# Patient Record
Sex: FEMALE | Race: WHITE | NOT HISPANIC OR LATINO | Employment: OTHER | URBAN - METROPOLITAN AREA
[De-identification: names, ages, dates, MRNs, and addresses within clinical notes are randomized per-mention and may not be internally consistent; named-entity substitution may affect disease eponyms.]

---

## 2020-12-11 ENCOUNTER — APPOINTMENT (OUTPATIENT)
Dept: CT IMAGING | Facility: HOSPITAL | Age: 75
End: 2020-12-11

## 2020-12-11 ENCOUNTER — HOSPITAL ENCOUNTER (EMERGENCY)
Facility: HOSPITAL | Age: 75
Discharge: HOME | End: 2020-12-11
Attending: EMERGENCY MEDICINE | Admitting: EMERGENCY MEDICINE

## 2020-12-11 VITALS
TEMPERATURE: 98.1 F | DIASTOLIC BLOOD PRESSURE: 96 MMHG | SYSTOLIC BLOOD PRESSURE: 138 MMHG | RESPIRATION RATE: 15 BRPM | BODY MASS INDEX: 29.88 KG/M2 | HEART RATE: 105 BPM | WEIGHT: 175 LBS | OXYGEN SATURATION: 95 % | HEIGHT: 64 IN

## 2020-12-11 DIAGNOSIS — S20.212A CHEST WALL CONTUSION, LEFT, INITIAL ENCOUNTER: ICD-10-CM

## 2020-12-11 DIAGNOSIS — S00.83XA FOREHEAD CONTUSION, INITIAL ENCOUNTER: Primary | ICD-10-CM

## 2020-12-11 PROCEDURE — 71250 CT THORAX DX C-: CPT

## 2020-12-11 PROCEDURE — 25010000002 ONDANSETRON PER 1 MG: Performed by: EMERGENCY MEDICINE

## 2020-12-11 PROCEDURE — 25010000002 MORPHINE PER 10 MG: Performed by: EMERGENCY MEDICINE

## 2020-12-11 PROCEDURE — 99283 EMERGENCY DEPT VISIT LOW MDM: CPT

## 2020-12-11 PROCEDURE — 96372 THER/PROPH/DIAG INJ SC/IM: CPT

## 2020-12-11 PROCEDURE — 70450 CT HEAD/BRAIN W/O DYE: CPT

## 2020-12-11 RX ORDER — HYDROCODONE BITARTRATE AND ACETAMINOPHEN 5; 325 MG/1; MG/1
1 TABLET ORAL EVERY 6 HOURS PRN
Qty: 12 TABLET | Refills: 0 | Status: SHIPPED | OUTPATIENT
Start: 2020-12-11 | End: 2022-07-12

## 2020-12-11 RX ORDER — MORPHINE SULFATE 4 MG/ML
4 INJECTION, SOLUTION INTRAMUSCULAR; INTRAVENOUS ONCE
Status: COMPLETED | OUTPATIENT
Start: 2020-12-11 | End: 2020-12-11

## 2020-12-11 RX ORDER — ONDANSETRON 2 MG/ML
4 INJECTION INTRAMUSCULAR; INTRAVENOUS ONCE
Status: COMPLETED | OUTPATIENT
Start: 2020-12-11 | End: 2020-12-11

## 2020-12-11 RX ADMIN — MORPHINE SULFATE 4 MG: 4 INJECTION INTRAVENOUS at 17:47

## 2020-12-11 RX ADMIN — ONDANSETRON 4 MG: 2 INJECTION, SOLUTION INTRAMUSCULAR; INTRAVENOUS at 17:47

## 2020-12-11 NOTE — ED NOTES
Pt reports she tripped over an uneven step on the walkway and fell. Pt reports pain 6/10.        Jyothi Henriquez, RN  12/11/20 2055

## 2020-12-12 NOTE — ED PROVIDER NOTES
Subjective   75-year-old female states she tripped and hit her forehead and left ribs.  She states it hurts to take a deep breath.  She reports no fever or chills.  She denies cough or hemoptysis.  She reports no abdominal pain nausea vomiting.  She reports no pain in the left upper quadrant.  He denies neck or back discomfort.  She denies paresthesias.  She reports no known novel coronavirus exposure          Review of Systems   Constitutional: Positive for fatigue. Negative for chills and fever.   HENT: Negative for sore throat and trouble swallowing.    Cardiovascular: Positive for chest pain. Negative for palpitations and leg swelling.   Gastrointestinal: Negative for abdominal pain and nausea.   Musculoskeletal: Negative for back pain and neck stiffness.   Hematological: Does not bruise/bleed easily.       No past medical history on file.  Patient has history of hypertension and has had previous episodes of pneumonia.  She states her tetanus status is up to date  No Known Allergies    No past surgical history on file.    No family history on file.    Social History     Socioeconomic History   • Marital status:      Spouse name: Not on file   • Number of children: Not on file   • Years of education: Not on file   • Highest education level: Not on file       No reported safety issues    Objective   Physical Exam  Alert Sarika Coma Scale 15   HEENT: Pupils equal and reactive to light. Conjunctivae are not injected. normal tympanic membranes. Oropharynx and nares are normal.  Fusion is noted to the left aspect of the forehead with a superficial abrasion there is no palpable fracture or step-off   Neck: Supple. Midline trachea. No JVD. No goiter.   Chest: Clear and equal breath sounds bilaterally regular rate and rhythm without murmur or rub.  There is diffuse discomfort mostly in the anterior axillary line in the lower third of the left ribs.  There is no subcutaneous air crepitance   Abdomen: Positive  bowel sounds nontender nondistended. No rebound or peritoneal signs. No CVA tenderness.   Extremities no clubbing cyanosis or edema motor sensory exam is normal the full range of motion is intact   skin: Warm and dry, no rashes or petechia.   Lymphatic: No regional lymphadenopathy. No calf pain, swelling or Corrine's sign    Procedures           ED Course                                   Labs Reviewed - No data to display  Medications   Morphine sulfate (PF) injection 4 mg (4 mg Intramuscular Given 12/11/20 1747)   ondansetron (ZOFRAN) injection 4 mg (4 mg Intramuscular Given 12/11/20 1747)     Ct Head Without Contrast    Result Date: 12/11/2020  1. Negative for evidence of intracranial injury. 2. Small left frontal scalp hematoma. 3. There is a 9 mm calcified structure in the medial left frontal area which may be intraparenchymal or extra-axial. It is considered incidental to the patient's history of trauma. It could be a meningioma but is not confirmed as such. Recommend consideration of nonemergent MR for further evaluation. 4. Minor white matter abnormality, most likely due to chronic small vessel ischemia.  Electronically Signed By-Kianna Bryant MD On:12/11/2020 6:10 PM This report was finalized on [id number] by  Kianna Bryant MD.    Ct Chest Without Contrast    Result Date: 12/11/2020  Impression: 1. Negative for evidence of active disease or injury to the chest. No rib fracture is identified. 2. There are a few small lung nodules. Based on Fleischner criteria, recommend consideration of CT follow-up in one year. 3. There is moderate chronic compression of T11. 4. There is mild cardiomegaly. There is coronary artery and aortic calcification.  Electronically Signed By-Kianna Bryant MD On:12/11/2020 6:20 PM This report was finalized on [id number] by  Kianna Bryant MD.            MDM  Number of Diagnoses or Management Options     Amount and/or Complexity of Data Reviewed  Tests in the radiology section of  CPT®: reviewed  Discuss the patient with other providers: yes  Independent visualization of images, tracings, or specimens: yes    Risk of Complications, Morbidity, and/or Mortality  Presenting problems: high  Diagnostic procedures: high  Management options: high  General comments: Stable ER course.  The patient is started on hydrocodone for pain.  She is encouraged to follow-up with her primary care provider.  The patient was stable at discharge and vocalized understanding of discharge instructions and warnings    Patient Progress  Patient progress: improved      Final diagnoses:   Forehead contusion, initial encounter   Chest wall contusion, left, initial encounter            Eagle Leroy MD  12/11/20 2030

## 2020-12-12 NOTE — DISCHARGE INSTRUCTIONS
No heavy lifting or pulling for the next 10 days  Rest elevate head next 24 hours  Cough and take a deep breath hourly while awake.  You can also use Tylenol or ibuprofen for discomfort

## 2020-12-31 ENCOUNTER — APPOINTMENT (OUTPATIENT)
Dept: GENERAL RADIOLOGY | Facility: HOSPITAL | Age: 75
End: 2020-12-31

## 2020-12-31 ENCOUNTER — APPOINTMENT (OUTPATIENT)
Dept: CT IMAGING | Facility: HOSPITAL | Age: 75
End: 2020-12-31

## 2020-12-31 ENCOUNTER — HOSPITAL ENCOUNTER (INPATIENT)
Facility: HOSPITAL | Age: 75
LOS: 1 days | Discharge: HOME | End: 2021-01-01
Attending: EMERGENCY MEDICINE | Admitting: HOSPITALIST

## 2020-12-31 DIAGNOSIS — K56.609 SMALL BOWEL OBSTRUCTION (HCC): ICD-10-CM

## 2020-12-31 DIAGNOSIS — R10.84 GENERALIZED ABDOMINAL PAIN: Primary | ICD-10-CM

## 2020-12-31 DIAGNOSIS — N39.0 URINARY TRACT INFECTION WITHOUT HEMATURIA, SITE UNSPECIFIED: ICD-10-CM

## 2020-12-31 PROBLEM — E03.9 HYPOTHYROIDISM: Status: ACTIVE | Noted: 2020-12-31

## 2020-12-31 PROBLEM — G89.29 CHRONIC BACK PAIN: Status: ACTIVE | Noted: 2020-12-31

## 2020-12-31 PROBLEM — M54.9 CHRONIC BACK PAIN: Status: ACTIVE | Noted: 2020-12-31

## 2020-12-31 PROBLEM — Z98.84 H/O GASTRIC BYPASS: Status: ACTIVE | Noted: 2020-12-31

## 2020-12-31 LAB
ALBUMIN SERPL-MCNC: 3.5 G/DL (ref 3.5–5.2)
ALBUMIN SERPL-MCNC: 3.7 G/DL (ref 3.5–5.2)
ALBUMIN/GLOB SERPL: 1.2 G/DL
ALBUMIN/GLOB SERPL: 1.2 G/DL
ALP SERPL-CCNC: 261 U/L (ref 39–117)
ALP SERPL-CCNC: 289 U/L (ref 39–117)
ALT SERPL W P-5'-P-CCNC: 12 U/L (ref 1–33)
ALT SERPL W P-5'-P-CCNC: 29 U/L (ref 1–33)
ANION GAP SERPL CALCULATED.3IONS-SCNC: 8 MMOL/L (ref 5–15)
ANION GAP SERPL CALCULATED.3IONS-SCNC: 9 MMOL/L (ref 5–15)
APTT PPP: 37.2 SECONDS (ref 24–31)
AST SERPL-CCNC: 19 U/L (ref 1–32)
AST SERPL-CCNC: 46 U/L (ref 1–32)
BACTERIA UR QL AUTO: ABNORMAL /HPF
BASOPHILS # BLD AUTO: 0 10*3/MM3 (ref 0–0.2)
BASOPHILS # BLD AUTO: 0 10*3/MM3 (ref 0–0.2)
BASOPHILS NFR BLD AUTO: 0.4 % (ref 0–1.5)
BASOPHILS NFR BLD AUTO: 0.6 % (ref 0–1.5)
BILIRUB SERPL-MCNC: 0.2 MG/DL (ref 0–1.2)
BILIRUB SERPL-MCNC: 0.3 MG/DL (ref 0–1.2)
BILIRUB UR QL STRIP: NEGATIVE
BUN SERPL-MCNC: 13 MG/DL (ref 8–23)
BUN SERPL-MCNC: 14 MG/DL (ref 8–23)
BUN/CREAT SERPL: 17.5 (ref 7–25)
BUN/CREAT SERPL: 17.6 (ref 7–25)
CALCIUM SPEC-SCNC: 8.2 MG/DL (ref 8.6–10.5)
CALCIUM SPEC-SCNC: 8.4 MG/DL (ref 8.6–10.5)
CHLORIDE SERPL-SCNC: 102 MMOL/L (ref 98–107)
CHLORIDE SERPL-SCNC: 102 MMOL/L (ref 98–107)
CLARITY UR: CLEAR
CO2 SERPL-SCNC: 26 MMOL/L (ref 22–29)
CO2 SERPL-SCNC: 28 MMOL/L (ref 22–29)
COLOR UR: YELLOW
CREAT SERPL-MCNC: 0.74 MG/DL (ref 0.57–1)
CREAT SERPL-MCNC: 0.8 MG/DL (ref 0.57–1)
D-LACTATE SERPL-SCNC: 0.3 MMOL/L (ref 0.5–2)
D-LACTATE SERPL-SCNC: 1.3 MMOL/L (ref 0.5–2)
DEPRECATED RDW RBC AUTO: 45.5 FL (ref 37–54)
DEPRECATED RDW RBC AUTO: 48.6 FL (ref 37–54)
EOSINOPHIL # BLD AUTO: 0.1 10*3/MM3 (ref 0–0.4)
EOSINOPHIL # BLD AUTO: 0.1 10*3/MM3 (ref 0–0.4)
EOSINOPHIL NFR BLD AUTO: 1.7 % (ref 0.3–6.2)
EOSINOPHIL NFR BLD AUTO: 1.9 % (ref 0.3–6.2)
ERYTHROCYTE [DISTWIDTH] IN BLOOD BY AUTOMATED COUNT: 14.2 % (ref 12.3–15.4)
ERYTHROCYTE [DISTWIDTH] IN BLOOD BY AUTOMATED COUNT: 14.8 % (ref 12.3–15.4)
GFR SERPL CREATININE-BSD FRML MDRD: 70 ML/MIN/1.73
GFR SERPL CREATININE-BSD FRML MDRD: 77 ML/MIN/1.73
GLOBULIN UR ELPH-MCNC: 2.9 GM/DL
GLOBULIN UR ELPH-MCNC: 3 GM/DL
GLUCOSE SERPL-MCNC: 135 MG/DL (ref 65–99)
GLUCOSE SERPL-MCNC: 92 MG/DL (ref 65–99)
GLUCOSE UR STRIP-MCNC: NEGATIVE MG/DL
HCT VFR BLD AUTO: 31.7 % (ref 34–46.6)
HCT VFR BLD AUTO: 34.8 % (ref 34–46.6)
HGB BLD-MCNC: 10.1 G/DL (ref 12–15.9)
HGB BLD-MCNC: 11 G/DL (ref 12–15.9)
HGB UR QL STRIP.AUTO: NEGATIVE
HOLD SPECIMEN: NORMAL
HYALINE CASTS UR QL AUTO: ABNORMAL /LPF
INR PPP: 1.5 (ref 0.93–1.1)
KETONES UR QL STRIP: NEGATIVE
LEUKOCYTE ESTERASE UR QL STRIP.AUTO: ABNORMAL
LIPASE SERPL-CCNC: 9 U/L (ref 13–60)
LYMPHOCYTES # BLD AUTO: 0.6 10*3/MM3 (ref 0.7–3.1)
LYMPHOCYTES # BLD AUTO: 0.8 10*3/MM3 (ref 0.7–3.1)
LYMPHOCYTES NFR BLD AUTO: 12.3 % (ref 19.6–45.3)
LYMPHOCYTES NFR BLD AUTO: 9.1 % (ref 19.6–45.3)
MAGNESIUM SERPL-MCNC: 2.3 MG/DL (ref 1.6–2.4)
MCH RBC QN AUTO: 29.2 PG (ref 26.6–33)
MCH RBC QN AUTO: 29.3 PG (ref 26.6–33)
MCHC RBC AUTO-ENTMCNC: 31.6 G/DL (ref 31.5–35.7)
MCHC RBC AUTO-ENTMCNC: 31.9 G/DL (ref 31.5–35.7)
MCV RBC AUTO: 91.7 FL (ref 79–97)
MCV RBC AUTO: 92.7 FL (ref 79–97)
MONOCYTES # BLD AUTO: 0.5 10*3/MM3 (ref 0.1–0.9)
MONOCYTES # BLD AUTO: 0.8 10*3/MM3 (ref 0.1–0.9)
MONOCYTES NFR BLD AUTO: 12.3 % (ref 5–12)
MONOCYTES NFR BLD AUTO: 7.6 % (ref 5–12)
NEUTROPHILS NFR BLD AUTO: 4.9 10*3/MM3 (ref 1.7–7)
NEUTROPHILS NFR BLD AUTO: 5.5 10*3/MM3 (ref 1.7–7)
NEUTROPHILS NFR BLD AUTO: 73.1 % (ref 42.7–76)
NEUTROPHILS NFR BLD AUTO: 81 % (ref 42.7–76)
NITRITE UR QL STRIP: POSITIVE
NRBC BLD AUTO-RTO: 0 /100 WBC (ref 0–0.2)
NRBC BLD AUTO-RTO: 0 /100 WBC (ref 0–0.2)
PH UR STRIP.AUTO: 5.5 [PH] (ref 5–8)
PLATELET # BLD AUTO: 329 10*3/MM3 (ref 140–450)
PLATELET # BLD AUTO: 361 10*3/MM3 (ref 140–450)
PMV BLD AUTO: 7.8 FL (ref 6–12)
PMV BLD AUTO: 7.8 FL (ref 6–12)
POTASSIUM SERPL-SCNC: 4.3 MMOL/L (ref 3.5–5.2)
POTASSIUM SERPL-SCNC: 4.3 MMOL/L (ref 3.5–5.2)
PROT SERPL-MCNC: 6.4 G/DL (ref 6–8.5)
PROT SERPL-MCNC: 6.7 G/DL (ref 6–8.5)
PROT UR QL STRIP: NEGATIVE
PROTHROMBIN TIME: 16.2 SECONDS (ref 9.6–11.7)
RBC # BLD AUTO: 3.46 10*6/MM3 (ref 3.77–5.28)
RBC # BLD AUTO: 3.75 10*6/MM3 (ref 3.77–5.28)
RBC # UR: ABNORMAL /HPF
REF LAB TEST METHOD: ABNORMAL
SARS-COV-2 ORF1AB RESP QL NAA+PROBE: NOT DETECTED
SODIUM SERPL-SCNC: 137 MMOL/L (ref 136–145)
SODIUM SERPL-SCNC: 138 MMOL/L (ref 136–145)
SP GR UR STRIP: 1.04 (ref 1–1.03)
SQUAMOUS #/AREA URNS HPF: ABNORMAL /HPF
TROPONIN T SERPL-MCNC: <0.01 NG/ML (ref 0–0.03)
UROBILINOGEN UR QL STRIP: ABNORMAL
WBC # BLD AUTO: 6.8 10*3/MM3 (ref 3.4–10.8)
WBC # BLD AUTO: 6.8 10*3/MM3 (ref 3.4–10.8)
WBC UR QL AUTO: ABNORMAL /HPF

## 2020-12-31 PROCEDURE — 83690 ASSAY OF LIPASE: CPT | Performed by: EMERGENCY MEDICINE

## 2020-12-31 PROCEDURE — 25010000002 MORPHINE PER 10 MG: Performed by: EMERGENCY MEDICINE

## 2020-12-31 PROCEDURE — 25010000002 PIPERACILLIN SOD-TAZOBACTAM PER 1 G: Performed by: EMERGENCY MEDICINE

## 2020-12-31 PROCEDURE — 83605 ASSAY OF LACTIC ACID: CPT

## 2020-12-31 PROCEDURE — 85025 COMPLETE CBC W/AUTO DIFF WBC: CPT | Performed by: EMERGENCY MEDICINE

## 2020-12-31 PROCEDURE — 0 IOPAMIDOL PER 1 ML: Performed by: EMERGENCY MEDICINE

## 2020-12-31 PROCEDURE — 81001 URINALYSIS AUTO W/SCOPE: CPT | Performed by: EMERGENCY MEDICINE

## 2020-12-31 PROCEDURE — 25010000002 CEFTRIAXONE PER 250 MG: Performed by: HOSPITALIST

## 2020-12-31 PROCEDURE — 84484 ASSAY OF TROPONIN QUANT: CPT | Performed by: EMERGENCY MEDICINE

## 2020-12-31 PROCEDURE — 74250 X-RAY XM SM INT 1CNTRST STD: CPT

## 2020-12-31 PROCEDURE — 99223 1ST HOSP IP/OBS HIGH 75: CPT | Performed by: HOSPITALIST

## 2020-12-31 PROCEDURE — 83735 ASSAY OF MAGNESIUM: CPT | Performed by: HOSPITALIST

## 2020-12-31 PROCEDURE — 74177 CT ABD & PELVIS W/CONTRAST: CPT

## 2020-12-31 PROCEDURE — U0004 COV-19 TEST NON-CDC HGH THRU: HCPCS | Performed by: INTERNAL MEDICINE

## 2020-12-31 PROCEDURE — 87086 URINE CULTURE/COLONY COUNT: CPT | Performed by: EMERGENCY MEDICINE

## 2020-12-31 PROCEDURE — 85025 COMPLETE CBC W/AUTO DIFF WBC: CPT | Performed by: HOSPITALIST

## 2020-12-31 PROCEDURE — 85730 THROMBOPLASTIN TIME PARTIAL: CPT | Performed by: EMERGENCY MEDICINE

## 2020-12-31 PROCEDURE — 99222 1ST HOSP IP/OBS MODERATE 55: CPT | Performed by: SURGERY

## 2020-12-31 PROCEDURE — 83605 ASSAY OF LACTIC ACID: CPT | Performed by: HOSPITALIST

## 2020-12-31 PROCEDURE — P9612 CATHETERIZE FOR URINE SPEC: HCPCS

## 2020-12-31 PROCEDURE — 93005 ELECTROCARDIOGRAM TRACING: CPT | Performed by: EMERGENCY MEDICINE

## 2020-12-31 PROCEDURE — 99285 EMERGENCY DEPT VISIT HI MDM: CPT

## 2020-12-31 PROCEDURE — 85610 PROTHROMBIN TIME: CPT | Performed by: EMERGENCY MEDICINE

## 2020-12-31 PROCEDURE — 25010000002 ONDANSETRON PER 1 MG: Performed by: EMERGENCY MEDICINE

## 2020-12-31 PROCEDURE — 87088 URINE BACTERIA CULTURE: CPT | Performed by: EMERGENCY MEDICINE

## 2020-12-31 PROCEDURE — 80053 COMPREHEN METABOLIC PANEL: CPT | Performed by: HOSPITALIST

## 2020-12-31 PROCEDURE — 87186 SC STD MICRODIL/AGAR DIL: CPT | Performed by: EMERGENCY MEDICINE

## 2020-12-31 PROCEDURE — 80053 COMPREHEN METABOLIC PANEL: CPT | Performed by: EMERGENCY MEDICINE

## 2020-12-31 RX ORDER — ONDANSETRON 2 MG/ML
4 INJECTION INTRAMUSCULAR; INTRAVENOUS ONCE
Status: COMPLETED | OUTPATIENT
Start: 2020-12-31 | End: 2020-12-31

## 2020-12-31 RX ORDER — LEVOTHYROXINE SODIUM 0.05 MG/1
50 TABLET ORAL DAILY
COMMUNITY
Start: 2020-09-09 | End: 2021-09-09

## 2020-12-31 RX ORDER — PANTOPRAZOLE SODIUM 40 MG/1
40 TABLET, DELAYED RELEASE ORAL DAILY
COMMUNITY
Start: 2020-09-03

## 2020-12-31 RX ORDER — ERGOCALCIFEROL 1.25 MG/1
50000 CAPSULE ORAL
COMMUNITY
Start: 2020-02-06 | End: 2021-02-05

## 2020-12-31 RX ORDER — FUROSEMIDE 20 MG/1
1-2 TABLET ORAL DAILY
COMMUNITY
Start: 2020-09-03

## 2020-12-31 RX ORDER — CITALOPRAM 40 MG/1
40 TABLET ORAL DAILY
COMMUNITY
Start: 2020-12-19 | End: 2021-12-19

## 2020-12-31 RX ORDER — SODIUM CHLORIDE 9 MG/ML
75 INJECTION, SOLUTION INTRAVENOUS CONTINUOUS
Status: DISCONTINUED | OUTPATIENT
Start: 2020-12-31 | End: 2021-01-01 | Stop reason: HOSPADM

## 2020-12-31 RX ORDER — SODIUM CHLORIDE 0.9 % (FLUSH) 0.9 %
10 SYRINGE (ML) INJECTION AS NEEDED
Status: DISCONTINUED | OUTPATIENT
Start: 2020-12-31 | End: 2021-01-01 | Stop reason: HOSPADM

## 2020-12-31 RX ORDER — LEVOTHYROXINE SODIUM 0.05 MG/1
50 TABLET ORAL
Status: DISCONTINUED | OUTPATIENT
Start: 2020-12-31 | End: 2021-01-01 | Stop reason: HOSPADM

## 2020-12-31 RX ORDER — METOPROLOL SUCCINATE 50 MG/1
100 TABLET, EXTENDED RELEASE ORAL DAILY
Status: DISCONTINUED | OUTPATIENT
Start: 2020-12-31 | End: 2021-01-01 | Stop reason: HOSPADM

## 2020-12-31 RX ORDER — MORPHINE SULFATE 4 MG/ML
4 INJECTION, SOLUTION INTRAMUSCULAR; INTRAVENOUS ONCE
Status: COMPLETED | OUTPATIENT
Start: 2020-12-31 | End: 2020-12-31

## 2020-12-31 RX ORDER — ALENDRONATE SODIUM 70 MG/1
70 TABLET ORAL
COMMUNITY
Start: 2020-08-21 | End: 2021-08-21

## 2020-12-31 RX ORDER — PANTOPRAZOLE SODIUM 40 MG/1
40 TABLET, DELAYED RELEASE ORAL DAILY
Status: DISCONTINUED | OUTPATIENT
Start: 2020-12-31 | End: 2021-01-01 | Stop reason: HOSPADM

## 2020-12-31 RX ORDER — METOPROLOL SUCCINATE 100 MG/1
100 TABLET, EXTENDED RELEASE ORAL DAILY
COMMUNITY
Start: 2020-05-03 | End: 2021-05-03

## 2020-12-31 RX ADMIN — LEVOTHYROXINE SODIUM 50 MCG: 0.05 TABLET ORAL at 10:38

## 2020-12-31 RX ADMIN — ONDANSETRON 4 MG: 2 INJECTION, SOLUTION INTRAMUSCULAR; INTRAVENOUS at 02:24

## 2020-12-31 RX ADMIN — CEFTRIAXONE SODIUM 1 G: 10 INJECTION, POWDER, FOR SOLUTION INTRAVENOUS at 09:47

## 2020-12-31 RX ADMIN — IOPAMIDOL 100 ML: 755 INJECTION, SOLUTION INTRAVENOUS at 03:41

## 2020-12-31 RX ADMIN — PIPERACILLIN AND TAZOBACTAM 3.38 G: 3; .375 INJECTION, POWDER, LYOPHILIZED, FOR SOLUTION INTRAVENOUS; PARENTERAL at 04:37

## 2020-12-31 RX ADMIN — SODIUM CHLORIDE 75 ML/HR: 9 INJECTION, SOLUTION INTRAVENOUS at 10:38

## 2020-12-31 RX ADMIN — MORPHINE SULFATE 4 MG: 4 INJECTION INTRAVENOUS at 02:24

## 2020-12-31 RX ADMIN — SODIUM CHLORIDE 500 ML: 9 INJECTION, SOLUTION INTRAVENOUS at 04:11

## 2020-12-31 RX ADMIN — PANTOPRAZOLE SODIUM 40 MG: 40 TABLET, DELAYED RELEASE ORAL at 10:38

## 2020-12-31 RX ADMIN — METOPROLOL SUCCINATE 100 MG: 50 TABLET, EXTENDED RELEASE ORAL at 10:38

## 2020-12-31 RX ADMIN — SODIUM CHLORIDE 500 ML: 9 INJECTION, SOLUTION INTRAVENOUS at 02:19

## 2021-01-01 VITALS
TEMPERATURE: 97.5 F | HEIGHT: 64 IN | DIASTOLIC BLOOD PRESSURE: 87 MMHG | HEART RATE: 119 BPM | WEIGHT: 183.42 LBS | RESPIRATION RATE: 18 BRPM | OXYGEN SATURATION: 94 % | BODY MASS INDEX: 31.31 KG/M2 | SYSTOLIC BLOOD PRESSURE: 132 MMHG

## 2021-01-01 PROBLEM — K43.9 VENTRAL HERNIA: Status: ACTIVE | Noted: 2021-01-01

## 2021-01-01 LAB
ALBUMIN SERPL-MCNC: 3.3 G/DL (ref 3.5–5.2)
ALBUMIN/GLOB SERPL: 1 G/DL
ALP SERPL-CCNC: 276 U/L (ref 39–117)
ALT SERPL W P-5'-P-CCNC: 23 U/L (ref 1–33)
ANION GAP SERPL CALCULATED.3IONS-SCNC: 6 MMOL/L (ref 5–15)
AST SERPL-CCNC: 35 U/L (ref 1–32)
BASOPHILS # BLD AUTO: 0 10*3/MM3 (ref 0–0.2)
BASOPHILS NFR BLD AUTO: 0.6 % (ref 0–1.5)
BILIRUB SERPL-MCNC: 0.4 MG/DL (ref 0–1.2)
BUN SERPL-MCNC: 12 MG/DL (ref 8–23)
BUN/CREAT SERPL: 16.4 (ref 7–25)
CALCIUM SPEC-SCNC: 8.3 MG/DL (ref 8.6–10.5)
CHLORIDE SERPL-SCNC: 105 MMOL/L (ref 98–107)
CO2 SERPL-SCNC: 28 MMOL/L (ref 22–29)
CREAT SERPL-MCNC: 0.73 MG/DL (ref 0.57–1)
CRP SERPL-MCNC: <0.3 MG/DL (ref 0–0.5)
D-LACTATE SERPL-SCNC: 0.9 MMOL/L (ref 0.5–2)
DEPRECATED RDW RBC AUTO: 47.7 FL (ref 37–54)
EOSINOPHIL # BLD AUTO: 0.2 10*3/MM3 (ref 0–0.4)
EOSINOPHIL NFR BLD AUTO: 3.6 % (ref 0.3–6.2)
ERYTHROCYTE [DISTWIDTH] IN BLOOD BY AUTOMATED COUNT: 14.6 % (ref 12.3–15.4)
GFR SERPL CREATININE-BSD FRML MDRD: 78 ML/MIN/1.73
GLOBULIN UR ELPH-MCNC: 3.2 GM/DL
GLUCOSE SERPL-MCNC: 85 MG/DL (ref 65–99)
HCT VFR BLD AUTO: 33.2 % (ref 34–46.6)
HGB BLD-MCNC: 10.4 G/DL (ref 12–15.9)
LYMPHOCYTES # BLD AUTO: 0.9 10*3/MM3 (ref 0.7–3.1)
LYMPHOCYTES NFR BLD AUTO: 19.1 % (ref 19.6–45.3)
MCH RBC QN AUTO: 29.1 PG (ref 26.6–33)
MCHC RBC AUTO-ENTMCNC: 31.2 G/DL (ref 31.5–35.7)
MCV RBC AUTO: 93 FL (ref 79–97)
MONOCYTES # BLD AUTO: 0.6 10*3/MM3 (ref 0.1–0.9)
MONOCYTES NFR BLD AUTO: 11.9 % (ref 5–12)
NEUTROPHILS NFR BLD AUTO: 3.1 10*3/MM3 (ref 1.7–7)
NEUTROPHILS NFR BLD AUTO: 64.8 % (ref 42.7–76)
NRBC BLD AUTO-RTO: 0.1 /100 WBC (ref 0–0.2)
PLATELET # BLD AUTO: 324 10*3/MM3 (ref 140–450)
PMV BLD AUTO: 7.9 FL (ref 6–12)
POTASSIUM SERPL-SCNC: 4.4 MMOL/L (ref 3.5–5.2)
PROT SERPL-MCNC: 6.5 G/DL (ref 6–8.5)
RBC # BLD AUTO: 3.56 10*6/MM3 (ref 3.77–5.28)
SODIUM SERPL-SCNC: 139 MMOL/L (ref 136–145)
WBC # BLD AUTO: 4.8 10*3/MM3 (ref 3.4–10.8)

## 2021-01-01 PROCEDURE — 85025 COMPLETE CBC W/AUTO DIFF WBC: CPT | Performed by: HOSPITALIST

## 2021-01-01 PROCEDURE — 99232 SBSQ HOSP IP/OBS MODERATE 35: CPT | Performed by: SURGERY

## 2021-01-01 PROCEDURE — 86140 C-REACTIVE PROTEIN: CPT | Performed by: HOSPITALIST

## 2021-01-01 PROCEDURE — 99238 HOSP IP/OBS DSCHRG MGMT 30/<: CPT | Performed by: HOSPITALIST

## 2021-01-01 PROCEDURE — 80053 COMPREHEN METABOLIC PANEL: CPT | Performed by: HOSPITALIST

## 2021-01-01 PROCEDURE — 83605 ASSAY OF LACTIC ACID: CPT | Performed by: HOSPITALIST

## 2021-01-01 RX ORDER — CEFDINIR 300 MG/1
300 CAPSULE ORAL 2 TIMES DAILY
Qty: 6 CAPSULE | Refills: 0 | Status: SHIPPED | OUTPATIENT
Start: 2021-01-01 | End: 2021-01-04

## 2021-01-01 RX ADMIN — LEVOTHYROXINE SODIUM 50 MCG: 0.05 TABLET ORAL at 05:01

## 2021-01-01 RX ADMIN — Medication 10 ML: at 08:39

## 2021-01-01 RX ADMIN — METOPROLOL SUCCINATE 100 MG: 50 TABLET, EXTENDED RELEASE ORAL at 08:38

## 2021-01-01 RX ADMIN — PANTOPRAZOLE SODIUM 40 MG: 40 TABLET, DELAYED RELEASE ORAL at 08:39

## 2021-01-01 NOTE — PROGRESS NOTES
GENERAL SURGERY PROGRESS NOTE    1/1/2021   LOS: 1 day   Patient Care Team:  Francine Hampton MD as PCP - General        Chief Complaint: Abdominal pain, nausea and vomiting    Subjective   HPI: Patient is a 75 y.o. female presents with sharp abdominal pain that radiated into her back and started yesterday and gradually got worse throughout the day.  She had some nausea and one bout of nonbloody nonbilious emesis.  She states that she had a bowel movement earlier yesterday, but it was firm.  She denies any fevers, chills, cough, congestion, chest pain.  Nothing seems to improve or worsen her pain which was severe at the time of her arriving at the emergency department, but has now improved substantially.  She denies any black or bloody stools.  Patient was found to have a ventral hernia, with concern for possible closed-loop bowel obstruction.  On physical exam the patient had a benign abdominal exam with a reducible ventral hernia and normal laboratory values.    Interval History:   The patient had a small bowel follow-through performed yesterday demonstrated no bowel obstruction.  This morning she is alert, feels well, and is hungry.  She has had multiple bowel movements following her small bowel follow-through.    Objective     Vital Signs  Temp:  [97.5 °F (36.4 °C)-98.5 °F (36.9 °C)] 97.5 °F (36.4 °C)  Heart Rate:  [111-119] 119  Resp:  [16-18] 18  BP: (100-132)/(68-87) 132/87    Physical Exam  Vitals signs reviewed.   Constitutional:       General: She is not in acute distress.     Appearance: She is obese. She is not ill-appearing.   HENT:      Head: Normocephalic and atraumatic.   Neck:      Musculoskeletal: Normal range of motion and neck supple.   Cardiovascular:      Rate and Rhythm: Normal rate and regular rhythm.   Pulmonary:      Effort: Pulmonary effort is normal. No respiratory distress.   Abdominal:      General: There is no distension.      Palpations: Abdomen is soft.      Tenderness: There is  no abdominal tenderness. There is no guarding or rebound.      Hernia: A hernia is present.   Musculoskeletal: Normal range of motion.         General: No swelling.   Skin:     General: Skin is warm and dry.   Neurological:      General: No focal deficit present.      Mental Status: She is alert and oriented to person, place, and time.   Psychiatric:         Mood and Affect: Mood normal.         Behavior: Behavior normal.         Thought Content: Thought content normal.         Judgment: Judgment normal.          Results Review:    Lab Results (last 24 hours)     Procedure Component Value Units Date/Time    C-reactive Protein [[id number]]  (Normal) Collected: 01/01/21 0403    Specimen: Blood Updated: 01/01/21 0549     C-Reactive Protein <0.30 mg/dL     Comprehensive Metabolic Panel [[id number]]  (Abnormal) Collected: 01/01/21 0403    Specimen: Blood Updated: 01/01/21 0449     Glucose 85 mg/dL      BUN 12 mg/dL      Creatinine 0.73 mg/dL      Sodium 139 mmol/L      Potassium 4.4 mmol/L      Chloride 105 mmol/L      CO2 28.0 mmol/L      Calcium 8.3 mg/dL      Total Protein 6.5 g/dL      Albumin 3.30 g/dL      ALT (SGPT) 23 U/L      AST (SGOT) 35 U/L      Alkaline Phosphatase 276 U/L      Total Bilirubin 0.4 mg/dL      eGFR Non African Amer 78 mL/min/1.73      Globulin 3.2 gm/dL      A/G Ratio 1.0 g/dL      BUN/Creatinine Ratio 16.4     Anion Gap 6.0 mmol/L     Narrative:      GFR Normal >60  Chronic Kidney Disease <60  Kidney Failure <15      Lactic Acid, Plasma [[id number]]  (Normal) Collected: 01/01/21 0403    Specimen: Blood Updated: 01/01/21 0436     Lactate 0.9 mmol/L     CBC & Differential [[id number]]  (Abnormal) Collected: 01/01/21 0403    Specimen: Blood Updated: 01/01/21 0424    Narrative:      The following orders were created for panel order CBC & Differential.  Procedure                               Abnormality         Status                     ---------                               -----------          ------                     CBC Auto Differential[[id number]]        Abnormal            Final result                 Please view results for these tests on the individual orders.    CBC Auto Differential [[id number]]  (Abnormal) Collected: 01/01/21 0403    Specimen: Blood Updated: 01/01/21 0424     WBC 4.80 10*3/mm3      RBC 3.56 10*6/mm3      Hemoglobin 10.4 g/dL      Hematocrit 33.2 %      MCV 93.0 fL      MCH 29.1 pg      MCHC 31.2 g/dL      RDW 14.6 %      RDW-SD 47.7 fl      MPV 7.9 fL      Platelets 324 10*3/mm3      Neutrophil % 64.8 %      Lymphocyte % 19.1 %      Monocyte % 11.9 %      Eosinophil % 3.6 %      Basophil % 0.6 %      Neutrophils, Absolute 3.10 10*3/mm3      Lymphocytes, Absolute 0.90 10*3/mm3      Monocytes, Absolute 0.60 10*3/mm3      Eosinophils, Absolute 0.20 10*3/mm3      Basophils, Absolute 0.00 10*3/mm3      nRBC 0.1 /100 WBC     Lactic Acid, Plasma [[id number]]  (Normal) Collected: 12/31/20 1542    Specimen: Blood Updated: 12/31/20 1609     Lactate 1.3 mmol/L     Comprehensive Metabolic Panel [[id number]]  (Abnormal) Collected: 12/31/20 1542    Specimen: Blood Updated: 12/31/20 1606     Glucose 92 mg/dL      BUN 13 mg/dL      Creatinine 0.74 mg/dL      Sodium 138 mmol/L      Potassium 4.3 mmol/L      Chloride 102 mmol/L      CO2 28.0 mmol/L      Calcium 8.2 mg/dL      Total Protein 6.7 g/dL      Albumin 3.70 g/dL      ALT (SGPT) 29 U/L      AST (SGOT) 46 U/L      Alkaline Phosphatase 289 U/L      Total Bilirubin 0.3 mg/dL      eGFR Non African Amer 77 mL/min/1.73      Globulin 3.0 gm/dL      A/G Ratio 1.2 g/dL      BUN/Creatinine Ratio 17.6     Anion Gap 8.0 mmol/L     Narrative:      GFR Normal >60  Chronic Kidney Disease <60  Kidney Failure <15      Magnesium [[id number]]  (Normal) Collected: 12/31/20 1542    Specimen: Blood Updated: 12/31/20 1606     Magnesium 2.3 mg/dL     CBC & Differential [[id number]]  (Abnormal) Collected: 12/31/20 1542    Specimen: Blood Updated: 12/31/20 1556     Narrative:      The following orders were created for panel order CBC & Differential.  Procedure                               Abnormality         Status                     ---------                               -----------         ------                     CBC Auto Differential[[id number]]        Abnormal            Final result                 Please view results for these tests on the individual orders.    CBC Auto Differential [[id number]]  (Abnormal) Collected: 12/31/20 1542    Specimen: Blood Updated: 12/31/20 1552     WBC 6.80 10*3/mm3      RBC 3.75 10*6/mm3      Hemoglobin 11.0 g/dL      Hematocrit 34.8 %      MCV 92.7 fL      MCH 29.3 pg      MCHC 31.6 g/dL      RDW 14.8 %      RDW-SD 48.6 fl      MPV 7.8 fL      Platelets 361 10*3/mm3      Neutrophil % 73.1 %      Lymphocyte % 12.3 %      Monocyte % 12.3 %      Eosinophil % 1.9 %      Basophil % 0.4 %      Neutrophils, Absolute 4.90 10*3/mm3      Lymphocytes, Absolute 0.80 10*3/mm3      Monocytes, Absolute 0.80 10*3/mm3      Eosinophils, Absolute 0.10 10*3/mm3      Basophils, Absolute 0.00 10*3/mm3      nRBC 0.0 /100 WBC     COVID PRE-OP / PRE-PROCEDURE SCREENING ORDER (NO ISOLATION) - Swab, Nasopharynx [[id number]]  (Normal) Collected: 12/31/20 0749    Specimen: Swab from Nasopharynx Updated: 12/31/20 1416    Narrative:      The following orders were created for panel order COVID PRE-OP / PRE-PROCEDURE SCREENING ORDER (NO ISOLATION) - Swab, Nasopharynx.  Procedure                               Abnormality         Status                     ---------                               -----------         ------                     COVID-19,APTIMA PANTHER,...[[id number]]  Normal              Final result                 Please view results for these tests on the individual orders.    COVID-19,[facility] IN-HOUSE, NP/OP SWAB IN UTM/VTM/[facility] TRANSPORT MEDIA,24 HR TAT - Swab, Nasopharynx [[id number]]  (Normal) Collected: 12/31/20 0749    Specimen: Swab from  Nasopharynx Updated: 12/31/20 1416     COVID19 Not Detected    Narrative:      Fact sheet for providers: https://www.fda.gov/media/261772/download     Fact sheet for patients: https://www.fda.gov/media/701193/download    Test performed by PCR.        Imaging Results (Last 24 Hours)     Procedure Component Value Units Date/Time    FL Small Bowel Follow Through Single-Contrast [[id number]] Collected: 12/31/20 1927     Updated: 12/31/20 1935    Narrative:      DATE OF EXAM:  12/31/2020 1:30 PM     PROCEDURE:  FL SMALL BOWEL FOLLOW THROUGH SINGLE-CONTRAST-     INDICATIONS:  Generalized abdominal pain, bowel obstruction secondary to a hernia.     COMPARISON:  CT of the abdomen and pelvis performed on 12/31/2020.     TECHNIQUE:    image of the abdomen was obtained. Patient ingested water-soluble  contrast for imaging of the small bowel.  Examination was recorded with  multiple large field of view radiographs and spot fluoroscopic images.     Fluoroscopic Time:   None used     FINDINGS:  The preliminary  KUB shows bowel sutures in the epigastric region  and left aspect of the abdomen. There is contrast material in the  bladder from yesterday's CT study. There are degenerative changes of the  thoracolumbar spine and scattered vascular calcifications.     The patient ingested water-soluble contrast. There is contrast seen  within the colon by 2 hours indicating no significant bowel obstruction.  The small bowel loops are nondilated. There is no abnormal fold  thickening. The patient has had previous gastric bypass surgery.        Impression:      No significant small bowel obstruction.     Electronically Signed By-Cyrus Beard MD On:12/31/2020 7:32 PM  This report was finalized on [id number] by  Cyrus Beard MD.           I have reviewed the above results and noted them below    Medication Review:    Current Facility-Administered Medications:   •  cefTRIAXone (ROCEPHIN) in SWFI 1 gram/10ml IV PUSH syringe, 1 g,  Intravenous, Q24H, Yonatan-Egziabher, Jhonny I, DO, 1 g at 12/31/20 0947  •  levothyroxine (SYNTHROID, LEVOTHROID) tablet 50 mcg, 50 mcg, Oral, Q AM, Yonatan-Egziabher, Jhonny I, DO, 50 mcg at 01/01/21 0501  •  metoprolol succinate XL (TOPROL-XL) 24 hr tablet 100 mg, 100 mg, Oral, Daily, Yonatan-Egziabher, Jhonny I, DO, 100 mg at 01/01/21 0838  •  pantoprazole (PROTONIX) EC tablet 40 mg, 40 mg, Oral, Daily, Yonatan-Egziabher, Jhonny I, DO, 40 mg at 01/01/21 0839  •  [COMPLETED] Insert peripheral IV, , , Once **AND** sodium chloride 0.9 % flush 10 mL, 10 mL, Intravenous, PRN, Dwayne Barahona MD, 10 mL at 01/01/21 0839  •  sodium chloride 0.9 % infusion, 75 mL/hr, Intravenous, Continuous, Yonatan-Egziabher, Jhonny I, DO, Last Rate: 75 mL/hr at 12/31/20 1038, 75 mL/hr at 12/31/20 1038    Assessment/Plan     Principal Problem:    SBO (small bowel obstruction) (CMS/HCC)  Active Problems:    Generalized abdominal pain    H/O gastric bypass    Hypothyroidism    Chronic back pain    UTI (urinary tract infection)    Small bowel follow-through noted.  No evidence of any obstruction.  Patient now having bowel function.  Patient is anticoagulated on Xarelto for atrial fibrillation, and thus would be high risk for bleeding in the perioperative setting.  Would recommend elective repair of her ventral hernia as an outpatient given the fact that she is not obstructed, and now otherwise feels well.  Discussed with the patient who is reluctant to undergo surgery.  Patient may eat a regular diet from a surgical standpoint and if tolerates is stable for discharge home.    Dwayne Vazquez MD  01/01/21  09:11 EST

## 2021-01-01 NOTE — DISCHARGE SUMMARY
[facility] Medicine Services  DISCHARGE SUMMARY        Prepared For PCP:  Francine Hampton MD    Patient Name: Fallon Nur  : 1945  MRN: [id number]      Date of Admission:   2020    Date of Discharge:  2021    Length of stay:  LOS: 1 day     Hospital Course     Presenting Problem:   Small bowel obstruction (CMS/HCC) [K56.609]  Generalized abdominal pain [R10.84]  Urinary tract infection without hematuria, site unspecified [N39.0]      Active Hospital Problems    Diagnosis  POA   • **SBO (small bowel obstruction) (CMS/HCC) [K56.609]  Yes     Priority: High   • Ventral hernia [K43.9]  Yes     Priority: High   • UTI (urinary tract infection) [N39.0]  Yes     Priority: High   • Generalized abdominal pain [R10.84]  Yes     Priority: Medium   • H/O gastric bypass [Z98.84]  Not Applicable     Priority: Medium   • Hypothyroidism [E03.9]  Yes     Priority: Medium   • Chronic back pain [M54.9, G89.29]  Yes     Priority: Medium      Resolved Hospital Problems   No resolved problems to display.           Hospital Course:    The patient was placed on observation.  She was evaluated by general surgeon.  The ventral hernia is reducible.  Small bowel follow-through did not show small bowel obstruction.  The patient has had multiple bowel movements overnight.  She will follow up with general surgeon in the outpatient setting for elective ventral hernia repair.  Xarelto will need to be stopped before she has surgery in the future.  The patient does have history of gastric ulcers and was told to avoid NSAIDs and alcohol.        Problem list addressed during hospitalization:      Small bowel obstruction: Resolved  -s/p CT abdomen/pelvis in ED--> suspected SBO.  Left fifth, sixth and seventh rib fracture.  Hepatic steatosis.  Hiatal hernia.  -General surgery consulted--> ventral hernia is reducible does no need to have surgical repair during hospitalization  -Small bowel  follow-through 12/31/20--> resolution of small bowel obstruction  -Follow-up with general surgeon for elective repair of ventral hernia     Urinary tract infection:   -s/p Rocephin  -Discharged on Omnicef    Left fifth, sixth and seventh rib fracture.    -Patient evaluated for fall on 12/11/20     Atrial fibrillation:  -On Toprol and Xarelto at home     Hypothyroidism:  -Synthroid     History of gastric bypass (1991) with revision complicated with ulcer:  -Denies NSAID use             Recommendation for Outpatient Providers:             Reasons For Change In Medications and Indications for New Medications:        Day of Discharge     HPI:     The patient is a 75-year-old female with history of gastric bypass with revision, gastric ulcers, chronic back pain on Norco, hypothyroidism, and atrial fibrillation on Xarelto.  Apparently the patient had a recent fall on 12/11/20 while ambulating outside and had recent aspiration of her left knee by her PCP.  She had come to the ED on 12/11/20 and had undergone CT of the head and chest.  It was significant for chronic T11 compression fracture and small pulmonary nodules and was sent home.     Apparently the patient has been having mostly epigastric intermittent abdominal pain with onset on 12/30/20 that became associated with one bout of vomiting before coming to the ED.  Her last bowel movement was yesterday.     The patient denies any fevers, chills, sweats, dysphagia, odontophagia, constipation or diarrhea     Last Xarelto dose was on 12/30/2020    Vital Signs:   Temp:  [97.5 °F (36.4 °C)-98.5 °F (36.9 °C)] 97.5 °F (36.4 °C)  Heart Rate:  [111-119] 119  Resp:  [16-18] 18  BP: (100-132)/(68-87) 132/87     Physical Exam:  Physical Exam  HENT:      Head: Normocephalic and atraumatic.      Mouth/Throat:      Mouth: Mucous membranes are moist.   Eyes:      General: No scleral icterus.     Extraocular Movements: Extraocular movements intact.      Pupils: Pupils are equal, round,  and reactive to light.   Neck:      Musculoskeletal: Normal range of motion.   Cardiovascular:      Rate and Rhythm: Normal rate and regular rhythm.   Pulmonary:      Effort: Pulmonary effort is normal.      Breath sounds: Normal breath sounds.   Abdominal:      General: Bowel sounds are normal.      Palpations: Abdomen is soft.   Musculoskeletal: Normal range of motion.   Skin:     General: Skin is warm.   Neurological:      General: No focal deficit present.      Mental Status: She is alert and oriented to person, place, and time.   Psychiatric:         Mood and Affect: Mood normal.         Pertinent  and/or Most Recent Results     Results from last 7 days   Lab Units 01/01/21 0403 12/31/20  1542 12/31/20  0236   WBC 10*3/mm3 4.80 6.80 6.80   HEMOGLOBIN g/dL 10.4* 11.0* 10.1*   HEMATOCRIT % 33.2* 34.8 31.7*   PLATELETS 10*3/mm3 324 361 329   SODIUM mmol/L 139 138 137   POTASSIUM mmol/L 4.4 4.3 4.3   CHLORIDE mmol/L 105 102 102   CO2 mmol/L 28.0 28.0 26.0   BUN mg/dL 12 13 14   CREATININE mg/dL 0.73 0.74 0.80   GLUCOSE mg/dL 85 92 135*   CALCIUM mg/dL 8.3* 8.2* 8.4*     Results from last 7 days   Lab Units 01/01/21 0403 12/31/20  1542 12/31/20  0236   BILIRUBIN mg/dL 0.4 0.3 0.2   ALK PHOS U/L 276* 289* 261*   ALT (SGPT) U/L 23 29 12   AST (SGOT) U/L 35* 46* 19   PROTIME Seconds  --   --  16.2*   INR   --   --  1.50*   APTT seconds  --   --  37.2*           Invalid input(s): TG, LDLCALC, LDLREALC  Results from last 7 days   Lab Units 01/01/21  0403 12/31/20  1542 12/31/20  0221 12/31/20  0218   TROPONIN T ng/mL  --   --   --  <0.010   LACTATE mmol/L 0.9 1.3 0.3*  --        Brief Urine Lab Results  (Last result in the past 365 days)      Color   Clarity   Blood   Leuk Est   Nitrite   Protein   CREAT   Urine HCG        12/31/20 0403 Yellow Clear Negative Small (1+) Positive Negative               Microbiology Results Abnormal     Procedure Component Value - Date/Time    Urine Culture - Urine, Urine, Catheter In/Out  [[id number]]  (Abnormal) Collected: 12/31/20 0403    Lab Status: Preliminary result Specimen: Urine, Catheter In/Out Updated: 01/01/21 1110     Urine Culture >100,000 CFU/mL Escherichia coli    COVID PRE-OP / PRE-PROCEDURE SCREENING ORDER (NO ISOLATION) - Swab, Nasopharynx [[id number]]  (Normal) Collected: 12/31/20 0749    Lab Status: Final result Specimen: Swab from Nasopharynx Updated: 12/31/20 1416    Narrative:      The following orders were created for panel order COVID PRE-OP / PRE-PROCEDURE SCREENING ORDER (NO ISOLATION) - Swab, Nasopharynx.  Procedure                               Abnormality         Status                     ---------                               -----------         ------                     COVID-19,APTIMA PANTHER,...[[id number]]  Normal              Final result                 Please view results for these tests on the individual orders.    COVID-19,APTIMA PANTHER,GERMÁN IN-HOUSE, NP/OP SWAB IN UTM/VTM/SALINE TRANSPORT MEDIA,24 HR TAT - Swab, Nasopharynx [[id number]]  (Normal) Collected: 12/31/20 0749    Lab Status: Final result Specimen: Swab from Nasopharynx Updated: 12/31/20 1416     COVID19 Not Detected    Narrative:      Fact sheet for providers: https://www.fda.gov/media/328010/download     Fact sheet for patients: https://www.fda.gov/media/283312/download    Test performed by PCR.          Ct Head Without Contrast    Result Date: 12/11/2020  Impression: 1. Negative for evidence of intracranial injury. 2. Small left frontal scalp hematoma. 3. There is a 9 mm calcified structure in the medial left frontal area which may be intraparenchymal or extra-axial. It is considered incidental to the patient's history of trauma. It could be a meningioma but is not confirmed as such. Recommend consideration of nonemergent MR for further evaluation. 4. Minor white matter abnormality, most likely due to chronic small vessel ischemia.  Electronically Signed By-Kianna Bryant MD On:12/11/2020 6:10 PM  This report was finalized on [id number] by  Kianna Bryant MD.    Ct Chest Without Contrast    Result Date: 12/11/2020  Impression: Impression: 1. Negative for evidence of active disease or injury to the chest. No rib fracture is identified. 2. There are a few small lung nodules. Based on Fleischner criteria, recommend consideration of CT follow-up in one year. 3. There is moderate chronic compression of T11. 4. There is mild cardiomegaly. There is coronary artery and aortic calcification.  Electronically Signed By-Kianna Bryant MD On:12/11/2020 6:20 PM This report was finalized on [id number] by  Kianna Bryant MD.    Ct Abdomen Pelvis With Contrast    Result Date: 12/31/2020  Impression: 1. Findings are consistent with a small bowel obstruction. This is a closed-loop small bowel obstruction. The distal zone of transition is likely related to an adhesion in the anterior abdomen. There is a fecal debris sign at the distal zone of transition. There is an additional proximal zone of transition related to an entrapped loop of small bowel within a right-sided periumbilical hernia. Surgical consultation is strongly recommended for repair as the patient would be at risk for developing small bowel ischemia. 2. Subtle fractures involving the left fifth, sixth and seventh ribs. 3. Cardiomegaly with coronary artery calcifications. 4. Small hiatal hernia. 5. Hepatic steatosis. 6. Additional incidental and chronic findings as above. Slot 63 Electronically signed by:  Kurt Mai M.D.  12/31/2020 1:59 AM    Fl Small Bowel Follow Through Single-contrast    Result Date: 12/31/2020  Impression: No significant small bowel obstruction.  Electronically Signed By-Cyrus Beard MD On:12/31/2020 7:32 PM This report was finalized on [id number] by  Cyrus Beard MD.                             Test Results Pending at Discharge  Pending Labs     Order Current Status    Urine Culture - Urine, Urine, Catheter In/Out Preliminary result             Procedures Performed           Consults:   Consults     Date and Time Order Name Status Description    12/31/2020 0407 Surgery (on-call MD unless specified) Completed     12/31/2020 0407 Hospitalist (on-call MD unless specified) Completed             Discharge Details        Discharge Medications      New Medications      Instructions Start Date   cefdinir 300 MG capsule  Commonly known as: OMNICEF   300 mg, Oral, 2 Times Daily         Continue These Medications      Instructions Start Date   alendronate 70 MG tablet  Commonly known as: FOSAMAX   70 mg, Oral, Every 7 Days      citalopram 40 MG tablet  Commonly known as: CeleXA   40 mg, Oral, Daily      furosemide 20 MG tablet  Commonly known as: LASIX   1-2 tablets, Oral, Daily      HYDROcodone-acetaminophen 5-325 MG per tablet  Commonly known as: NORCO   1 tablet, Oral, Every 6 Hours PRN      levothyroxine 50 MCG tablet  Commonly known as: SYNTHROID, LEVOTHROID   50 mcg, Oral, Daily      metoprolol succinate  MG 24 hr tablet  Commonly known as: TOPROL-XL   100 mg, Oral, Daily      pantoprazole 40 MG EC tablet  Commonly known as: PROTONIX   40 mg, Oral, Daily      vitamin D 1.25 MG (91790 UT) capsule capsule  Commonly known as: ERGOCALCIFEROL   50,000 Units, Oral, Every 7 Days             No Known Allergies      Discharge Disposition:      Diet:  Hospital:  Diet Order   Procedures   • Diet Regular         Discharge Activity: as tolerated        CODE STATUS:    Code Status and Medical Interventions:   Ordered at: 12/31/20 0903     Code Status:    CPR     Medical Interventions (Level of Support Prior to Arrest):    Full         Follow-up Appointments  No future appointments.    Additional Instructions for the Follow-ups that You Need to Schedule     Discharge Follow-up with PCP   As directed       Currently Documented PCP:    Francine Hampton MD    PCP Phone Number:    [phone]     Follow Up Details: 2 weeks                 Condition on  Discharge:      Stable      This patient has been examined wearing appropriate Personal Protective Equipment and discussed with nursing. 01/01/21      Electronically signed by Jhonny Junior DO, 01/01/21, 12:03 PM EST.      Time: I spent  15 minutes on this discharge activity which included face-to-face encounter with the patient/reviewing the data in the system/coordination of the care with the nursing staff as well as consultants/documentation/entering orders.

## 2021-01-01 NOTE — PROGRESS NOTES
Discharge Planning Assessment  [facility]     Patient Name: Fallon Nur  MRN: [id number]  Today's Date: 1/1/2021    Admit Date: 12/31/2020          Plan    Final Discharge Disposition Code  01 - home or self-care    Final Note  return home       Carol naegele rn  Case management  Office number [phone]  Cell phone [phone]

## 2021-01-01 NOTE — PLAN OF CARE
Goal Outcome Evaluation:  Plan of Care Reviewed With: patient  Progress: improving  Outcome Summary: patient rested well throughout the night.  up several times to the bathroom.  awaiting the result of her small bowel follow thru.  will continiue to monitor.

## 2021-01-02 LAB
BACTERIA SPEC AEROBE CULT: ABNORMAL
QT INTERVAL: 339 MS

## 2021-07-30 ENCOUNTER — OFFICE (OUTPATIENT)
Dept: URBAN - METROPOLITAN AREA CLINIC 64 | Facility: CLINIC | Age: 76
End: 2021-07-30

## 2021-07-30 VITALS
HEIGHT: 64 IN | WEIGHT: 184 LBS | SYSTOLIC BLOOD PRESSURE: 128 MMHG | HEART RATE: 82 BPM | DIASTOLIC BLOOD PRESSURE: 83 MMHG

## 2021-07-30 DIAGNOSIS — K30 FUNCTIONAL DYSPEPSIA: ICD-10-CM

## 2021-07-30 DIAGNOSIS — D50.0 IRON DEFICIENCY ANEMIA SECONDARY TO BLOOD LOSS (CHRONIC): ICD-10-CM

## 2021-07-30 PROCEDURE — 99203 OFFICE O/P NEW LOW 30 MIN: CPT | Performed by: INTERNAL MEDICINE

## 2021-07-30 RX ORDER — SUCRALFATE 1 G/1
TABLET ORAL
Qty: 0 | Refills: 0 | Status: ACTIVE

## 2021-08-17 ENCOUNTER — APPOINTMENT (OUTPATIENT)
Dept: GENERAL RADIOLOGY | Facility: HOSPITAL | Age: 76
End: 2021-08-17

## 2021-08-17 ENCOUNTER — HOSPITAL ENCOUNTER (EMERGENCY)
Facility: HOSPITAL | Age: 76
Discharge: HOME | End: 2021-08-17
Admitting: EMERGENCY MEDICINE

## 2021-08-17 VITALS
DIASTOLIC BLOOD PRESSURE: 85 MMHG | OXYGEN SATURATION: 92 % | WEIGHT: 191.36 LBS | RESPIRATION RATE: 18 BRPM | HEIGHT: 64 IN | SYSTOLIC BLOOD PRESSURE: 139 MMHG | TEMPERATURE: 98 F | HEART RATE: 74 BPM | BODY MASS INDEX: 32.67 KG/M2

## 2021-08-17 DIAGNOSIS — S80.01XA CONTUSION OF RIGHT KNEE, INITIAL ENCOUNTER: Primary | ICD-10-CM

## 2021-08-17 PROCEDURE — 99283 EMERGENCY DEPT VISIT LOW MDM: CPT

## 2021-08-17 PROCEDURE — 73564 X-RAY EXAM KNEE 4 OR MORE: CPT

## 2021-08-17 PROCEDURE — 63710000001 ONDANSETRON ODT 4 MG TABLET DISPERSIBLE: Performed by: NURSE PRACTITIONER

## 2021-08-17 RX ORDER — HYDROCODONE BITARTRATE AND ACETAMINOPHEN 7.5; 325 MG/1; MG/1
1 TABLET ORAL ONCE
Status: COMPLETED | OUTPATIENT
Start: 2021-08-17 | End: 2021-08-17

## 2021-08-17 RX ORDER — ONDANSETRON 4 MG/1
4 TABLET, ORALLY DISINTEGRATING ORAL ONCE
Status: COMPLETED | OUTPATIENT
Start: 2021-08-17 | End: 2021-08-17

## 2021-08-17 RX ADMIN — HYDROCODONE BITARTRATE AND ACETAMINOPHEN 1 TABLET: 7.5; 325 TABLET ORAL at 17:55

## 2021-08-17 RX ADMIN — ONDANSETRON 4 MG: 4 TABLET, ORALLY DISINTEGRATING ORAL at 17:55

## 2021-08-17 NOTE — ED PROVIDER NOTES
"Subjective   75-year-old female presents with complaint of right patellar pain status post mechanical fall.  Patient stated \"I tripped over my daughter's Eduardo lift.  I take care of her because she has CP\".  She reports a history of osteoarthritis.  Denies previous fracture.  She reports that she chronically gets injections in the knee by her primary care physician, and she has seen orthopedist, Dr. Mckeon.  Patient reports that she takes Xarelto for A. fib.    1. Location: Right patella  2. Quality: Sore  3. Severity: Moderate  4. Worsening factors: Bending, weightbearing  5. Alleviating factors: Rest  6. Onset: Prior to arrival  7. Radiation: Denies  8. Frequency: Intermittent  9. Co-morbidities: Past Medical History:  No date: Atrial fibrillation (CMS/HCC)  No date: Histoplasmosis  No date: Hypertension  No date: Legally blind  10. Source: Patient            Review of Systems   Musculoskeletal: Positive for arthralgias and gait problem. Negative for myalgias.   Skin: Negative for color change, pallor, rash and wound.   Neurological: Negative for weakness and numbness.   Hematological: Does not bruise/bleed easily.   All other systems reviewed and are negative.      Past Medical History:   Diagnosis Date   • Atrial fibrillation (CMS/HCC)    • Histoplasmosis    • Hypertension    • Legally blind        No Known Allergies    No past surgical history on file.    Family History   Problem Relation Age of Onset   • Cancer Mother    • Dementia Father    • Cancer Sister        Social History     Socioeconomic History   • Marital status:      Spouse name: Not on file   • Number of children: Not on file   • Years of education: Not on file   • Highest education level: Not on file   Tobacco Use   • Smoking status: Former Smoker   • Smokeless tobacco: Never Used   Vaping Use   • Vaping Use: Never used   Substance and Sexual Activity   • Alcohol use: Not Currently   • Drug use: Not Currently   • Sexual activity: Defer "           Objective   Physical Exam  Vitals and nursing note reviewed.   Constitutional:       General: She is awake. She is not in acute distress.     Appearance: Normal appearance. She is well-developed. She is obese. She is not toxic-appearing.   Cardiovascular:      Pulses: Normal pulses.           Dorsalis pedis pulses are 2+ on the right side and 2+ on the left side.        Posterior tibial pulses are 2+ on the right side and 2+ on the left side.   Musculoskeletal:         General: Swelling, tenderness and signs of injury present. No deformity.      Right knee: Swelling, ecchymosis and bony tenderness present. No erythema. Decreased range of motion. No tenderness. Normal pulse.      Left knee: Normal.        Legs:       Comments: Pain noted directly over the patella with moderate ecchymosis and mild swelling.  No obvious deformity noted.  Pain is increased with bending the knee.  Motor function and sensation intact.  Cap refill less than 2 seconds.  Distal pulses strong and equal bilaterally 2+.   Skin:     General: Skin is warm and dry.      Capillary Refill: Capillary refill takes less than 2 seconds.      Coloration: Skin is not pale.      Findings: Bruising present.   Neurological:      Mental Status: She is alert and oriented to person, place, and time.      Sensory: No sensory deficit.      Motor: No abnormal muscle tone.   Psychiatric:         Mood and Affect: Mood normal.         Behavior: Behavior normal. Behavior is cooperative.         Thought Content: Thought content normal.         Judgment: Judgment normal.         Procedures           ED Course    XR Knee 4+ View Right    Result Date: 8/17/2021  No acute osseous abnormality. A joint effusion is present with prepatellar soft tissue swelling which may be related to contusion. Internal derangement cannot be excluded. Moderate tricompartmental osteoarthritic changes are present, most pronounced within the patellofemoral compartment.  Electronically  "Signed By-Aspen Gant MD On:8/17/2021 5:56 PM This report was finalized on [id number] by  Aspen Gant MD.    Medications   HYDROcodone-acetaminophen (NORCO) 7.5-325 MG per tablet 1 tablet (1 tablet Oral Given 8/17/21 1755)   ondansetron ODT (ZOFRAN-ODT) disintegrating tablet 4 mg (4 mg Oral Given 8/17/21 1755)     Labs Reviewed - No data to display                                         MDM  Number of Diagnoses or Management Options  Contusion of right knee, initial encounter  Diagnosis management comments: Chart Review: 8/11/21 patient was seen by cardiologist for routine f/u.   Comorbidity: Past Medical History:  No date: Atrial fibrillation (CMS/HCC)  No date: Histoplasmosis  No date: Hypertension  No date: Legally blind  Imaging: Was interpreted by physician and reviewed by myself: XR Knee 4+ View Right   Final Result    No acute osseous abnormality. A joint effusion is present with    prepatellar soft tissue swelling which may be related to contusion.    Internal derangement cannot be excluded. Moderate tricompartmental    osteoarthritic changes are present, most pronounced within the    patellofemoral compartment.         Electronically Signed By-Aspen Gant MD On:8/17/2021 5:56 PM    This report was finalized on [id number] by  Aspen Gant MD.    Patient undressed and placed in gown for exam.  Appropriate PPE worn during patient exam. 75-year-old female presents with complaint of right patellar pain status post mechanical fall.  Patient stated \"I tripped over my daughter's Eduardo lift.  I take care of her because she has CP\".  She reports a history of osteoarthritis.  Denies previous fracture.  She reports that she chronically gets injections in the knee by her primary care physician, and she has seen orthopedist, Dr. Mckeon.  Patient reports that she takes Xarelto for A. Fib.Pain noted directly over the patella with moderate ecchymosis and mild swelling.  No obvious deformity noted.  Pain is " increased with bending the knee.  Motor function and sensation intact.  Cap refill less than 2 seconds.  Distal pulses strong and equal bilaterally 2+. Ice pack applied.  Patient was given Norco 7.5/325 p.o. x1 and Zofran 4 mg ODT.  X-ray obtained to the right knee with the above findings.  No acute findings on imaging.  Patient had an knee immobilizer applied.  She has a walker with her.  She requested Dr. Woo as her orthopedist.  She reports relief with medications given.    Disposition/Treatment: Discussed results with patient, verbalized understanding.  Discussed reasons to return to the ER, patient verbalized understanding.  Agreeable with plan of care.  Patient was stable upon discharge.    EMR Dragon transcription disclaimer:  Some of this encounter note is an electronic transcription translation of spoken language to printed text. The electronic translation of spoken language may permit erroneous, or at times, nonsensical words or phrases to be inadvertently transcribed; Although I have reviewed the note for such errors some may still exist.              Amount and/or Complexity of Data Reviewed  Tests in the radiology section of CPT®: reviewed    Patient Progress  Patient progress: stable      Final diagnoses:   Contusion of right knee, initial encounter       ED Disposition  ED Disposition     ED Disposition Condition Comment    Discharge Stable           Francine Hampton MD  [address] [place] [id number] [phone]    Schedule an appointment as soon as possible for a visit       Frederick Woo MD  [address] [facility] [place] [id number] [phone]    Schedule an appointment as soon as possible for a visit       [facility] EMERGENCY DEPARTMENT  [facility] [facility] [address] [phone]  Go to   If symptoms worsen         Medication List      No changes were made to your prescriptions during this visit.          Lucy Terry, APRN  08/17/21  1857

## 2021-08-17 NOTE — ED NOTES
Was using berna lift to get daughter up and tripped. Fell landing on her right knee.      Tonya Rudolph RN  08/17/21 0310

## 2021-08-17 NOTE — DISCHARGE INSTRUCTIONS
Apply ice every 2 hours while awake, on for 20 minutes.  Rotate Tylenol Motrin for pain.  Wear knee immobilizer and use walker as directed.  Schedule follow-up with orthopedist of your choice.  Return to the ER for new or worsening symptoms.

## 2022-07-12 ENCOUNTER — APPOINTMENT (OUTPATIENT)
Dept: GENERAL RADIOLOGY | Facility: HOSPITAL | Age: 77
End: 2022-07-12

## 2022-07-12 ENCOUNTER — HOSPITAL ENCOUNTER (EMERGENCY)
Facility: HOSPITAL | Age: 77
Discharge: HOME | End: 2022-07-12
Admitting: EMERGENCY MEDICINE

## 2022-07-12 VITALS
DIASTOLIC BLOOD PRESSURE: 72 MMHG | WEIGHT: 170 LBS | OXYGEN SATURATION: 100 % | HEIGHT: 60 IN | HEART RATE: 112 BPM | BODY MASS INDEX: 33.38 KG/M2 | RESPIRATION RATE: 18 BRPM | SYSTOLIC BLOOD PRESSURE: 123 MMHG | TEMPERATURE: 98.3 F

## 2022-07-12 DIAGNOSIS — S70.01XA CONTUSION OF RIGHT HIP, INITIAL ENCOUNTER: ICD-10-CM

## 2022-07-12 DIAGNOSIS — M25.462 EFFUSION, LEFT KNEE: ICD-10-CM
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
## 2022-07-12 PROCEDURE — 73521 X-RAY EXAM HIPS BI 2 VIEWS: CPT

## 2022-07-12 PROCEDURE — 72110 X-RAY EXAM L-2 SPINE 4/>VWS: CPT

## 2022-07-12 PROCEDURE — 73562 X-RAY EXAM OF KNEE 3: CPT

## 2022-07-12 PROCEDURE — 99283 EMERGENCY DEPT VISIT LOW MDM: CPT

## 2022-07-12 RX ORDER — HYDROCODONE BITARTRATE AND ACETAMINOPHEN 5; 325 MG/1; MG/1
1 TABLET ORAL EVERY 6 HOURS PRN
Qty: 8 TABLET | Refills: 0 | Status: SHIPPED | OUTPATIENT
Start: 2022-07-12

## 2022-07-12 RX ORDER — DIGOXIN 125 MCG
125 TABLET ORAL
COMMUNITY

## 2022-07-12 RX ORDER — DIGOXIN 125 MCG
125 TABLET ORAL ONCE
Status: COMPLETED | OUTPATIENT
Start: 2022-07-12 | End: 2022-07-12

## 2022-07-12 RX ADMIN — DIGOXIN 125 MCG: 125 TABLET ORAL at 14:53

## 2022-07-12 NOTE — ED PROVIDER NOTES
Subjective   Chief complaint: fall      Context: Patient is a 76-year-old female who comes in with her daughter after she fell yesterday and landed on her buttocks.  She states she has a history of chronic falls and gait abnormalities and typically uses a cane or walker.  She denies any syncope chest pain shortness of breath.  She does have some low back right hip and left knee pain although no direct trauma to her knee.  Has been ambulatory and weightbearing since then.  Denies striking her head or any loss of consciousness.  No saddle anesthesia bowel or bladder incontinence or retention.  Notably she did not take her Lasix or diuretics today but reports she did have them yesterday      Location: As above  Duration: Yesterday  Timing: Waxes and wanes  Quality/Severity: Moderate  Modifying factors: Worse with weightbearing range of motion  Associated symptoms:        Additional hx provided by:  daughter    PCP:  cory               Review of Systems   Constitutional: Negative for fever.   HENT: Negative.    Respiratory: Negative.    Cardiovascular: Negative.    Gastrointestinal: Negative.    Genitourinary: Negative.    Musculoskeletal: Positive for arthralgias and back pain.   Skin: Negative for rash.   Allergic/Immunologic: Negative for immunocompromised state.   Neurological: Negative for dizziness and syncope.   Hematological: Does not bruise/bleed easily.   Psychiatric/Behavioral: Negative for confusion.       Past Medical History:   Diagnosis Date   • Atrial fibrillation (HCC)    • Histoplasmosis    • Hypertension    • Legally blind        No Known Allergies    History reviewed. No pertinent surgical history.    Family History   Problem Relation Age of Onset   • Cancer Mother    • Dementia Father    • Cancer Sister        Social History     Socioeconomic History   • Marital status:    Tobacco Use   • Smoking status: Former Smoker   • Smokeless tobacco: Never Used   Vaping Use   • Vaping Use: Never used    Substance and Sexual Activity   • Alcohol use: Not Currently   • Drug use: Not Currently   • Sexual activity: Defer           Objective   Physical Exam     Vital signs and traige nurse note reviewed.  Constitutional:  Awake, alert; well developed and well nourished.  No acute distress, the patient is examined in hospital gown.  HEENT:  Normocephalic, atraumatic; pupils   with intact EOM; oropharynx is pink and moist without edema or erythema.  Neck: Supple, full range of motion without pain;    Cardiovascular: Irregularly irregular tachycardic rate and rhythm   Pulmonary: Respiratory effort regular, nonlabored; breath sounds CTA without wheezing or rhonchi.  Abdomen: Soft, nontender, nondistended with normoactive bowel sounds; no rebound or guarding.    Musculoskeletal: Independent range of motion of all extremities; +3 DP pulses bilaterally, negative Corrine bilaterally.  There is some swelling without erythema or ecchymosis noted to the left knee without laxity noted.  Back:  Spine is midline and without midline tenderness on palpation of cervical, thoracic; some midline lower lumbar spine pain without any bony step-off crepitus or noted ecchymosis; paraspinal musculature is nontender and without soft tissue swelling, overlying ecchymosis or erythema. ROM is without pain,  Distal muscle strength is 5/5.  Stable pelvic rock.  Neuro: Alert oriented x3, speech is clear and appropriate.  negative Babinski BLE, negative straight leg raise BLE, strong and equal dorsiflexion of bilateral great toes L4, L5, S1 motor sensory intact.        Procedures           ED Course      Labs Reviewed - No data to display  Medications   digoxin (LANOXIN) tablet 125 mcg (125 mcg Oral Given 7/12/22 1453)     XR Knee 3 View Left    Result Date: 7/12/2022    1.  Severe degenerative change of the medial joint compartment of the left knee.  This has worsened from prior exam. 2.  Small joint effusion. 3.  No acute fracture or dislocation  identified.  Electronically Signed By-Darrell Rodriguez MD On:7/12/2022 3:36 PM This report was finalized on [id number] by  Darrell Rodriguez MD.    XR Spine Lumbar Complete 4+VW    Result Date: 7/12/2022  No acute fracture or traumatic malalignment identified.  Electronically Signed By-Boni Carlson MD On:7/12/2022 3:36 PM This report was finalized on [id number] by  Boni Carlson MD.    XR Hips Bilateral With or Without Pelvis 2 View    Result Date: 7/12/2022   1.  Osteopenia and degenerative change of both hips.  No acute bony abnormality.  Electronically Signed By-Darrell Rodriguez MD On:7/12/2022 3:35 PM This report was finalized on [id number] by  Darrell Rodriguez MD.    Prior to Admission medications    Medication Sig Start Date End Date Taking? Authorizing Provider   citalopram (CeleXA) 40 MG tablet Take 40 mg by mouth Daily. 12/19/20 12/19/21  Mel Brower MD   digoxin (LANOXIN) 125 MCG tablet Take 125 mcg by mouth Daily.    ProviderMel MD   furosemide (LASIX) 20 MG tablet Take 1-2 tablets by mouth Daily. 9/3/20   Mel Borwer MD   HYDROcodone-acetaminophen (NORCO) 5-325 MG per tablet Take 1 tablet by mouth Every 6 (Six) Hours As Needed for Moderate Pain . 7/12/22   Erna Aguilar APRN   levothyroxine (SYNTHROID, LEVOTHROID) 50 MCG tablet Take 50 mcg by mouth Daily. 9/9/20 9/9/21  Mel Brower MD   metoprolol succinate XL (TOPROL-XL) 100 MG 24 hr tablet Take 100 mg by mouth Daily. 5/3/20 5/3/21  Mel Brower MD   pantoprazole (PROTONIX) 40 MG EC tablet Take 40 mg by mouth Daily. 9/3/20   Mel Brower MD   HYDROcodone-acetaminophen (NORCO) 5-325 MG per tablet Take 1 tablet by mouth Every 6 (Six) Hours As Needed for Moderate Pain  or Severe Pain . 12/11/20 7/12/22  Eagle Leroy MD                                          MDM  Number of Diagnoses or Management Options  Contusion of right hip, initial encounter  Effusion, left knee  Fall,  "initial encounter  Diagnosis management comments: /72   Pulse 112   Temp 98.3 °F (36.8 °C)   Resp 18   Ht 152.4 cm (60\")   Wt 77.1 kg (170 lb)   SpO2 100%   BMI 33.20 kg/m²      Inspect reviewed patient last had 20 tablets of Norco filled on June 13      Comorbidities:  has a past medical history of Atrial fibrillation (HCC), Histoplasmosis, Hypertension, and Legally blind.  Differentials:   not all inclusive of differentials considered  Radiology interpretation:  X-rays reviewed by me and interpreted by radiologist,   XR Knee 3 View Left    Result Date: 7/12/2022    1.  Severe degenerative change of the medial joint compartment of the left knee.  This has worsened from prior exam. 2.  Small joint effusion. 3.  No acute fracture or dislocation identified.  Electronically Signed By-Darrell Rodriguez MD On:7/12/2022 3:36 PM This report was finalized on [id number] by  Darrell Rodriguez MD.    XR Spine Lumbar Complete 4+VW    Result Date: 7/12/2022  No acute fracture or traumatic malalignment identified.  Electronically Signed By-Boni Carlson MD On:7/12/2022 3:36 PM This report was finalized on [id number] by  Boni Carlson MD.    XR Hips Bilateral With or Without Pelvis 2 View    Result Date: 7/12/2022   1.  Osteopenia and degenerative change of both hips.  No acute bony abnormality.  Electronically Signed By-Darrell Rodriguez MD On:7/12/2022 3:35 PM This report was finalized on [id number] by  Darrell Rodriguez MD.      Appropriate PPE worn during exam.  Patient was given her missed dose of digoxin.  On reexam resting comfortably with daughter at bedside.  She states she has a cane and walker at home and is ready to go home and follow-up with her orthopedist    i discussed findings with patient who voices understanding of discharge instructions, signs and symptoms requiring return to ED; discharged improved and in stable condition with follow up for re-evaluation.  This document is intended for " medical expert use only. Reading of this document by patients and/or patient's family without participating medical staff guidance may result in misinterpretation and unintended morbidity.  Any interpretation of such data is the responsibility of the patient and/or family member responsible for the patient in concert with their primary or specialist providers, not to be left for sources of online searches such as [employer], [employer] or similar queries. Relying on these approaches to knowledge may result in misinterpretation, misguided goals of care and even death should patients or family members try recommendations outside of the realm of professional medical care in a supervised inpatient environment.         Final diagnoses:   Fall, initial encounter   Effusion, left knee   Contusion of right hip, initial encounter       ED Disposition  ED Disposition     ED Disposition   Discharge    Condition   Stable    Comment   --             Francine Hampton MD  [address] [place] [address] [phone]               Medication List      Changed    HYDROcodone-acetaminophen 5-325 MG per tablet  Commonly known as: NORCO  Take 1 tablet by mouth Every 6 (Six) Hours As Needed for Moderate Pain .  What changed: reasons to take this           Where to Get Your Medications      These medications were sent to [id number] - [place], [place] - [address] AT [facility] & [place] - [phone]  - [phone] FX  [address] [facility] [place] [address]    Phone: [phone]   · HYDROcodone-acetaminophen 5-325 MG per tablet          Erna Aguilar, APRN  07/12/22 5686

## 2022-07-12 NOTE — DISCHARGE INSTRUCTIONS
Elevate your knee, use ice 20 as a time several times a day.  Medications as directed follow-up pharmacy precautions and warnings regarding your new prescriptions.  Follow-up with your orthopedist.  Return for any new or worsening symptoms

## 2023-05-19 ENCOUNTER — HOSPITAL ENCOUNTER (EMERGENCY)
Facility: HOSPITAL | Age: 78
Discharge: HOME | End: 2023-05-19
Attending: EMERGENCY MEDICINE
Payer: MEDICARE

## 2023-05-19 DIAGNOSIS — S61.411A LACERATION OF RIGHT HAND WITHOUT FOREIGN BODY, INITIAL ENCOUNTER: Primary | ICD-10-CM

## 2023-05-19 PROCEDURE — 90471 IMMUNIZATION ADMIN: CPT | Performed by: NURSE PRACTITIONER

## 2023-05-19 PROCEDURE — 99282 EMERGENCY DEPT VISIT SF MDM: CPT

## 2023-05-19 PROCEDURE — 90715 TDAP VACCINE 7 YRS/> IM: CPT | Performed by: NURSE PRACTITIONER

## 2023-05-19 PROCEDURE — 25010000002 TETANUS-DIPHTH-ACELL PERTUSSIS 5-2.5-18.5 LF-MCG/0.5 SUSPENSION PREFILLED SYRINGE: Performed by: NURSE PRACTITIONER

## 2023-05-19 RX ADMIN — TETANUS TOXOID, REDUCED DIPHTHERIA TOXOID AND ACELLULAR PERTUSSIS VACCINE, ADSORBED 0.5 ML: 5; 2.5; 8; 8; 2.5 SUSPENSION INTRAMUSCULAR at 18:49

## 2023-05-19 NOTE — DISCHARGE INSTRUCTIONS
Keep wound clean and dry, watch for infection, follow-up with prior provider for suture removal 10 days.  Return as needed.

## 2023-05-19 NOTE — ED NOTES
Patient ambulatory. No distress noted. Patient verbalized understanding of discharge instructions

## 2023-05-19 NOTE — ED PROVIDER NOTES
Subjective   History of Present Illness  77-year-old female presents with laceration to right hand.  She was using a drill when it slipped cutting her hand.  She presents with wound.  She has no other complaints.  Review of Systems    Past Medical History:   Diagnosis Date   • Atrial fibrillation    • Histoplasmosis    • Hypertension    • Legally blind        No Known Allergies    No past surgical history on file.    Family History   Problem Relation Age of Onset   • Cancer Mother    • Dementia Father    • Cancer Sister        Social History     Socioeconomic History   • Marital status:    Tobacco Use   • Smoking status: Former   • Smokeless tobacco: Never   Vaping Use   • Vaping Use: Never used   Substance and Sexual Activity   • Alcohol use: Not Currently   • Drug use: Not Currently   • Sexual activity: Defer     Prior to Admission medications    Medication Sig Start Date End Date Taking? Authorizing Provider   citalopram (CeleXA) 40 MG tablet Take 40 mg by mouth Daily. 12/19/20 12/19/21  Mel Brower MD   digoxin (LANOXIN) 125 MCG tablet Take 125 mcg by mouth Daily.    Mel Brower MD   furosemide (LASIX) 20 MG tablet Take 1-2 tablets by mouth Daily. 9/3/20   Mel Brower MD   HYDROcodone-acetaminophen (NORCO) 5-325 MG per tablet Take 1 tablet by mouth Every 6 (Six) Hours As Needed for Moderate Pain . 7/12/22   Erna Aguilar APRN   levothyroxine (SYNTHROID, LEVOTHROID) 50 MCG tablet Take 50 mcg by mouth Daily. 9/9/20 9/9/21  Mel Brower MD   metoprolol succinate XL (TOPROL-XL) 100 MG 24 hr tablet Take 100 mg by mouth Daily. 5/3/20 5/3/21  Mel Brower MD   pantoprazole (PROTONIX) 40 MG EC tablet Take 40 mg by mouth Daily. 9/3/20   Mel Brower MD           Objective   Physical Exam  77-year-old female awake alert.  Examination of right hand reveals a proximal based V-shaped flap laceration over the dorsal aspect of hand proximal to webspace of  index and long finger.  She is neurovascular tact distally without deficit.  Examination of the wound reveals no deep injury.  Laceration Repair    Date/Time: 5/19/2023 7:15 PM  Performed by: Rashid Brown MD  Authorized by: Rashid Brown MD     Consent:     Consent obtained:  Verbal  Universal protocol:     Patient identity confirmed:  Verbally with patient  Anesthesia:     Anesthesia method:  Local infiltration    Local anesthetic:  Lidocaine 1% w/o epi  Laceration details:     Location:  Hand    Length (cm):  5  Pre-procedure details:     Preparation:  Patient was prepped and draped in usual sterile fashion  Treatment:     Area cleansed with:  Chlorhexidine and saline    Amount of cleaning:  Standard  Skin repair:     Repair method:  Sutures    Suture size:  5-0    Suture material:  Nylon  Approximation:     Approximation:  Close  Post-procedure details:     Dressing:  Antibiotic ointment and sterile dressing  Comments:      The tip of the flap was resected and wound was closed with a Y revision using 5-0 Ethilon suture.               ED Course              No radiology results for the last day  Medications   Tetanus-Diphth-Acell Pertussis (BOOSTRIX) injection 0.5 mL (0.5 mL Intramuscular Given 5/19/23 8339)     There were no vitals taken for this visit.                                    MDM  Patient's findings were discussed with her.  The possibility of flap necrosis was discussed she is understanding of this.  She was given usual wound care instructions to follow-up with her primary provider return as needed.  Final diagnoses:   Laceration of right hand without foreign body, initial encounter       ED Disposition  ED Disposition     ED Disposition   Discharge    Condition   Stable    Comment   --             Francine Hampton MD  [address] [id number] [phone]               Medication List      No changes were made to your prescriptions during this visit.           Rashid Brown MD  05/19/23 1917

## 2023-07-08 PROBLEM — I48.91 ATRIAL FIBRILLATION WITH RAPID VENTRICULAR RESPONSE: Status: ACTIVE | Noted: 2023-07-08

## 2023-07-08 PROBLEM — K21.9 GERD (GASTROESOPHAGEAL REFLUX DISEASE): Status: ACTIVE | Noted: 2023-07-08

## 2023-07-10 PROBLEM — G93.41 ACUTE METABOLIC ENCEPHALOPATHY: Status: RESOLVED | Noted: 2023-07-10 | Resolved: 2023-07-10

## 2023-07-10 PROBLEM — G93.41 ACUTE METABOLIC ENCEPHALOPATHY: Status: ACTIVE | Noted: 2023-07-10

## 2023-07-10 PROBLEM — B96.20 E. COLI UTI: Status: ACTIVE | Noted: 2020-12-31

## 2023-07-10 PROBLEM — I48.0 PAROXYSMAL ATRIAL FIBRILLATION: Chronic | Status: ACTIVE | Noted: 2023-07-10

## 2023-07-10 PROBLEM — H54.7 BLIND: Chronic | Status: ACTIVE | Noted: 2023-07-10

## 2023-07-10 PROBLEM — I48.11 LONGSTANDING PERSISTENT ATRIAL FIBRILLATION: Chronic | Status: ACTIVE | Noted: 2023-07-10

## 2023-07-10 PROBLEM — I48.91 ATRIAL FIBRILLATION WITH RAPID VENTRICULAR RESPONSE: Status: RESOLVED | Noted: 2023-07-08 | Resolved: 2023-07-10

## 2024-08-01 ENCOUNTER — APPOINTMENT (OUTPATIENT)
Dept: GENERAL RADIOLOGY | Facility: HOSPITAL | Age: 79
End: 2024-08-01
Payer: MEDICARE

## 2024-08-01 ENCOUNTER — APPOINTMENT (OUTPATIENT)
Dept: CT IMAGING | Facility: HOSPITAL | Age: 79
End: 2024-08-01
Payer: MEDICARE

## 2024-08-01 ENCOUNTER — HOSPITAL ENCOUNTER (EMERGENCY)
Facility: HOSPITAL | Age: 79
Discharge: HOME | End: 2024-08-01
Payer: MEDICARE

## 2024-08-01 VITALS
HEIGHT: 61 IN | HEART RATE: 105 BPM | BODY MASS INDEX: 26.43 KG/M2 | SYSTOLIC BLOOD PRESSURE: 131 MMHG | OXYGEN SATURATION: 99 % | DIASTOLIC BLOOD PRESSURE: 84 MMHG | WEIGHT: 140 LBS | RESPIRATION RATE: 18 BRPM | TEMPERATURE: 98.1 F

## 2024-08-01 DIAGNOSIS — S80.02XA CONTUSION OF LEFT KNEE, INITIAL ENCOUNTER: ICD-10-CM

## 2024-08-01 DIAGNOSIS — R93.6 ABNORMAL X-RAY OF KNEE: ICD-10-CM

## 2024-08-01 DIAGNOSIS — M25.462 EFFUSION OF LEFT KNEE JOINT: ICD-10-CM
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
## 2024-08-01 PROCEDURE — 73564 X-RAY EXAM KNEE 4 OR MORE: CPT

## 2024-08-01 PROCEDURE — 99284 EMERGENCY DEPT VISIT MOD MDM: CPT

## 2024-08-01 PROCEDURE — 73700 CT LOWER EXTREMITY W/O DYE: CPT

## 2024-08-01 NOTE — DISCHARGE INSTRUCTIONS
Wear knee brace as instructed.  Elevate and ice over the next couple of days.  Keep your wound clean dry and covered.  Watch for signs of infection.  Based on all for pain.  Follow-up with orthopedic specialist.  Return for new or worsening symptoms.

## 2024-08-01 NOTE — ED PROVIDER NOTES
Subjective   History of Present Illness  Patient is a 78-year-old white female with a history of A-fib currently on Xarelto presents today with complaints of pain bruising and swelling to the left knee after she slipped and fell yesterday.  Complains of pain to the left knee when she bears weight.  Some mild discomfort to the right knee as well.  She did not hit her head or have any other injury or complaint at this time.        Review of Systems   Musculoskeletal:         Left knee pain/injury   Skin:  Positive for wound.        Left knee skin tear       Past Medical History:   Diagnosis Date    Atrial fibrillation     Blind 07/10/2023    Histoplasmosis     Hypertension     Legally blind     Longstanding persistent atrial fibrillation 07/10/2023    Paroxysmal atrial fibrillation 07/10/2023       No Known Allergies    No past surgical history on file.    Family History   Problem Relation Age of Onset    Cancer Mother     Dementia Father     Cancer Sister        Social History     Socioeconomic History    Marital status:    Tobacco Use    Smoking status: Former    Smokeless tobacco: Never   Vaping Use    Vaping status: Never Used   Substance and Sexual Activity    Alcohol use: Not Currently    Drug use: Not Currently    Sexual activity: Defer           Objective   Physical Exam  Vital signs and triage nurse note reviewed.  Constitutional: Awake, alert; well-developed and well-nourished. No acute distress is noted.  Musculoskeletal: Independent range of motion of all extremities.  Tenderness about the left knee diffusely.  There is overlying ecchymosis.  There is diffuse edema.  There is no crepitus.  There is good cap refill and sensation distally.  There is a large skin tear noted over the anterior aspect the left knee.  There is no active bleeding.  Skin: Flesh tone, warm, dry, intact; no erythematous or petechial rash or lesion.    Procedures           ED Course      Labs Reviewed - No data to display  CT  Lower Extremity Left Without Contrast    Result Date: 8/1/2024  1. Suspected acute nondisplaced fracture of a lateral patellar marginal osteophyte. 2. Moderate suprapatellar effusion. 3. Severe medial compartment and moderate patellofemoral prominent degenerative changes. Electronically Signed: Tri Hagan MD  8/1/2024 4:04 PM EDT  Workstation ID: [id number]    XR Knee 4+ View Left    Result Date: 8/1/2024  Impression: 1. Advanced degenerative changes of the knees bilaterally. Given limitations from underlying osteopenia no definite acute fracture or dislocation noted. Electronically Signed: Sean Travis MD  8/1/2024 2:16 PM EDT  Workstation ID: [id number]    XR Knee 4+ View Right    Result Date: 8/1/2024  Impression: 1. Advanced degenerative changes of the knees bilaterally. Given limitations from underlying osteopenia no definite acute fracture or dislocation noted. Electronically Signed: Sean Travis MD  8/1/2024 2:16 PM EDT  Workstation ID: [id number]    XR Wrist Comp Min 3 Vw RT    Result Date: 7/31/2024  3 x-ray views of the right wrist show a distal radius fracture in the early stages of healing with visible callus.  Radial height is well-maintained but there has been slight loss of radial inclination.  On the lateral view there is estimated to be 10 to 15 degrees of dorsal angulation of the distal radial fragment.   Medications - No data to display                                         Medical Decision Making  Patient presents today with above complaint.    She had above exam and evaluation.  X-rays were obtained.  Her skin tear was cleansed and dressed.    Workup: X-ray of the right knee shows degenerative changes, no acute abnormality.  X-ray of the left knee shows degenerative changes and given limitations of underlying osteopenia no definite fracture or dislocation noted.  CT of the left knee was obtained that shows a suspected acute nondisplaced fracture of a lateral patellar marginal osteophyte.   Moderate suprapatellar effusion and severe degenerative changes.    Findings were discussed with the patient.  She is placed in a hinged knee brace.  She remains neurovascular intact.  She is well-appearing and hemodynamically stable.  She is able to ambulate with a walker.  She will be discharged home and instructed to follow-up with orthopedic specialist.  She was given warning signs for prompt return to the ED and voiced her standing.      Problems Addressed:  Abnormal x-ray of knee: complicated acute illness or injury  Contusion of left knee, initial encounter: complicated acute illness or injury  Effusion of left knee joint: complicated acute illness or injury  Fall, initial encounter: complicated acute illness or injury    Amount and/or Complexity of Data Reviewed  Radiology: ordered.        Final diagnoses:   Fall, initial encounter   Effusion of left knee joint   Contusion of left knee, initial encounter   Abnormal x-ray of knee       ED Disposition  ED Disposition       ED Disposition   Discharge    Condition   Stable    Comment   --               Pierre Herrera MD  [address] [id number] [phone]    Schedule an appointment as soon as possible for a visit            Medication List      No changes were made to your prescriptions during this visit.            Deana Kellogg, AZAEL  08/01/24 9616

## 2024-08-04 ENCOUNTER — APPOINTMENT (OUTPATIENT)
Dept: GENERAL RADIOLOGY | Facility: HOSPITAL | Age: 79
End: 2024-08-04
Payer: MEDICARE

## 2024-08-04 ENCOUNTER — HOSPITAL ENCOUNTER (OUTPATIENT)
Facility: HOSPITAL | Age: 79
Setting detail: OBSERVATION
Discharge: HOME | End: 2024-08-05
Attending: EMERGENCY MEDICINE | Admitting: STUDENT IN AN ORGANIZED HEALTH CARE EDUCATION/TRAINING PROGRAM
Payer: MEDICARE

## 2024-08-04 DIAGNOSIS — S81.811A LEG LACERATION, RIGHT, INITIAL ENCOUNTER: ICD-10-CM

## 2024-08-04 DIAGNOSIS — I48.91 ATRIAL FIBRILLATION WITH RVR: Primary | ICD-10-CM

## 2024-08-04 LAB
ALBUMIN SERPL-MCNC: 3 G/DL (ref 3.5–5.2)
ALBUMIN/GLOB SERPL: 0.8 G/DL
ALP SERPL-CCNC: 173 U/L (ref 39–117)
ALT SERPL W P-5'-P-CCNC: 14 U/L (ref 1–33)
ANION GAP SERPL CALCULATED.3IONS-SCNC: 7.9 MMOL/L (ref 5–15)
AST SERPL-CCNC: 30 U/L (ref 1–32)
BASOPHILS # BLD AUTO: 0.04 10*3/MM3 (ref 0–0.2)
BASOPHILS NFR BLD AUTO: 0.5 % (ref 0–1.5)
BILIRUB SERPL-MCNC: 0.4 MG/DL (ref 0–1.2)
BUN SERPL-MCNC: 15 MG/DL (ref 8–23)
BUN/CREAT SERPL: 21.7 (ref 7–25)
CALCIUM SPEC-SCNC: 8.6 MG/DL (ref 8.6–10.5)
CHLORIDE SERPL-SCNC: 101 MMOL/L (ref 98–107)
CO2 SERPL-SCNC: 26.1 MMOL/L (ref 22–29)
CREAT SERPL-MCNC: 0.69 MG/DL (ref 0.57–1)
DEPRECATED RDW RBC AUTO: 49.4 FL (ref 37–54)
DIGOXIN SERPL-MCNC: 0.59 NG/ML (ref 0.6–1.2)
EGFRCR SERPLBLD CKD-EPI 2021: 89 ML/MIN/1.73
EOSINOPHIL # BLD AUTO: 0.22 10*3/MM3 (ref 0–0.4)
EOSINOPHIL NFR BLD AUTO: 2.7 % (ref 0.3–6.2)
ERYTHROCYTE [DISTWIDTH] IN BLOOD BY AUTOMATED COUNT: 14.2 % (ref 12.3–15.4)
GLOBULIN UR ELPH-MCNC: 3.6 GM/DL
GLUCOSE SERPL-MCNC: 110 MG/DL (ref 65–99)
HCT VFR BLD AUTO: 30.6 % (ref 34–46.6)
HGB BLD-MCNC: 9.3 G/DL (ref 12–15.9)
HOLD SPECIMEN: NORMAL
IMM GRANULOCYTES # BLD AUTO: 0.02 10*3/MM3 (ref 0–0.05)
IMM GRANULOCYTES NFR BLD AUTO: 0.2 % (ref 0–0.5)
LYMPHOCYTES # BLD AUTO: 1.2 10*3/MM3 (ref 0.7–3.1)
LYMPHOCYTES NFR BLD AUTO: 14.6 % (ref 19.6–45.3)
MAGNESIUM SERPL-MCNC: 2.3 MG/DL (ref 1.6–2.4)
MCH RBC QN AUTO: 28.9 PG (ref 26.6–33)
MCHC RBC AUTO-ENTMCNC: 30.4 G/DL (ref 31.5–35.7)
MCV RBC AUTO: 95 FL (ref 79–97)
MONOCYTES # BLD AUTO: 0.85 10*3/MM3 (ref 0.1–0.9)
MONOCYTES NFR BLD AUTO: 10.4 % (ref 5–12)
NEUTROPHILS NFR BLD AUTO: 5.87 10*3/MM3 (ref 1.7–7)
NEUTROPHILS NFR BLD AUTO: 71.6 % (ref 42.7–76)
NRBC BLD AUTO-RTO: 0 /100 WBC (ref 0–0.2)
PLATELET # BLD AUTO: 359 10*3/MM3 (ref 140–450)
PMV BLD AUTO: 10.8 FL (ref 6–12)
POTASSIUM SERPL-SCNC: 4.3 MMOL/L (ref 3.5–5.2)
PROT SERPL-MCNC: 6.6 G/DL (ref 6–8.5)
RBC # BLD AUTO: 3.22 10*6/MM3 (ref 3.77–5.28)
SODIUM SERPL-SCNC: 135 MMOL/L (ref 136–145)
TROPONIN T SERPL HS-MCNC: 15 NG/L
TSH SERPL DL<=0.05 MIU/L-ACNC: 4.45 UIU/ML (ref 0.27–4.2)
WBC NRBC COR # BLD AUTO: 8.2 10*3/MM3 (ref 3.4–10.8)
WHOLE BLOOD HOLD COAG: NORMAL

## 2024-08-04 PROCEDURE — 80162 ASSAY OF DIGOXIN TOTAL: CPT

## 2024-08-04 PROCEDURE — 83735 ASSAY OF MAGNESIUM: CPT | Performed by: EMERGENCY MEDICINE

## 2024-08-04 PROCEDURE — 96376 TX/PRO/DX INJ SAME DRUG ADON: CPT

## 2024-08-04 PROCEDURE — 96365 THER/PROPH/DIAG IV INF INIT: CPT

## 2024-08-04 PROCEDURE — G0378 HOSPITAL OBSERVATION PER HR: HCPCS

## 2024-08-04 PROCEDURE — 96366 THER/PROPH/DIAG IV INF ADDON: CPT

## 2024-08-04 PROCEDURE — 99285 EMERGENCY DEPT VISIT HI MDM: CPT

## 2024-08-04 PROCEDURE — 93005 ELECTROCARDIOGRAM TRACING: CPT | Performed by: EMERGENCY MEDICINE

## 2024-08-04 PROCEDURE — 36415 COLL VENOUS BLD VENIPUNCTURE: CPT

## 2024-08-04 PROCEDURE — 85025 COMPLETE CBC W/AUTO DIFF WBC: CPT | Performed by: EMERGENCY MEDICINE

## 2024-08-04 PROCEDURE — 84484 ASSAY OF TROPONIN QUANT: CPT | Performed by: EMERGENCY MEDICINE

## 2024-08-04 PROCEDURE — 84443 ASSAY THYROID STIM HORMONE: CPT | Performed by: EMERGENCY MEDICINE

## 2024-08-04 PROCEDURE — 73590 X-RAY EXAM OF LOWER LEG: CPT

## 2024-08-04 PROCEDURE — 80053 COMPREHEN METABOLIC PANEL: CPT | Performed by: EMERGENCY MEDICINE

## 2024-08-04 RX ORDER — AMOXICILLIN AND CLAVULANATE POTASSIUM 875; 125 MG/1; MG/1
1 TABLET, FILM COATED ORAL ONCE
Status: COMPLETED | OUTPATIENT
Start: 2024-08-04 | End: 2024-08-04

## 2024-08-04 RX ORDER — CETIRIZINE HYDROCHLORIDE 10 MG/1
10 TABLET ORAL DAILY
Status: DISCONTINUED | OUTPATIENT
Start: 2024-08-04 | End: 2024-08-05 | Stop reason: HOSPADM

## 2024-08-04 RX ORDER — ACETAMINOPHEN 160 MG/5ML
650 SOLUTION ORAL EVERY 4 HOURS PRN
Status: DISCONTINUED | OUTPATIENT
Start: 2024-08-04 | End: 2024-08-05 | Stop reason: HOSPADM

## 2024-08-04 RX ORDER — DILTIAZEM HCL/D5W 125 MG/125
5-15 PLASTIC BAG, INJECTION (ML) INTRAVENOUS
Status: DISCONTINUED | OUTPATIENT
Start: 2024-08-04 | End: 2024-08-05 | Stop reason: HOSPADM

## 2024-08-04 RX ORDER — HYDROCODONE BITARTRATE AND ACETAMINOPHEN 5; 325 MG/1; MG/1
1 TABLET ORAL DAILY PRN
Status: DISCONTINUED | OUTPATIENT
Start: 2024-08-04 | End: 2024-08-05 | Stop reason: HOSPADM

## 2024-08-04 RX ORDER — CITALOPRAM 20 MG/1
20 TABLET ORAL DAILY
Status: DISCONTINUED | OUTPATIENT
Start: 2024-08-04 | End: 2024-08-05 | Stop reason: HOSPADM

## 2024-08-04 RX ORDER — ACETAMINOPHEN 650 MG/1
650 SUPPOSITORY RECTAL EVERY 4 HOURS PRN
Status: DISCONTINUED | OUTPATIENT
Start: 2024-08-04 | End: 2024-08-05 | Stop reason: HOSPADM

## 2024-08-04 RX ORDER — LEVOTHYROXINE SODIUM 0.05 MG/1
50 TABLET ORAL DAILY
COMMUNITY

## 2024-08-04 RX ORDER — LEVOTHYROXINE SODIUM 0.05 MG/1
50 TABLET ORAL
Status: DISCONTINUED | OUTPATIENT
Start: 2024-08-04 | End: 2024-08-05 | Stop reason: HOSPADM

## 2024-08-04 RX ORDER — DIGOXIN 125 MCG
125 TABLET ORAL
Status: DISCONTINUED | OUTPATIENT
Start: 2024-08-04 | End: 2024-08-05 | Stop reason: HOSPADM

## 2024-08-04 RX ORDER — OXYCODONE HYDROCHLORIDE 5 MG/1
5 TABLET ORAL EVERY 6 HOURS PRN
Status: DISCONTINUED | OUTPATIENT
Start: 2024-08-04 | End: 2024-08-05 | Stop reason: HOSPADM

## 2024-08-04 RX ORDER — DILTIAZEM HYDROCHLORIDE 5 MG/ML
10 INJECTION INTRAVENOUS ONCE
Status: COMPLETED | OUTPATIENT
Start: 2024-08-04 | End: 2024-08-04

## 2024-08-04 RX ORDER — LIDOCAINE HYDROCHLORIDE AND EPINEPHRINE 10; 10 MG/ML; UG/ML
10 INJECTION, SOLUTION INFILTRATION; PERINEURAL ONCE
Status: COMPLETED | OUTPATIENT
Start: 2024-08-04 | End: 2024-08-04

## 2024-08-04 RX ORDER — CHLORHEXIDINE GLUCONATE 4 %
1 LIQUID (ML) TOPICAL NIGHTLY PRN
COMMUNITY

## 2024-08-04 RX ORDER — ZOLPIDEM TARTRATE 5 MG/1
5 TABLET ORAL NIGHTLY PRN
Status: DISCONTINUED | OUTPATIENT
Start: 2024-08-04 | End: 2024-08-05 | Stop reason: HOSPADM

## 2024-08-04 RX ORDER — DIAPER,BRIEF,INFANT-TODD,DISP
1 EACH MISCELLANEOUS EVERY 12 HOURS SCHEDULED
Status: DISCONTINUED | OUTPATIENT
Start: 2024-08-04 | End: 2024-08-05 | Stop reason: HOSPADM

## 2024-08-04 RX ORDER — BUPROPION HYDROCHLORIDE 150 MG/1
150 TABLET ORAL DAILY
Status: DISCONTINUED | OUTPATIENT
Start: 2024-08-04 | End: 2024-08-05 | Stop reason: HOSPADM

## 2024-08-04 RX ORDER — DIGOXIN 125 MCG
125 TABLET ORAL
COMMUNITY

## 2024-08-04 RX ORDER — ZOLPIDEM TARTRATE 5 MG/1
5 TABLET ORAL
COMMUNITY

## 2024-08-04 RX ORDER — PANTOPRAZOLE SODIUM 40 MG/1
40 TABLET, DELAYED RELEASE ORAL DAILY
Status: DISCONTINUED | OUTPATIENT
Start: 2024-08-04 | End: 2024-08-05 | Stop reason: HOSPADM

## 2024-08-04 RX ORDER — ACETAMINOPHEN 325 MG/1
650 TABLET ORAL EVERY 4 HOURS PRN
Status: DISCONTINUED | OUTPATIENT
Start: 2024-08-04 | End: 2024-08-05 | Stop reason: HOSPADM

## 2024-08-04 RX ORDER — UREA 10 %
1000 LOTION (ML) TOPICAL DAILY
Status: DISCONTINUED | OUTPATIENT
Start: 2024-08-04 | End: 2024-08-05 | Stop reason: HOSPADM

## 2024-08-04 RX ORDER — CITALOPRAM 20 MG/1
20 TABLET ORAL DAILY
COMMUNITY

## 2024-08-04 RX ORDER — FUROSEMIDE 20 MG/1
20 TABLET ORAL DAILY PRN
Status: DISCONTINUED | OUTPATIENT
Start: 2024-08-04 | End: 2024-08-05 | Stop reason: HOSPADM

## 2024-08-04 RX ORDER — ONDANSETRON 4 MG/1
4 TABLET, ORALLY DISINTEGRATING ORAL EVERY 6 HOURS PRN
Status: DISCONTINUED | OUTPATIENT
Start: 2024-08-04 | End: 2024-08-05 | Stop reason: HOSPADM

## 2024-08-04 RX ORDER — FERROUS SULFATE 324(65)MG
324 TABLET, DELAYED RELEASE (ENTERIC COATED) ORAL
Status: DISCONTINUED | OUTPATIENT
Start: 2024-08-04 | End: 2024-08-05 | Stop reason: HOSPADM

## 2024-08-04 RX ORDER — METOPROLOL SUCCINATE 25 MG/1
25 TABLET, EXTENDED RELEASE ORAL
Status: DISCONTINUED | OUTPATIENT
Start: 2024-08-04 | End: 2024-08-05 | Stop reason: HOSPADM

## 2024-08-04 RX ORDER — ONDANSETRON 2 MG/ML
4 INJECTION INTRAMUSCULAR; INTRAVENOUS EVERY 6 HOURS PRN
Status: DISCONTINUED | OUTPATIENT
Start: 2024-08-04 | End: 2024-08-05 | Stop reason: HOSPADM

## 2024-08-04 RX ADMIN — BUPROPION HYDROCHLORIDE 150 MG: 150 TABLET, EXTENDED RELEASE ORAL at 13:37

## 2024-08-04 RX ADMIN — AMOXICILLIN AND CLAVULANATE POTASSIUM 1 TABLET: 875; 125 TABLET, FILM COATED ORAL at 09:13

## 2024-08-04 RX ADMIN — FERROUS SULFATE TAB EC 324 MG (65 MG FE EQUIVALENT) 324 MG: 324 (65 FE) TABLET DELAYED RESPONSE at 13:34

## 2024-08-04 RX ADMIN — Medication 5 MG/HR: at 09:13

## 2024-08-04 RX ADMIN — CETIRIZINE HYDROCHLORIDE 10 MG: 10 TABLET, FILM COATED ORAL at 13:34

## 2024-08-04 RX ADMIN — LIDOCAINE HYDROCHLORIDE,EPINEPHRINE BITARTRATE 10 ML: 10; .01 INJECTION, SOLUTION INFILTRATION; PERINEURAL at 07:25

## 2024-08-04 RX ADMIN — HYDROCODONE BITARTRATE AND ACETAMINOPHEN 1 TABLET: 5; 325 TABLET ORAL at 19:52

## 2024-08-04 RX ADMIN — PANTOPRAZOLE SODIUM 40 MG: 40 TABLET, DELAYED RELEASE ORAL at 13:34

## 2024-08-04 RX ADMIN — METOPROLOL SUCCINATE 25 MG: 25 TABLET, EXTENDED RELEASE ORAL at 13:34

## 2024-08-04 RX ADMIN — CITALOPRAM HYDROBROMIDE 20 MG: 20 TABLET ORAL at 13:35

## 2024-08-04 RX ADMIN — CYANOCOBALAMIN TAB 500 MCG 1000 MCG: 500 TAB at 13:34

## 2024-08-04 RX ADMIN — DIGOXIN 125 MCG: 125 TABLET ORAL at 13:34

## 2024-08-04 RX ADMIN — DILTIAZEM HYDROCHLORIDE 10 MG: 5 INJECTION, SOLUTION INTRAVENOUS at 07:47

## 2024-08-04 RX ADMIN — LEVOTHYROXINE SODIUM 50 MCG: 0.05 TABLET ORAL at 13:34

## 2024-08-04 RX ADMIN — RIVAROXABAN 20 MG: 20 TABLET, FILM COATED ORAL at 17:13

## 2024-08-04 RX ADMIN — BACITRACIN 0.9 G: 500 OINTMENT TOPICAL at 19:53

## 2024-08-04 RX ADMIN — BACITRACIN 0.9 G: 500 OINTMENT TOPICAL at 09:19

## 2024-08-04 RX ADMIN — Medication 12.5 MG/HR: at 18:56

## 2024-08-04 RX ADMIN — Medication 10 MG: at 19:52

## 2024-08-04 NOTE — PLAN OF CARE
Goal Outcome Evaluation:  Plan of Care Reviewed With: patient                                           Problem: Adult Inpatient Plan of Care  Goal: Plan of Care Review  Outcome: Ongoing, Progressing  Flowsheets (Taken 8/4/2024 1741)  Plan of Care Reviewed With: patient  Goal: Patient-Specific Goal (Individualized)  Outcome: Ongoing, Progressing  Goal: Absence of Hospital-Acquired Illness or Injury  Outcome: Ongoing, Progressing  Intervention: Identify and Manage Fall Risk  Recent Flowsheet Documentation  Taken 8/4/2024 1642 by Shanon Gan LPN  Safety Promotion/Fall Prevention:   safety round/check completed   room organization consistent   nonskid shoes/slippers when out of bed   mobility aid in reach   gait belt   lighting adjusted   fall prevention program maintained   clutter free environment maintained   activity supervised   assistive device/personal items within reach  Taken 8/4/2024 1433 by Shanon Gan LPN  Safety Promotion/Fall Prevention:   safety round/check completed   room organization consistent   nonskid shoes/slippers when out of bed   mobility aid in reach   lighting adjusted   gait belt   fall prevention program maintained   clutter free environment maintained   activity supervised   assistive device/personal items within reach  Taken 8/4/2024 1248 by Shanon Gan LPN  Safety Promotion/Fall Prevention:   safety round/check completed   room organization consistent   nonskid shoes/slippers when out of bed   lighting adjusted   mobility aid in reach   gait belt   fall prevention program maintained   clutter free environment maintained   assistive device/personal items within reach   activity supervised  Intervention: Prevent Skin Injury  Recent Flowsheet Documentation  Taken 8/4/2024 1642 by Shanon Gan LPN  Body Position: position changed independently  Taken 8/4/2024 1248 by Shanon Gan LPN  Body Position: position changed independently  Intervention: Prevent and Manage VTE (Venous  Thromboembolism) Risk  Recent Flowsheet Documentation  Taken 8/4/2024 1642 by Shanon Gan LPN  Activity Management: activity encouraged  VTE Prevention/Management:   bilateral   sequential compression devices off  Taken 8/4/2024 1248 by Shanon Gan LPN  Activity Management: activity encouraged  Intervention: Prevent Infection  Recent Flowsheet Documentation  Taken 8/4/2024 1642 by Shanon Gan LPN  Infection Prevention:   single patient room provided   rest/sleep promoted   personal protective equipment utilized   equipment surfaces disinfected   hand hygiene promoted   environmental surveillance performed   cohorting utilized  Taken 8/4/2024 1433 by Shanon Gan LPN  Infection Prevention:   single patient room provided   rest/sleep promoted   hand hygiene promoted   equipment surfaces disinfected   cohorting utilized   environmental surveillance performed   personal protective equipment utilized  Taken 8/4/2024 1248 by Shanon Gan LPN  Infection Prevention:   personal protective equipment utilized   equipment surfaces disinfected   environmental surveillance performed   cohorting utilized   hand hygiene promoted   single patient room provided   rest/sleep promoted  Goal: Optimal Comfort and Wellbeing  Outcome: Ongoing, Progressing  Intervention: Provide Person-Centered Care  Recent Flowsheet Documentation  Taken 8/4/2024 1642 by Shanon Gan LPN  Trust Relationship/Rapport:   care explained   empathic listening provided   questions encouraged   reassurance provided   questions answered   emotional support provided   choices provided  Taken 8/4/2024 1248 by Shanon Gan LPN  Trust Relationship/Rapport:   care explained   choices provided   questions answered   thoughts/feelings acknowledged   reassurance provided   questions encouraged   emotional support provided   empathic listening provided  Goal: Readiness for Transition of Care  Outcome: Ongoing, Progressing     Problem: Behavioral Health Comorbidity  Goal:  Maintenance of Behavioral Health Symptom Control  Outcome: Ongoing, Progressing  Intervention: Maintain Behavioral Health Symptom Control  Recent Flowsheet Documentation  Taken 8/4/2024 1433 by Shanon Gan LPN  Medication Review/Management: medications reviewed  Taken 8/4/2024 1248 by Shanon Gan LPN  Medication Review/Management: medications reviewed     Problem: Hypertension Comorbidity  Goal: Blood Pressure in Desired Range  Outcome: Ongoing, Progressing  Intervention: Maintain Blood Pressure Management  Recent Flowsheet Documentation  Taken 8/4/2024 1433 by Shanon Gan LPN  Medication Review/Management: medications reviewed  Taken 8/4/2024 1248 by Shanon Gan LPN  Medication Review/Management: medications reviewed     Problem: Fall Injury Risk  Goal: Absence of Fall and Fall-Related Injury  Outcome: Ongoing, Progressing  Intervention: Identify and Manage Contributors  Recent Flowsheet Documentation  Taken 8/4/2024 1433 by Shanon Gan LPN  Medication Review/Management: medications reviewed  Taken 8/4/2024 1248 by Shanon Gan LPN  Medication Review/Management: medications reviewed  Intervention: Promote Injury-Free Environment  Recent Flowsheet Documentation  Taken 8/4/2024 1642 by Shanon Gan LPN  Safety Promotion/Fall Prevention:   safety round/check completed   room organization consistent   nonskid shoes/slippers when out of bed   mobility aid in reach   gait belt   lighting adjusted   fall prevention program maintained   clutter free environment maintained   activity supervised   assistive device/personal items within reach  Taken 8/4/2024 1433 by Shanon Gan LPN  Safety Promotion/Fall Prevention:   safety round/check completed   room organization consistent   nonskid shoes/slippers when out of bed   mobility aid in reach   lighting adjusted   gait belt   fall prevention program maintained   clutter free environment maintained   activity supervised   assistive device/personal items within reach  Taken  8/4/2024 1248 by Shanon Gan LPN  Safety Promotion/Fall Prevention:   safety round/check completed   room organization consistent   nonskid shoes/slippers when out of bed   lighting adjusted   mobility aid in reach   gait belt   fall prevention program maintained   clutter free environment maintained   assistive device/personal items within reach   activity supervised

## 2024-08-04 NOTE — ED PROVIDER NOTES
Subjective   History of Present Illness  78-year-old female tripped and fell backwards with small refrigerator falling onto her legs around 10 PM.  Patient complains of right leg laceration but denies any significant bony injury.  There was concerned of some broken glass.  Patient denies any head injury.      Review of Systems   Skin:  Positive for wound.       Past Medical History:   Diagnosis Date    Atrial fibrillation     Blind 07/10/2023    Histoplasmosis     Hypertension     Legally blind     Longstanding persistent atrial fibrillation 07/10/2023    Paroxysmal atrial fibrillation 07/10/2023       No Known Allergies    History reviewed. No pertinent surgical history.    Family History   Problem Relation Age of Onset    Cancer Mother     Dementia Father     Cancer Sister        Social History     Socioeconomic History    Marital status:    Tobacco Use    Smoking status: Former    Smokeless tobacco: Never   Vaping Use    Vaping status: Never Used   Substance and Sexual Activity    Alcohol use: Not Currently    Drug use: Not Currently    Sexual activity: Defer           Objective   Physical Exam  Constitutional:       Appearance: Normal appearance.   HENT:      Head: Normocephalic and atraumatic.   Eyes:      Conjunctiva/sclera: Conjunctivae normal.      Pupils: Pupils are equal, round, and reactive to light.   Cardiovascular:      Rate and Rhythm: Rhythm irregular.      Heart sounds: No murmur heard.  Pulmonary:      Effort: Pulmonary effort is normal.      Breath sounds: Normal breath sounds.   Musculoskeletal:      Cervical back: Normal range of motion and neck supple. No tenderness.      Comments: Laceration noted to right lateral leg, skin tears to left knee and left anterior leg without any bony tenderness   Skin:     General: Skin is warm and dry.      Capillary Refill: Capillary refill takes less than 2 seconds.   Neurological:      General: No focal deficit present.      Mental Status: She is  alert. Mental status is at baseline.   Psychiatric:         Mood and Affect: Mood normal.         Behavior: Behavior normal.         Laceration Repair    Date/Time: 8/4/2024 8:44 AM    Performed by: Dwayne Hammond MD  Authorized by: Dwayne Hammond MD    Consent:     Consent obtained:  Verbal    Consent given by:  Patient    Risks discussed:  Infection, pain and need for additional repair  Anesthesia:     Anesthesia method:  Local infiltration    Local anesthetic:  Lidocaine 1% WITH epi  Laceration details:     Location:  Leg    Leg location:  R lower leg    Length (cm):  8  Pre-procedure details:     Preparation:  Patient was prepped and draped in usual sterile fashion  Exploration:     Imaging obtained: x-ray      Imaging outcome: foreign body not noted      Wound exploration: wound explored through full range of motion      Contaminated: no    Treatment:     Area cleansed with:  Chlorhexidine and saline    Amount of cleaning:  Extensive    Irrigation solution:  Sterile water    Irrigation volume:  1 liter    Debridement:  None    Layers/structures repaired:  Deep subcutaneous  Deep subcutaneous:     Suture size:  4-0    Suture material:  Vicryl    Suture technique:  Simple interrupted    Number of sutures:  2  Skin repair:     Repair method:  Sutures    Suture size:  3-0    Suture material:  Nylon    Suture technique:  Simple interrupted and horizontal mattress    Number of sutures:  8  Approximation:     Approximation:  Close  Repair type:     Repair type:  Intermediate  Post-procedure details:     Dressing:  Antibiotic ointment    Procedure completion:  Tolerated             ED Course  ED Course as of 08/04/24 0927   Sun Aug 04, 2024   0911 I spoke with Dr. Garrison regarding admission [MC]      ED Course User Index  [MC] Anushka Davidson PA                                             Medical Decision Making  Patient initially had no bleeding from her right laceration but several hours later developed moderate  amount of hemorrhage, wound irrigated, repaired.  This repair was difficult secondary to the patient's fragile skin and subcutaneous tissue and ultimately only to subcutaneous suture could be placed.  Hemostasis was achieved.  No foreign body was noted, patient will be placed on prophylactic antibiotics.  Patient then developed RVR waiting for suture repair but has done well with just 1 dose of Cardizem and is due for her morning medicine.    On evaluation patient's atrial fibrillation increased to the 130s to 140s, will observe in the hospital.  Patient sutures in her right lower extremity can be removed in 2 weeks.  I would place the patient on prophylactic Augmentin for 7 days when she is discharged please.    Problems Addressed:  Atrial fibrillation with RVR: complicated acute illness or injury  Leg laceration, right, initial encounter: complicated acute illness or injury    Amount and/or Complexity of Data Reviewed  Labs: ordered.  Radiology: ordered.  ECG/medicine tests: ordered and independent interpretation performed.     Details: EKG interpretation atrial fibrillation with rapid ventricular response, rate 144    Risk  OTC drugs.  Prescription drug management.  Decision regarding hospitalization.        Final diagnoses:   Atrial fibrillation with RVR   Leg laceration, right, initial encounter       ED Disposition  ED Disposition       ED Disposition   Decision to Admit    Condition   --    Comment   Level of Care: Telemetry [5]   Admitting Physician: WOODY CELIS [[id number]]   Bed Request Comments: PCU                 No follow-up provider specified.       Medication List      No changes were made to your prescriptions during this visit.            Dwayne Hammond MD  08/04/24 0981

## 2024-08-04 NOTE — H&P
"[facility] Medicine Services  History & Physical    Patient Name: Fallon Nur  : 1945  MRN: [id number]  Primary Care Physician:  Francine Hampton MD  Date of admission: 2024  Date and Time of Service: 2024 at 0936    Subjective      Chief Complaint: fall    History of Present Illness: Fallon Nur is a 78 y.o. female with a CMH of Afib, HTN and blindness who presented to [facility] on 2024 after fall. She reports that she tripped while walking and as a result she fell backwards. Denies hitting head and LOC. Patient is on Xarelto for Afib. She reports sustaining a laceration to right knee but otherwise denies pain. She states that she has fell twice over the last 3 days. She states she just \"loses balance.\" Denies preceding symptoms such as chest pain, dizziness, palpitations, shortness of breath. She admits that she takes her home medications but per records, digoxin was last filled in 2024.     On ED evaluation, patient HDS. Labs were pertinent for Hgb 9.3. X-ray of R tib/fib with no bony abnormalities. However, once ER provider began suturing laceration, patient went into atrial fibrillation with RVR with a rate of 140s. Patient was given Diltiazem IVP and then started on drip. Hospitalist service to admit for further management.      Review of Systems   Constitutional:  Negative for chills and fever.   Respiratory:  Negative for shortness of breath.    Cardiovascular:  Negative for chest pain and palpitations.   Gastrointestinal:  Negative for abdominal pain, nausea and vomiting.   Musculoskeletal:  Negative for neck pain.   Skin:  Positive for wound.   Neurological:  Negative for dizziness and weakness.   All other systems reviewed and are negative.      Personal History     Past Medical History:   Diagnosis Date    Atrial fibrillation     Blind 07/10/2023    Histoplasmosis     Hypertension     Legally blind     Longstanding persistent atrial " fibrillation 07/10/2023    Paroxysmal atrial fibrillation 07/10/2023       History reviewed. No pertinent surgical history.    Family History: family history includes Cancer in her mother and sister; Dementia in her father. Otherwise pertinent FHx was reviewed and not pertinent to current issue.    Social History:  reports that she has quit smoking. She has never used smokeless tobacco. She reports that she does not currently use alcohol. She reports that she does not currently use drugs.    Home Medications:  Prior to Admission Medications       Prescriptions Last Dose Informant Patient Reported? Taking?    alendronate (FOSAMAX) 70 MG tablet 8/3/2024  Yes Yes    Take 1 tablet by mouth Every 7 (Seven) Days. Saturday    buPROPion XL (WELLBUTRIN XL) 150 MG 24 hr tablet 8/3/2024  Yes Yes    Take 1 tablet by mouth Daily.    cetirizine (zyrTEC) 10 MG tablet 8/3/2024  Yes Yes    Take 1 tablet by mouth Daily.    citalopram (CeleXA) 20 MG tablet 8/3/2024  Yes Yes    Take 1 tablet by mouth Daily.    digoxin (LANOXIN) 125 MCG tablet 8/3/2024  Yes Yes    Take 1 tablet by mouth Daily.    ferrous sulfate 325 (65 FE) MG tablet 8/3/2024  Yes Yes    Take 1 tablet by mouth Daily With Breakfast.    fluticasone (FLONASE) 50 MCG/ACT nasal spray 8/3/2024  Yes Yes    2 sprays into the nostril(s) as directed by provider Daily As Needed for Rhinitis.    HYDROcodone-acetaminophen (NORCO) 5-325 MG per tablet Past Week  Yes Yes    Take 1 tablet by mouth Daily As Needed.    levothyroxine (SYNTHROID, LEVOTHROID) 50 MCG tablet 8/3/2024  Yes Yes    Take 1 tablet by mouth Daily.    metoprolol succinate XL (TOPROL-XL) 25 MG 24 hr tablet 8/3/2024  No Yes    Take 1 tablet by mouth Daily.    pantoprazole (PROTONIX) 40 MG EC tablet 8/3/2024  Yes Yes    Take 1 tablet by mouth Daily.    rivaroxaban (XARELTO) 20 MG tablet 8/3/2024  Yes Yes    Take 1 tablet by mouth Daily.    vitamin D (ERGOCALCIFEROL) 1.25 MG (33188 UT) capsule capsule 8/3/2024  Yes Yes     Take 1 capsule by mouth 1 (One) Time Per Week. Saturday    zolpidem (AMBIEN) 5 MG tablet Past Week  Yes Yes    Take 1 tablet by mouth every night at bedtime.    furosemide (LASIX) 20 MG tablet   Yes No    Take 1 tablet by mouth Daily As Needed.    Melatonin 12 MG tablet   Yes No    Take 1 tablet by mouth At Night As Needed.    vitamin B-12 (CYANOCOBALAMIN) 1000 MCG tablet   Yes No    Take 1 tablet by mouth Daily.              Allergies:  No Known Allergies    Objective      Vitals:   Temp:  [97.8 °F (36.6 °C)-98 °F (36.7 °C)] 98 °F (36.7 °C)  Heart Rate:  [] 110  Resp:  [16-20] 20  BP: (111-151)/(65-96) 118/70  Body mass index is 24.03 kg/m².    Physical Exam  General: 79 yo WF, Alert and oriented, well nourished, no acute distress.  HENT: Normocephalic, normal hearing, moist oral mucosa, no scleral icterus.  Neck: Supple, non-tender, no carotid bruits, no JVD, no LAD.  Lungs: Clear to auscultation, non-labored respiration.  Heart: RRR, no murmur, gallop or edema.  Abdomen: Soft, nontender, non-distended, + bowel sounds.  Musculoskeletal: Normal range of motion and strength, no tenderness or swelling.  Skin: Ecchymoses on right shoulder and bilateral lower extremities. Skin tear noted on LEFT knee. Laceration on RLE bandaged, unable to assess. Skin is warm, dry and pink.  Psychiatric: Cooperative, appropriate mood and affect.      Diagnostic Data:  Lab Results (last 24 hours)       Procedure Component Value Units Date/Time    Digoxin Level [[id number]]  (Abnormal) Collected: 08/04/24 0739    Specimen: Blood Updated: 08/04/24 1204     Digoxin 0.59 ng/mL     Comprehensive Metabolic Panel [[id number]]  (Abnormal) Collected: 08/04/24 0739    Specimen: Blood Updated: 08/04/24 0845     Glucose 110 mg/dL      BUN 15 mg/dL      Creatinine 0.69 mg/dL      Sodium 135 mmol/L      Potassium 4.3 mmol/L      Comment: Slight hemolysis detected by analyzer. Result may be falsely elevated.        Chloride 101 mmol/L      CO2  26.1 mmol/L      Calcium 8.6 mg/dL      Total Protein 6.6 g/dL      Albumin 3.0 g/dL      ALT (SGPT) 14 U/L      AST (SGOT) 30 U/L      Comment: Slight hemolysis detected by analyzer. Result may be falsely elevated.        Alkaline Phosphatase 173 U/L      Total Bilirubin 0.4 mg/dL      Globulin 3.6 gm/dL      A/G Ratio 0.8 g/dL      BUN/Creatinine Ratio 21.7     Anion Gap 7.9 mmol/L      eGFR 89.0 mL/min/1.73     Narrative:      GFR Normal >60  Chronic Kidney Disease <60  Kidney Failure <15    The GFR formula is only valid for adults with stable renal function between ages 18 and 70.    Magnesium [[id number]]  (Normal) Collected: 08/04/24 0739    Specimen: Blood Updated: 08/04/24 0845     Magnesium 2.3 mg/dL     TSH [[id number]]  (Abnormal) Collected: 08/04/24 0739    Specimen: Blood Updated: 08/04/24 0821     TSH 4.450 uIU/mL     Single High Sensitivity Troponin T [[id number]]  (Abnormal) Collected: 08/04/24 0739    Specimen: Blood Updated: 08/04/24 0821     HS Troponin T 15 ng/L     Narrative:      High Sensitive Troponin T Reference Range:  <14.0 ng/L- Negative Female for AMI  <22.0 ng/L- Negative Male for AMI  >=14 - Abnormal Female indicating possible myocardial injury.  >=22 - Abnormal Male indicating possible myocardial injury.   Clinicians would have to utilize clinical acumen, EKG, Troponin, and serial changes to determine if it is an Acute Myocardial Infarction or myocardial injury due to an underlying chronic condition.         Extra Tubes [[id number]] Collected: 08/04/24 0739    Specimen: Blood, Venous Line Updated: 08/04/24 0800    Narrative:      The following orders were created for panel order Extra Tubes.  Procedure                               Abnormality         Status                     ---------                               -----------         ------                     Gold Top - SST[[id number]]                                   Final result               Light Blue Top[[id number]]                                    Final result                 Please view results for these tests on the individual orders.    Gold Top - SST [[id number]] Collected: 08/04/24 0739    Specimen: Blood Updated: 08/04/24 0800     Extra Tube Hold for add-ons.     Comment: Auto resulted.       Light Blue Top [[id number]] Collected: 08/04/24 0739    Specimen: Blood Updated: 08/04/24 0800     Extra Tube Hold for add-ons.     Comment: Auto resulted       CBC & Differential [[id number]]  (Abnormal) Collected: 08/04/24 0739    Specimen: Blood Updated: 08/04/24 0748    Narrative:      The following orders were created for panel order CBC & Differential.  Procedure                               Abnormality         Status                     ---------                               -----------         ------                     CBC Auto Differential[[id number]]        Abnormal            Final result                 Please view results for these tests on the individual orders.    CBC Auto Differential [[id number]]  (Abnormal) Collected: 08/04/24 0739    Specimen: Blood Updated: 08/04/24 0748     WBC 8.20 10*3/mm3      RBC 3.22 10*6/mm3      Hemoglobin 9.3 g/dL      Hematocrit 30.6 %      MCV 95.0 fL      MCH 28.9 pg      MCHC 30.4 g/dL      RDW 14.2 %      RDW-SD 49.4 fl      MPV 10.8 fL      Platelets 359 10*3/mm3      Neutrophil % 71.6 %      Lymphocyte % 14.6 %      Monocyte % 10.4 %      Eosinophil % 2.7 %      Basophil % 0.5 %      Immature Grans % 0.2 %      Neutrophils, Absolute 5.87 10*3/mm3      Lymphocytes, Absolute 1.20 10*3/mm3      Monocytes, Absolute 0.85 10*3/mm3      Eosinophils, Absolute 0.22 10*3/mm3      Basophils, Absolute 0.04 10*3/mm3      Immature Grans, Absolute 0.02 10*3/mm3      nRBC 0.0 /100 WBC              Imaging Results (Last 24 Hours)       Procedure Component Value Units Date/Time    XR Tibia Fibula 2 View Right [[id number]] Collected: 08/04/24 0502     Updated: 08/04/24 0505    Narrative:      XR TIBIA FIBULA 2 VW  RIGHT    Date of Exam: 8/4/2024 4:50 AM EDT    Indication: trauma    Comparison: None available.    Findings:  There is no evidence of fracture or dislocation. No focal lesions identified. No erosions. No periostitis. Lateral soft tissue swelling and probable wound present. No radiopaque foreign bodies.      Impression:      Impression:  No acute osseous abnormality. Lateral soft tissue swelling and probable wound present. No radiopaque foreign bodies.        Electronically Signed: Aspen Gant MD    8/4/2024 5:03 AM EDT    Workstation ID: [id number]              Assessment & Plan        This is a 78 y.o. female with:    Active and Resolved Problems  Active Hospital Problems    Diagnosis  POA    Atrial fibrillation with rapid ventricular response [I48.91]  Yes      Resolved Hospital Problems   No resolved problems to display.       Atrial fibrillation with RVR  Rate 120s currently   Digoxin level 0.59, concern for noncompliance   Continue Cardizem gtt  Continue Xarelto, Metoprolol and Digoxin   Telemetry    Recurrent falls  Right knee laceration sutured in ED  Prn pain medication   PT/OT to evaluate     Anemia   Hgb 9.3  No overt s/sx of bleeding   Continue Ferrous sulfate   Continue to monitor CBC  Transfuse if Hgb < 7    Hypertension  Bp controlled   Continue Metoprolol    Hypothyroidism  TSH 4.45  Continue Levothyroxine        VTE Prophylaxis:  Pharmacologic VTE prophylaxis orders are present.        The patient desires to be as follows:    CODE STATUS:    Code Status (Patient has no pulse and is not breathing): CPR (Attempt to Resuscitate)  Medical Interventions (Patient has pulse or is breathing): Full Support        Admission Status:  I believe this patient meets observation status.    Expected Length of Stay: > 24 hours     PDMP and Medication Dispenses via Sidebar reviewed and consistent with patient reported medications.    I discussed the patient's findings and my recommendations with  patient.      Signature:     This document has been electronically signed by Radha Garrison PA-C on August 4, 2024 12:59 EDT   [facility]ist Team

## 2024-08-05 VITALS
SYSTOLIC BLOOD PRESSURE: 111 MMHG | DIASTOLIC BLOOD PRESSURE: 65 MMHG | RESPIRATION RATE: 21 BRPM | HEART RATE: 86 BPM | OXYGEN SATURATION: 96 % | BODY MASS INDEX: 23.9 KG/M2 | HEIGHT: 64 IN | WEIGHT: 140 LBS | TEMPERATURE: 98.2 F

## 2024-08-05 LAB
ANION GAP SERPL CALCULATED.3IONS-SCNC: 7.8 MMOL/L (ref 5–15)
BASOPHILS # BLD AUTO: 0.04 10*3/MM3 (ref 0–0.2)
BASOPHILS NFR BLD AUTO: 0.6 % (ref 0–1.5)
BUN SERPL-MCNC: 19 MG/DL (ref 8–23)
BUN/CREAT SERPL: 22.6 (ref 7–25)
CALCIUM SPEC-SCNC: 8.3 MG/DL (ref 8.6–10.5)
CHLORIDE SERPL-SCNC: 102 MMOL/L (ref 98–107)
CO2 SERPL-SCNC: 26.2 MMOL/L (ref 22–29)
CREAT SERPL-MCNC: 0.84 MG/DL (ref 0.57–1)
DEPRECATED RDW RBC AUTO: 49.6 FL (ref 37–54)
EGFRCR SERPLBLD CKD-EPI 2021: 71.2 ML/MIN/1.73
EOSINOPHIL # BLD AUTO: 0.31 10*3/MM3 (ref 0–0.4)
EOSINOPHIL NFR BLD AUTO: 4.7 % (ref 0.3–6.2)
ERYTHROCYTE [DISTWIDTH] IN BLOOD BY AUTOMATED COUNT: 14.4 % (ref 12.3–15.4)
GLUCOSE SERPL-MCNC: 114 MG/DL (ref 65–99)
HCT VFR BLD AUTO: 25.2 % (ref 34–46.6)
HGB BLD-MCNC: 8 G/DL (ref 12–15.9)
IMM GRANULOCYTES # BLD AUTO: 0.03 10*3/MM3 (ref 0–0.05)
IMM GRANULOCYTES NFR BLD AUTO: 0.5 % (ref 0–0.5)
LYMPHOCYTES # BLD AUTO: 1.07 10*3/MM3 (ref 0.7–3.1)
LYMPHOCYTES NFR BLD AUTO: 16.3 % (ref 19.6–45.3)
MCH RBC QN AUTO: 30.1 PG (ref 26.6–33)
MCHC RBC AUTO-ENTMCNC: 31.7 G/DL (ref 31.5–35.7)
MCV RBC AUTO: 94.7 FL (ref 79–97)
MONOCYTES # BLD AUTO: 0.79 10*3/MM3 (ref 0.1–0.9)
MONOCYTES NFR BLD AUTO: 12 % (ref 5–12)
NEUTROPHILS NFR BLD AUTO: 4.32 10*3/MM3 (ref 1.7–7)
NEUTROPHILS NFR BLD AUTO: 65.9 % (ref 42.7–76)
NRBC BLD AUTO-RTO: 0 /100 WBC (ref 0–0.2)
PLATELET # BLD AUTO: 308 10*3/MM3 (ref 140–450)
PMV BLD AUTO: 11.8 FL (ref 6–12)
POTASSIUM SERPL-SCNC: 4 MMOL/L (ref 3.5–5.2)
QT INTERVAL: 270 MS
QTC INTERVAL: 419 MS
RBC # BLD AUTO: 2.66 10*6/MM3 (ref 3.77–5.28)
SODIUM SERPL-SCNC: 136 MMOL/L (ref 136–145)
WBC NRBC COR # BLD AUTO: 6.56 10*3/MM3 (ref 3.4–10.8)

## 2024-08-05 PROCEDURE — 97161 PT EVAL LOW COMPLEX 20 MIN: CPT

## 2024-08-05 PROCEDURE — 80048 BASIC METABOLIC PNL TOTAL CA: CPT

## 2024-08-05 PROCEDURE — 97166 OT EVAL MOD COMPLEX 45 MIN: CPT

## 2024-08-05 PROCEDURE — G0378 HOSPITAL OBSERVATION PER HR: HCPCS

## 2024-08-05 PROCEDURE — 85025 COMPLETE CBC W/AUTO DIFF WBC: CPT

## 2024-08-05 PROCEDURE — 96366 THER/PROPH/DIAG IV INF ADDON: CPT

## 2024-08-05 RX ADMIN — OXYCODONE HYDROCHLORIDE 5 MG: 5 TABLET ORAL at 05:04

## 2024-08-05 RX ADMIN — OXYCODONE HYDROCHLORIDE 5 MG: 5 TABLET ORAL at 10:57

## 2024-08-05 RX ADMIN — DIGOXIN 125 MCG: 125 TABLET ORAL at 10:57

## 2024-08-05 RX ADMIN — METOPROLOL SUCCINATE 25 MG: 25 TABLET, EXTENDED RELEASE ORAL at 08:53

## 2024-08-05 RX ADMIN — BUPROPION HYDROCHLORIDE 150 MG: 150 TABLET, EXTENDED RELEASE ORAL at 08:53

## 2024-08-05 RX ADMIN — BACITRACIN 0.9 G: 500 OINTMENT TOPICAL at 08:53

## 2024-08-05 RX ADMIN — FERROUS SULFATE TAB EC 324 MG (65 MG FE EQUIVALENT) 324 MG: 324 (65 FE) TABLET DELAYED RESPONSE at 08:53

## 2024-08-05 RX ADMIN — CYANOCOBALAMIN TAB 500 MCG 1000 MCG: 500 TAB at 08:53

## 2024-08-05 RX ADMIN — CITALOPRAM HYDROBROMIDE 20 MG: 20 TABLET ORAL at 08:53

## 2024-08-05 RX ADMIN — PANTOPRAZOLE SODIUM 40 MG: 40 TABLET, DELAYED RELEASE ORAL at 08:53

## 2024-08-05 RX ADMIN — CETIRIZINE HYDROCHLORIDE 10 MG: 10 TABLET, FILM COATED ORAL at 08:53

## 2024-08-05 RX ADMIN — LEVOTHYROXINE SODIUM 50 MCG: 0.05 TABLET ORAL at 05:04

## 2024-08-05 NOTE — THERAPY EVALUATION
Patient Name: Fallon Nur  : 1945    MRN: [id number]                              Today's Date: 2024       Admit Date: 2024    Visit Dx:     ICD-10-CM ICD-9-CM   1. Atrial fibrillation with RVR  I48.91 427.31   2. Leg laceration, right, initial encounter  S81.811A 894.0     Patient Active Problem List   Diagnosis    Generalized abdominal pain    SBO (small bowel obstruction)    H/O gastric bypass    Hypothyroidism    Chronic back pain    E. coli UTI    Ventral hernia    GERD (gastroesophageal reflux disease)    Blind    Longstanding persistent atrial fibrillation    Atrial fibrillation with rapid ventricular response     Past Medical History:   Diagnosis Date    Atrial fibrillation     Blind 07/10/2023    Histoplasmosis     Hypertension     Legally blind     Longstanding persistent atrial fibrillation 07/10/2023    Paroxysmal atrial fibrillation 07/10/2023     History reviewed. No pertinent surgical history.   General Information       [facility] Name 24 1306          Physical Therapy Time and Intention    Document Type evaluation  -     Mode of Treatment physical therapy  -[facility] Name 24 1306          General Information    Patient Profile Reviewed yes  -     Prior Level of Function independent:;all household mobility;community mobility;ADL's  CG for dtr with Cerebral palsy - uses berna lift to transfer her. Pt has a rollator and a quad cane - rarely uses either. 4 falls in the last 3 months, attributes to shuffling her feet. Pt reports not using an AD when all the falls occurred.  -     Existing Precautions/Restrictions fall  -     Barriers to Rehab none identified  -       Row Name 24 1306          Living Environment    People in Home child(elgin), adult;child(elgin), dependent  adult dtr with CP - disabled. Granddtr, her  BF, and their children.  -       Row Name 24 1306          Home Main Entrance    Number of Stairs, Main Entrance none  -[facility] Name  08/05/24 1306          Stairs Within Home, Primary    Number of Stairs, Within Home, Primary two  -     Stair Railings, Within Home, Primary railings safe and in good condition  -[facility] Name 08/05/24 1306          Cognition    Orientation Status (Cognition) oriented x 4  -[facility] Name 08/05/24 1306          Safety Issues, Functional Mobility    Safety Issues Affecting Function (Mobility) --  impaired acceptance of current condition and increased fall risk  -     Impairments Affecting Function (Mobility) balance;endurance/activity tolerance;strength  -               User Key  (r) = Recorded By, (t) = Taken By, (c) = Cosigned By      Initials Name Provider Type     Sugar Ybarra, PT Physical Therapist                   Mobility       [facility] Name 08/05/24 1343          Bed Mobility    Bed Mobility bed mobility (all) activities  -     All Activities, [place] (Bed Mobility) independent  -     Assistive Device (Bed Mobility) head of bed elevated  -AH       Row Name 08/05/24 1343          Sit-Stand Transfer    Sit-Stand [place] (Transfers) supervision  -     Assistive Device (Sit-Stand Transfers) walker, front-wheeled  -[facility] Name 08/05/24 1343          Gait/Stairs (Locomotion)    [place] Level (Gait) standby assist  -     Assistive Device (Gait) walker, front-wheeled  -     Patient was able to Ambulate yes  -     Distance in Feet (Gait) 60  -     Deviations/Abnormal Patterns (Gait) bilateral deviations;gait speed decreased;stride length decreased  -     Bilateral Gait Deviations heel strike decreased  -               User Key  (r) = Recorded By, (t) = Taken By, (c) = Cosigned By      Initials Name Provider Type     Sugar Ybarra PT Physical Therapist                   Obj/Interventions       [facility] Name 08/05/24 1344          Range of Motion Comprehensive    General Range of Motion bilateral lower extremity ROM WFL  -[facility] Name 08/05/24 1349           Strength Comprehensive (MMT)    General Manual Muscle Testing (MMT) Assessment lower extremity strength deficits identified  -     Comment, General Manual Muscle Testing (MMT) Assessment grossly 4/5 BLE seated screening. max resistance not applied due to pt with audible crepitus and c/o pain in BLE knees (severe OA) with light/moderate pressure.  -[facility] Name 08/05/24 1344          Balance    Balance Assessment sitting dynamic balance;standing dynamic balance;standing static balance;sitting static balance  -     Static Sitting Balance independent  -     Dynamic Sitting Balance independent  -     Position, Sitting Balance sitting edge of bed  -     Static Standing Balance standby assist  -     Dynamic Standing Balance standby assist  -     Position/Device Used, Standing Balance walker, front-wheeled  -               User Key  (r) = Recorded By, (t) = Taken By, (c) = Cosigned By      Initials Name Provider Type     Sugar Ybarra, THIAGO Physical Therapist                   Goals/Plan    No documentation.                  Clinical Impression       [facility] Name 08/05/24 1346          Pain    Pretreatment Pain Rating 4/10  -     Posttreatment Pain Rating 4/10  -     Pain Location - Side/Orientation Right  -     Pain Location lower  -     Pain Location - extremity  -     Pain Intervention(s) Repositioned;Therapeutic presence  -[facility] Name 08/05/24 1341          Plan of Care Review    Plan of Care Reviewed With patient  -     Outcome Evaluation 77 y/o female admitted with Afib with RVR. She also sustained a Left leg laceration after a fall with mini refridgerator falling onto her. Pt reports she's independent at baseline without AD use. She's the primary CG for her dtr who has CP and is physically disabled.  Pt and other family members transfer pt's dtr with a berna lift (no manual lifting required). Pt reports 4 falls in the last several weeks and admits she wasn't using an AD when they  occurred. Pt presents today still in Afib but with HR in a safe range (70-90s). She's SBA/supervision for transfers and gait with RW. Pt with impaired gait mechanics including decreased DF at initial contact and decreased foot clearance during swing phase, both increasing her risk of falls. Pt would likely benefit from inpatient rehab, and she anticipated this recommendation, but she refuses at this time; mostly due to  responsibilities. Pt is agreeable to outpatient. PT recommends increased family assist/supervision and rollator use anytime she's up. No POC established due to patient discharging home today.  -[facility] Name 08/05/24 1346          Therapy Assessment/Plan (PT)    Patient/Family Therapy Goals Statement (PT) no more falls, go home.  -     Criteria for Skilled Interventions Met (PT) yes;meets criteria  however, no POC due to DC today  -     Therapy Frequency (PT) evaluation only  -[facility] Name 08/05/24 1346          Vital Signs    Pre Systolic BP Rehab 108  -AH     Pre Treatment Diastolic BP 65  -     Pretreatment Heart Rate (beats/min) 72  -     Intratreatment Heart Rate (beats/min) 89  -     Posttreatment Heart Rate (beats/min) 78  -AH     Pre Patient Position Sitting  -     Intra Patient Position Standing  -     Post Patient Position Sitting  -       Row Name 08/05/24 1346          Positioning and Restraints    Post Treatment Position bed  -     In Bed notified nsg;sitting EOB;call light within reach;encouraged to call for assist  -               User Key  (r) = Recorded By, (t) = Taken By, (c) = Cosigned By      Initials Name Provider Type     Sugar Ybarra, PT Physical Therapist                   Outcome Measures       Row Name 08/05/24 1358 08/05/24 0852       How much help from another person do you currently need...    Turning from your back to your side while in flat bed without using bedrails? 4  - 3  -TA    Moving from lying on back to sitting on the side of  a flat bed without bedrails? 4  - 3  -TA    Moving to and from a bed to a chair (including a wheelchair)? 4  - 3  -TA    Standing up from a chair using your arms (e.g., wheelchair, bedside chair)? 4  - 3  -TA    Climbing 3-5 steps with a railing? 3  - 2  -TA    To walk in hospital room? 4  - 3  -TA    AM-PAC 6 Clicks Score (PT) 23  - 17  -TA    Highest Level of Mobility Goal 7 --> Walk 25 feet or more  - 5 --> Static standing  -TA      Row Name 08/05/24 1358          Functional Assessment    Outcome Measure Options AM-PAC 6 Clicks Basic Mobility (PT)  -               User Key  (r) = Recorded By, (t) = Taken By, (c) = Cosigned By      Initials Name Provider Type    Bonita Corado, RN Registered Nurse     Sugar Ybarra, PT Physical Therapist                                 Physical Therapy Education       Title: PT OT SLP Therapies (In Progress)       Topic: Physical Therapy (Done)       Point: Mobility training (Done)       Learning Progress Summary             Patient Acceptance, E, VU by  at 8/5/2024 1359                         Point: Home exercise program (Done)       Learning Progress Summary             Patient Acceptance, E, VU by  at 8/5/2024 1359                         Point: Body mechanics (Done)       Learning Progress Summary             Patient Acceptance, E, VU by  at 8/5/2024 1359                         Point: Precautions (Done)       Learning Progress Summary             Patient Acceptance, E, VU by  at 8/5/2024 1359                                         User Key       Initials Effective Dates Name Provider Type [facility] 12/04/23 -  Sugar Ybarra, PT Physical Therapist PT                  PT Recommendation and Plan     Plan of Care Reviewed With: patient  Outcome Evaluation: 79 y/o female admitted with Afib with RVR. She also sustained a Left leg laceration after a fall with mini refridgerator falling onto her. Pt reports she's independent at baseline without  AD use. She's the primary CG for her dtr who has CP and is physically disabled.  Pt and other family members transfer pt's dtr with a berna lift (no manual lifting required). Pt reports 4 falls in the last several weeks and admits she wasn't using an AD when they occurred. Pt presents today still in Afib but with HR in a safe range (70-90s). She's SBA/supervision for transfers and gait with RW. Pt with impaired gait mechanics including decreased DF at initial contact and decreased foot clearance during swing phase, both increasing her risk of falls. Pt would likely benefit from inpatient rehab, and she anticipated this recommendation, but she refuses at this time; mostly due to CG responsibilities. Pt is agreeable to outpatient. PT recommends increased family assist/supervision and rollator use anytime she's up. No POC established due to patient discharging home today.     Time Calculation:         PT Charges       Row Name 08/05/24 1400             Time Calculation    Start Time 1320  -      Stop Time 1340  -      Time Calculation (min) 20 min  -      PT Non-Billable Time (min) 0 min  -      PT Received On 08/05/24  -         Time Calculation- PT    Total Timed Code Minutes- PT 0 minute(s)  -                User Key  (r) = Recorded By, (t) = Taken By, (c) = Cosigned By      Initials Name Provider Type     Sugar Ybarra PT Physical Therapist                  Therapy Charges for Today       Code Description Service Date Service Provider Modifiers Qty    [id number] HC PT EVAL LOW COMPLEXITY 4 8/5/2024 Sugar Ybarra PT GP 1            PT G-Codes  Outcome Measure Options: AM-PAC 6 Clicks Basic Mobility (PT)  AM-PAC 6 Clicks Score (PT): 23  PT Discharge Summary  Anticipated Discharge Disposition (PT): home with assist, home with outpatient therapy services    Sugar Ybarra PT  8/5/2024

## 2024-08-05 NOTE — PLAN OF CARE
Goal Outcome Evaluation:  Plan of Care Reviewed With: patient        Progress: improving     Pt c/o right lower extremity pain r/t laceration from fall. Pt resting comfortably with no other complaints. Cardizem gtt infusing. Pt remains in afib with rate 80s-90s. Will continue to monitor...

## 2024-08-05 NOTE — PLAN OF CARE
Goal Outcome Evaluation:  Plan of Care Reviewed With: patient, daughter, grandchild(elgin)           Outcome Evaluation: Pt is a 78 y.o. year old female admitted 8/4/24 s/p fall, hitting back of head with LOC. Xray Tib/Fib (-) fxs, but pt went into Afib while having laceration sutured.   Distal radius fx approx 6 weeks ago.     Pmhx significant for hernia, blindness, afib, hx of gastric bypass.    At baseline, pt resides with daughter who requires total care (aide to assist with ADLs daily), adult granddaughter and her two children (18 and 12). Pt lives in [facility] (2 stairs to kitchen) with multiple steps to enter. Pt oriented x4. Limited by histoplasmosis and macular degeneration, R eye worse than L.  Pt independent with bed mobility. Requires CGA - SBA for ADLs. Anticipate she would require additional assist for bathing. Pt very limited by balance deficits, requiring CGA for higher level tasks. 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 Patient is further limited by pain, primarily in RLE at suture site. Pt reports multiple falls, and would benefit from short stay in IP rehab to facilitate increased safety at baseline. Pt declines, and OT recommends highest level of therapy patient is agreeable to. No POC established due to pt dc'ing home this date.      Anticipated Discharge Disposition (OT): inpatient rehabilitation facility

## 2024-08-05 NOTE — PLAN OF CARE
Problem: Adult Inpatient Plan of Care  Goal: Absence of Hospital-Acquired Illness or Injury  Intervention: Identify and Manage Fall Risk  Recent Flowsheet Documentation  Taken 8/5/2024 1200 by Bonita Carter RN  Safety Promotion/Fall Prevention:   assistive device/personal items within reach   clutter free environment maintained   fall prevention program maintained   lighting adjusted   nonskid shoes/slippers when out of bed   room organization consistent   safety round/check completed  Taken 8/5/2024 1028 by Bonita Carter RN  Safety Promotion/Fall Prevention:   assistive device/personal items within reach   clutter free environment maintained   fall prevention program maintained   lighting adjusted   nonskid shoes/slippers when out of bed   safety round/check completed   room organization consistent  Taken 8/5/2024 0852 by Bonita Carter RN  Safety Promotion/Fall Prevention:   assistive device/personal items within reach   clutter free environment maintained   fall prevention program maintained   lighting adjusted   nonskid shoes/slippers when out of bed   room organization consistent   safety round/check completed  Intervention: Prevent Skin Injury  Recent Flowsheet Documentation  Taken 8/5/2024 1200 by Bonita Carter RN  Body Position: position changed independently  Skin Protection: adhesive use limited  Taken 8/5/2024 1028 by Bonita Carter RN  Body Position: position changed independently  Taken 8/5/2024 0852 by Bonita Carter RN  Body Position: position changed independently  Skin Protection: adhesive use limited  Intervention: Prevent and Manage VTE (Venous Thromboembolism) Risk  Recent Flowsheet Documentation  Taken 8/5/2024 1200 by Bonita Carter, RN  Activity Management: activity encouraged  Taken 8/5/2024 1028 by Bonita Carter RN  Activity Management: activity encouraged  Taken 8/5/2024 0852 by Bonita Carter RN  Activity Management: activity encouraged  Intervention: Prevent Infection  Recent Flowsheet  Documentation  Taken 8/5/2024 1200 by Bonita Carter RN  Infection Prevention:   hand hygiene promoted   personal protective equipment utilized  Taken 8/5/2024 1028 by Bonita Carter RN  Infection Prevention:   hand hygiene promoted   personal protective equipment utilized  Taken 8/5/2024 0852 by Bonita Carter RN  Infection Prevention:   hand hygiene promoted   personal protective equipment utilized  Goal: Optimal Comfort and Wellbeing  Intervention: Provide Person-Centered Care  Recent Flowsheet Documentation  Taken 8/5/2024 1200 by Bonita Carter RN  Trust Relationship/Rapport: care explained  Taken 8/5/2024 0852 by Bonita Carter RN  Trust Relationship/Rapport: care explained   Goal Outcome Evaluation:      Pt to DC this afternoon. Awaiting transportation. No complaints a this time. Call light in reach. Plan of care to continue.

## 2024-08-05 NOTE — DISCHARGE SUMMARY
"             [facility] Medicine Services  Discharge Summary    Date of Service: 2024  Patient Name: Fallon Nur  : 1945  MRN: [id number]    Date of Admission: 2024  Discharge Diagnosis: A-fib with RVR secondary to medication noncompliance  Date of Discharge: 2024  Primary Care Physician: Francine Hampton MD      Presenting Problem:   Atrial fibrillation with RVR [I48.91]  Leg laceration, right, initial encounter [S81.811A]  Atrial fibrillation with rapid ventricular response [I48.91]    Active and Resolved Hospital Problems:  Active Hospital Problems    Diagnosis POA    Atrial fibrillation with rapid ventricular response [I48.91] Yes      Resolved Hospital Problems   No resolved problems to display.         Hospital Course     HPI:  Per the H&P written by Radha Garrison PA-C , dated 2024:  \"History of Present Illness: Fallon Nur is a 78 y.o. female with a CMH of Afib, HTN and blindness who presented to [facility] on 2024 after fall. She reports that she tripped while walking and as a result she fell backwards. Denies hitting head and LOC. Patient is on Xarelto for Afib. She reports sustaining a laceration to right knee but otherwise denies pain. She states that she has fell twice over the last 3 days. She states she just \"loses balance.\" Denies preceding symptoms such as chest pain, dizziness, palpitations, shortness of breath. She admits that she takes her home medications but per records, digoxin was last filled in 2024.      On ED evaluation, patient HDS. Labs were pertinent for Hgb 9.3. X-ray of R tib/fib with no bony abnormalities. However, once ER provider began suturing laceration, patient went into atrial fibrillation with RVR with a rate of 140s. Patient was given Diltiazem IVP and then started on drip. Hospitalist service to admit for further management.\"    Hospital Course:  Patient admitted to the hospital in A-fib with RVR because " by her own report she stopped taking her medicine including all of her rate controllers at home.  She did not take them because she was not feeling like taking them.  Initially she was using a Dilt drip inpatient before her home medications were restarted.  As soon as her head medications were restarted she was able to transition off of the Dilt drip and remained stable on her home medications.  She was discharged home with her home medications and a clear plan was discussed with the family to ensure patient is actually taking her prescribed home medications.        DISCHARGE Follow Up Recommendations for labs and diagnostics:     Take your prescribed medications.  Follow-up with your outpatient physicians as scheduled.      Reasons For Change In Medications and Indications for New Medications:      Day of Discharge     Vital Signs:  Temp:  [97.7 °F (36.5 °C)-98.3 °F (36.8 °C)] 98.2 °F (36.8 °C)  Heart Rate:  [62-99] 86  Resp:  [15-21] 21  BP: ()/(46-78) 111/65    Physical Exam:  Physical Exam  Constitutional:       Appearance: Normal appearance.   HENT:      Head: Normocephalic and atraumatic.      Mouth/Throat:      Mouth: Mucous membranes are moist. Mucous membranes are dry.   Cardiovascular:      Rate and Rhythm: Normal rate. Rhythm irregular.      Pulses: Normal pulses.      Heart sounds: Normal heart sounds.   Pulmonary:      Effort: Pulmonary effort is normal.      Breath sounds: Normal breath sounds.   Abdominal:      General: Abdomen is flat.      Palpations: Abdomen is soft.   Skin:     General: Skin is warm and dry.   Neurological:      General: No focal deficit present.      Mental Status: She is alert and oriented to person, place, and time.            Pertinent  and/or Most Recent Results     LAB RESULTS:      Lab 08/05/24  0051 08/04/24  0739   WBC 6.56 8.20   HEMOGLOBIN 8.0* 9.3*   HEMATOCRIT 25.2* 30.6*   PLATELETS 308 359   NEUTROS ABS 4.32 5.87   IMMATURE GRANS (ABS) 0.03 0.02   LYMPHS ABS  1.07 1.20   MONOS ABS 0.79 0.85   EOS ABS 0.31 0.22   MCV 94.7 95.0         Lab 08/05/24  0252 08/04/24  0739   SODIUM 136 135*   POTASSIUM 4.0 4.3   CHLORIDE 102 101   CO2 26.2 26.1   ANION GAP 7.8 7.9   BUN 19 15   CREATININE 0.84 0.69   EGFR 71.2 89.0   GLUCOSE 114* 110*   CALCIUM 8.3* 8.6   MAGNESIUM  --  2.3   TSH  --  4.450*         Lab 08/04/24  0739   TOTAL PROTEIN 6.6   ALBUMIN 3.0*   GLOBULIN 3.6   ALT (SGPT) 14   AST (SGOT) 30   BILIRUBIN 0.4   ALK PHOS 173*         Lab 08/04/24  0739   HSTROP T 15*                 Brief Urine Lab Results       None          Microbiology Results (last 10 days)       ** No results found for the last 240 hours. **            XR Tibia Fibula 2 View Right    Result Date: 8/4/2024  Impression: Impression: No acute osseous abnormality. Lateral soft tissue swelling and probable wound present. No radiopaque foreign bodies. Electronically Signed: Aspen Gant MD  8/4/2024 5:03 AM EDT  Workstation ID: [id number]    CT Lower Extremity Left Without Contrast    Result Date: 8/1/2024  Impression: 1. Suspected acute nondisplaced fracture of a lateral patellar marginal osteophyte. 2. Moderate suprapatellar effusion. 3. Severe medial compartment and moderate patellofemoral prominent degenerative changes. Electronically Signed: Tri Hagan MD  8/1/2024 4:04 PM EDT  Workstation ID: [id number]    XR Knee 4+ View Left    Result Date: 8/1/2024  Impression: Impression: 1. Advanced degenerative changes of the knees bilaterally. Given limitations from underlying osteopenia no definite acute fracture or dislocation noted. Electronically Signed: Sean Travis MD  8/1/2024 2:16 PM EDT  Workstation ID: [id number]    XR Knee 4+ View Right    Result Date: 8/1/2024  Impression: Impression: 1. Advanced degenerative changes of the knees bilaterally. Given limitations from underlying osteopenia no definite acute fracture or dislocation noted. Electronically Signed: Sena Travis MD  8/1/2024 2:16 PM  EDT  Workstation ID: [id number]    XR Wrist Comp Min 3 Vw RT    Result Date: 7/31/2024  Impression: 3 x-ray views of the right wrist show a distal radius fracture in the early stages of healing with visible callus.  Radial height is well-maintained but there has been slight loss of radial inclination.  On the lateral view there is estimated to be 10 to 15 degrees of dorsal angulation of the distal radial fragment.                 Labs Pending at Discharge:      Procedures Performed           Consults:   Consults       Date and Time Order Name Status Description    8/4/2024  8:57 AM Inpatient Hospitalist Consult                Discharge Details        Discharge Medications        Continue These Medications        Instructions Start Date   alendronate 70 MG tablet  Commonly known as: FOSAMAX   70 mg, Oral, Every 7 Days, Saturday       buPROPion  MG 24 hr tablet  Commonly known as: WELLBUTRIN XL   150 mg, Oral, Daily      cetirizine 10 MG tablet  Commonly known as: zyrTEC   10 mg, Oral, Daily      citalopram 20 MG tablet  Commonly known as: CeleXA   20 mg, Oral, Daily      digoxin 125 MCG tablet  Commonly known as: LANOXIN   125 mcg, Oral, Daily Digoxin      ferrous sulfate 325 (65 FE) MG tablet   325 mg, Oral, Daily With Breakfast      fluticasone 50 MCG/ACT nasal spray  Commonly known as: FLONASE   2 sprays, Nasal, Daily PRN      furosemide 20 MG tablet  Commonly known as: LASIX   20 mg, Oral, Daily PRN      HYDROcodone-acetaminophen 5-325 MG per tablet  Commonly known as: NORCO   1 tablet, Oral, Daily PRN      levothyroxine 50 MCG tablet  Commonly known as: SYNTHROID, LEVOTHROID   50 mcg, Oral, Daily      Melatonin 12 MG tablet   1 tablet, Oral, Nightly PRN      metoprolol succinate XL 25 MG 24 hr tablet  Commonly known as: TOPROL-XL   25 mg, Oral, Every 24 Hours Scheduled      pantoprazole 40 MG EC tablet  Commonly known as: PROTONIX   40 mg, Oral, Daily      rivaroxaban 20 MG tablet  Commonly known as:  XARELTO   20 mg, Oral, Daily      vitamin B-12 1000 MCG tablet  Commonly known as: CYANOCOBALAMIN   1,000 mcg, Oral, Daily      vitamin D 1.25 MG (54687 UT) capsule capsule  Commonly known as: ERGOCALCIFEROL   50,000 Units, Oral, Weekly, Saturday       zolpidem 5 MG tablet  Commonly known as: AMBIEN   5 mg, Oral, Every Night at Bedtime               No Known Allergies      Discharge Disposition: Home with family  Home or Self Care    Diet:  Hospital:  Diet Order   Procedures    Diet: Cardiac; Healthy Heart (2-3 Na+); Fluid Consistency: Thin (IDDSI 0)         Discharge Activity:         CODE STATUS:  Code Status and Medical Interventions: CPR (Attempt to Resuscitate); Full Support   Ordered at: 08/04/24 1258     Code Status (Patient has no pulse and is not breathing):    CPR (Attempt to Resuscitate)     Medical Interventions (Patient has pulse or is breathing):    Full Support         No future appointments.        Time spent on Discharge including face to face service:  >30 minutes    Signature: Electronically signed by Gonzalo Castillo MD, 08/05/24, 18:02 EDT.  [facility] Hospitalist Team

## 2024-08-05 NOTE — THERAPY EVALUATION
Patient Name: Fallon Nur  : 1945    MRN: [id number]                              Today's Date: 2024       Admit Date: 2024    Visit Dx:     ICD-10-CM ICD-9-CM   1. Atrial fibrillation with RVR  I48.91 427.31   2. Leg laceration, right, initial encounter  S81.811A 894.0     Patient Active Problem List   Diagnosis    Generalized abdominal pain    SBO (small bowel obstruction)    H/O gastric bypass    Hypothyroidism    Chronic back pain    E. coli UTI    Ventral hernia    GERD (gastroesophageal reflux disease)    Blind    Longstanding persistent atrial fibrillation    Atrial fibrillation with rapid ventricular response     Past Medical History:   Diagnosis Date    Atrial fibrillation     Blind 07/10/2023    Histoplasmosis     Hypertension     Legally blind     Longstanding persistent atrial fibrillation 07/10/2023    Paroxysmal atrial fibrillation 07/10/2023     History reviewed. No pertinent surgical history.   General Information       Row Name 24 1459          OT Time and Intention    Document Type evaluation  -ES     Mode of Treatment occupational therapy  -ES       Row Name 24 1459          General Information    Patient Profile Reviewed yes  -ES     Existing Precautions/Restrictions fall  -ES     Barriers to Rehab visual deficit  -ES       Row Name 24 1459          Occupational Profile    Reason for Services/Referral (Occupational Profile) Pt is a 78 y.o. year old female admitted 24 s/p fall, hitting back of head with LOC. Xray Tib/Fib (-) fxs, but pt went into Afib while having laceration sutured.   Distal radius fx approx 6 weeks ago.     Pmhx significant for hernia, blindness, afib, hx of gastric bypass.    At baseline, pt resides with daughter who requires total care (aide to assist with ADLs daily), adult granddaughter and her two children (18 and 12). Pt lives in [facility] (2 stairs to kitchen) with multiple steps to enter.  -ES       Row Name 24 1452           Living Environment    People in Home child(elgin), adult;child(elgin), dependent  -ES       Row Name 08/05/24 1459          Stairs Within Home, Primary    Number of Stairs, Within Home, Primary two  -ES       Row Name 08/05/24 1459          Cognition    Orientation Status (Cognition) oriented x 4  -ES       Row Name 08/05/24 1459          Safety Issues, Functional Mobility    Safety Issues Affecting Function (Mobility) insight into deficits/self-awareness;safety precaution awareness;safety precautions follow-through/compliance  -ES     Impairments Affecting Function (Mobility) balance;endurance/activity tolerance;strength  -ES               User Key  (r) = Recorded By, (t) = Taken By, (c) = Cosigned By      Initials Name Provider Type    ES Azalea Peguero OT Occupational Therapist                     Mobility/ADL's       Row Name 08/05/24 1459          Bed Mobility    Bed Mobility bed mobility (all) activities  -ES     All Activities, [place] (Bed Mobility) independent  -ES     Assistive Device (Bed Mobility) head of bed elevated  -ES       Row Name 08/05/24 1459          Sit-Stand Transfer    Sit-Stand [place] (Transfers) supervision  -ES     Assistive Device (Sit-Stand Transfers) walker, front-wheeled  -ES       Row Name 08/05/24 1459          Functional Mobility    Functional Mobility- Ind. Level contact guard assist;supervision required  -ES       Row Name 08/05/24 1459          Activities of Daily Living    BADL Assessment/Intervention upper body dressing;lower body dressing  -ES       Row Name 08/05/24 1459          Upper Body Dressing Assessment/Training    [place] Level (Upper Body Dressing) set up  -ES       Row Name 08/05/24 1459          Grooming Assessment/Training    [place] Level (Grooming) set up  -ES       Row Name 08/05/24 1459          Lower Body Dressing Assessment/Training    [place] Level (Lower Body Dressing) contact guard assist  -ES               User Key  (r) =  Recorded By, (t) = Taken By, (c) = Cosigned By      Initials Name Provider Type    Azalea Chamberlain OT Occupational Therapist                   Obj/Interventions       Row Name 08/05/24 1500          Vision Assessment/Intervention    Vision Assessment Comment Limited by histoplasmosis and macular degeneration, R eye worse than L  -ES       Row Name 08/05/24 1500          Range of Motion Comprehensive    General Range of Motion bilateral upper extremity ROM WFL  -ES       Row Name 08/05/24 1500          Strength Comprehensive (MMT)    Comment, General Manual Muscle Testing (MMT) Assessment BUE 3+/5  -ES       Row Name 08/05/24 1500          Balance    Balance Assessment sitting static balance;sitting dynamic balance;standing static balance;standing dynamic balance  -ES     Static Sitting Balance independent  -ES     Dynamic Sitting Balance independent  -ES     Static Standing Balance standby assist  -ES     Dynamic Standing Balance standby assist  -ES     Balance Interventions sitting;standing;static;dynamic  -ES               User Key  (r) = Recorded By, (t) = Taken By, (c) = Cosigned By      Initials Name Provider Type    Azalea Chamberlain OT Occupational Therapist                   Goals/Plan    No documentation.                  Clinical Impression       Row Name 08/05/24 1501          Pain Assessment    Pretreatment Pain Rating 4/10  -ES     Posttreatment Pain Rating 6/10  -ES     Pain Location - Side/Orientation Right  -ES     Pain Location lower  -ES     Pain Location - extremity  -ES       Row Name 08/05/24 1501          Plan of Care Review    Plan of Care Reviewed With patient;daughter;grandchild(elgin)  -ES     Outcome Evaluation Pt is a 78 y.o. year old female admitted 8/4/24 s/p fall, hitting back of head with LOC. Xray Tib/Fib (-) fxs, but pt went into Afib while having laceration sutured.   Distal radius fx approx 6 weeks ago.     Pmhx significant for hernia, blindness, afib, hx of gastric  bypass.    At baseline, pt resides with daughter who requires total care (aide to assist with ADLs daily), adult granddaughter and her two children (18 and 12). Pt lives in [facility] (2 stairs to kitchen) with multiple steps to enter. Pt oriented x4. Limited by histoplasmosis and macular degeneration, R eye worse than L.  Pt independent with bed mobility. Requires CGA - SBA for ADLs. Anticipate she would require additional assist for bathing. Pt very limited by balance deficits, requiring CGA for higher level tasks. 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 Patient is further limited by pain, primarily in RLE at suture site. Pt reports multiple falls, and would benefit from short stay in IP rehab to facilitate increased safety at baseline. Pt declines, and OT recommends highest level of therapy patient is agreeable to. No POC established due to pt dc'ing home this date.  -ES       Row Name 08/05/24 1501          Therapy Assessment/Plan (OT)    Therapy Frequency (OT) evaluation only  -ES       Row Name 08/05/24 1501          Therapy Plan Review/Discharge Plan (OT)    Anticipated Discharge Disposition (OT) inpatient rehabilitation facility  -ES       Row Name 08/05/24 1501          Vital Signs    O2 Delivery Pre Treatment room air  -ES     O2 Delivery Intra Treatment room air  -ES     O2 Delivery Post Treatment room air  -ES     Pre Patient Position Supine  -ES     Intra Patient Position Standing  -ES     Post Patient Position Sitting  -ES       Row Name 08/05/24 1501          Positioning and Restraints    Pre-Treatment Position in bed  -ES     Post Treatment Position bed  -ES     In Bed notified nsg;sitting EOB;call light within reach;encouraged to call for assist  -ES               User Key  (r) = Recorded By, (t) = Taken By, (c) = Cosigned By      Initials Name Provider Type    Azalea Chamberlain, OT  Occupational Therapist                   Outcome Measures       Row Name 08/05/24 1505          How much help from another is currently needed...    Putting on and taking off regular lower body clothing? 3  -ES     Bathing (including washing, rinsing, and drying) 3  -ES     Toileting (which includes using toilet bed pan or urinal) 3  -ES     Putting on and taking off regular upper body clothing 4  -ES     Taking care of personal grooming (such as brushing teeth) 4  -ES     Eating meals 4  -ES     AM-PAC 6 Clicks Score (OT) 21  -ES       Row Name 08/05/24 1358 08/05/24 0852       How much help from another person do you currently need...    Turning from your back to your side while in flat bed without using bedrails? 4  -AH 3  -TA    Moving from lying on back to sitting on the side of a flat bed without bedrails? 4  -AH 3  -TA    Moving to and from a bed to a chair (including a wheelchair)? 4  -AH 3  -TA    Standing up from a chair using your arms (e.g., wheelchair, bedside chair)? 4  -AH 3  -TA    Climbing 3-5 steps with a railing? 3  -AH 2  -TA    To walk in hospital room? 4  -AH 3  -TA    AM-PAC 6 Clicks Score (PT) 23  -AH 17  -TA    Highest Level of Mobility Goal 7 --> Walk 25 feet or more  -AH 5 --> Static standing  -TA      Row Name 08/05/24 1505 08/05/24 1358       Functional Assessment    Outcome Measure Options AM-PAC 6 Clicks Daily Activity (OT)  -ES AM-PAC 6 Clicks Basic Mobility (PT)  -              User Key  (r) = Recorded By, (t) = Taken By, (c) = Cosigned By      Initials Name Provider Type    Azalea Chamberlain OT Occupational Therapist    Bonita Corado, RN Registered Nurse    S[place] PT Physical Therapist                    Occupational Therapy Education       Title: PT OT SLP Therapies (In Progress)       Topic: Occupational Therapy (Not Started)       Point: ADL training (Not Started)       Description:   Instruct learner(s) on proper safety adaptation and remediation techniques  during self care or transfers.   Instruct in proper use of assistive devices.                  Learner Progress:  Not documented in this visit.              Point: Precautions (Not Started)       Description:   Instruct learner(s) on prescribed precautions during self-care and functional transfers.                  Learner Progress:  Not documented in this visit.                                  OT Recommendation and Plan  Therapy Frequency (OT): evaluation only  Plan of Care Review  Plan of Care Reviewed With: patient, daughter, grandchild(elgin)  Outcome Evaluation: Pt is a 78 y.o. year old female admitted 8/4/24 s/p fall, hitting back of head with LOC. Xray Tib/Fib (-) fxs, but pt went into Afib while having laceration sutured.   Distal radius fx approx 6 weeks ago.     Pmhx significant for hernia, blindness, afib, hx of gastric bypass.    At baseline, pt resides with daughter who requires total care (aide to assist with ADLs daily), adult granddaughter and her two children (18 and 12). Pt lives in [facility] (2 stairs to kitchen) with multiple steps to enter. Pt oriented x4. Limited by histoplasmosis and macular degeneration, R eye worse than L.  Pt independent with bed mobility. Requires CGA - SBA for ADLs. Anticipate she would require additional assist for bathing. Pt very limited by balance deficits, requiring CGA for higher level tasks. 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 Patient is further limited by pain, primarily in RLE at suture site. Pt reports multiple falls, and would benefit from short stay in IP rehab to facilitate increased safety at baseline. Pt declines, and OT recommends highest level of therapy patient is agreeable to. No POC established due to pt dc'ing home this date.     Time Calculation:         Time Calculation- OT       Row Name 08/05/24 4033             Time Calculation- OT    OT  Start Time 1115  -ES      OT Stop Time 1145  -ES      OT Time Calculation (min) 30 min  -ES      Total Timed Code Minutes- OT 0 minute(s)  -ES      OT Received On 08/05/24  -ES                User Key  (r) = Recorded By, (t) = Taken By, (c) = Cosigned By      Initials Name Provider Type    Azalea Chamberlain OT Occupational Therapist                  Therapy Charges for Today       Code Description Service Date Service Provider Modifiers Qty    [id number]  OT EVAL MOD COMPLEXITY 4 8/5/2024 Azalea Peguero OT GO 1                 Azalea Peguero OT  8/5/2024

## 2024-08-05 NOTE — CASE MANAGEMENT/SOCIAL WORK
Continued Stay Note  [facility]     Patient Name: Fallon Nur  MRN: [id number]  Today's Date: 8/5/2024    Admit Date: 8/4/2024    Plan: D/C Plan.  Home no needs   Discharge Plan       Row Name 08/05/24 1213       Plan    Plan D/C Plan.  Home no needs                 Expected Discharge Date and Time       Expected Discharge Date Expected Discharge Time    Aug 6, 2024               Honey Jenkins RN

## 2024-08-05 NOTE — PLAN OF CARE
Goal Outcome Evaluation:  Plan of Care Reviewed With: patient           Outcome Evaluation: 77 y/o female admitted with Afib with RVR. She also sustained a Left leg laceration after a fall with mini refridgerator falling onto her. Pt reports she's independent at baseline without AD use. She's the primary CG for her dtr who has CP and is physically disabled.  Pt and other family members transfer pt's dtr with a berna lift (no manual lifting required). Pt reports 4 falls in the last several weeks and admits she wasn't using an AD when they occurred. Pt presents today still in Afib but with HR in a safe range (70-90s). She's SBA/supervision for transfers and gait with RW. Pt with impaired gait mechanics including decreased DF at initial contact and decreased foot clearance during swing phase, both increasing her risk of falls. Pt would likely benefit from inpatient rehab, and she anticipated this recommendation, but she refuses at this time; mostly due to CG responsibilities. Pt is agreeable to outpatient. PT recommends increased family assist/supervision and rollator use anytime she's up. No POC established due to patient discharging home today.      Anticipated Discharge Disposition (PT): home with assist, home with outpatient therapy services

## 2024-08-06 NOTE — CASE MANAGEMENT/SOCIAL WORK
Case Management Discharge Note      Final Note: Home         Selected Continued Care - Discharged on 8/5/2024 Admission date: 8/4/2024 - Discharge disposition: Home or Self Care      Destination    No services have been selected for the patient.                   Transportation Services  Private: Car    Final Discharge Disposition Code: 01 - home or self-care

## 2024-09-04 ENCOUNTER — HOSPITAL ENCOUNTER (EMERGENCY)
Facility: HOSPITAL | Age: 79
Discharge: LEFT BEFORE DISCHARGE | End: 2024-09-04
Attending: EMERGENCY MEDICINE
Payer: MEDICARE

## 2024-09-04 VITALS
OXYGEN SATURATION: 96 % | WEIGHT: 137 LBS | BODY MASS INDEX: 23.39 KG/M2 | SYSTOLIC BLOOD PRESSURE: 113 MMHG | RESPIRATION RATE: 20 BRPM | HEIGHT: 64 IN | HEART RATE: 74 BPM | DIASTOLIC BLOOD PRESSURE: 75 MMHG | TEMPERATURE: 98 F

## 2024-09-04 PROCEDURE — 99281 EMR DPT VST MAYX REQ PHY/QHP: CPT

## 2024-09-04 RX ORDER — SODIUM CHLORIDE 0.9 % (FLUSH) 0.9 %
10 SYRINGE (ML) INJECTION AS NEEDED
Status: DISCONTINUED | OUTPATIENT
Start: 2024-09-04 | End: 2024-09-04 | Stop reason: HOSPADM

## 2024-09-04 NOTE — ED PROVIDER NOTES
Provider in Triage Note  Pt presents with bilateral lower extremity swelling.  No chest pain or shortness of breath.  No fevers or chills.     Takes Lasix    Chart review: Discharge 8/5/2024 for A-fib, leg laceration  Laceration repair 8/4/2024-sutures removed from right leg    Due to significant overcrowding in the emergency department patient was initially seen and evaluated in triage.  Provider in triage recommended patient placement in the treatment area to initiate therapy and movement to an ER bed as soon as possible.   Orders placed; medications will be deferred to main provider per protocol.      Subjective   History of Present Illness    Review of Systems    Past Medical History:   Diagnosis Date    Atrial fibrillation     Blind 07/10/2023    Histoplasmosis     Hypertension     Legally blind     Longstanding persistent atrial fibrillation 07/10/2023    Paroxysmal atrial fibrillation 07/10/2023       No Known Allergies    No past surgical history on file.    Family History   Problem Relation Age of Onset    Cancer Mother     Dementia Father     Cancer Sister        Social History     Socioeconomic History    Marital status:    Tobacco Use    Smoking status: Former    Smokeless tobacco: Never   Vaping Use    Vaping status: Never Used   Substance and Sexual Activity    Alcohol use: Not Currently    Drug use: Not Currently    Sexual activity: Defer           Objective   Physical Exam    Procedures           ED Course  ED Course as of 09/09/24 1958   Thu Sep 05, 2024   0312 Pt left prior to further treatment [LB]      ED Course User Index  [LB] Stella Thorpe APRN                                               Medical Decision Making  Patient left after being seen and evaluated the [facility] provider.        Final diagnoses:   None       ED Disposition  ED Disposition       ED Disposition   Eloped    Condition   --    Comment   --               No follow-up provider specified.       Medication List       No changes were made to your prescriptions during this visit.            Stella Thorpe, APRN  09/09/24 1958

## 2024-09-12 ENCOUNTER — HOSPITAL ENCOUNTER (INPATIENT)
Facility: HOSPITAL | Age: 79
LOS: 9 days | Discharge: HOME | DRG: 571 | End: 2024-09-21
Attending: STUDENT IN AN ORGANIZED HEALTH CARE EDUCATION/TRAINING PROGRAM | Admitting: INTERNAL MEDICINE
Payer: MEDICARE

## 2024-09-12 ENCOUNTER — APPOINTMENT (OUTPATIENT)
Dept: GENERAL RADIOLOGY | Facility: HOSPITAL | Age: 79
DRG: 571 | End: 2024-09-12
Payer: MEDICARE

## 2024-09-12 DIAGNOSIS — T14.8XXA WOUND INFECTION: Primary | ICD-10-CM

## 2024-09-12 DIAGNOSIS — L03.119 CELLULITIS OF LOWER EXTREMITY, UNSPECIFIED LATERALITY: ICD-10-CM

## 2024-09-12 DIAGNOSIS — L08.9 WOUND INFECTION: Primary | ICD-10-CM

## 2024-09-12 DIAGNOSIS — N17.9 AKI (ACUTE KIDNEY INJURY): ICD-10-CM

## 2024-09-12 PROBLEM — F41.9 ANXIETY DISORDER: Status: ACTIVE | Noted: 2024-09-12

## 2024-09-12 PROBLEM — E87.6 HYPOKALEMIA: Status: ACTIVE | Noted: 2024-09-12

## 2024-09-12 PROBLEM — S81.801A OPEN WOUND OF BOTH LOWER EXTREMITIES: Status: ACTIVE | Noted: 2024-09-12

## 2024-09-12 PROBLEM — N28.9 ACUTE RENAL INSUFFICIENCY: Status: ACTIVE | Noted: 2024-09-12

## 2024-09-12 PROBLEM — D64.9 ANEMIA, UNSPECIFIED: Status: ACTIVE | Noted: 2024-09-12

## 2024-09-12 PROBLEM — S81.802A OPEN WOUND OF BOTH LOWER EXTREMITIES: Status: ACTIVE | Noted: 2024-09-12

## 2024-09-12 LAB
ALBUMIN SERPL-MCNC: 3.2 G/DL (ref 3.5–5.2)
ALBUMIN/GLOB SERPL: 0.8 G/DL
ALP SERPL-CCNC: 255 U/L (ref 39–117)
ALT SERPL W P-5'-P-CCNC: 14 U/L (ref 1–33)
ANION GAP SERPL CALCULATED.3IONS-SCNC: 12.8 MMOL/L (ref 5–15)
AST SERPL-CCNC: 33 U/L (ref 1–32)
BASOPHILS # BLD AUTO: 0.05 10*3/MM3 (ref 0–0.2)
BASOPHILS NFR BLD AUTO: 0.6 % (ref 0–1.5)
BILIRUB SERPL-MCNC: 0.2 MG/DL (ref 0–1.2)
BUN SERPL-MCNC: 31 MG/DL (ref 8–23)
BUN/CREAT SERPL: 25.2 (ref 7–25)
CALCIUM SPEC-SCNC: 8.4 MG/DL (ref 8.6–10.5)
CHLORIDE SERPL-SCNC: 106 MMOL/L (ref 98–107)
CO2 SERPL-SCNC: 21.2 MMOL/L (ref 22–29)
CREAT SERPL-MCNC: 1.23 MG/DL (ref 0.57–1)
DEPRECATED RDW RBC AUTO: 52.8 FL (ref 37–54)
DIGOXIN SERPL-MCNC: 0.99 NG/ML (ref 0.6–1.2)
EGFRCR SERPLBLD CKD-EPI 2021: 45.1 ML/MIN/1.73
EOSINOPHIL # BLD AUTO: 0.44 10*3/MM3 (ref 0–0.4)
EOSINOPHIL NFR BLD AUTO: 5.2 % (ref 0.3–6.2)
ERYTHROCYTE [DISTWIDTH] IN BLOOD BY AUTOMATED COUNT: 16.7 % (ref 12.3–15.4)
GLOBULIN UR ELPH-MCNC: 4.1 GM/DL
GLUCOSE SERPL-MCNC: 87 MG/DL (ref 65–99)
HCT VFR BLD AUTO: 27.1 % (ref 34–46.6)
HGB BLD-MCNC: 8.1 G/DL (ref 12–15.9)
IMM GRANULOCYTES # BLD AUTO: 0.02 10*3/MM3 (ref 0–0.05)
IMM GRANULOCYTES NFR BLD AUTO: 0.2 % (ref 0–0.5)
LYMPHOCYTES # BLD AUTO: 0.95 10*3/MM3 (ref 0.7–3.1)
LYMPHOCYTES NFR BLD AUTO: 11.2 % (ref 19.6–45.3)
MCH RBC QN AUTO: 26.6 PG (ref 26.6–33)
MCHC RBC AUTO-ENTMCNC: 29.9 G/DL (ref 31.5–35.7)
MCV RBC AUTO: 89.1 FL (ref 79–97)
MONOCYTES # BLD AUTO: 0.77 10*3/MM3 (ref 0.1–0.9)
MONOCYTES NFR BLD AUTO: 9.1 % (ref 5–12)
NEUTROPHILS NFR BLD AUTO: 6.27 10*3/MM3 (ref 1.7–7)
NEUTROPHILS NFR BLD AUTO: 73.7 % (ref 42.7–76)
NRBC BLD AUTO-RTO: 0 /100 WBC (ref 0–0.2)
PLATELET # BLD AUTO: 563 10*3/MM3 (ref 140–450)
PMV BLD AUTO: 10 FL (ref 6–12)
POTASSIUM SERPL-SCNC: 3.4 MMOL/L (ref 3.5–5.2)
PROT SERPL-MCNC: 7.3 G/DL (ref 6–8.5)
RBC # BLD AUTO: 3.04 10*6/MM3 (ref 3.77–5.28)
SODIUM SERPL-SCNC: 140 MMOL/L (ref 136–145)
WBC NRBC COR # BLD AUTO: 8.5 10*3/MM3 (ref 3.4–10.8)

## 2024-09-12 PROCEDURE — 25810000003 SODIUM CHLORIDE 0.9 % SOLUTION 250 ML FLEX CONT

## 2024-09-12 PROCEDURE — 80162 ASSAY OF DIGOXIN TOTAL: CPT | Performed by: STUDENT IN AN ORGANIZED HEALTH CARE EDUCATION/TRAINING PROGRAM

## 2024-09-12 PROCEDURE — 73590 X-RAY EXAM OF LOWER LEG: CPT

## 2024-09-12 PROCEDURE — 25010000002 VANCOMYCIN HCL 1.25 G RECONSTITUTED SOLUTION 1 EACH VIAL

## 2024-09-12 PROCEDURE — 87070 CULTURE OTHR SPECIMN AEROBIC: CPT

## 2024-09-12 PROCEDURE — 80053 COMPREHEN METABOLIC PANEL: CPT

## 2024-09-12 PROCEDURE — 99285 EMERGENCY DEPT VISIT HI MDM: CPT

## 2024-09-12 PROCEDURE — 25810000003 SODIUM CHLORIDE 0.9 % SOLUTION: Performed by: NURSE PRACTITIONER

## 2024-09-12 PROCEDURE — 87641 MR-STAPH DNA AMP PROBE: CPT | Performed by: NURSE PRACTITIONER

## 2024-09-12 PROCEDURE — 87205 SMEAR GRAM STAIN: CPT

## 2024-09-12 PROCEDURE — 87040 BLOOD CULTURE FOR BACTERIA: CPT

## 2024-09-12 PROCEDURE — 85025 COMPLETE CBC W/AUTO DIFF WBC: CPT

## 2024-09-12 RX ORDER — CETIRIZINE HYDROCHLORIDE 10 MG/1
10 TABLET ORAL DAILY
Status: DISCONTINUED | OUTPATIENT
Start: 2024-09-13 | End: 2024-09-21 | Stop reason: HOSPADM

## 2024-09-12 RX ORDER — DIGOXIN 125 MCG
125 TABLET ORAL
Status: DISCONTINUED | OUTPATIENT
Start: 2024-09-13 | End: 2024-09-21 | Stop reason: HOSPADM

## 2024-09-12 RX ORDER — BUPROPION HYDROCHLORIDE 150 MG/1
150 TABLET ORAL DAILY
Status: DISCONTINUED | OUTPATIENT
Start: 2024-09-13 | End: 2024-09-21 | Stop reason: HOSPADM

## 2024-09-12 RX ORDER — UREA 10 %
1000 LOTION (ML) TOPICAL DAILY
Status: DISCONTINUED | OUTPATIENT
Start: 2024-09-13 | End: 2024-09-21 | Stop reason: HOSPADM

## 2024-09-12 RX ORDER — HYDROCODONE BITARTRATE AND ACETAMINOPHEN 5; 325 MG/1; MG/1
1 TABLET ORAL ONCE AS NEEDED
Status: DISCONTINUED | OUTPATIENT
Start: 2024-09-12 | End: 2024-09-13

## 2024-09-12 RX ORDER — FERROUS SULFATE 324(65)MG
324 TABLET, DELAYED RELEASE (ENTERIC COATED) ORAL
Status: DISCONTINUED | OUTPATIENT
Start: 2024-09-13 | End: 2024-09-21 | Stop reason: HOSPADM

## 2024-09-12 RX ORDER — SODIUM CHLORIDE 9 MG/ML
100 INJECTION, SOLUTION INTRAVENOUS CONTINUOUS
Status: DISCONTINUED | OUTPATIENT
Start: 2024-09-12 | End: 2024-09-14

## 2024-09-12 RX ORDER — ZOLPIDEM TARTRATE 5 MG/1
5 TABLET ORAL NIGHTLY
Status: DISCONTINUED | OUTPATIENT
Start: 2024-09-12 | End: 2024-09-21 | Stop reason: HOSPADM

## 2024-09-12 RX ORDER — PANTOPRAZOLE SODIUM 40 MG/1
40 TABLET, DELAYED RELEASE ORAL DAILY
Status: DISCONTINUED | OUTPATIENT
Start: 2024-09-13 | End: 2024-09-21 | Stop reason: HOSPADM

## 2024-09-12 RX ORDER — CITALOPRAM HYDROBROMIDE 20 MG/1
20 TABLET ORAL DAILY
Status: DISCONTINUED | OUTPATIENT
Start: 2024-09-13 | End: 2024-09-21 | Stop reason: HOSPADM

## 2024-09-12 RX ORDER — HYDROCODONE BITARTRATE AND ACETAMINOPHEN 5; 325 MG/1; MG/1
1 TABLET ORAL EVERY 8 HOURS PRN
Status: DISCONTINUED | OUTPATIENT
Start: 2024-09-12 | End: 2024-09-13

## 2024-09-12 RX ORDER — LEVOTHYROXINE SODIUM 50 UG/1
50 TABLET ORAL
Status: DISCONTINUED | OUTPATIENT
Start: 2024-09-13 | End: 2024-09-21 | Stop reason: HOSPADM

## 2024-09-12 RX ORDER — SODIUM CHLORIDE 0.9 % (FLUSH) 0.9 %
10 SYRINGE (ML) INJECTION AS NEEDED
Status: DISCONTINUED | OUTPATIENT
Start: 2024-09-12 | End: 2024-09-21 | Stop reason: HOSPADM

## 2024-09-12 RX ORDER — METOPROLOL SUCCINATE 25 MG/1
25 TABLET, EXTENDED RELEASE ORAL
Status: DISCONTINUED | OUTPATIENT
Start: 2024-09-13 | End: 2024-09-20

## 2024-09-12 RX ADMIN — SODIUM CHLORIDE 1250 MG: 9 INJECTION, SOLUTION INTRAVENOUS at 19:59

## 2024-09-12 RX ADMIN — HYDROCODONE BITARTRATE AND ACETAMINOPHEN 1 TABLET: 5; 325 TABLET ORAL at 19:57

## 2024-09-12 RX ADMIN — SODIUM CHLORIDE 100 ML/HR: 9 INJECTION, SOLUTION INTRAVENOUS at 23:30

## 2024-09-12 RX ADMIN — ZOLPIDEM TARTRATE 5 MG: 5 TABLET, FILM COATED ORAL at 23:50

## 2024-09-13 ENCOUNTER — APPOINTMENT (OUTPATIENT)
Dept: CARDIOLOGY | Facility: HOSPITAL | Age: 79
DRG: 571 | End: 2024-09-13
Payer: MEDICARE

## 2024-09-13 LAB
ANION GAP SERPL CALCULATED.3IONS-SCNC: 9.8 MMOL/L (ref 5–15)
BASOPHILS # BLD AUTO: 0.04 10*3/MM3 (ref 0–0.2)
BASOPHILS NFR BLD AUTO: 0.5 % (ref 0–1.5)
BH CV LOWER ARTERIAL LEFT ABI RATIO: 0.51
BH CV LOWER ARTERIAL LEFT DORSALIS PEDIS SYS MAX: 55
BH CV LOWER ARTERIAL LEFT GREAT TOE SYS MAX: 0
BH CV LOWER ARTERIAL LEFT TBI RATIO: 0
BH CV LOWER ARTERIAL RIGHT ABI RATIO: 1.09
BH CV LOWER ARTERIAL RIGHT DORSALIS PEDIS SYS MAX: 117
BH CV LOWER ARTERIAL RIGHT GREAT TOE SYS MAX: 0
BH CV LOWER ARTERIAL RIGHT TBI RATIO: 0
BUN SERPL-MCNC: 29 MG/DL (ref 8–23)
BUN/CREAT SERPL: 24.6 (ref 7–25)
CA-I SERPL ISE-MCNC: 1.1 MMOL/L (ref 1.15–1.3)
CALCIUM SPEC-SCNC: 7.9 MG/DL (ref 8.6–10.5)
CHLORIDE SERPL-SCNC: 111 MMOL/L (ref 98–107)
CO2 SERPL-SCNC: 21.2 MMOL/L (ref 22–29)
CREAT SERPL-MCNC: 1.18 MG/DL (ref 0.57–1)
DEPRECATED RDW RBC AUTO: 53.4 FL (ref 37–54)
EGFRCR SERPLBLD CKD-EPI 2021: 47.4 ML/MIN/1.73
EOSINOPHIL # BLD AUTO: 0.58 10*3/MM3 (ref 0–0.4)
EOSINOPHIL NFR BLD AUTO: 7.2 % (ref 0.3–6.2)
ERYTHROCYTE [DISTWIDTH] IN BLOOD BY AUTOMATED COUNT: 16.7 % (ref 12.3–15.4)
GLUCOSE SERPL-MCNC: 102 MG/DL (ref 65–99)
HCT VFR BLD AUTO: 25.9 % (ref 34–46.6)
HGB BLD-MCNC: 7.5 G/DL (ref 12–15.9)
IMM GRANULOCYTES # BLD AUTO: 0.04 10*3/MM3 (ref 0–0.05)
IMM GRANULOCYTES NFR BLD AUTO: 0.5 % (ref 0–0.5)
IRON 24H UR-MRATE: 18 MCG/DL (ref 37–145)
IRON SATN MFR SERPL: 5 % (ref 20–50)
LYMPHOCYTES # BLD AUTO: 1.19 10*3/MM3 (ref 0.7–3.1)
LYMPHOCYTES NFR BLD AUTO: 14.7 % (ref 19.6–45.3)
MCH RBC QN AUTO: 25.9 PG (ref 26.6–33)
MCHC RBC AUTO-ENTMCNC: 29 G/DL (ref 31.5–35.7)
MCV RBC AUTO: 89.3 FL (ref 79–97)
MONOCYTES # BLD AUTO: 0.84 10*3/MM3 (ref 0.1–0.9)
MONOCYTES NFR BLD AUTO: 10.4 % (ref 5–12)
MRSA DNA SPEC QL NAA+PROBE: NORMAL
NEUTROPHILS NFR BLD AUTO: 5.41 10*3/MM3 (ref 1.7–7)
NEUTROPHILS NFR BLD AUTO: 66.7 % (ref 42.7–76)
NRBC BLD AUTO-RTO: 0 /100 WBC (ref 0–0.2)
PLATELET # BLD AUTO: 517 10*3/MM3 (ref 140–450)
PMV BLD AUTO: 10 FL (ref 6–12)
POTASSIUM SERPL-SCNC: 3.5 MMOL/L (ref 3.5–5.2)
RBC # BLD AUTO: 2.9 10*6/MM3 (ref 3.77–5.28)
SODIUM SERPL-SCNC: 142 MMOL/L (ref 136–145)
TIBC SERPL-MCNC: 352 MCG/DL (ref 298–536)
TRANSFERRIN SERPL-MCNC: 236 MG/DL (ref 200–360)
UPPER ARTERIAL LEFT ARM BRACHIAL SYS MAX: 107
VANCOMYCIN SERPL-MCNC: 13.1 MCG/ML (ref 5–40)
WBC NRBC COR # BLD AUTO: 8.1 10*3/MM3 (ref 3.4–10.8)

## 2024-09-13 PROCEDURE — 25010000002 MORPHINE PER 10 MG: Performed by: HOSPITALIST

## 2024-09-13 PROCEDURE — 82330 ASSAY OF CALCIUM: CPT | Performed by: NURSE PRACTITIONER

## 2024-09-13 PROCEDURE — 25010000002 CEFTRIAXONE PER 250 MG: Performed by: NURSE PRACTITIONER

## 2024-09-13 PROCEDURE — 25810000003 SODIUM CHLORIDE 0.9 % SOLUTION: Performed by: NURSE PRACTITIONER

## 2024-09-13 PROCEDURE — 83540 ASSAY OF IRON: CPT | Performed by: HOSPITALIST

## 2024-09-13 PROCEDURE — 93922 UPR/L XTREMITY ART 2 LEVELS: CPT | Performed by: SURGERY

## 2024-09-13 PROCEDURE — 80202 ASSAY OF VANCOMYCIN: CPT | Performed by: NURSE PRACTITIONER

## 2024-09-13 PROCEDURE — 25010000002 VANCOMYCIN 1 G RECONSTITUTED SOLUTION 1 EACH VIAL: Performed by: HOSPITALIST

## 2024-09-13 PROCEDURE — 93922 UPR/L XTREMITY ART 2 LEVELS: CPT

## 2024-09-13 PROCEDURE — 84466 ASSAY OF TRANSFERRIN: CPT | Performed by: HOSPITALIST

## 2024-09-13 PROCEDURE — 80048 BASIC METABOLIC PNL TOTAL CA: CPT | Performed by: NURSE PRACTITIONER

## 2024-09-13 PROCEDURE — 25810000003 SODIUM CHLORIDE 0.9 % SOLUTION 250 ML FLEX CONT: Performed by: HOSPITALIST

## 2024-09-13 PROCEDURE — 85025 COMPLETE CBC W/AUTO DIFF WBC: CPT | Performed by: NURSE PRACTITIONER

## 2024-09-13 RX ORDER — MORPHINE SULFATE 2 MG/ML
2 INJECTION, SOLUTION INTRAMUSCULAR; INTRAVENOUS EVERY 4 HOURS PRN
Status: DISPENSED | OUTPATIENT
Start: 2024-09-13 | End: 2024-09-18

## 2024-09-13 RX ORDER — OXYCODONE HYDROCHLORIDE 5 MG/1
10 TABLET ORAL EVERY 4 HOURS PRN
Status: DISPENSED | OUTPATIENT
Start: 2024-09-13 | End: 2024-09-18

## 2024-09-13 RX ORDER — DIPHENOXYLATE HYDROCHLORIDE AND ATROPINE SULFATE 2.5; .025 MG/1; MG/1
1 TABLET ORAL DAILY
Status: DISCONTINUED | OUTPATIENT
Start: 2024-09-13 | End: 2024-09-21 | Stop reason: HOSPADM

## 2024-09-13 RX ORDER — ZINC SULFATE 50(220)MG
220 CAPSULE ORAL DAILY
Status: DISCONTINUED | OUTPATIENT
Start: 2024-09-13 | End: 2024-09-21 | Stop reason: HOSPADM

## 2024-09-13 RX ORDER — HYDROCODONE BITARTRATE AND ACETAMINOPHEN 5; 325 MG/1; MG/1
1 TABLET ORAL EVERY 4 HOURS PRN
Status: DISCONTINUED | OUTPATIENT
Start: 2024-09-13 | End: 2024-09-13

## 2024-09-13 RX ORDER — POTASSIUM CHLORIDE 1500 MG/1
40 TABLET, EXTENDED RELEASE ORAL EVERY 4 HOURS
Status: COMPLETED | OUTPATIENT
Start: 2024-09-13 | End: 2024-09-13

## 2024-09-13 RX ADMIN — OXYCODONE HYDROCHLORIDE 10 MG: 5 TABLET ORAL at 16:02

## 2024-09-13 RX ADMIN — FERROUS SULFATE TAB EC 324 MG (65 MG FE EQUIVALENT) 324 MG: 324 (65 FE) TABLET DELAYED RESPONSE at 08:58

## 2024-09-13 RX ADMIN — CETIRIZINE HYDROCHLORIDE 10 MG: 10 TABLET, FILM COATED ORAL at 08:58

## 2024-09-13 RX ADMIN — RIVAROXABAN 15 MG: 15 TABLET, FILM COATED ORAL at 17:25

## 2024-09-13 RX ADMIN — MORPHINE SULFATE 2 MG: 2 INJECTION, SOLUTION INTRAMUSCULAR; INTRAVENOUS at 13:55

## 2024-09-13 RX ADMIN — MORPHINE SULFATE 2 MG: 2 INJECTION, SOLUTION INTRAMUSCULAR; INTRAVENOUS at 21:19

## 2024-09-13 RX ADMIN — HYDROCODONE BITARTRATE AND ACETAMINOPHEN 1 TABLET: 5; 325 TABLET ORAL at 00:32

## 2024-09-13 RX ADMIN — SODIUM CHLORIDE 1000 MG: 9 INJECTION, SOLUTION INTRAVENOUS at 11:47

## 2024-09-13 RX ADMIN — DIGOXIN 125 MCG: 125 TABLET ORAL at 11:47

## 2024-09-13 RX ADMIN — POTASSIUM CHLORIDE 40 MEQ: 1500 TABLET, EXTENDED RELEASE ORAL at 05:45

## 2024-09-13 RX ADMIN — LEVOTHYROXINE SODIUM 50 MCG: 0.05 TABLET ORAL at 05:45

## 2024-09-13 RX ADMIN — CITALOPRAM HYDROBROMIDE 20 MG: 20 TABLET ORAL at 08:59

## 2024-09-13 RX ADMIN — METOPROLOL SUCCINATE 25 MG: 25 TABLET, EXTENDED RELEASE ORAL at 08:59

## 2024-09-13 RX ADMIN — CEFTRIAXONE 2000 MG: 2 INJECTION, POWDER, FOR SOLUTION INTRAMUSCULAR; INTRAVENOUS at 19:51

## 2024-09-13 RX ADMIN — THERA TABS 1 TABLET: TAB at 16:00

## 2024-09-13 RX ADMIN — Medication 220 MG: at 16:00

## 2024-09-13 RX ADMIN — MORPHINE SULFATE 2 MG: 2 INJECTION, SOLUTION INTRAMUSCULAR; INTRAVENOUS at 17:16

## 2024-09-13 RX ADMIN — BUPROPION HYDROCHLORIDE 150 MG: 150 TABLET, EXTENDED RELEASE ORAL at 08:58

## 2024-09-13 RX ADMIN — CYANOCOBALAMIN TAB 500 MCG 1000 MCG: 500 TAB at 09:00

## 2024-09-13 RX ADMIN — POTASSIUM CHLORIDE 40 MEQ: 1500 TABLET, EXTENDED RELEASE ORAL at 00:32

## 2024-09-13 RX ADMIN — HYDROCODONE BITARTRATE AND ACETAMINOPHEN 1 TABLET: 5; 325 TABLET ORAL at 09:53

## 2024-09-13 RX ADMIN — PANTOPRAZOLE SODIUM 40 MG: 40 TABLET, DELAYED RELEASE ORAL at 08:58

## 2024-09-13 RX ADMIN — SODIUM CHLORIDE 100 ML/HR: 9 INJECTION, SOLUTION INTRAVENOUS at 15:54

## 2024-09-14 LAB
ANION GAP SERPL CALCULATED.3IONS-SCNC: 9.4 MMOL/L (ref 5–15)
BASOPHILS # BLD AUTO: 0.06 10*3/MM3 (ref 0–0.2)
BASOPHILS NFR BLD AUTO: 0.6 % (ref 0–1.5)
BUN SERPL-MCNC: 29 MG/DL (ref 8–23)
BUN/CREAT SERPL: 19.9 (ref 7–25)
CALCIUM SPEC-SCNC: 8.1 MG/DL (ref 8.6–10.5)
CHLORIDE SERPL-SCNC: 112 MMOL/L (ref 98–107)
CO2 SERPL-SCNC: 19.6 MMOL/L (ref 22–29)
CREAT SERPL-MCNC: 1.46 MG/DL (ref 0.57–1)
CRP SERPL-MCNC: 0.55 MG/DL (ref 0–0.5)
DEPRECATED RDW RBC AUTO: 55.8 FL (ref 37–54)
EGFRCR SERPLBLD CKD-EPI 2021: 36.7 ML/MIN/1.73
EOSINOPHIL # BLD AUTO: 0.53 10*3/MM3 (ref 0–0.4)
EOSINOPHIL NFR BLD AUTO: 5 % (ref 0.3–6.2)
ERYTHROCYTE [DISTWIDTH] IN BLOOD BY AUTOMATED COUNT: 16.8 % (ref 12.3–15.4)
ERYTHROCYTE [SEDIMENTATION RATE] IN BLOOD: 50 MM/HR (ref 0–30)
FERRITIN SERPL-MCNC: 43.9 NG/ML (ref 13–150)
FOLATE SERPL-MCNC: 6.71 NG/ML (ref 4.78–24.2)
GLUCOSE SERPL-MCNC: 103 MG/DL (ref 65–99)
HCT VFR BLD AUTO: 27.4 % (ref 34–46.6)
HGB BLD-MCNC: 7.6 G/DL (ref 12–15.9)
IMM GRANULOCYTES # BLD AUTO: 0.02 10*3/MM3 (ref 0–0.05)
IMM GRANULOCYTES NFR BLD AUTO: 0.2 % (ref 0–0.5)
LYMPHOCYTES # BLD AUTO: 1.18 10*3/MM3 (ref 0.7–3.1)
LYMPHOCYTES NFR BLD AUTO: 11 % (ref 19.6–45.3)
MCH RBC QN AUTO: 25.6 PG (ref 26.6–33)
MCHC RBC AUTO-ENTMCNC: 27.7 G/DL (ref 31.5–35.7)
MCV RBC AUTO: 92.3 FL (ref 79–97)
MONOCYTES # BLD AUTO: 1.05 10*3/MM3 (ref 0.1–0.9)
MONOCYTES NFR BLD AUTO: 9.8 % (ref 5–12)
NEUTROPHILS NFR BLD AUTO: 7.84 10*3/MM3 (ref 1.7–7)
NEUTROPHILS NFR BLD AUTO: 73.4 % (ref 42.7–76)
NRBC BLD AUTO-RTO: 0 /100 WBC (ref 0–0.2)
PLATELET # BLD AUTO: 493 10*3/MM3 (ref 140–450)
PMV BLD AUTO: 10.2 FL (ref 6–12)
POTASSIUM SERPL-SCNC: 4.4 MMOL/L (ref 3.5–5.2)
RBC # BLD AUTO: 2.97 10*6/MM3 (ref 3.77–5.28)
SODIUM SERPL-SCNC: 141 MMOL/L (ref 136–145)
VANCOMYCIN SERPL-MCNC: 15.5 MCG/ML (ref 5–40)
VIT B12 BLD-MCNC: 1417 PG/ML (ref 211–946)
WBC NRBC COR # BLD AUTO: 10.68 10*3/MM3 (ref 3.4–10.8)

## 2024-09-14 PROCEDURE — 82728 ASSAY OF FERRITIN: CPT | Performed by: STUDENT IN AN ORGANIZED HEALTH CARE EDUCATION/TRAINING PROGRAM

## 2024-09-14 PROCEDURE — 25010000002 MORPHINE PER 10 MG: Performed by: HOSPITALIST

## 2024-09-14 PROCEDURE — 80202 ASSAY OF VANCOMYCIN: CPT | Performed by: NURSE PRACTITIONER

## 2024-09-14 PROCEDURE — 86140 C-REACTIVE PROTEIN: CPT | Performed by: STUDENT IN AN ORGANIZED HEALTH CARE EDUCATION/TRAINING PROGRAM

## 2024-09-14 PROCEDURE — 25810000003 LACTATED RINGERS PER 1000 ML: Performed by: STUDENT IN AN ORGANIZED HEALTH CARE EDUCATION/TRAINING PROGRAM

## 2024-09-14 PROCEDURE — 99222 1ST HOSP IP/OBS MODERATE 55: CPT | Performed by: STUDENT IN AN ORGANIZED HEALTH CARE EDUCATION/TRAINING PROGRAM

## 2024-09-14 PROCEDURE — 85652 RBC SED RATE AUTOMATED: CPT | Performed by: STUDENT IN AN ORGANIZED HEALTH CARE EDUCATION/TRAINING PROGRAM

## 2024-09-14 PROCEDURE — 25810000003 LACTATED RINGERS SOLUTION: Performed by: STUDENT IN AN ORGANIZED HEALTH CARE EDUCATION/TRAINING PROGRAM

## 2024-09-14 PROCEDURE — 25010000002 CEFTRIAXONE PER 250 MG: Performed by: NURSE PRACTITIONER

## 2024-09-14 PROCEDURE — 25010000002 VANCOMYCIN 750 MG RECONSTITUTED SOLUTION 1 EACH VIAL: Performed by: INTERNAL MEDICINE

## 2024-09-14 PROCEDURE — 80048 BASIC METABOLIC PNL TOTAL CA: CPT | Performed by: NURSE PRACTITIONER

## 2024-09-14 PROCEDURE — 97162 PT EVAL MOD COMPLEX 30 MIN: CPT

## 2024-09-14 PROCEDURE — 85025 COMPLETE CBC W/AUTO DIFF WBC: CPT | Performed by: NURSE PRACTITIONER

## 2024-09-14 PROCEDURE — 82746 ASSAY OF FOLIC ACID SERUM: CPT | Performed by: HOSPITALIST

## 2024-09-14 PROCEDURE — 82607 VITAMIN B-12: CPT | Performed by: HOSPITALIST

## 2024-09-14 PROCEDURE — 25810000003 SODIUM CHLORIDE 0.9 % SOLUTION 250 ML FLEX CONT: Performed by: INTERNAL MEDICINE

## 2024-09-14 RX ORDER — SODIUM CHLORIDE, SODIUM LACTATE, POTASSIUM CHLORIDE, CALCIUM CHLORIDE 600; 310; 30; 20 MG/100ML; MG/100ML; MG/100ML; MG/100ML
100 INJECTION, SOLUTION INTRAVENOUS CONTINUOUS
Status: DISPENSED | OUTPATIENT
Start: 2024-09-14 | End: 2024-09-14

## 2024-09-14 RX ORDER — SODIUM CHLORIDE, SODIUM LACTATE, POTASSIUM CHLORIDE, CALCIUM CHLORIDE 600; 310; 30; 20 MG/100ML; MG/100ML; MG/100ML; MG/100ML
75 INJECTION, SOLUTION INTRAVENOUS CONTINUOUS
Status: DISCONTINUED | OUTPATIENT
Start: 2024-09-14 | End: 2024-09-14

## 2024-09-14 RX ADMIN — MORPHINE SULFATE 2 MG: 2 INJECTION, SOLUTION INTRAMUSCULAR; INTRAVENOUS at 21:49

## 2024-09-14 RX ADMIN — SODIUM CHLORIDE, POTASSIUM CHLORIDE, SODIUM LACTATE AND CALCIUM CHLORIDE 250 ML: 600; 310; 30; 20 INJECTION, SOLUTION INTRAVENOUS at 13:46

## 2024-09-14 RX ADMIN — MORPHINE SULFATE 2 MG: 2 INJECTION, SOLUTION INTRAMUSCULAR; INTRAVENOUS at 17:31

## 2024-09-14 RX ADMIN — OXYCODONE HYDROCHLORIDE 10 MG: 5 TABLET ORAL at 20:33

## 2024-09-14 RX ADMIN — DIGOXIN 125 MCG: 125 TABLET ORAL at 12:02

## 2024-09-14 RX ADMIN — Medication 220 MG: at 07:55

## 2024-09-14 RX ADMIN — RIVAROXABAN 15 MG: 15 TABLET, FILM COATED ORAL at 16:14

## 2024-09-14 RX ADMIN — BUPROPION HYDROCHLORIDE 150 MG: 150 TABLET, EXTENDED RELEASE ORAL at 07:55

## 2024-09-14 RX ADMIN — CYANOCOBALAMIN TAB 500 MCG 1000 MCG: 500 TAB at 07:55

## 2024-09-14 RX ADMIN — SODIUM CHLORIDE, POTASSIUM CHLORIDE, SODIUM LACTATE AND CALCIUM CHLORIDE 100 ML/HR: 600; 310; 30; 20 INJECTION, SOLUTION INTRAVENOUS at 13:46

## 2024-09-14 RX ADMIN — MORPHINE SULFATE 2 MG: 2 INJECTION, SOLUTION INTRAMUSCULAR; INTRAVENOUS at 05:30

## 2024-09-14 RX ADMIN — FERROUS SULFATE TAB EC 324 MG (65 MG FE EQUIVALENT) 324 MG: 324 (65 FE) TABLET DELAYED RESPONSE at 07:55

## 2024-09-14 RX ADMIN — LEVOTHYROXINE SODIUM 50 MCG: 0.05 TABLET ORAL at 05:30

## 2024-09-14 RX ADMIN — MORPHINE SULFATE 2 MG: 2 INJECTION, SOLUTION INTRAMUSCULAR; INTRAVENOUS at 13:45

## 2024-09-14 RX ADMIN — OXYCODONE HYDROCHLORIDE 10 MG: 5 TABLET ORAL at 12:02

## 2024-09-14 RX ADMIN — THERA TABS 1 TABLET: TAB at 07:55

## 2024-09-14 RX ADMIN — CITALOPRAM HYDROBROMIDE 20 MG: 20 TABLET ORAL at 07:55

## 2024-09-14 RX ADMIN — ZOLPIDEM TARTRATE 5 MG: 5 TABLET, FILM COATED ORAL at 20:33

## 2024-09-14 RX ADMIN — OXYCODONE HYDROCHLORIDE 10 MG: 5 TABLET ORAL at 02:17

## 2024-09-14 RX ADMIN — MORPHINE SULFATE 2 MG: 2 INJECTION, SOLUTION INTRAMUSCULAR; INTRAVENOUS at 01:23

## 2024-09-14 RX ADMIN — CEFTRIAXONE 2000 MG: 2 INJECTION, POWDER, FOR SOLUTION INTRAMUSCULAR; INTRAVENOUS at 16:14

## 2024-09-14 RX ADMIN — Medication 10 ML: at 07:55

## 2024-09-14 RX ADMIN — VANCOMYCIN HYDROCHLORIDE 750 MG: 750 INJECTION, POWDER, LYOPHILIZED, FOR SOLUTION INTRAVENOUS at 14:42

## 2024-09-14 RX ADMIN — COLLAGENASE SANTYL 1 APPLICATION: 250 OINTMENT TOPICAL at 12:02

## 2024-09-14 RX ADMIN — PANTOPRAZOLE SODIUM 40 MG: 40 TABLET, DELAYED RELEASE ORAL at 07:55

## 2024-09-14 RX ADMIN — OXYCODONE HYDROCHLORIDE 10 MG: 5 TABLET ORAL at 16:14

## 2024-09-14 RX ADMIN — CETIRIZINE HYDROCHLORIDE 10 MG: 10 TABLET, FILM COATED ORAL at 07:55

## 2024-09-14 RX ADMIN — OXYCODONE HYDROCHLORIDE 10 MG: 5 TABLET ORAL at 07:55

## 2024-09-14 RX ADMIN — METOPROLOL SUCCINATE 25 MG: 25 TABLET, EXTENDED RELEASE ORAL at 07:55

## 2024-09-15 ENCOUNTER — ANESTHESIA EVENT (OUTPATIENT)
Dept: PERIOP | Facility: HOSPITAL | Age: 79
End: 2024-09-15
Payer: MEDICARE

## 2024-09-15 LAB
ALBUMIN SERPL-MCNC: 2.7 G/DL (ref 3.5–5.2)
ALBUMIN/GLOB SERPL: 0.7 G/DL
ALP SERPL-CCNC: 254 U/L (ref 39–117)
ALT SERPL W P-5'-P-CCNC: 13 U/L (ref 1–33)
ANION GAP SERPL CALCULATED.3IONS-SCNC: 10.4 MMOL/L (ref 5–15)
AST SERPL-CCNC: 32 U/L (ref 1–32)
BACTERIA SPEC AEROBE CULT: ABNORMAL
BASOPHILS # BLD AUTO: 0.03 10*3/MM3 (ref 0–0.2)
BASOPHILS NFR BLD AUTO: 0.3 % (ref 0–1.5)
BILIRUB SERPL-MCNC: <0.2 MG/DL (ref 0–1.2)
BUN SERPL-MCNC: 27 MG/DL (ref 8–23)
BUN/CREAT SERPL: 19.6 (ref 7–25)
CALCIUM SPEC-SCNC: 8.3 MG/DL (ref 8.6–10.5)
CHLORIDE SERPL-SCNC: 111 MMOL/L (ref 98–107)
CO2 SERPL-SCNC: 17.6 MMOL/L (ref 22–29)
CREAT SERPL-MCNC: 1.38 MG/DL (ref 0.57–1)
DEPRECATED RDW RBC AUTO: 55.9 FL (ref 37–54)
EGFRCR SERPLBLD CKD-EPI 2021: 39.3 ML/MIN/1.73
EOSINOPHIL # BLD AUTO: 0.55 10*3/MM3 (ref 0–0.4)
EOSINOPHIL NFR BLD AUTO: 5.7 % (ref 0.3–6.2)
ERYTHROCYTE [DISTWIDTH] IN BLOOD BY AUTOMATED COUNT: 16.8 % (ref 12.3–15.4)
GLOBULIN UR ELPH-MCNC: 3.8 GM/DL
GLUCOSE SERPL-MCNC: 87 MG/DL (ref 65–99)
GRAM STN SPEC: ABNORMAL
GRAM STN SPEC: ABNORMAL
HCT VFR BLD AUTO: 26.6 % (ref 34–46.6)
HGB BLD-MCNC: 7.6 G/DL (ref 12–15.9)
IMM GRANULOCYTES # BLD AUTO: 0.06 10*3/MM3 (ref 0–0.05)
IMM GRANULOCYTES NFR BLD AUTO: 0.6 % (ref 0–0.5)
LYMPHOCYTES # BLD AUTO: 1.03 10*3/MM3 (ref 0.7–3.1)
LYMPHOCYTES NFR BLD AUTO: 10.6 % (ref 19.6–45.3)
MAGNESIUM SERPL-MCNC: 2.2 MG/DL (ref 1.6–2.4)
MCH RBC QN AUTO: 26.3 PG (ref 26.6–33)
MCHC RBC AUTO-ENTMCNC: 28.6 G/DL (ref 31.5–35.7)
MCV RBC AUTO: 92 FL (ref 79–97)
MONOCYTES # BLD AUTO: 0.86 10*3/MM3 (ref 0.1–0.9)
MONOCYTES NFR BLD AUTO: 8.8 % (ref 5–12)
NEUTROPHILS NFR BLD AUTO: 7.2 10*3/MM3 (ref 1.7–7)
NEUTROPHILS NFR BLD AUTO: 74 % (ref 42.7–76)
NRBC BLD AUTO-RTO: 0 /100 WBC (ref 0–0.2)
PHOSPHATE SERPL-MCNC: 4.2 MG/DL (ref 2.5–4.5)
PLATELET # BLD AUTO: 515 10*3/MM3 (ref 140–450)
PMV BLD AUTO: 10.2 FL (ref 6–12)
POTASSIUM SERPL-SCNC: 4.7 MMOL/L (ref 3.5–5.2)
PROT SERPL-MCNC: 6.5 G/DL (ref 6–8.5)
RBC # BLD AUTO: 2.89 10*6/MM3 (ref 3.77–5.28)
SODIUM SERPL-SCNC: 139 MMOL/L (ref 136–145)
VANCOMYCIN SERPL-MCNC: 16.4 MCG/ML (ref 5–40)
WBC NRBC COR # BLD AUTO: 9.73 10*3/MM3 (ref 3.4–10.8)

## 2024-09-15 PROCEDURE — 85025 COMPLETE CBC W/AUTO DIFF WBC: CPT | Performed by: NURSE PRACTITIONER

## 2024-09-15 PROCEDURE — 80202 ASSAY OF VANCOMYCIN: CPT | Performed by: INTERNAL MEDICINE

## 2024-09-15 PROCEDURE — 80053 COMPREHEN METABOLIC PANEL: CPT | Performed by: STUDENT IN AN ORGANIZED HEALTH CARE EDUCATION/TRAINING PROGRAM

## 2024-09-15 PROCEDURE — 83735 ASSAY OF MAGNESIUM: CPT | Performed by: STUDENT IN AN ORGANIZED HEALTH CARE EDUCATION/TRAINING PROGRAM

## 2024-09-15 PROCEDURE — 25810000003 LACTATED RINGERS PER 1000 ML: Performed by: STUDENT IN AN ORGANIZED HEALTH CARE EDUCATION/TRAINING PROGRAM

## 2024-09-15 PROCEDURE — 99232 SBSQ HOSP IP/OBS MODERATE 35: CPT | Performed by: STUDENT IN AN ORGANIZED HEALTH CARE EDUCATION/TRAINING PROGRAM

## 2024-09-15 PROCEDURE — 25810000003 SODIUM CHLORIDE 0.9 % SOLUTION 250 ML FLEX CONT: Performed by: INTERNAL MEDICINE

## 2024-09-15 PROCEDURE — 84100 ASSAY OF PHOSPHORUS: CPT | Performed by: STUDENT IN AN ORGANIZED HEALTH CARE EDUCATION/TRAINING PROGRAM

## 2024-09-15 PROCEDURE — 25010000002 CEFTRIAXONE PER 250 MG: Performed by: NURSE PRACTITIONER

## 2024-09-15 PROCEDURE — 25010000002 VANCOMYCIN 750 MG RECONSTITUTED SOLUTION 1 EACH VIAL: Performed by: INTERNAL MEDICINE

## 2024-09-15 PROCEDURE — 25010000002 MORPHINE PER 10 MG: Performed by: HOSPITALIST

## 2024-09-15 RX ORDER — SODIUM CHLORIDE, SODIUM LACTATE, POTASSIUM CHLORIDE, CALCIUM CHLORIDE 600; 310; 30; 20 MG/100ML; MG/100ML; MG/100ML; MG/100ML
50 INJECTION, SOLUTION INTRAVENOUS CONTINUOUS
Status: DISPENSED | OUTPATIENT
Start: 2024-09-15 | End: 2024-09-16

## 2024-09-15 RX ORDER — SODIUM CHLORIDE 0.9 % (FLUSH) 0.9 %
10 SYRINGE (ML) INJECTION EVERY 12 HOURS SCHEDULED
Status: DISCONTINUED | OUTPATIENT
Start: 2024-09-15 | End: 2024-09-16 | Stop reason: HOSPADM

## 2024-09-15 RX ORDER — SODIUM CHLORIDE 0.9 % (FLUSH) 0.9 %
10 SYRINGE (ML) INJECTION AS NEEDED
Status: DISCONTINUED | OUTPATIENT
Start: 2024-09-15 | End: 2024-09-16 | Stop reason: HOSPADM

## 2024-09-15 RX ORDER — SODIUM CHLORIDE, SODIUM LACTATE, POTASSIUM CHLORIDE, CALCIUM CHLORIDE 600; 310; 30; 20 MG/100ML; MG/100ML; MG/100ML; MG/100ML
9 INJECTION, SOLUTION INTRAVENOUS CONTINUOUS PRN
Status: DISCONTINUED | OUTPATIENT
Start: 2024-09-15 | End: 2024-09-21 | Stop reason: HOSPADM

## 2024-09-15 RX ORDER — SODIUM BICARBONATE 650 MG/1
650 TABLET ORAL 3 TIMES DAILY
Status: DISCONTINUED | OUTPATIENT
Start: 2024-09-15 | End: 2024-09-21 | Stop reason: HOSPADM

## 2024-09-15 RX ORDER — FOLIC ACID 5 MG/ML
1 INJECTION, SOLUTION INTRAMUSCULAR; INTRAVENOUS; SUBCUTANEOUS DAILY
Status: DISCONTINUED | OUTPATIENT
Start: 2024-09-15 | End: 2024-09-15

## 2024-09-15 RX ADMIN — MORPHINE SULFATE 2 MG: 2 INJECTION, SOLUTION INTRAMUSCULAR; INTRAVENOUS at 01:51

## 2024-09-15 RX ADMIN — OXYCODONE HYDROCHLORIDE 10 MG: 5 TABLET ORAL at 08:24

## 2024-09-15 RX ADMIN — SODIUM CHLORIDE, POTASSIUM CHLORIDE, SODIUM LACTATE AND CALCIUM CHLORIDE 50 ML/HR: 600; 310; 30; 20 INJECTION, SOLUTION INTRAVENOUS at 08:25

## 2024-09-15 RX ADMIN — SODIUM BICARBONATE 650 MG: 650 TABLET ORAL at 20:33

## 2024-09-15 RX ADMIN — CYANOCOBALAMIN TAB 500 MCG 1000 MCG: 500 TAB at 08:25

## 2024-09-15 RX ADMIN — METOPROLOL SUCCINATE 25 MG: 25 TABLET, EXTENDED RELEASE ORAL at 08:25

## 2024-09-15 RX ADMIN — SODIUM BICARBONATE 650 MG: 650 TABLET ORAL at 08:24

## 2024-09-15 RX ADMIN — SODIUM CHLORIDE 750 MG: 9 INJECTION, SOLUTION INTRAVENOUS at 13:55

## 2024-09-15 RX ADMIN — OXYCODONE HYDROCHLORIDE 10 MG: 5 TABLET ORAL at 20:38

## 2024-09-15 RX ADMIN — CITALOPRAM HYDROBROMIDE 20 MG: 20 TABLET ORAL at 08:25

## 2024-09-15 RX ADMIN — COLLAGENASE SANTYL 1 APPLICATION: 250 OINTMENT TOPICAL at 08:25

## 2024-09-15 RX ADMIN — PANTOPRAZOLE SODIUM 40 MG: 40 TABLET, DELAYED RELEASE ORAL at 08:25

## 2024-09-15 RX ADMIN — FERROUS SULFATE TAB EC 324 MG (65 MG FE EQUIVALENT) 324 MG: 324 (65 FE) TABLET DELAYED RESPONSE at 08:24

## 2024-09-15 RX ADMIN — DIGOXIN 125 MCG: 125 TABLET ORAL at 12:29

## 2024-09-15 RX ADMIN — CEFTRIAXONE 2000 MG: 2 INJECTION, POWDER, FOR SOLUTION INTRAMUSCULAR; INTRAVENOUS at 16:30

## 2024-09-15 RX ADMIN — CETIRIZINE HYDROCHLORIDE 10 MG: 10 TABLET, FILM COATED ORAL at 08:25

## 2024-09-15 RX ADMIN — LEVOTHYROXINE SODIUM 50 MCG: 0.05 TABLET ORAL at 04:39

## 2024-09-15 RX ADMIN — SODIUM BICARBONATE 650 MG: 650 TABLET ORAL at 13:55

## 2024-09-15 RX ADMIN — OXYCODONE HYDROCHLORIDE 10 MG: 5 TABLET ORAL at 12:29

## 2024-09-15 RX ADMIN — THERA TABS 1 TABLET: TAB at 08:24

## 2024-09-15 RX ADMIN — ZOLPIDEM TARTRATE 5 MG: 5 TABLET, FILM COATED ORAL at 20:33

## 2024-09-15 RX ADMIN — OXYCODONE HYDROCHLORIDE 10 MG: 5 TABLET ORAL at 16:29

## 2024-09-15 RX ADMIN — FOLIC ACID 1 MG: 5 INJECTION, SOLUTION INTRAMUSCULAR; INTRAVENOUS; SUBCUTANEOUS at 12:29

## 2024-09-15 RX ADMIN — OXYCODONE HYDROCHLORIDE 10 MG: 5 TABLET ORAL at 04:38

## 2024-09-15 RX ADMIN — BUPROPION HYDROCHLORIDE 150 MG: 150 TABLET, EXTENDED RELEASE ORAL at 08:24

## 2024-09-15 RX ADMIN — OXYCODONE HYDROCHLORIDE 10 MG: 5 TABLET ORAL at 00:31

## 2024-09-15 RX ADMIN — Medication 220 MG: at 08:24

## 2024-09-16 ENCOUNTER — ANESTHESIA (OUTPATIENT)
Dept: PERIOP | Facility: HOSPITAL | Age: 79
End: 2024-09-16
Payer: MEDICARE

## 2024-09-16 LAB
ALBUMIN SERPL-MCNC: 2.5 G/DL (ref 3.5–5.2)
ALBUMIN/GLOB SERPL: 0.7 G/DL
ALP SERPL-CCNC: 228 U/L (ref 39–117)
ALT SERPL W P-5'-P-CCNC: 13 U/L (ref 1–33)
ANION GAP SERPL CALCULATED.3IONS-SCNC: 6.7 MMOL/L (ref 5–15)
AST SERPL-CCNC: 31 U/L (ref 1–32)
BASOPHILS # BLD AUTO: 0.05 10*3/MM3 (ref 0–0.2)
BASOPHILS NFR BLD AUTO: 0.6 % (ref 0–1.5)
BILIRUB SERPL-MCNC: <0.2 MG/DL (ref 0–1.2)
BUN SERPL-MCNC: 23 MG/DL (ref 8–23)
BUN/CREAT SERPL: 18.1 (ref 7–25)
CALCIUM SPEC-SCNC: 8.3 MG/DL (ref 8.6–10.5)
CHLORIDE SERPL-SCNC: 111 MMOL/L (ref 98–107)
CO2 SERPL-SCNC: 23.3 MMOL/L (ref 22–29)
CREAT SERPL-MCNC: 1.27 MG/DL (ref 0.57–1)
DEPRECATED RDW RBC AUTO: 59 FL (ref 37–54)
EGFRCR SERPLBLD CKD-EPI 2021: 43.4 ML/MIN/1.73
EOSINOPHIL # BLD AUTO: 0.43 10*3/MM3 (ref 0–0.4)
EOSINOPHIL NFR BLD AUTO: 4.9 % (ref 0.3–6.2)
ERYTHROCYTE [DISTWIDTH] IN BLOOD BY AUTOMATED COUNT: 17.3 % (ref 12.3–15.4)
GLOBULIN UR ELPH-MCNC: 3.4 GM/DL
GLUCOSE SERPL-MCNC: 93 MG/DL (ref 65–99)
HCT VFR BLD AUTO: 25.9 % (ref 34–46.6)
HGB BLD-MCNC: 7.3 G/DL (ref 12–15.9)
IMM GRANULOCYTES # BLD AUTO: 0.03 10*3/MM3 (ref 0–0.05)
IMM GRANULOCYTES NFR BLD AUTO: 0.3 % (ref 0–0.5)
LYMPHOCYTES # BLD AUTO: 1.12 10*3/MM3 (ref 0.7–3.1)
LYMPHOCYTES NFR BLD AUTO: 12.6 % (ref 19.6–45.3)
MAGNESIUM SERPL-MCNC: 2.3 MG/DL (ref 1.6–2.4)
MCH RBC QN AUTO: 26.6 PG (ref 26.6–33)
MCHC RBC AUTO-ENTMCNC: 28.2 G/DL (ref 31.5–35.7)
MCV RBC AUTO: 94.5 FL (ref 79–97)
MONOCYTES # BLD AUTO: 0.94 10*3/MM3 (ref 0.1–0.9)
MONOCYTES NFR BLD AUTO: 10.6 % (ref 5–12)
NEUTROPHILS NFR BLD AUTO: 6.29 10*3/MM3 (ref 1.7–7)
NEUTROPHILS NFR BLD AUTO: 71 % (ref 42.7–76)
NRBC BLD AUTO-RTO: 0 /100 WBC (ref 0–0.2)
PHOSPHATE SERPL-MCNC: 3.3 MG/DL (ref 2.5–4.5)
PLATELET # BLD AUTO: 471 10*3/MM3 (ref 140–450)
PMV BLD AUTO: 11.3 FL (ref 6–12)
POTASSIUM SERPL-SCNC: 5 MMOL/L (ref 3.5–5.2)
PROT SERPL-MCNC: 5.9 G/DL (ref 6–8.5)
RBC # BLD AUTO: 2.74 10*6/MM3 (ref 3.77–5.28)
SODIUM SERPL-SCNC: 141 MMOL/L (ref 136–145)
WBC NRBC COR # BLD AUTO: 8.86 10*3/MM3 (ref 3.4–10.8)

## 2024-09-16 PROCEDURE — 83735 ASSAY OF MAGNESIUM: CPT | Performed by: STUDENT IN AN ORGANIZED HEALTH CARE EDUCATION/TRAINING PROGRAM

## 2024-09-16 PROCEDURE — 25010000002 VANCOMYCIN 750 MG RECONSTITUTED SOLUTION 1 EACH VIAL: Performed by: STUDENT IN AN ORGANIZED HEALTH CARE EDUCATION/TRAINING PROGRAM

## 2024-09-16 PROCEDURE — 11042 DBRDMT SUBQ TIS 1ST 20SQCM/<: CPT | Performed by: STUDENT IN AN ORGANIZED HEALTH CARE EDUCATION/TRAINING PROGRAM

## 2024-09-16 PROCEDURE — 84100 ASSAY OF PHOSPHORUS: CPT | Performed by: STUDENT IN AN ORGANIZED HEALTH CARE EDUCATION/TRAINING PROGRAM

## 2024-09-16 PROCEDURE — 0JBN0ZZ EXCISION OF RIGHT LOWER LEG SUBCUTANEOUS TISSUE AND FASCIA, OPEN APPROACH: ICD-10-PCS | Performed by: STUDENT IN AN ORGANIZED HEALTH CARE EDUCATION/TRAINING PROGRAM

## 2024-09-16 PROCEDURE — 25810000003 LACTATED RINGERS PER 1000 ML: Performed by: NURSE ANESTHETIST, CERTIFIED REGISTERED

## 2024-09-16 PROCEDURE — 25810000003 SODIUM CHLORIDE 0.9 % SOLUTION 250 ML FLEX CONT: Performed by: STUDENT IN AN ORGANIZED HEALTH CARE EDUCATION/TRAINING PROGRAM

## 2024-09-16 PROCEDURE — 25810000003 LACTATED RINGERS PER 1000 ML: Performed by: STUDENT IN AN ORGANIZED HEALTH CARE EDUCATION/TRAINING PROGRAM

## 2024-09-16 PROCEDURE — 25010000002 ONDANSETRON PER 1 MG: Performed by: NURSE ANESTHETIST, CERTIFIED REGISTERED

## 2024-09-16 PROCEDURE — 25810000003 LACTATED RINGERS PER 1000 ML: Performed by: ANESTHESIOLOGY

## 2024-09-16 PROCEDURE — 25010000002 CEFTRIAXONE PER 250 MG: Performed by: STUDENT IN AN ORGANIZED HEALTH CARE EDUCATION/TRAINING PROGRAM

## 2024-09-16 PROCEDURE — 85025 COMPLETE CBC W/AUTO DIFF WBC: CPT | Performed by: NURSE PRACTITIONER

## 2024-09-16 PROCEDURE — 25010000002 HYDROMORPHONE 1 MG/ML SOLUTION: Performed by: NURSE ANESTHETIST, CERTIFIED REGISTERED

## 2024-09-16 PROCEDURE — 80053 COMPREHEN METABOLIC PANEL: CPT | Performed by: STUDENT IN AN ORGANIZED HEALTH CARE EDUCATION/TRAINING PROGRAM

## 2024-09-16 PROCEDURE — 25010000002 PROPOFOL 1000 MG/100ML EMULSION: Performed by: NURSE ANESTHETIST, CERTIFIED REGISTERED

## 2024-09-16 PROCEDURE — 25010000002 FENTANYL CITRATE (PF) 100 MCG/2ML SOLUTION: Performed by: NURSE ANESTHETIST, CERTIFIED REGISTERED

## 2024-09-16 RX ORDER — FENTANYL CITRATE 50 UG/ML
INJECTION, SOLUTION INTRAMUSCULAR; INTRAVENOUS AS NEEDED
Status: DISCONTINUED | OUTPATIENT
Start: 2024-09-16 | End: 2024-09-16 | Stop reason: SURG

## 2024-09-16 RX ORDER — LABETALOL HYDROCHLORIDE 5 MG/ML
5 INJECTION, SOLUTION INTRAVENOUS
Status: DISCONTINUED | OUTPATIENT
Start: 2024-09-16 | End: 2024-09-16 | Stop reason: HOSPADM

## 2024-09-16 RX ORDER — ONDANSETRON 2 MG/ML
INJECTION INTRAMUSCULAR; INTRAVENOUS AS NEEDED
Status: DISCONTINUED | OUTPATIENT
Start: 2024-09-16 | End: 2024-09-16 | Stop reason: SURG

## 2024-09-16 RX ORDER — OXYCODONE HYDROCHLORIDE 5 MG/1
5 TABLET ORAL ONCE AS NEEDED
Status: COMPLETED | OUTPATIENT
Start: 2024-09-16 | End: 2024-09-16

## 2024-09-16 RX ORDER — HYDRALAZINE HYDROCHLORIDE 20 MG/ML
5 INJECTION INTRAMUSCULAR; INTRAVENOUS
Status: DISCONTINUED | OUTPATIENT
Start: 2024-09-16 | End: 2024-09-16 | Stop reason: HOSPADM

## 2024-09-16 RX ORDER — ONDANSETRON 2 MG/ML
4 INJECTION INTRAMUSCULAR; INTRAVENOUS ONCE AS NEEDED
Status: DISCONTINUED | OUTPATIENT
Start: 2024-09-16 | End: 2024-09-16 | Stop reason: HOSPADM

## 2024-09-16 RX ORDER — IPRATROPIUM BROMIDE AND ALBUTEROL SULFATE 2.5; .5 MG/3ML; MG/3ML
3 SOLUTION RESPIRATORY (INHALATION) ONCE AS NEEDED
Status: DISCONTINUED | OUTPATIENT
Start: 2024-09-16 | End: 2024-09-16 | Stop reason: HOSPADM

## 2024-09-16 RX ORDER — SODIUM CHLORIDE, SODIUM LACTATE, POTASSIUM CHLORIDE, CALCIUM CHLORIDE 600; 310; 30; 20 MG/100ML; MG/100ML; MG/100ML; MG/100ML
INJECTION, SOLUTION INTRAVENOUS CONTINUOUS PRN
Status: DISCONTINUED | OUTPATIENT
Start: 2024-09-16 | End: 2024-09-16 | Stop reason: SURG

## 2024-09-16 RX ORDER — LIDOCAINE HYDROCHLORIDE 20 MG/ML
INJECTION, SOLUTION EPIDURAL; INFILTRATION; INTRACAUDAL; PERINEURAL AS NEEDED
Status: DISCONTINUED | OUTPATIENT
Start: 2024-09-16 | End: 2024-09-16 | Stop reason: SURG

## 2024-09-16 RX ORDER — DIPHENHYDRAMINE HYDROCHLORIDE 50 MG/ML
12.5 INJECTION INTRAMUSCULAR; INTRAVENOUS ONCE AS NEEDED
Status: DISCONTINUED | OUTPATIENT
Start: 2024-09-16 | End: 2024-09-16 | Stop reason: HOSPADM

## 2024-09-16 RX ORDER — PROPOFOL 10 MG/ML
INJECTION, EMULSION INTRAVENOUS AS NEEDED
Status: DISCONTINUED | OUTPATIENT
Start: 2024-09-16 | End: 2024-09-16 | Stop reason: SURG

## 2024-09-16 RX ORDER — DIPHENHYDRAMINE HYDROCHLORIDE 50 MG/ML
12.5 INJECTION INTRAMUSCULAR; INTRAVENOUS
Status: DISCONTINUED | OUTPATIENT
Start: 2024-09-16 | End: 2024-09-16 | Stop reason: HOSPADM

## 2024-09-16 RX ORDER — NALOXONE HCL 0.4 MG/ML
0.4 VIAL (ML) INJECTION AS NEEDED
Status: DISCONTINUED | OUTPATIENT
Start: 2024-09-16 | End: 2024-09-16 | Stop reason: HOSPADM

## 2024-09-16 RX ORDER — EPHEDRINE SULFATE 5 MG/ML
5 INJECTION INTRAVENOUS ONCE AS NEEDED
Status: DISCONTINUED | OUTPATIENT
Start: 2024-09-16 | End: 2024-09-16 | Stop reason: HOSPADM

## 2024-09-16 RX ORDER — OXYCODONE HYDROCHLORIDE 5 MG/1
10 TABLET ORAL EVERY 4 HOURS PRN
Status: DISCONTINUED | OUTPATIENT
Start: 2024-09-16 | End: 2024-09-16 | Stop reason: HOSPADM

## 2024-09-16 RX ORDER — DEXMEDETOMIDINE HYDROCHLORIDE 100 UG/ML
INJECTION, SOLUTION INTRAVENOUS AS NEEDED
Status: DISCONTINUED | OUTPATIENT
Start: 2024-09-16 | End: 2024-09-16 | Stop reason: SURG

## 2024-09-16 RX ORDER — PHENYLEPHRINE HCL IN 0.9% NACL 1 MG/10 ML
SYRINGE (ML) INTRAVENOUS AS NEEDED
Status: DISCONTINUED | OUTPATIENT
Start: 2024-09-16 | End: 2024-09-16 | Stop reason: SURG

## 2024-09-16 RX ADMIN — FOLIC ACID 1 MG: 5 INJECTION, SOLUTION INTRAMUSCULAR; INTRAVENOUS; SUBCUTANEOUS at 15:24

## 2024-09-16 RX ADMIN — OXYCODONE HYDROCHLORIDE 10 MG: 5 TABLET ORAL at 18:10

## 2024-09-16 RX ADMIN — DEXMEDETOMIDINE HYDROCHLORIDE 4 MCG: 100 INJECTION, SOLUTION INTRAVENOUS at 10:39

## 2024-09-16 RX ADMIN — DEXMEDETOMIDINE HYDROCHLORIDE 4 MCG: 100 INJECTION, SOLUTION INTRAVENOUS at 10:29

## 2024-09-16 RX ADMIN — DIGOXIN 125 MCG: 125 TABLET ORAL at 13:41

## 2024-09-16 RX ADMIN — FENTANYL CITRATE 25 MCG: 50 INJECTION, SOLUTION INTRAMUSCULAR; INTRAVENOUS at 10:46

## 2024-09-16 RX ADMIN — RIVAROXABAN 15 MG: 15 TABLET, FILM COATED ORAL at 17:46

## 2024-09-16 RX ADMIN — FERROUS SULFATE TAB EC 324 MG (65 MG FE EQUIVALENT) 324 MG: 324 (65 FE) TABLET DELAYED RESPONSE at 13:41

## 2024-09-16 RX ADMIN — CYANOCOBALAMIN TAB 500 MCG 1000 MCG: 500 TAB at 12:34

## 2024-09-16 RX ADMIN — PANTOPRAZOLE SODIUM 40 MG: 40 TABLET, DELAYED RELEASE ORAL at 12:34

## 2024-09-16 RX ADMIN — FENTANYL CITRATE 25 MCG: 50 INJECTION, SOLUTION INTRAMUSCULAR; INTRAVENOUS at 10:23

## 2024-09-16 RX ADMIN — CITALOPRAM HYDROBROMIDE 20 MG: 20 TABLET ORAL at 12:35

## 2024-09-16 RX ADMIN — METOPROLOL SUCCINATE 25 MG: 25 TABLET, EXTENDED RELEASE ORAL at 12:35

## 2024-09-16 RX ADMIN — OXYCODONE HYDROCHLORIDE 10 MG: 5 TABLET ORAL at 01:51

## 2024-09-16 RX ADMIN — FENTANYL CITRATE 25 MCG: 50 INJECTION, SOLUTION INTRAMUSCULAR; INTRAVENOUS at 10:39

## 2024-09-16 RX ADMIN — SODIUM BICARBONATE 650 MG: 650 TABLET ORAL at 15:23

## 2024-09-16 RX ADMIN — PROPOFOL INJECTABLE EMULSION 20 MG: 10 INJECTION, EMULSION INTRAVENOUS at 10:27

## 2024-09-16 RX ADMIN — SODIUM CHLORIDE, POTASSIUM CHLORIDE, SODIUM LACTATE AND CALCIUM CHLORIDE 9 ML/HR: 600; 310; 30; 20 INJECTION, SOLUTION INTRAVENOUS at 09:44

## 2024-09-16 RX ADMIN — LEVOTHYROXINE SODIUM 50 MCG: 0.05 TABLET ORAL at 04:45

## 2024-09-16 RX ADMIN — HYDROMORPHONE HYDROCHLORIDE 0.25 MG: 1 INJECTION, SOLUTION INTRAMUSCULAR; INTRAVENOUS; SUBCUTANEOUS at 11:45

## 2024-09-16 RX ADMIN — Medication 220 MG: at 12:34

## 2024-09-16 RX ADMIN — BUPROPION HYDROCHLORIDE 150 MG: 150 TABLET, EXTENDED RELEASE ORAL at 12:34

## 2024-09-16 RX ADMIN — OXYCODONE HYDROCHLORIDE 5 MG: 5 TABLET ORAL at 11:24

## 2024-09-16 RX ADMIN — SODIUM CHLORIDE 750 MG: 9 INJECTION, SOLUTION INTRAVENOUS at 12:54

## 2024-09-16 RX ADMIN — CETIRIZINE HYDROCHLORIDE 10 MG: 10 TABLET, FILM COATED ORAL at 12:34

## 2024-09-16 RX ADMIN — THERA TABS 1 TABLET: TAB at 12:34

## 2024-09-16 RX ADMIN — PROPOFOL INJECTABLE EMULSION 150 MCG/KG/MIN: 10 INJECTION, EMULSION INTRAVENOUS at 10:28

## 2024-09-16 RX ADMIN — ONDANSETRON 4 MG: 2 INJECTION INTRAMUSCULAR; INTRAVENOUS at 10:43

## 2024-09-16 RX ADMIN — CEFTRIAXONE 2000 MG: 2 INJECTION, POWDER, FOR SOLUTION INTRAMUSCULAR; INTRAVENOUS at 17:46

## 2024-09-16 RX ADMIN — Medication 100 MCG: at 10:58

## 2024-09-16 RX ADMIN — SODIUM CHLORIDE, SODIUM LACTATE, POTASSIUM CHLORIDE, AND CALCIUM CHLORIDE: .6; .31; .03; .02 INJECTION, SOLUTION INTRAVENOUS at 10:17

## 2024-09-16 RX ADMIN — LIDOCAINE HYDROCHLORIDE 60 MG: 20 INJECTION, SOLUTION EPIDURAL; INFILTRATION; INTRACAUDAL; PERINEURAL at 10:27

## 2024-09-16 RX ADMIN — HYDROMORPHONE HYDROCHLORIDE 0.25 MG: 1 INJECTION, SOLUTION INTRAMUSCULAR; INTRAVENOUS; SUBCUTANEOUS at 11:24

## 2024-09-16 RX ADMIN — OXYCODONE HYDROCHLORIDE 10 MG: 5 TABLET ORAL at 05:45

## 2024-09-16 RX ADMIN — FENTANYL CITRATE 25 MCG: 50 INJECTION, SOLUTION INTRAMUSCULAR; INTRAVENOUS at 10:52

## 2024-09-16 RX ADMIN — DEXMEDETOMIDINE HYDROCHLORIDE 2 MCG: 100 INJECTION, SOLUTION INTRAVENOUS at 10:42

## 2024-09-17 ENCOUNTER — APPOINTMENT (OUTPATIENT)
Dept: CARDIOLOGY | Facility: HOSPITAL | Age: 79
DRG: 571 | End: 2024-09-17
Payer: MEDICARE

## 2024-09-17 LAB
ANION GAP SERPL CALCULATED.3IONS-SCNC: 4.8 MMOL/L (ref 5–15)
BACTERIA SPEC AEROBE CULT: NORMAL
BACTERIA SPEC AEROBE CULT: NORMAL
BASOPHILS # BLD AUTO: 0.05 10*3/MM3 (ref 0–0.2)
BASOPHILS NFR BLD AUTO: 0.6 % (ref 0–1.5)
BH CV UPPER VENOUS LEFT AXILLARY AUGMENT: NORMAL
BH CV UPPER VENOUS LEFT AXILLARY COMPRESS: NORMAL
BH CV UPPER VENOUS LEFT AXILLARY PHASIC: NORMAL
BH CV UPPER VENOUS LEFT AXILLARY SPONT: NORMAL
BH CV UPPER VENOUS LEFT BASILIC FOREARM COMPRESS: NORMAL
BH CV UPPER VENOUS LEFT BASILIC UPPER COMPRESS: NORMAL
BH CV UPPER VENOUS LEFT BRACHIAL COMPRESS: NORMAL
BH CV UPPER VENOUS LEFT CEPHALIC FOREARM COMPRESS: NORMAL
BH CV UPPER VENOUS LEFT CEPHALIC UPPER COMPRESS: NORMAL
BH CV UPPER VENOUS LEFT INTERNAL JUGULAR AUGMENT: NORMAL
BH CV UPPER VENOUS LEFT INTERNAL JUGULAR COMPRESS: NORMAL
BH CV UPPER VENOUS LEFT INTERNAL JUGULAR PHASIC: NORMAL
BH CV UPPER VENOUS LEFT INTERNAL JUGULAR SPONT: NORMAL
BH CV UPPER VENOUS LEFT RADIAL COMPRESS: NORMAL
BH CV UPPER VENOUS LEFT SUBCLAVIAN AUGMENT: NORMAL
BH CV UPPER VENOUS LEFT SUBCLAVIAN COMPRESS: NORMAL
BH CV UPPER VENOUS LEFT SUBCLAVIAN PHASIC: NORMAL
BH CV UPPER VENOUS LEFT SUBCLAVIAN SPONT: NORMAL
BH CV UPPER VENOUS LEFT ULNAR COMPRESS: NORMAL
BH CV UPPER VENOUS RIGHT INTERNAL JUGULAR AUGMENT: NORMAL
BH CV UPPER VENOUS RIGHT INTERNAL JUGULAR COMPRESS: NORMAL
BH CV UPPER VENOUS RIGHT INTERNAL JUGULAR PHASIC: NORMAL
BH CV UPPER VENOUS RIGHT INTERNAL JUGULAR SPONT: NORMAL
BH CV UPPER VENOUS RIGHT SUBCLAVIAN AUGMENT: NORMAL
BH CV UPPER VENOUS RIGHT SUBCLAVIAN COMPRESS: NORMAL
BH CV UPPER VENOUS RIGHT SUBCLAVIAN PHASIC: NORMAL
BH CV UPPER VENOUS RIGHT SUBCLAVIAN SPONT: NORMAL
BUN SERPL-MCNC: 20 MG/DL (ref 8–23)
BUN/CREAT SERPL: 21.3 (ref 7–25)
CALCIUM SPEC-SCNC: 8.4 MG/DL (ref 8.6–10.5)
CHLORIDE SERPL-SCNC: 111 MMOL/L (ref 98–107)
CO2 SERPL-SCNC: 26.2 MMOL/L (ref 22–29)
CREAT SERPL-MCNC: 0.94 MG/DL (ref 0.57–1)
DEPRECATED RDW RBC AUTO: 57.2 FL (ref 37–54)
EGFRCR SERPLBLD CKD-EPI 2021: 62.2 ML/MIN/1.73
EOSINOPHIL # BLD AUTO: 0.57 10*3/MM3 (ref 0–0.4)
EOSINOPHIL NFR BLD AUTO: 7 % (ref 0.3–6.2)
ERYTHROCYTE [DISTWIDTH] IN BLOOD BY AUTOMATED COUNT: 17.3 % (ref 12.3–15.4)
GLUCOSE SERPL-MCNC: 88 MG/DL (ref 65–99)
HCT VFR BLD AUTO: 27 % (ref 34–46.6)
HGB BLD-MCNC: 7.6 G/DL (ref 12–15.9)
IMM GRANULOCYTES # BLD AUTO: 0.03 10*3/MM3 (ref 0–0.05)
IMM GRANULOCYTES NFR BLD AUTO: 0.4 % (ref 0–0.5)
LYMPHOCYTES # BLD AUTO: 0.91 10*3/MM3 (ref 0.7–3.1)
LYMPHOCYTES NFR BLD AUTO: 11.2 % (ref 19.6–45.3)
MCH RBC QN AUTO: 25.9 PG (ref 26.6–33)
MCHC RBC AUTO-ENTMCNC: 28.1 G/DL (ref 31.5–35.7)
MCV RBC AUTO: 92.2 FL (ref 79–97)
MONOCYTES # BLD AUTO: 0.77 10*3/MM3 (ref 0.1–0.9)
MONOCYTES NFR BLD AUTO: 9.5 % (ref 5–12)
NEUTROPHILS NFR BLD AUTO: 5.79 10*3/MM3 (ref 1.7–7)
NEUTROPHILS NFR BLD AUTO: 71.3 % (ref 42.7–76)
NRBC BLD AUTO-RTO: 0 /100 WBC (ref 0–0.2)
PLATELET # BLD AUTO: 453 10*3/MM3 (ref 140–450)
PMV BLD AUTO: 10.3 FL (ref 6–12)
POTASSIUM SERPL-SCNC: 4.6 MMOL/L (ref 3.5–5.2)
RBC # BLD AUTO: 2.93 10*6/MM3 (ref 3.77–5.28)
SODIUM SERPL-SCNC: 142 MMOL/L (ref 136–145)
WBC NRBC COR # BLD AUTO: 8.12 10*3/MM3 (ref 3.4–10.8)

## 2024-09-17 PROCEDURE — 25010000002 CEFTRIAXONE PER 250 MG: Performed by: STUDENT IN AN ORGANIZED HEALTH CARE EDUCATION/TRAINING PROGRAM

## 2024-09-17 PROCEDURE — 97530 THERAPEUTIC ACTIVITIES: CPT

## 2024-09-17 PROCEDURE — 93971 EXTREMITY STUDY: CPT

## 2024-09-17 PROCEDURE — 85025 COMPLETE CBC W/AUTO DIFF WBC: CPT | Performed by: STUDENT IN AN ORGANIZED HEALTH CARE EDUCATION/TRAINING PROGRAM

## 2024-09-17 PROCEDURE — 97116 GAIT TRAINING THERAPY: CPT

## 2024-09-17 PROCEDURE — 99232 SBSQ HOSP IP/OBS MODERATE 35: CPT | Performed by: NURSE PRACTITIONER

## 2024-09-17 PROCEDURE — 80048 BASIC METABOLIC PNL TOTAL CA: CPT | Performed by: STUDENT IN AN ORGANIZED HEALTH CARE EDUCATION/TRAINING PROGRAM

## 2024-09-17 PROCEDURE — 93971 EXTREMITY STUDY: CPT | Performed by: SURGERY

## 2024-09-17 RX ADMIN — SODIUM BICARBONATE 650 MG: 650 TABLET ORAL at 08:46

## 2024-09-17 RX ADMIN — CEFTRIAXONE 2000 MG: 2 INJECTION, POWDER, FOR SOLUTION INTRAMUSCULAR; INTRAVENOUS at 17:07

## 2024-09-17 RX ADMIN — ZOLPIDEM TARTRATE 5 MG: 5 TABLET, FILM COATED ORAL at 00:14

## 2024-09-17 RX ADMIN — OXYCODONE HYDROCHLORIDE 10 MG: 5 TABLET ORAL at 21:15

## 2024-09-17 RX ADMIN — DIGOXIN 125 MCG: 125 TABLET ORAL at 12:11

## 2024-09-17 RX ADMIN — OXYCODONE HYDROCHLORIDE 10 MG: 5 TABLET ORAL at 00:13

## 2024-09-17 RX ADMIN — ZOLPIDEM TARTRATE 5 MG: 5 TABLET, FILM COATED ORAL at 21:15

## 2024-09-17 RX ADMIN — CETIRIZINE HYDROCHLORIDE 10 MG: 10 TABLET, FILM COATED ORAL at 08:46

## 2024-09-17 RX ADMIN — COLLAGENASE SANTYL 1 APPLICATION: 250 OINTMENT TOPICAL at 08:47

## 2024-09-17 RX ADMIN — OXYCODONE HYDROCHLORIDE 10 MG: 5 TABLET ORAL at 06:18

## 2024-09-17 RX ADMIN — Medication 10 ML: at 08:47

## 2024-09-17 RX ADMIN — PANTOPRAZOLE SODIUM 40 MG: 40 TABLET, DELAYED RELEASE ORAL at 08:47

## 2024-09-17 RX ADMIN — OXYCODONE HYDROCHLORIDE 10 MG: 5 TABLET ORAL at 13:49

## 2024-09-17 RX ADMIN — SODIUM BICARBONATE 650 MG: 650 TABLET ORAL at 16:40

## 2024-09-17 RX ADMIN — RIVAROXABAN 15 MG: 15 TABLET, FILM COATED ORAL at 17:07

## 2024-09-17 RX ADMIN — CYANOCOBALAMIN TAB 500 MCG 1000 MCG: 500 TAB at 08:46

## 2024-09-17 RX ADMIN — SODIUM BICARBONATE 650 MG: 650 TABLET ORAL at 00:13

## 2024-09-17 RX ADMIN — FERROUS SULFATE TAB EC 324 MG (65 MG FE EQUIVALENT) 324 MG: 324 (65 FE) TABLET DELAYED RESPONSE at 08:46

## 2024-09-17 RX ADMIN — BUPROPION HYDROCHLORIDE 150 MG: 150 TABLET, EXTENDED RELEASE ORAL at 08:47

## 2024-09-17 RX ADMIN — LEVOTHYROXINE SODIUM 50 MCG: 0.05 TABLET ORAL at 06:19

## 2024-09-17 RX ADMIN — SODIUM BICARBONATE 650 MG: 650 TABLET ORAL at 21:15

## 2024-09-17 RX ADMIN — THERA TABS 1 TABLET: TAB at 08:47

## 2024-09-17 RX ADMIN — OXYCODONE HYDROCHLORIDE 10 MG: 5 TABLET ORAL at 17:21

## 2024-09-17 RX ADMIN — FOLIC ACID 1 MG: 5 INJECTION, SOLUTION INTRAMUSCULAR; INTRAVENOUS; SUBCUTANEOUS at 08:47

## 2024-09-17 RX ADMIN — Medication 220 MG: at 08:46

## 2024-09-17 RX ADMIN — METOPROLOL SUCCINATE 25 MG: 25 TABLET, EXTENDED RELEASE ORAL at 08:46

## 2024-09-17 RX ADMIN — CITALOPRAM HYDROBROMIDE 20 MG: 20 TABLET ORAL at 08:46

## 2024-09-18 LAB
BASOPHILS # BLD AUTO: 0.05 10*3/MM3 (ref 0–0.2)
BASOPHILS NFR BLD AUTO: 0.6 % (ref 0–1.5)
DEPRECATED RDW RBC AUTO: 57.1 FL (ref 37–54)
EOSINOPHIL # BLD AUTO: 0.71 10*3/MM3 (ref 0–0.4)
EOSINOPHIL NFR BLD AUTO: 9.1 % (ref 0.3–6.2)
ERYTHROCYTE [DISTWIDTH] IN BLOOD BY AUTOMATED COUNT: 17.4 % (ref 12.3–15.4)
HCT VFR BLD AUTO: 25.3 % (ref 34–46.6)
HGB BLD-MCNC: 7.2 G/DL (ref 12–15.9)
IMM GRANULOCYTES # BLD AUTO: 0.03 10*3/MM3 (ref 0–0.05)
IMM GRANULOCYTES NFR BLD AUTO: 0.4 % (ref 0–0.5)
LYMPHOCYTES # BLD AUTO: 1.23 10*3/MM3 (ref 0.7–3.1)
LYMPHOCYTES NFR BLD AUTO: 15.8 % (ref 19.6–45.3)
MCH RBC QN AUTO: 25.9 PG (ref 26.6–33)
MCHC RBC AUTO-ENTMCNC: 28.5 G/DL (ref 31.5–35.7)
MCV RBC AUTO: 91 FL (ref 79–97)
MONOCYTES # BLD AUTO: 0.85 10*3/MM3 (ref 0.1–0.9)
MONOCYTES NFR BLD AUTO: 10.9 % (ref 5–12)
NEUTROPHILS NFR BLD AUTO: 4.91 10*3/MM3 (ref 1.7–7)
NEUTROPHILS NFR BLD AUTO: 63.2 % (ref 42.7–76)
NRBC BLD AUTO-RTO: 0 /100 WBC (ref 0–0.2)
PLATELET # BLD AUTO: 446 10*3/MM3 (ref 140–450)
PMV BLD AUTO: 10.1 FL (ref 6–12)
RBC # BLD AUTO: 2.78 10*6/MM3 (ref 3.77–5.28)
WBC NRBC COR # BLD AUTO: 7.78 10*3/MM3 (ref 3.4–10.8)

## 2024-09-18 PROCEDURE — 85025 COMPLETE CBC W/AUTO DIFF WBC: CPT | Performed by: STUDENT IN AN ORGANIZED HEALTH CARE EDUCATION/TRAINING PROGRAM

## 2024-09-18 PROCEDURE — 25010000002 CEFTRIAXONE PER 250 MG: Performed by: STUDENT IN AN ORGANIZED HEALTH CARE EDUCATION/TRAINING PROGRAM

## 2024-09-18 PROCEDURE — 97165 OT EVAL LOW COMPLEX 30 MIN: CPT

## 2024-09-18 RX ORDER — HYDROCODONE BITARTRATE AND ACETAMINOPHEN 5; 325 MG/1; MG/1
1 TABLET ORAL EVERY 6 HOURS PRN
Status: DISCONTINUED | OUTPATIENT
Start: 2024-09-18 | End: 2024-09-21 | Stop reason: HOSPADM

## 2024-09-18 RX ADMIN — CETIRIZINE HYDROCHLORIDE 10 MG: 10 TABLET, FILM COATED ORAL at 09:50

## 2024-09-18 RX ADMIN — FOLIC ACID 1 MG: 5 INJECTION, SOLUTION INTRAMUSCULAR; INTRAVENOUS; SUBCUTANEOUS at 09:49

## 2024-09-18 RX ADMIN — SODIUM BICARBONATE 650 MG: 650 TABLET ORAL at 17:06

## 2024-09-18 RX ADMIN — PANTOPRAZOLE SODIUM 40 MG: 40 TABLET, DELAYED RELEASE ORAL at 09:50

## 2024-09-18 RX ADMIN — CYANOCOBALAMIN TAB 500 MCG 1000 MCG: 500 TAB at 09:50

## 2024-09-18 RX ADMIN — SODIUM BICARBONATE 650 MG: 650 TABLET ORAL at 20:07

## 2024-09-18 RX ADMIN — SODIUM BICARBONATE 650 MG: 650 TABLET ORAL at 09:51

## 2024-09-18 RX ADMIN — THERA TABS 1 TABLET: TAB at 09:50

## 2024-09-18 RX ADMIN — Medication 220 MG: at 09:49

## 2024-09-18 RX ADMIN — RIVAROXABAN 15 MG: 15 TABLET, FILM COATED ORAL at 17:06

## 2024-09-18 RX ADMIN — FERROUS SULFATE TAB EC 324 MG (65 MG FE EQUIVALENT) 324 MG: 324 (65 FE) TABLET DELAYED RESPONSE at 09:50

## 2024-09-18 RX ADMIN — OXYCODONE HYDROCHLORIDE 10 MG: 5 TABLET ORAL at 09:53

## 2024-09-18 RX ADMIN — DIGOXIN 125 MCG: 125 TABLET ORAL at 11:52

## 2024-09-18 RX ADMIN — ZOLPIDEM TARTRATE 5 MG: 5 TABLET, FILM COATED ORAL at 20:07

## 2024-09-18 RX ADMIN — METOPROLOL SUCCINATE 25 MG: 25 TABLET, EXTENDED RELEASE ORAL at 09:51

## 2024-09-18 RX ADMIN — HYDROCODONE BITARTRATE AND ACETAMINOPHEN 1 TABLET: 5; 325 TABLET ORAL at 20:07

## 2024-09-18 RX ADMIN — LEVOTHYROXINE SODIUM 50 MCG: 0.05 TABLET ORAL at 06:25

## 2024-09-18 RX ADMIN — CITALOPRAM HYDROBROMIDE 20 MG: 20 TABLET ORAL at 09:50

## 2024-09-18 RX ADMIN — CEFTRIAXONE 2000 MG: 2 INJECTION, POWDER, FOR SOLUTION INTRAMUSCULAR; INTRAVENOUS at 17:07

## 2024-09-18 RX ADMIN — BUPROPION HYDROCHLORIDE 150 MG: 150 TABLET, EXTENDED RELEASE ORAL at 09:50

## 2024-09-19 ENCOUNTER — APPOINTMENT (OUTPATIENT)
Dept: CARDIOLOGY | Facility: HOSPITAL | Age: 79
DRG: 571 | End: 2024-09-19
Payer: MEDICARE

## 2024-09-19 LAB
BASOPHILS # BLD AUTO: 0.05 10*3/MM3 (ref 0–0.2)
BASOPHILS NFR BLD AUTO: 0.7 % (ref 0–1.5)
BH CV LOWER VASCULAR LEFT COMMON FEMORAL AUGMENT: NORMAL
BH CV LOWER VASCULAR LEFT COMMON FEMORAL COMPETENT: NORMAL
BH CV LOWER VASCULAR LEFT COMMON FEMORAL COMPRESS: NORMAL
BH CV LOWER VASCULAR LEFT COMMON FEMORAL PHASIC: NORMAL
BH CV LOWER VASCULAR LEFT COMMON FEMORAL SPONT: NORMAL
BH CV LOWER VASCULAR LEFT DISTAL FEMORAL COMPRESS: NORMAL
BH CV LOWER VASCULAR LEFT GASTRONEMIUS COMPRESS: NORMAL
BH CV LOWER VASCULAR LEFT GREATER SAPH AK COMPRESS: NORMAL
BH CV LOWER VASCULAR LEFT GREATER SAPH BK COMPRESS: NORMAL
BH CV LOWER VASCULAR LEFT LESSER SAPH COMPRESS: NORMAL
BH CV LOWER VASCULAR LEFT MID FEMORAL AUGMENT: NORMAL
BH CV LOWER VASCULAR LEFT MID FEMORAL COMPETENT: NORMAL
BH CV LOWER VASCULAR LEFT MID FEMORAL COMPRESS: NORMAL
BH CV LOWER VASCULAR LEFT MID FEMORAL PHASIC: NORMAL
BH CV LOWER VASCULAR LEFT MID FEMORAL SPONT: NORMAL
BH CV LOWER VASCULAR LEFT PERONEAL COMPRESS: NORMAL
BH CV LOWER VASCULAR LEFT POPLITEAL AUGMENT: NORMAL
BH CV LOWER VASCULAR LEFT POPLITEAL COMPETENT: NORMAL
BH CV LOWER VASCULAR LEFT POPLITEAL COMPRESS: NORMAL
BH CV LOWER VASCULAR LEFT POPLITEAL PHASIC: NORMAL
BH CV LOWER VASCULAR LEFT POPLITEAL SPONT: NORMAL
BH CV LOWER VASCULAR LEFT POSTERIOR TIBIAL COMPRESS: NORMAL
BH CV LOWER VASCULAR LEFT PROXIMAL FEMORAL COMPRESS: NORMAL
BH CV LOWER VASCULAR LEFT SAPHENOFEMORAL JUNCTION COMPRESS: NORMAL
BH CV LOWER VASCULAR RIGHT COMMON FEMORAL AUGMENT: NORMAL
BH CV LOWER VASCULAR RIGHT COMMON FEMORAL COMPETENT: NORMAL
BH CV LOWER VASCULAR RIGHT COMMON FEMORAL COMPRESS: NORMAL
BH CV LOWER VASCULAR RIGHT COMMON FEMORAL PHASIC: NORMAL
BH CV LOWER VASCULAR RIGHT COMMON FEMORAL SPONT: NORMAL
DEPRECATED RDW RBC AUTO: 57.5 FL (ref 37–54)
EOSINOPHIL # BLD AUTO: 0.56 10*3/MM3 (ref 0–0.4)
EOSINOPHIL NFR BLD AUTO: 7.5 % (ref 0.3–6.2)
ERYTHROCYTE [DISTWIDTH] IN BLOOD BY AUTOMATED COUNT: 17.7 % (ref 12.3–15.4)
HCT VFR BLD AUTO: 25.6 % (ref 34–46.6)
HGB BLD-MCNC: 7.4 G/DL (ref 12–15.9)
IMM GRANULOCYTES # BLD AUTO: 0.04 10*3/MM3 (ref 0–0.05)
IMM GRANULOCYTES NFR BLD AUTO: 0.5 % (ref 0–0.5)
LYMPHOCYTES # BLD AUTO: 1.21 10*3/MM3 (ref 0.7–3.1)
LYMPHOCYTES NFR BLD AUTO: 16.2 % (ref 19.6–45.3)
MCH RBC QN AUTO: 26.1 PG (ref 26.6–33)
MCHC RBC AUTO-ENTMCNC: 28.9 G/DL (ref 31.5–35.7)
MCV RBC AUTO: 90.1 FL (ref 79–97)
MONOCYTES # BLD AUTO: 1.08 10*3/MM3 (ref 0.1–0.9)
MONOCYTES NFR BLD AUTO: 14.4 % (ref 5–12)
NEUTROPHILS NFR BLD AUTO: 4.54 10*3/MM3 (ref 1.7–7)
NEUTROPHILS NFR BLD AUTO: 60.7 % (ref 42.7–76)
NRBC BLD AUTO-RTO: 0 /100 WBC (ref 0–0.2)
PLATELET # BLD AUTO: 453 10*3/MM3 (ref 140–450)
PMV BLD AUTO: 10.5 FL (ref 6–12)
RBC # BLD AUTO: 2.84 10*6/MM3 (ref 3.77–5.28)
WBC NRBC COR # BLD AUTO: 7.48 10*3/MM3 (ref 3.4–10.8)

## 2024-09-19 PROCEDURE — 85025 COMPLETE CBC W/AUTO DIFF WBC: CPT | Performed by: STUDENT IN AN ORGANIZED HEALTH CARE EDUCATION/TRAINING PROGRAM

## 2024-09-19 PROCEDURE — 93971 EXTREMITY STUDY: CPT | Performed by: SURGERY

## 2024-09-19 PROCEDURE — 25010000002 FUROSEMIDE PER 20 MG: Performed by: INTERNAL MEDICINE

## 2024-09-19 PROCEDURE — 93971 EXTREMITY STUDY: CPT

## 2024-09-19 RX ORDER — CEPHALEXIN 500 MG/1
500 CAPSULE ORAL EVERY 6 HOURS SCHEDULED
Qty: 20 CAPSULE | Refills: 0 | Status: SHIPPED | OUTPATIENT
Start: 2024-09-19 | End: 2024-09-24

## 2024-09-19 RX ORDER — FUROSEMIDE 10 MG/ML
40 INJECTION INTRAMUSCULAR; INTRAVENOUS EVERY 12 HOURS
Status: DISCONTINUED | OUTPATIENT
Start: 2024-09-19 | End: 2024-09-20

## 2024-09-19 RX ORDER — CEPHALEXIN 500 MG/1
500 CAPSULE ORAL EVERY 6 HOURS SCHEDULED
Status: DISCONTINUED | OUTPATIENT
Start: 2024-09-19 | End: 2024-09-21 | Stop reason: HOSPADM

## 2024-09-19 RX ADMIN — CETIRIZINE HYDROCHLORIDE 10 MG: 10 TABLET, FILM COATED ORAL at 09:06

## 2024-09-19 RX ADMIN — SODIUM BICARBONATE 650 MG: 650 TABLET ORAL at 09:06

## 2024-09-19 RX ADMIN — HYDROCODONE BITARTRATE AND ACETAMINOPHEN 1 TABLET: 5; 325 TABLET ORAL at 21:06

## 2024-09-19 RX ADMIN — THERA TABS 1 TABLET: TAB at 09:06

## 2024-09-19 RX ADMIN — RIVAROXABAN 15 MG: 15 TABLET, FILM COATED ORAL at 17:52

## 2024-09-19 RX ADMIN — Medication 220 MG: at 09:06

## 2024-09-19 RX ADMIN — CEPHALEXIN 500 MG: 500 CAPSULE ORAL at 14:23

## 2024-09-19 RX ADMIN — CEPHALEXIN 500 MG: 500 CAPSULE ORAL at 17:52

## 2024-09-19 RX ADMIN — FUROSEMIDE 40 MG: 10 INJECTION, SOLUTION INTRAMUSCULAR; INTRAVENOUS at 23:26

## 2024-09-19 RX ADMIN — FUROSEMIDE 40 MG: 10 INJECTION, SOLUTION INTRAMUSCULAR; INTRAVENOUS at 11:38

## 2024-09-19 RX ADMIN — ZOLPIDEM TARTRATE 5 MG: 5 TABLET, FILM COATED ORAL at 21:04

## 2024-09-19 RX ADMIN — CITALOPRAM HYDROBROMIDE 20 MG: 20 TABLET ORAL at 09:06

## 2024-09-19 RX ADMIN — HYDROCODONE BITARTRATE AND ACETAMINOPHEN 1 TABLET: 5; 325 TABLET ORAL at 02:20

## 2024-09-19 RX ADMIN — FOLIC ACID 1 MG: 5 INJECTION, SOLUTION INTRAMUSCULAR; INTRAVENOUS; SUBCUTANEOUS at 09:05

## 2024-09-19 RX ADMIN — SODIUM BICARBONATE 650 MG: 650 TABLET ORAL at 21:04

## 2024-09-19 RX ADMIN — CYANOCOBALAMIN TAB 500 MCG 1000 MCG: 500 TAB at 09:06

## 2024-09-19 RX ADMIN — SODIUM BICARBONATE 650 MG: 650 TABLET ORAL at 17:53

## 2024-09-19 RX ADMIN — DIGOXIN 125 MCG: 125 TABLET ORAL at 11:38

## 2024-09-19 RX ADMIN — HYDROCODONE BITARTRATE AND ACETAMINOPHEN 1 TABLET: 5; 325 TABLET ORAL at 14:34

## 2024-09-19 RX ADMIN — BUPROPION HYDROCHLORIDE 150 MG: 150 TABLET, EXTENDED RELEASE ORAL at 09:06

## 2024-09-19 RX ADMIN — PANTOPRAZOLE SODIUM 40 MG: 40 TABLET, DELAYED RELEASE ORAL at 09:06

## 2024-09-19 RX ADMIN — Medication 10 ML: at 09:12

## 2024-09-19 RX ADMIN — METOPROLOL SUCCINATE 25 MG: 25 TABLET, EXTENDED RELEASE ORAL at 09:06

## 2024-09-19 RX ADMIN — FERROUS SULFATE TAB EC 324 MG (65 MG FE EQUIVALENT) 324 MG: 324 (65 FE) TABLET DELAYED RESPONSE at 09:06

## 2024-09-19 RX ADMIN — CEPHALEXIN 500 MG: 500 CAPSULE ORAL at 23:26

## 2024-09-19 RX ADMIN — LEVOTHYROXINE SODIUM 50 MCG: 0.05 TABLET ORAL at 05:54

## 2024-09-20 LAB
BASOPHILS # BLD AUTO: 0.06 10*3/MM3 (ref 0–0.2)
BASOPHILS NFR BLD AUTO: 0.7 % (ref 0–1.5)
DEPRECATED RDW RBC AUTO: 59.2 FL (ref 37–54)
EOSINOPHIL # BLD AUTO: 0.55 10*3/MM3 (ref 0–0.4)
EOSINOPHIL NFR BLD AUTO: 6.6 % (ref 0.3–6.2)
ERYTHROCYTE [DISTWIDTH] IN BLOOD BY AUTOMATED COUNT: 18.3 % (ref 12.3–15.4)
HCT VFR BLD AUTO: 32 % (ref 34–46.6)
HGB BLD-MCNC: 9.2 G/DL (ref 12–15.9)
IMM GRANULOCYTES # BLD AUTO: 0.03 10*3/MM3 (ref 0–0.05)
IMM GRANULOCYTES NFR BLD AUTO: 0.4 % (ref 0–0.5)
LYMPHOCYTES # BLD AUTO: 1.4 10*3/MM3 (ref 0.7–3.1)
LYMPHOCYTES NFR BLD AUTO: 16.9 % (ref 19.6–45.3)
MCH RBC QN AUTO: 26 PG (ref 26.6–33)
MCHC RBC AUTO-ENTMCNC: 28.8 G/DL (ref 31.5–35.7)
MCV RBC AUTO: 90.4 FL (ref 79–97)
MONOCYTES # BLD AUTO: 1.18 10*3/MM3 (ref 0.1–0.9)
MONOCYTES NFR BLD AUTO: 14.2 % (ref 5–12)
NEUTROPHILS NFR BLD AUTO: 5.08 10*3/MM3 (ref 1.7–7)
NEUTROPHILS NFR BLD AUTO: 61.2 % (ref 42.7–76)
NRBC BLD AUTO-RTO: 0 /100 WBC (ref 0–0.2)
PLATELET # BLD AUTO: 510 10*3/MM3 (ref 140–450)
PMV BLD AUTO: 10.4 FL (ref 6–12)
RBC # BLD AUTO: 3.54 10*6/MM3 (ref 3.77–5.28)
WBC NRBC COR # BLD AUTO: 8.3 10*3/MM3 (ref 3.4–10.8)

## 2024-09-20 PROCEDURE — 85025 COMPLETE CBC W/AUTO DIFF WBC: CPT | Performed by: STUDENT IN AN ORGANIZED HEALTH CARE EDUCATION/TRAINING PROGRAM

## 2024-09-20 PROCEDURE — 97530 THERAPEUTIC ACTIVITIES: CPT

## 2024-09-20 PROCEDURE — 97116 GAIT TRAINING THERAPY: CPT

## 2024-09-20 PROCEDURE — 99222 1ST HOSP IP/OBS MODERATE 55: CPT | Performed by: INTERNAL MEDICINE

## 2024-09-20 RX ORDER — FUROSEMIDE 40 MG
40 TABLET ORAL
Status: DISCONTINUED | OUTPATIENT
Start: 2024-09-20 | End: 2024-09-21 | Stop reason: HOSPADM

## 2024-09-20 RX ORDER — METOPROLOL SUCCINATE 50 MG/1
50 TABLET, EXTENDED RELEASE ORAL
Status: DISCONTINUED | OUTPATIENT
Start: 2024-09-21 | End: 2024-09-21 | Stop reason: HOSPADM

## 2024-09-20 RX ORDER — METOPROLOL SUCCINATE 25 MG/1
25 TABLET, EXTENDED RELEASE ORAL ONCE
Status: COMPLETED | OUTPATIENT
Start: 2024-09-20 | End: 2024-09-20

## 2024-09-20 RX ADMIN — HYDROCODONE BITARTRATE AND ACETAMINOPHEN 1 TABLET: 5; 325 TABLET ORAL at 21:23

## 2024-09-20 RX ADMIN — PANTOPRAZOLE SODIUM 40 MG: 40 TABLET, DELAYED RELEASE ORAL at 08:14

## 2024-09-20 RX ADMIN — LEVOTHYROXINE SODIUM 50 MCG: 0.05 TABLET ORAL at 05:45

## 2024-09-20 RX ADMIN — DIGOXIN 125 MCG: 125 TABLET ORAL at 11:35

## 2024-09-20 RX ADMIN — Medication 220 MG: at 08:14

## 2024-09-20 RX ADMIN — HYDROCODONE BITARTRATE AND ACETAMINOPHEN 1 TABLET: 5; 325 TABLET ORAL at 13:51

## 2024-09-20 RX ADMIN — METOPROLOL SUCCINATE 25 MG: 25 TABLET, EXTENDED RELEASE ORAL at 08:14

## 2024-09-20 RX ADMIN — CITALOPRAM HYDROBROMIDE 20 MG: 20 TABLET ORAL at 08:14

## 2024-09-20 RX ADMIN — SODIUM BICARBONATE 650 MG: 650 TABLET ORAL at 08:14

## 2024-09-20 RX ADMIN — Medication 10 ML: at 08:15

## 2024-09-20 RX ADMIN — BUPROPION HYDROCHLORIDE 150 MG: 150 TABLET, EXTENDED RELEASE ORAL at 08:15

## 2024-09-20 RX ADMIN — CEPHALEXIN 500 MG: 500 CAPSULE ORAL at 05:45

## 2024-09-20 RX ADMIN — THERA TABS 1 TABLET: TAB at 08:15

## 2024-09-20 RX ADMIN — HYDROCODONE BITARTRATE AND ACETAMINOPHEN 1 TABLET: 5; 325 TABLET ORAL at 08:16

## 2024-09-20 RX ADMIN — RIVAROXABAN 15 MG: 15 TABLET, FILM COATED ORAL at 17:00

## 2024-09-20 RX ADMIN — FUROSEMIDE 40 MG: 40 TABLET ORAL at 17:00

## 2024-09-20 RX ADMIN — CEPHALEXIN 500 MG: 500 CAPSULE ORAL at 23:20

## 2024-09-20 RX ADMIN — FERROUS SULFATE TAB EC 324 MG (65 MG FE EQUIVALENT) 324 MG: 324 (65 FE) TABLET DELAYED RESPONSE at 08:15

## 2024-09-20 RX ADMIN — CEPHALEXIN 500 MG: 500 CAPSULE ORAL at 11:35

## 2024-09-20 RX ADMIN — CETIRIZINE HYDROCHLORIDE 10 MG: 10 TABLET, FILM COATED ORAL at 08:14

## 2024-09-20 RX ADMIN — METOPROLOL SUCCINATE 25 MG: 25 TABLET, EXTENDED RELEASE ORAL at 12:50

## 2024-09-20 RX ADMIN — CYANOCOBALAMIN TAB 500 MCG 1000 MCG: 500 TAB at 08:14

## 2024-09-20 RX ADMIN — ZOLPIDEM TARTRATE 5 MG: 5 TABLET, FILM COATED ORAL at 21:22

## 2024-09-20 RX ADMIN — SODIUM BICARBONATE 650 MG: 650 TABLET ORAL at 16:55

## 2024-09-20 RX ADMIN — FOLIC ACID 1 MG: 5 INJECTION, SOLUTION INTRAMUSCULAR; INTRAVENOUS; SUBCUTANEOUS at 08:15

## 2024-09-20 RX ADMIN — CEPHALEXIN 500 MG: 500 CAPSULE ORAL at 17:00

## 2024-09-20 RX ADMIN — SODIUM BICARBONATE 650 MG: 650 TABLET ORAL at 21:22

## 2024-09-21 ENCOUNTER — READMISSION MANAGEMENT (OUTPATIENT)
Dept: CALL CENTER | Facility: HOSPITAL | Age: 79
End: 2024-09-21
Payer: MEDICARE

## 2024-09-21 VITALS
TEMPERATURE: 98.4 F | OXYGEN SATURATION: 97 % | HEART RATE: 104 BPM | HEIGHT: 64 IN | RESPIRATION RATE: 17 BRPM | BODY MASS INDEX: 23.75 KG/M2 | WEIGHT: 139.11 LBS | SYSTOLIC BLOOD PRESSURE: 151 MMHG | DIASTOLIC BLOOD PRESSURE: 82 MMHG

## 2024-09-21 LAB
BASOPHILS # BLD AUTO: 0.06 10*3/MM3 (ref 0–0.2)
BASOPHILS NFR BLD AUTO: 0.9 % (ref 0–1.5)
DEPRECATED RDW RBC AUTO: 58.6 FL (ref 37–54)
EOSINOPHIL # BLD AUTO: 0.51 10*3/MM3 (ref 0–0.4)
EOSINOPHIL NFR BLD AUTO: 7.3 % (ref 0.3–6.2)
ERYTHROCYTE [DISTWIDTH] IN BLOOD BY AUTOMATED COUNT: 18.1 % (ref 12.3–15.4)
HCT VFR BLD AUTO: 26.5 % (ref 34–46.6)
HGB BLD-MCNC: 7.7 G/DL (ref 12–15.9)
IMM GRANULOCYTES # BLD AUTO: 0.03 10*3/MM3 (ref 0–0.05)
IMM GRANULOCYTES NFR BLD AUTO: 0.4 % (ref 0–0.5)
LYMPHOCYTES # BLD AUTO: 1.23 10*3/MM3 (ref 0.7–3.1)
LYMPHOCYTES NFR BLD AUTO: 17.6 % (ref 19.6–45.3)
MCH RBC QN AUTO: 26 PG (ref 26.6–33)
MCHC RBC AUTO-ENTMCNC: 29.1 G/DL (ref 31.5–35.7)
MCV RBC AUTO: 89.5 FL (ref 79–97)
MONOCYTES # BLD AUTO: 1 10*3/MM3 (ref 0.1–0.9)
MONOCYTES NFR BLD AUTO: 14.3 % (ref 5–12)
NEUTROPHILS NFR BLD AUTO: 4.15 10*3/MM3 (ref 1.7–7)
NEUTROPHILS NFR BLD AUTO: 59.5 % (ref 42.7–76)
NRBC BLD AUTO-RTO: 0 /100 WBC (ref 0–0.2)
PLATELET # BLD AUTO: 455 10*3/MM3 (ref 140–450)
PMV BLD AUTO: 10.3 FL (ref 6–12)
RBC # BLD AUTO: 2.96 10*6/MM3 (ref 3.77–5.28)
WBC NRBC COR # BLD AUTO: 6.98 10*3/MM3 (ref 3.4–10.8)

## 2024-09-21 PROCEDURE — 99232 SBSQ HOSP IP/OBS MODERATE 35: CPT | Performed by: INTERNAL MEDICINE

## 2024-09-21 PROCEDURE — 85025 COMPLETE CBC W/AUTO DIFF WBC: CPT | Performed by: STUDENT IN AN ORGANIZED HEALTH CARE EDUCATION/TRAINING PROGRAM

## 2024-09-21 RX ORDER — FUROSEMIDE 40 MG
40 TABLET ORAL DAILY
Qty: 30 TABLET | Refills: 0 | Status: SHIPPED | OUTPATIENT
Start: 2024-09-21

## 2024-09-21 RX ORDER — METOPROLOL SUCCINATE 50 MG/1
100 TABLET, EXTENDED RELEASE ORAL
Qty: 30 TABLET | Refills: 0 | Status: SHIPPED | OUTPATIENT
Start: 2024-09-22

## 2024-09-21 RX ADMIN — LEVOTHYROXINE SODIUM 50 MCG: 0.05 TABLET ORAL at 05:20

## 2024-09-21 RX ADMIN — CEPHALEXIN 500 MG: 500 CAPSULE ORAL at 05:20

## 2024-09-21 RX ADMIN — METOPROLOL SUCCINATE 50 MG: 50 TABLET, EXTENDED RELEASE ORAL at 08:15

## 2024-09-21 RX ADMIN — CYANOCOBALAMIN TAB 500 MCG 1000 MCG: 500 TAB at 08:14

## 2024-09-21 RX ADMIN — FOLIC ACID 1 MG: 5 INJECTION, SOLUTION INTRAMUSCULAR; INTRAVENOUS; SUBCUTANEOUS at 08:22

## 2024-09-21 RX ADMIN — DIGOXIN 125 MCG: 125 TABLET ORAL at 11:46

## 2024-09-21 RX ADMIN — SODIUM BICARBONATE 650 MG: 650 TABLET ORAL at 08:14

## 2024-09-21 RX ADMIN — Medication 220 MG: at 08:14

## 2024-09-21 RX ADMIN — CITALOPRAM HYDROBROMIDE 20 MG: 20 TABLET ORAL at 08:14

## 2024-09-21 RX ADMIN — CEPHALEXIN 500 MG: 500 CAPSULE ORAL at 11:46

## 2024-09-21 RX ADMIN — THERA TABS 1 TABLET: TAB at 08:14

## 2024-09-21 RX ADMIN — FUROSEMIDE 40 MG: 40 TABLET ORAL at 08:14

## 2024-09-21 RX ADMIN — FERROUS SULFATE TAB EC 324 MG (65 MG FE EQUIVALENT) 324 MG: 324 (65 FE) TABLET DELAYED RESPONSE at 08:14

## 2024-09-21 RX ADMIN — CETIRIZINE HYDROCHLORIDE 10 MG: 10 TABLET, FILM COATED ORAL at 08:15

## 2024-09-21 RX ADMIN — BUPROPION HYDROCHLORIDE 150 MG: 150 TABLET, EXTENDED RELEASE ORAL at 08:14

## 2024-09-21 RX ADMIN — PANTOPRAZOLE SODIUM 40 MG: 40 TABLET, DELAYED RELEASE ORAL at 08:15

## 2024-09-26 ENCOUNTER — READMISSION MANAGEMENT (OUTPATIENT)
Dept: CALL CENTER | Facility: HOSPITAL | Age: 79
End: 2024-09-26
Payer: MEDICARE

## 2024-10-01 ENCOUNTER — OFFICE VISIT (OUTPATIENT)
Dept: WOUND CARE | Facility: HOSPITAL | Age: 79
End: 2024-10-01
Payer: MEDICARE

## 2024-10-01 ENCOUNTER — READMISSION MANAGEMENT (OUTPATIENT)
Dept: CALL CENTER | Facility: HOSPITAL | Age: 79
End: 2024-10-01
Payer: MEDICARE

## 2024-10-01 NOTE — OUTREACH NOTE
General Surgery Week 1 Survey      Flowsheet Row Responses   [facility] patient discharged from? [facility]   Does the patient have one of the following disease processes/diagnoses(primary or secondary)? General Surgery   Week 1 attempt successful? No   Unsuccessful attempts Attempt 2            Marcella Olivier Registered Nurse

## 2024-10-08 ENCOUNTER — READMISSION MANAGEMENT (OUTPATIENT)
Dept: CALL CENTER | Facility: HOSPITAL | Age: 79
End: 2024-10-08
Payer: MEDICARE

## 2024-10-08 NOTE — OUTREACH NOTE
General Surgery Week 3 Survey      Flowsheet Row Responses   [facility] operated by [facility] patient discharged from? [facility]   Does the patient have one of the following disease processes/diagnoses(primary or secondary)? General Surgery   Week 3 attempt successful? Yes   Call start time 1602   Call end time 1609   Is patient permission given to speak with other caregiver? Yes   Person spoke with today (if not patient) and relationship Hilda-daughter.   Meds reviewed with patient/caregiver? Yes   Is the patient having any side effects they believe may be caused by any medication additions or changes? No   Does the patient have all medications related to this admission filled (includes all antibiotics, pain medications, etc.) Yes   Is the patient taking all medications as directed (includes completed medication regime)? Yes   Does the patient have a follow up appointment scheduled with their surgeon? Yes   Has the patient kept scheduled appointments due by today? Yes   Comments Daughter states will make f/u appt with surgeon. States patient continues to go to wound care. Next wound care appt 10/15/24.   Has home health visited the patient within 72 hours of discharge? N/A   DME comments Uses a walker.   Psychosocial issues? No   Did the patient receive a copy of their discharge instructions? Yes   Nursing interventions Reviewed instructions with patient   What is the patient's perception of their health status since discharge? Improving   Nursing interventions Nurse provided patient education   Is the patient /caregiver able to teach back basic post-op care? Practice 'cough and deep breath', Drive as instructed by MD in discharge instructions, Take showers only when approved by MD-sponge bathe until then, No tub bath, swimming, or hot tub until instructed by MD, Keep incision areas clean,dry and protected, Do not remove steri-strips, Lifting as instructed by MD in discharge instructions   Is the patient/caregiver able to teach back  signs and symptoms of incisional infection? Increased redness, swelling or pain at the incisonal site, Increased drainage or bleeding, Incisional warmth, Pus or odor from incision, Fever   Is the patient/caregiver able to teach back steps to recovery at home? Set small, achievable goals for return to baseline health, Rest and rebuild strength, gradually increase activity, Practice good oral hygiene, Eat a well-balance diet   If the patient is a current smoker, are they able to teach back resources for cessation? Not a smoker   Is the patient/caregiver able to teach back the hierarchy of who to call/visit for symptoms/problems? PCP, Specialist, Home health nurse, Urgent Care, ED, 911 Yes   Week 3 call completed? Yes   Graduated Yes   Is the patient interested in additional calls from an ambulatory ? No   Would this patient benefit from a Referral to [facility] Social Work? No   Graduated/Revoked comments States is improving, and will call with any questions/concerns.   Wrap up additional comments Daughter states patient is slowly improving. Denies any s/s of worsening infection at leg wound. States doing blue foam dressing changes to wound as needed. Continues with wound care clinic. Denies any needs or concerns today.   Call end time 1603            Maria D SPRINGER - Registered Nurse

## 2024-10-09 ENCOUNTER — APPOINTMENT (OUTPATIENT)
Dept: GENERAL RADIOLOGY | Facility: HOSPITAL | Age: 79
DRG: 271 | End: 2024-10-09
Payer: MEDICARE

## 2024-10-09 ENCOUNTER — HOSPITAL ENCOUNTER (INPATIENT)
Facility: HOSPITAL | Age: 79
LOS: 8 days | Discharge: NURSING, REHAB OR LONG-TERM CARE | DRG: 271 | End: 2024-10-18
Attending: INTERNAL MEDICINE | Admitting: HOSPITALIST
Payer: MEDICARE

## 2024-10-09 DIAGNOSIS — M79.661 PAIN IN RIGHT LOWER LEG: ICD-10-CM

## 2024-10-09 DIAGNOSIS — T14.8XXA INFECTED WOUND: Primary | ICD-10-CM

## 2024-10-09 DIAGNOSIS — I73.9 PAD (PERIPHERAL ARTERY DISEASE): ICD-10-CM

## 2024-10-09 DIAGNOSIS — I70.232 ATHEROSCLEROSIS OF NATIVE ARTERIES OF RIGHT LEG WITH ULCERATION OF CALF: ICD-10-CM

## 2024-10-09 DIAGNOSIS — L08.9 INFECTED WOUND: Primary | ICD-10-CM

## 2024-10-09 DIAGNOSIS — S81.801D WOUND OF RIGHT LOWER EXTREMITY, SUBSEQUENT ENCOUNTER: ICD-10-CM

## 2024-10-09 DIAGNOSIS — S81.801A WOUND OF RIGHT LOWER EXTREMITY, INITIAL ENCOUNTER: ICD-10-CM

## 2024-10-09 PROBLEM — S81.809A WOUND OF LOWER EXTREMITY: Status: ACTIVE | Noted: 2024-10-09

## 2024-10-09 LAB
ALBUMIN SERPL-MCNC: 2.9 G/DL (ref 3.5–5.2)
ALBUMIN/GLOB SERPL: 0.7 G/DL
ALP SERPL-CCNC: 220 U/L (ref 39–117)
ALT SERPL W P-5'-P-CCNC: 14 U/L (ref 1–33)
ANION GAP SERPL CALCULATED.3IONS-SCNC: 8 MMOL/L (ref 5–15)
APTT PPP: 27.4 SECONDS (ref 24–31)
AST SERPL-CCNC: 36 U/L (ref 1–32)
BASOPHILS # BLD AUTO: 0.05 10*3/MM3 (ref 0–0.2)
BASOPHILS # BLD AUTO: 0.05 10*3/MM3 (ref 0–0.2)
BASOPHILS NFR BLD AUTO: 0.6 % (ref 0–1.5)
BASOPHILS NFR BLD AUTO: 0.7 % (ref 0–1.5)
BILIRUB SERPL-MCNC: 0.2 MG/DL (ref 0–1.2)
BUN SERPL-MCNC: 21 MG/DL (ref 8–23)
BUN/CREAT SERPL: 20.4 (ref 7–25)
CALCIUM SPEC-SCNC: 8.7 MG/DL (ref 8.6–10.5)
CHLORIDE SERPL-SCNC: 102 MMOL/L (ref 98–107)
CK SERPL-CCNC: 76 U/L (ref 20–180)
CO2 SERPL-SCNC: 29 MMOL/L (ref 22–29)
CREAT SERPL-MCNC: 1.03 MG/DL (ref 0.57–1)
CRP SERPL-MCNC: 1.59 MG/DL (ref 0–0.5)
D-LACTATE SERPL-SCNC: 1.6 MMOL/L (ref 0.3–2)
D-LACTATE SERPL-SCNC: 4.5 MMOL/L (ref 0.3–2)
D-LACTATE SERPL-SCNC: 5.1 MMOL/L (ref 0.5–2)
DEPRECATED RDW RBC AUTO: 51.4 FL (ref 37–54)
DEPRECATED RDW RBC AUTO: 53.5 FL (ref 37–54)
EGFRCR SERPLBLD CKD-EPI 2021: 55.4 ML/MIN/1.73
EOSINOPHIL # BLD AUTO: 0.52 10*3/MM3 (ref 0–0.4)
EOSINOPHIL # BLD AUTO: 0.65 10*3/MM3 (ref 0–0.4)
EOSINOPHIL NFR BLD AUTO: 6.2 % (ref 0.3–6.2)
EOSINOPHIL NFR BLD AUTO: 9.7 % (ref 0.3–6.2)
ERYTHROCYTE [DISTWIDTH] IN BLOOD BY AUTOMATED COUNT: 16.1 % (ref 12.3–15.4)
ERYTHROCYTE [DISTWIDTH] IN BLOOD BY AUTOMATED COUNT: 16.3 % (ref 12.3–15.4)
ERYTHROCYTE [SEDIMENTATION RATE] IN BLOOD: 105 MM/HR (ref 0–30)
GLOBULIN UR ELPH-MCNC: 4.4 GM/DL
GLUCOSE SERPL-MCNC: 87 MG/DL (ref 65–99)
HCT VFR BLD AUTO: 30.5 % (ref 34–46.6)
HCT VFR BLD AUTO: 32.6 % (ref 34–46.6)
HGB BLD-MCNC: 8.6 G/DL (ref 12–15.9)
HGB BLD-MCNC: 9.3 G/DL (ref 12–15.9)
IMM GRANULOCYTES # BLD AUTO: 0.02 10*3/MM3 (ref 0–0.05)
IMM GRANULOCYTES # BLD AUTO: 0.03 10*3/MM3 (ref 0–0.05)
IMM GRANULOCYTES NFR BLD AUTO: 0.2 % (ref 0–0.5)
IMM GRANULOCYTES NFR BLD AUTO: 0.4 % (ref 0–0.5)
INR PPP: 1.05 (ref 0.93–1.1)
LARGE PLATELETS: NORMAL
LYMPHOCYTES # BLD AUTO: 0.87 10*3/MM3 (ref 0.7–3.1)
LYMPHOCYTES # BLD AUTO: 0.92 10*3/MM3 (ref 0.7–3.1)
LYMPHOCYTES NFR BLD AUTO: 11 % (ref 19.6–45.3)
LYMPHOCYTES NFR BLD AUTO: 13 % (ref 19.6–45.3)
MAGNESIUM SERPL-MCNC: 2.4 MG/DL (ref 1.6–2.4)
MCH RBC QN AUTO: 24.8 PG (ref 26.6–33)
MCH RBC QN AUTO: 25.7 PG (ref 26.6–33)
MCHC RBC AUTO-ENTMCNC: 28.2 G/DL (ref 31.5–35.7)
MCHC RBC AUTO-ENTMCNC: 28.5 G/DL (ref 31.5–35.7)
MCV RBC AUTO: 87.9 FL (ref 79–97)
MCV RBC AUTO: 90.1 FL (ref 79–97)
MONOCYTES # BLD AUTO: 0.34 10*3/MM3 (ref 0.1–0.9)
MONOCYTES # BLD AUTO: 0.54 10*3/MM3 (ref 0.1–0.9)
MONOCYTES NFR BLD AUTO: 5.1 % (ref 5–12)
MONOCYTES NFR BLD AUTO: 6.4 % (ref 5–12)
NEUTROPHILS NFR BLD AUTO: 4.76 10*3/MM3 (ref 1.7–7)
NEUTROPHILS NFR BLD AUTO: 6.35 10*3/MM3 (ref 1.7–7)
NEUTROPHILS NFR BLD AUTO: 71.1 % (ref 42.7–76)
NEUTROPHILS NFR BLD AUTO: 75.6 % (ref 42.7–76)
NRBC BLD AUTO-RTO: 0 /100 WBC (ref 0–0.2)
NRBC BLD AUTO-RTO: 0 /100 WBC (ref 0–0.2)
PLATELET # BLD AUTO: 376 10*3/MM3 (ref 140–450)
PLATELET # BLD AUTO: 578 10*3/MM3 (ref 140–450)
PMV BLD AUTO: 10 FL (ref 6–12)
PMV BLD AUTO: 11.1 FL (ref 6–12)
POTASSIUM SERPL-SCNC: 4.1 MMOL/L (ref 3.5–5.2)
PROT SERPL-MCNC: 7.3 G/DL (ref 6–8.5)
PROTHROMBIN TIME: 11.4 SECONDS (ref 9.6–11.7)
RBC # BLD AUTO: 3.47 10*6/MM3 (ref 3.77–5.28)
RBC # BLD AUTO: 3.62 10*6/MM3 (ref 3.77–5.28)
RBC MORPH BLD: NORMAL
SMALL PLATELETS BLD QL SMEAR: ADEQUATE
SODIUM SERPL-SCNC: 139 MMOL/L (ref 136–145)
WBC MORPH BLD: NORMAL
WBC NRBC COR # BLD AUTO: 6.7 10*3/MM3 (ref 3.4–10.8)
WBC NRBC COR # BLD AUTO: 8.4 10*3/MM3 (ref 3.4–10.8)

## 2024-10-09 PROCEDURE — G0378 HOSPITAL OBSERVATION PER HR: HCPCS

## 2024-10-09 PROCEDURE — 25810000003 SODIUM CHLORIDE 0.9 % SOLUTION

## 2024-10-09 PROCEDURE — 85025 COMPLETE CBC W/AUTO DIFF WBC: CPT | Performed by: NURSE PRACTITIONER

## 2024-10-09 PROCEDURE — 87040 BLOOD CULTURE FOR BACTERIA: CPT | Performed by: NURSE PRACTITIONER

## 2024-10-09 PROCEDURE — 85652 RBC SED RATE AUTOMATED: CPT | Performed by: NURSE PRACTITIONER

## 2024-10-09 PROCEDURE — 93005 ELECTROCARDIOGRAM TRACING: CPT | Performed by: INTERNAL MEDICINE

## 2024-10-09 PROCEDURE — 83735 ASSAY OF MAGNESIUM: CPT | Performed by: INTERNAL MEDICINE

## 2024-10-09 PROCEDURE — 86140 C-REACTIVE PROTEIN: CPT | Performed by: NURSE PRACTITIONER

## 2024-10-09 PROCEDURE — 99285 EMERGENCY DEPT VISIT HI MDM: CPT

## 2024-10-09 PROCEDURE — 80053 COMPREHEN METABOLIC PANEL: CPT | Performed by: NURSE PRACTITIONER

## 2024-10-09 PROCEDURE — 85730 THROMBOPLASTIN TIME PARTIAL: CPT | Performed by: NURSE PRACTITIONER

## 2024-10-09 PROCEDURE — 25010000002 HYDROMORPHONE 1 MG/ML SOLUTION: Performed by: NURSE PRACTITIONER

## 2024-10-09 PROCEDURE — 25010000002 HYDROMORPHONE 1 MG/ML SOLUTION

## 2024-10-09 PROCEDURE — 83605 ASSAY OF LACTIC ACID: CPT

## 2024-10-09 PROCEDURE — 83605 ASSAY OF LACTIC ACID: CPT | Performed by: NURSE PRACTITIONER

## 2024-10-09 PROCEDURE — 36415 COLL VENOUS BLD VENIPUNCTURE: CPT

## 2024-10-09 PROCEDURE — 25010000002 ENOXAPARIN PER 10 MG

## 2024-10-09 PROCEDURE — 82550 ASSAY OF CK (CPK): CPT

## 2024-10-09 PROCEDURE — 25010000002 CEFAZOLIN PER 500 MG: Performed by: NURSE PRACTITIONER

## 2024-10-09 PROCEDURE — 85610 PROTHROMBIN TIME: CPT | Performed by: NURSE PRACTITIONER

## 2024-10-09 PROCEDURE — 73590 X-RAY EXAM OF LOWER LEG: CPT

## 2024-10-09 PROCEDURE — 85025 COMPLETE CBC W/AUTO DIFF WBC: CPT | Performed by: INTERNAL MEDICINE

## 2024-10-09 PROCEDURE — 25010000002 CEFAZOLIN PER 500 MG

## 2024-10-09 PROCEDURE — 85007 BL SMEAR W/DIFF WBC COUNT: CPT | Performed by: NURSE PRACTITIONER

## 2024-10-09 RX ORDER — HYDROCODONE BITARTRATE AND ACETAMINOPHEN 5; 325 MG/1; MG/1
1 TABLET ORAL ONCE AS NEEDED
Status: COMPLETED | OUTPATIENT
Start: 2024-10-09 | End: 2024-10-09

## 2024-10-09 RX ORDER — SODIUM CHLORIDE 0.9 % (FLUSH) 0.9 %
10 SYRINGE (ML) INJECTION AS NEEDED
Status: DISCONTINUED | OUTPATIENT
Start: 2024-10-09 | End: 2024-10-18 | Stop reason: HOSPADM

## 2024-10-09 RX ORDER — ACETAMINOPHEN 325 MG/1
650 TABLET ORAL EVERY 4 HOURS PRN
Status: DISCONTINUED | OUTPATIENT
Start: 2024-10-09 | End: 2024-10-18 | Stop reason: HOSPADM

## 2024-10-09 RX ORDER — ENOXAPARIN SODIUM 100 MG/ML
40 INJECTION SUBCUTANEOUS NIGHTLY
Status: DISCONTINUED | OUTPATIENT
Start: 2024-10-09 | End: 2024-10-10

## 2024-10-09 RX ORDER — OXYCODONE AND ACETAMINOPHEN 5; 325 MG/1; MG/1
1 TABLET ORAL 2 TIMES DAILY
COMMUNITY
End: 2024-10-18 | Stop reason: HOSPADM

## 2024-10-09 RX ORDER — OXYCODONE HYDROCHLORIDE 5 MG/1
5 TABLET ORAL EVERY 6 HOURS PRN
Status: DISCONTINUED | OUTPATIENT
Start: 2024-10-09 | End: 2024-10-10 | Stop reason: DRUGHIGH

## 2024-10-09 RX ORDER — ACETAMINOPHEN 650 MG/1
650 SUPPOSITORY RECTAL EVERY 4 HOURS PRN
Status: DISCONTINUED | OUTPATIENT
Start: 2024-10-09 | End: 2024-10-18 | Stop reason: HOSPADM

## 2024-10-09 RX ORDER — SODIUM CHLORIDE 9 MG/ML
100 INJECTION, SOLUTION INTRAVENOUS CONTINUOUS
Status: DISPENSED | OUTPATIENT
Start: 2024-10-09 | End: 2024-10-10

## 2024-10-09 RX ORDER — ONDANSETRON 2 MG/ML
4 INJECTION INTRAMUSCULAR; INTRAVENOUS EVERY 6 HOURS PRN
Status: DISCONTINUED | OUTPATIENT
Start: 2024-10-09 | End: 2024-10-18 | Stop reason: HOSPADM

## 2024-10-09 RX ORDER — CILOSTAZOL 100 MG/1
100 TABLET ORAL 2 TIMES DAILY
Status: DISCONTINUED | OUTPATIENT
Start: 2024-10-09 | End: 2024-10-18 | Stop reason: HOSPADM

## 2024-10-09 RX ORDER — ONDANSETRON 4 MG/1
4 TABLET, ORALLY DISINTEGRATING ORAL EVERY 6 HOURS PRN
Status: DISCONTINUED | OUTPATIENT
Start: 2024-10-09 | End: 2024-10-18 | Stop reason: HOSPADM

## 2024-10-09 RX ORDER — KETOROLAC TROMETHAMINE 30 MG/ML
15 INJECTION, SOLUTION INTRAMUSCULAR; INTRAVENOUS ONCE
Status: DISCONTINUED | OUTPATIENT
Start: 2024-10-09 | End: 2024-10-10

## 2024-10-09 RX ORDER — ACETAMINOPHEN 160 MG/5ML
650 SOLUTION ORAL EVERY 4 HOURS PRN
Status: DISCONTINUED | OUTPATIENT
Start: 2024-10-09 | End: 2024-10-18 | Stop reason: HOSPADM

## 2024-10-09 RX ORDER — SODIUM CHLORIDE 0.9 % (FLUSH) 0.9 %
10 SYRINGE (ML) INJECTION EVERY 12 HOURS SCHEDULED
Status: DISCONTINUED | OUTPATIENT
Start: 2024-10-09 | End: 2024-10-18 | Stop reason: HOSPADM

## 2024-10-09 RX ADMIN — Medication 10 ML: at 20:32

## 2024-10-09 RX ADMIN — SODIUM CHLORIDE 100 ML/HR: 900 INJECTION INTRAVENOUS at 22:26

## 2024-10-09 RX ADMIN — HYDROMORPHONE HYDROCHLORIDE 0.5 MG: 1 INJECTION, SOLUTION INTRAMUSCULAR; INTRAVENOUS; SUBCUTANEOUS at 17:56

## 2024-10-09 RX ADMIN — SODIUM CHLORIDE 2000 MG: 900 INJECTION INTRAVENOUS at 22:26

## 2024-10-09 RX ADMIN — HYDROMORPHONE HYDROCHLORIDE 0.5 MG: 1 INJECTION, SOLUTION INTRAMUSCULAR; INTRAVENOUS; SUBCUTANEOUS at 20:32

## 2024-10-09 RX ADMIN — HYDROMORPHONE HYDROCHLORIDE 0.5 MG: 1 INJECTION, SOLUTION INTRAMUSCULAR; INTRAVENOUS; SUBCUTANEOUS at 16:31

## 2024-10-09 RX ADMIN — HYDROMORPHONE HYDROCHLORIDE 0.5 MG: 1 INJECTION, SOLUTION INTRAMUSCULAR; INTRAVENOUS; SUBCUTANEOUS at 22:27

## 2024-10-09 RX ADMIN — OXYCODONE 5 MG: 5 TABLET ORAL at 18:33

## 2024-10-09 RX ADMIN — CILOSTAZOL 100 MG: 100 TABLET ORAL at 22:37

## 2024-10-09 RX ADMIN — HYDROCODONE BITARTRATE AND ACETAMINOPHEN 1 TABLET: 5; 325 TABLET ORAL at 15:40

## 2024-10-09 RX ADMIN — ENOXAPARIN SODIUM 40 MG: 100 INJECTION SUBCUTANEOUS at 22:27

## 2024-10-09 RX ADMIN — CEFAZOLIN 1000 MG: 1 INJECTION, POWDER, FOR SOLUTION INTRAMUSCULAR; INTRAVENOUS at 14:47

## 2024-10-09 NOTE — ED PROVIDER NOTES
Subjective   History of Present Illness  Patient is a 79-year-old white female with a history of A-fib currently on Xarelto.  She is blind.  She has a history of hypertension.  She presents today from home accompanied by family member with complaints of pain and swelling to both lower extremities right worse than left.  She states she sustained some wounds to her anterior legs after refrigerator fell on her a couple of months ago.  She has had surgical debridement about a month ago.  She has been followed by wound care center.  She reports good wound healing in the left leg however the wound to the right leg is deep, poorly healing, now draining and malodorous.  She reports increasing pain.  No fevers.  States she has been on Keflex.      Review of Systems   Constitutional:  Negative for fever.   Skin:  Positive for wound.        Malodorous draining wound right lower leg       Past Medical History:   Diagnosis Date    Atrial fibrillation     Blind 07/10/2023    Histoplasmosis     Hypertension     Legally blind     Longstanding persistent atrial fibrillation 07/10/2023    Paroxysmal atrial fibrillation 07/10/2023       No Known Allergies    Past Surgical History:   Procedure Laterality Date    WOUND DEBRIDEMENT Right 9/16/2024    Procedure: RIGHT LEG DEBRIDEMENT;  Surgeon: Red Hazel II, MD;  Location: [facility] MAIN OR;  Service: Vascular;  Laterality: Right;       Family History   Problem Relation Age of Onset    Cancer Mother     Dementia Father     Cancer Sister        Social History     Socioeconomic History    Marital status:    Tobacco Use    Smoking status: Former    Smokeless tobacco: Never   Vaping Use    Vaping status: Never Used   Substance and Sexual Activity    Alcohol use: Not Currently    Drug use: Not Currently    Sexual activity: Defer           Objective   Physical Exam  Vital signs and triage nurse note reviewed.  Constitutional: Awake, alert; well-developed and well-nourished. No acute  distress is noted.  Chronically ill in appearance.  Bedbugs noted on initial exam.  HEENT: Normocephalic, atraumatic; pupils are PERRL with intact EOM; oropharynx is pink and moist without exudate or erythema.  No drooling or pooling of oral secretions.  Cardiovascular: Regular rate and rhythm, normal S1-S2.  No murmur noted.  Pulmonary: Respiratory effort regular nonlabored, breath sounds clear to auscultation all fields.  Musculoskeletal: Independent range of motion of all extremities with no palpable tenderness or edema.  Neuro: Alert oriented x3, speech is clear and appropriate, GCS 15.    Skin: Flesh tone, warm, dry.  Superficial well-healing wound noted over the anterior aspect of the left lower leg.  There is a deep wound noted over the anterior aspect of the right lower leg with some malodorous drainage and mild erythema.  Few additional blisters noted about the right ankle and lower leg.  There is pitting edema noted to both lower extremities.  No calf tenderness.  Negative Corrine.    Procedures           ED Course      Labs Reviewed   COMPREHENSIVE METABOLIC PANEL - Abnormal; Notable for the following components:       Result Value    Creatinine 1.03 (*)     Albumin 2.9 (*)     AST (SGOT) 36 (*)     Alkaline Phosphatase 220 (*)     eGFR 55.4 (*)     All other components within normal limits    Narrative:     GFR Normal >60  Chronic Kidney Disease <60  Kidney Failure <15    The GFR formula is only valid for adults with stable renal function between ages 18 and 70.   SEDIMENTATION RATE - Abnormal; Notable for the following components:    Sed Rate 105 (*)     All other components within normal limits   C-REACTIVE PROTEIN - Abnormal; Notable for the following components:    C-Reactive Protein 1.59 (*)     All other components within normal limits   CBC WITH AUTO DIFFERENTIAL - Abnormal; Notable for the following components:    RBC 3.62 (*)     Hemoglobin 9.3 (*)     Hematocrit 32.6 (*)     MCH 25.7 (*)     MCHC  28.5 (*)     RDW 16.3 (*)     Lymphocyte % 13.0 (*)     Eosinophil % 9.7 (*)     Eosinophils, Absolute 0.65 (*)     All other components within normal limits   POC LACTATE - Abnormal; Notable for the following components:    Lactate 4.5 (*)     All other components within normal limits   PROTIME-INR - Normal   APTT - Normal   POC LACTATE - Normal   BLOOD CULTURE   BLOOD CULTURE   SCAN SLIDE   LACTIC ACID, REFLEX   CBC AND DIFFERENTIAL    Narrative:     The following orders were created for panel order CBC & Differential.  Procedure                               Abnormality         Status                     ---------                               -----------         ------                     CBC Auto Differential[[id number]]        Abnormal            Final result               Scan Slide[[id number]]                                       Final result                 Please view results for these tests on the individual orders.     XR Tibia Fibula 2 View Right    Result Date: 10/9/2024  Impression: Soft tissue defect again identified. No underlying bony abnormality. Electronically Signed: Jazlyn Ramon MD  10/9/2024 1:45 PM EDT  Workstation ID: [id number]   Medications   sodium chloride 0.9 % flush 10 mL (has no administration in time range)   ketorolac (TORADOL) injection 15 mg (15 mg Intravenous Not Given 10/9/24 1516)   ceFAZolin 1000 mg IVPB in 100 mL NS (MBP) (0 mg Intravenous Stopped 10/9/24 1645)   HYDROcodone-acetaminophen (NORCO) 5-325 MG per tablet 1 tablet (1 tablet Oral Given 10/9/24 1540)   HYDROmorphone (DILAUDID) injection 0.5 mg (0.5 mg Intravenous Given 10/9/24 1631)                                            Medical Decision Making  Patient presents today with the above complaint.    She had above exam and evaluation.  IV was established.  Labs and x-ray were obtained.  Chart review notes the patient did have ABIs obtained of lower extremities on her last admission here that showed Right Conclusion:  The right FRANSISCO is normal. Severe digital insufficiency.  ·  Left Conclusion: The left FRANSISCO is moderately reduced. Severe digital insufficiency.    Workup: CBC reveals a normal white blood cell count of 6.0, hemoglobin appears stable at 9.3.  Metabolic panel is grossly unremarkable except for an ALT of 36 and alk phos of 220.  Sed rate 102.  CRP 1.59.  Blood cultures were obtained as well as a POC lactate that was found to be within normal limits at 1.6.  X-ray of the right tib-fib was obtained interpreted by radiologist and reviewed by myself shows soft tissue defect, no underlying bony abnormality.    Patient's wound culture from previous admission was reviewed and showed some mixed maricel, nasal MRSA swab was negative.    Patient was given a dose of IV cefazolin here in the ED.    Findings were discussed with her.  She will be admitted to the hospital for IV antibiotics and further management.  Case discussed with hospitalist PA.      Amount and/or Complexity of Data Reviewed  Labs: ordered.  Radiology: ordered.    Risk  Prescription drug management.        Final diagnoses:   Infected wound   Pain in right lower leg       ED Disposition  ED Disposition       ED Disposition   Decision to Admit    Condition   --    Comment   Level of Care: Med/Surg [1]   Admitting Physician: MAKAYLA BATISTA [[id number]]   Attending Physician: MAKAYLA BATISTA [[id number]]                 No follow-up provider specified.       Medication List      No changes were made to your prescriptions during this visit.            Deana Kellogg, AZAEL  10/09/24 [id number]

## 2024-10-09 NOTE — H&P
"[facility] Medicine Services  History & Physical    Patient Name: Fallon Nur  : 1945  MRN: [id number]  Primary Care Physician:  Francine Hampton MD  Date of admission: 10/9/2024  Date and Time of Service: 10/9/2024 at 1635    Subjective      Chief Complaint: bilateral lower leg pain and swelling    History of Present Illness: Fallon Nur is a 79 y.o. female with a CMH of bilateral lower leg wounds, A.FIB, HTN, and is legally blind who presented to [facility] on 10/9/2024 with increasing pain and swelling to bilateral lower legs. Patient reported the right leg is worse than the left. Patient was admitted to [facility] on 24 for traumatic bilateral lower leg wounds after a fall and discharged on 24 after wound debridement and management. Patient reported she followed up with outpatient wound care one time, during which the wound vac was removed and patient was started on dressing changes at home. Patient reported blisters began forming on her right leg soon after she was discharged from [facility]. Patient reported her mobility has been decreased since discharge from [facility] and when she does ambulate, it is with walker and assistance of family member. Patient reported when she woke up this morning, she had increased pain and swelling of both legs and noticed an odor coming from the right leg wound, and came to the ER for evaluation. Hospitalist team admitting patient at this time.    In ED: Pertinent labs: alk phos 220, albumin 2.9, CRP 1.59, sed rate 105, Hgb 9.3, creat1.03, GFR 55.4. Initial lactic acid 4.5, upon recheck at 1528: 1.6, recheck at 1741: 5.1. WBC stable at 6.70. Blood cultures drawn. Nasal MRSA swab was negative. XR tib/fib showed \"Soft tissue defect again identified. No underlying bony abnormality.\" Patient received IV cefazolin. Patient received dilaudid and norco for pain management.     Review of Systems   Constitutional:  Positive for activity change. Negative " for chills and fever.   Respiratory:  Negative for chest tightness and shortness of breath.    Cardiovascular:  Positive for leg swelling. Negative for chest pain.   Musculoskeletal:  Positive for gait problem.   Skin:  Positive for color change and wound.        BLE wounds   All other systems reviewed and are negative.      Personal History     Past Medical History:   Diagnosis Date    Atrial fibrillation     Blind 07/10/2023    Histoplasmosis     Hypertension     Legally blind     Longstanding persistent atrial fibrillation 07/10/2023    Paroxysmal atrial fibrillation 07/10/2023       Past Surgical History:   Procedure Laterality Date    WOUND DEBRIDEMENT Right 9/16/2024    Procedure: RIGHT LEG DEBRIDEMENT;  Surgeon: Red Hazel II, MD;  Location: [facility] OR;  Service: Vascular;  Laterality: Right;       Family History: family history includes Cancer in her mother and sister; Dementia in her father. Otherwise pertinent FHx was reviewed and not pertinent to current issue.    Social History:  reports that she has quit smoking. She has never used smokeless tobacco. She reports that she does not currently use alcohol. She reports that she does not currently use drugs.    Home Medications:  Prior to Admission Medications       Prescriptions Last Dose Informant Patient Reported? Taking?    alendronate (FOSAMAX) 70 MG tablet   Yes No    Take 1 tablet by mouth Every 7 (Seven) Days. Saturday    buPROPion XL (WELLBUTRIN XL) 150 MG 24 hr tablet   Yes No    Take 1 tablet by mouth Daily.    cetirizine (zyrTEC) 10 MG tablet   Yes No    Take 1 tablet by mouth Daily.    citalopram (CeleXA) 20 MG tablet   Yes No    Take 1 tablet by mouth Daily.    digoxin (LANOXIN) 125 MCG tablet   Yes No    Take 1 tablet by mouth Daily.    ferrous sulfate 325 (65 FE) MG tablet   Yes No    Take 1 tablet by mouth Daily With Breakfast.    fluticasone (FLONASE) 50 MCG/ACT nasal spray   Yes No    2 sprays into the nostril(s) as directed by  provider Daily As Needed for Rhinitis.    furosemide (LASIX) 40 MG tablet   No No    Take 1 tablet by mouth Daily.    HYDROcodone-acetaminophen (NORCO) 5-325 MG per tablet   Yes No    Take 1 tablet by mouth Every 8 (Eight) Hours As Needed.    levothyroxine (SYNTHROID, LEVOTHROID) 50 MCG tablet   Yes No    Take 1 tablet by mouth Daily.    Melatonin 12 MG tablet   Yes No    Take 1 tablet by mouth At Night As Needed.    metoprolol succinate XL (TOPROL-XL) 50 MG 24 hr tablet   No No    Take 2 tablets by mouth Daily.    pantoprazole (PROTONIX) 40 MG EC tablet   Yes No    Take 1 tablet by mouth Daily.    rivaroxaban (XARELTO) 20 MG tablet   Yes No    Take 1 tablet by mouth Daily.    vitamin B-12 (CYANOCOBALAMIN) 1000 MCG tablet   Yes No    Take 1 tablet by mouth Daily.    vitamin D (ERGOCALCIFEROL) 1.25 MG (45388 UT) capsule capsule   Yes No    Take 1 capsule by mouth 1 (One) Time Per Week. Saturday    zolpidem (AMBIEN) 5 MG tablet   Yes No    Take 1 tablet by mouth every night at bedtime.              Allergies:  No Known Allergies    Objective      Vitals:   Temp:  [97.7 °F (36.5 °C)] 97.7 °F (36.5 °C)  Heart Rate:  [] 86  Resp:  [16-19] 17  BP: (104-154)/(61-98) 154/90  Body mass index is 25.51 kg/m².  Physical Exam  HENT:      Head: Normocephalic and atraumatic.      Nose: Nose normal.      Mouth/Throat:      Mouth: Mucous membranes are moist.      Pharynx: Oropharynx is clear.   Eyes:      Extraocular Movements: Extraocular movements intact.      Conjunctiva/sclera: Conjunctivae normal.      Pupils: Pupils are equal, round, and reactive to light.   Cardiovascular:      Rate and Rhythm: Normal rate.      Pulses:           Radial pulses are 2+ on the right side and 2+ on the left side.        Dorsalis pedis pulses are detected w/ Doppler on the right side and detected w/ Doppler on the left side.        Posterior tibial pulses are detected w/ Doppler on the right side and detected w/ Doppler on the left side.    Pulmonary:      Effort: Pulmonary effort is normal.      Breath sounds: Normal breath sounds.   Abdominal:      General: Bowel sounds are normal.      Palpations: Abdomen is soft.   Musculoskeletal:         General: Tenderness present.      Cervical back: Neck supple.      Right lower leg: 3+ Edema present.      Left lower le+ Edema present.      Comments: BLE painful at rest and upon palpation   Skin:     Findings: Erythema and lesion present.      Comments: BLE erythematous and edematous with open wounds R>L; RLE wound with purulence, malodorous; RLE with multiple blisters on calf, some weeping; fingertips of right hand discolored per baseline   Neurological:      General: No focal deficit present.      Mental Status: She is alert and oriented to person, place, and time.   Psychiatric:         Mood and Affect: Mood normal.         Behavior: Behavior normal.         Thought Content: Thought content normal.         Judgment: Judgment normal.         Diagnostic Data:  Lab Results (last 24 hours)       Procedure Component Value Units Date/Time    STAT Lactic Acid, Reflex [[id number]]  (Abnormal) Collected: 10/09/24 1741    Specimen: Blood Updated: 10/09/24 1810     Lactate 5.1 mmol/L     Magnesium [[id number]]  (Normal) Collected: 10/09/24 1527    Specimen: Blood from Arm, Right Updated: 10/09/24 1739     Magnesium 2.4 mg/dL     Comprehensive Metabolic Panel [[id number]]  (Abnormal) Collected: 10/09/24 1527    Specimen: Blood from Arm, Right Updated: 10/09/24 1613     Glucose 87 mg/dL      BUN 21 mg/dL      Creatinine 1.03 mg/dL      Sodium 139 mmol/L      Potassium 4.1 mmol/L      Chloride 102 mmol/L      CO2 29.0 mmol/L      Calcium 8.7 mg/dL      Total Protein 7.3 g/dL      Albumin 2.9 g/dL      ALT (SGPT) 14 U/L      AST (SGOT) 36 U/L      Alkaline Phosphatase 220 U/L      Total Bilirubin 0.2 mg/dL      Globulin 4.4 gm/dL      A/G Ratio 0.7 g/dL      BUN/Creatinine Ratio 20.4     Anion Gap 8.0 mmol/L      eGFR  55.4 mL/min/1.73     Narrative:      GFR Normal >60  Chronic Kidney Disease <60  Kidney Failure <15    The GFR formula is only valid for adults with stable renal function between ages 18 and 70.    C-reactive Protein [[id number]]  (Abnormal) Collected: 10/09/24 1527    Specimen: Blood from Arm, Right Updated: 10/09/24 1613     C-Reactive Protein 1.59 mg/dL     Protime-INR [[id number]]  (Normal) Collected: 10/09/24 1527    Specimen: Blood from Arm, Right Updated: 10/09/24 1607     Protime 11.4 Seconds      INR 1.05    aPTT [[id number]]  (Normal) Collected: 10/09/24 1527    Specimen: Blood from Arm, Right Updated: 10/09/24 1607     PTT 27.4 seconds     POC Lactate [[id number]]  (Normal) Collected: 10/09/24 1528    Specimen: Blood Updated: 10/09/24 1530     Lactate 1.6 mmol/L      Comment: Serial Number: [id number]:  [id number]       CBC & Differential [[id number]]  (Abnormal) Collected: 10/09/24 1440    Specimen: Blood from Arm, Right Updated: 10/09/24 1502    Narrative:      The following orders were created for panel order CBC & Differential.  Procedure                               Abnormality         Status                     ---------                               -----------         ------                     CBC Auto Differential[[id number]]        Abnormal            Final result               Scan Slide[[id number]]                                       Final result                 Please view results for these tests on the individual orders.    CBC Auto Differential [[id number]]  (Abnormal) Collected: 10/09/24 1440    Specimen: Blood from Arm, Right Updated: 10/09/24 1502     WBC 6.70 10*3/mm3      RBC 3.62 10*6/mm3      Hemoglobin 9.3 g/dL      Hematocrit 32.6 %      MCV 90.1 fL      MCH 25.7 pg      MCHC 28.5 g/dL      RDW 16.3 %      RDW-SD 53.5 fl      MPV 11.1 fL      Platelets 376 10*3/mm3      Neutrophil % 71.1 %      Lymphocyte % 13.0 %      Monocyte % 5.1 %      Eosinophil % 9.7 %      Basophil % 0.7  %      Immature Grans % 0.4 %      Neutrophils, Absolute 4.76 10*3/mm3      Lymphocytes, Absolute 0.87 10*3/mm3      Monocytes, Absolute 0.34 10*3/mm3      Eosinophils, Absolute 0.65 10*3/mm3      Basophils, Absolute 0.05 10*3/mm3      Immature Grans, Absolute 0.03 10*3/mm3      nRBC 0.0 /100 WBC     Scan Slide [[id number]] Collected: 10/09/24 1440    Specimen: Blood from Arm, Right Updated: 10/09/24 1502     RBC Morphology Normal     WBC Morphology Normal     Platelet Estimate Adequate     Large Platelets Slight/1+    Sedimentation Rate [[id number]]  (Abnormal) Collected: 10/09/24 1440    Specimen: Blood from Arm, Right Updated: 10/09/24 1451     Sed Rate 105 mm/hr     POC Lactate [[id number]]  (Abnormal) Collected: 10/09/24 1443    Specimen: Blood Updated: 10/09/24 1445     Lactate 4.5 mmol/L      Comment: Serial Number: [id number]:  [id number]       Blood Culture - Blood, Arm, Left [[id number]] Collected: 10/09/24 1440    Specimen: Blood from Arm, Left Updated: 10/09/24 1443    Blood Culture - Blood, Arm, Right [[id number]] Collected: 10/09/24 1440    Specimen: Blood from Arm, Right Updated: 10/09/24 1443             Imaging Results (Last 24 Hours)       Procedure Component Value Units Date/Time    XR Tibia Fibula 2 View Right [[id number]] Collected: 10/09/24 1344     Updated: 10/09/24 1347    Narrative:      XR TIBIA FIBULA 2 VW RIGHT    Date of Exam: 10/9/2024 1:29 PM EDT    Indication: draining open wound    Comparison: 9/12/2024.    Findings:  Soft tissue wound along the lateral leg is again identified. 2 films. There are no lytic lesions or erosions. There are no fractures. Bones appear normal.      Impression:      Impression:  Soft tissue defect again identified. No underlying bony abnormality.      Electronically Signed: Jazlyn Ramon MD    10/9/2024 1:45 PM EDT    Workstation ID: [id number]              Assessment & Plan        This is a 79 y.o. female with:    Active and Resolved Problems  Active  "Hospital Problems    Diagnosis  POA    **Wound of lower extremity [S81.809A]  Yes      Resolved Hospital Problems   No resolved problems to display.         Bilateral lower extremity wounds R>L  Peripheral arterial disease  - XR right tib/fin: \"Soft tissue wound along the lateral leg is again identified. 2 films. There are no lytic lesions or erosions. There are no fractures. Bones appear normal.\"   - FRANSISCO on 9/13/24: \"The right FRANSISCO is normal. Severe digital insufficiency. The left FRANSISCO is moderately reduced. Severe digital insufficiency.\"  - WBC stable at 6.70; follow AM labs  - Blood cultures pending  - Continue cefazolin  - Started cilostazol  - Continue pain management  - Gentle hydration  - NPO at MN for possible debridement tomorrow  - Consult wound  - Consult vascular     AFIB  HTN  - Started lovenox  - Resume home meds once reconciled  - Continue VS per order      VTE Prophylaxis:  Pharmacologic VTE prophylaxis orders are present.        The patient desires to be as follows:    CODE STATUS:    Level Of Support Discussed With: Patient  Code Status (Patient has no pulse and is not breathing): CPR (Attempt to Resuscitate)  Medical Interventions (Patient has pulse or is breathing): Full Support        Hilda Springer (daughter), who can be contacted at [phone], is the designated person to make medical decisions on the patient's behalf if She is incapable of doing so. This was clarified with patient and/or next of kin on 10/9/2024 during the course of this H&P.    Admission Status:  I believe this patient meets observation status.    Expected Length of Stay: <24 hours    PDMP and Medication Dispenses via Sidebar reviewed and consistent with patient reported medications.    I discussed the patient's findings and my recommendations with patient and family.      Signature:     This document has been electronically signed by AZAEL Vazqeuz on October 9, 2024 20:10 EDT   [facility]   "

## 2024-10-09 NOTE — Clinical Note
Level of Care: Med/Surg [1]   Admitting Physician: MAKAYLA BATISTA [[id number]]   Attending Physician: MAKAYLA BATISTA [[id number]]

## 2024-10-09 NOTE — LETTER
EMS Transport Request  For use at [facility], [place], [place], [place], and [place] only   Patient Name: Fallon Nur : 1945   Weight:66.1 kg (145 lb 11.6 oz) Pick-up Location: 2122 BLS/ALS: BLS/ALS: BLS   Insurance: [employer] MEDICARE REPLACEMENT Auth End Date:    Pre-Cert #: D/C Summary complete:    Destination: Other [facility] [address] [place] [address]   Contact Precautions: Other Contact ESBL E coli   Equipment (O2, Fluids, etc.): None   Arrive By Date/Time: 10/17/24 RN will call when ready Stretcher/WC: Stretcher   CM Requesting: Radha Kearns Ext: [phone]   Notes/Medical Necessity: max assist x2 for bed mobility, unstageable RLL wound, unable to tolerate seated position for duration of transport, Aox4, 145lbs, room air.      ______________________________________________________________________    *Only 2 patient bags OR 1 carry-on size bag are permitted.  Wheelchairs and walkers CANNOT transported with the patient. Acknowledge: Yes

## 2024-10-09 NOTE — ED TRIAGE NOTES
"Pt to ED via [facility] from home. Pt has wounds on both legs that are red and \"Don't smell good\". Pt was here a few weeks ago for the same problem.   "

## 2024-10-09 NOTE — ED NOTES
Upon RN beginning assessment, bed bug was immediately found crawling off of pt. RN questioned pt/family who confirmed they currently have bed bugs. DANIELLE Candelario immediately notified, as pt is in hallway bed. RN deconned pt in shower room and notified EVS of situation.Pt moved to private room after decon.

## 2024-10-10 ENCOUNTER — APPOINTMENT (OUTPATIENT)
Dept: CT IMAGING | Facility: HOSPITAL | Age: 79
DRG: 271 | End: 2024-10-10
Payer: MEDICARE

## 2024-10-10 LAB
ANION GAP SERPL CALCULATED.3IONS-SCNC: 8.3 MMOL/L (ref 5–15)
APTT PPP: 26.2 SECONDS (ref 61–76.5)
APTT PPP: 32.5 SECONDS (ref 61–76.5)
APTT PPP: 95.1 SECONDS (ref 61–76.5)
BUN SERPL-MCNC: 22 MG/DL (ref 8–23)
BUN/CREAT SERPL: 19.3 (ref 7–25)
CALCIUM SPEC-SCNC: 8.5 MG/DL (ref 8.6–10.5)
CHLORIDE SERPL-SCNC: 101 MMOL/L (ref 98–107)
CO2 SERPL-SCNC: 28.7 MMOL/L (ref 22–29)
CREAT SERPL-MCNC: 1.14 MG/DL (ref 0.57–1)
CRP SERPL-MCNC: 1.71 MG/DL (ref 0–0.5)
D-LACTATE SERPL-SCNC: 1.4 MMOL/L (ref 0.5–2)
DIGOXIN SERPL-MCNC: 0.66 NG/ML (ref 0.6–1.2)
EGFRCR SERPLBLD CKD-EPI 2021: 49.1 ML/MIN/1.73
ERYTHROCYTE [SEDIMENTATION RATE] IN BLOOD: 69 MM/HR (ref 0–30)
GLUCOSE SERPL-MCNC: 123 MG/DL (ref 65–99)
IRON 24H UR-MRATE: 23 MCG/DL (ref 37–145)
IRON SATN MFR SERPL: 7 % (ref 20–50)
MAGNESIUM SERPL-MCNC: 2.4 MG/DL (ref 1.6–2.4)
POTASSIUM SERPL-SCNC: 4 MMOL/L (ref 3.5–5.2)
QT INTERVAL: 280 MS
QTC INTERVAL: 408 MS
SODIUM SERPL-SCNC: 138 MMOL/L (ref 136–145)
TIBC SERPL-MCNC: 352 MCG/DL (ref 298–536)
TRANSFERRIN SERPL-MCNC: 236 MG/DL (ref 200–360)

## 2024-10-10 PROCEDURE — 80202 ASSAY OF VANCOMYCIN: CPT | Performed by: NURSE PRACTITIONER

## 2024-10-10 PROCEDURE — 80048 BASIC METABOLIC PNL TOTAL CA: CPT | Performed by: HOSPITALIST

## 2024-10-10 PROCEDURE — 84466 ASSAY OF TRANSFERRIN: CPT | Performed by: HOSPITALIST

## 2024-10-10 PROCEDURE — 85025 COMPLETE CBC W/AUTO DIFF WBC: CPT | Performed by: HOSPITALIST

## 2024-10-10 PROCEDURE — 25010000002 MORPHINE PER 10 MG: Performed by: HOSPITALIST

## 2024-10-10 PROCEDURE — 80162 ASSAY OF DIGOXIN TOTAL: CPT | Performed by: INTERNAL MEDICINE

## 2024-10-10 PROCEDURE — 93010 ELECTROCARDIOGRAM REPORT: CPT | Performed by: INTERNAL MEDICINE

## 2024-10-10 PROCEDURE — 85730 THROMBOPLASTIN TIME PARTIAL: CPT | Performed by: INTERNAL MEDICINE

## 2024-10-10 PROCEDURE — 25810000003 SODIUM CHLORIDE 0.9 % SOLUTION

## 2024-10-10 PROCEDURE — 25010000002 CEFTAZIDIME 2 G RECONSTITUTED SOLUTION 1 EACH VIAL: Performed by: NURSE PRACTITIONER

## 2024-10-10 PROCEDURE — 87205 SMEAR GRAM STAIN: CPT | Performed by: NURSE PRACTITIONER

## 2024-10-10 PROCEDURE — 87077 CULTURE AEROBIC IDENTIFY: CPT | Performed by: NURSE PRACTITIONER

## 2024-10-10 PROCEDURE — 85730 THROMBOPLASTIN TIME PARTIAL: CPT | Performed by: HOSPITALIST

## 2024-10-10 PROCEDURE — 82746 ASSAY OF FOLIC ACID SERUM: CPT | Performed by: HOSPITALIST

## 2024-10-10 PROCEDURE — 97166 OT EVAL MOD COMPLEX 45 MIN: CPT

## 2024-10-10 PROCEDURE — 25510000001 IOPAMIDOL PER 1 ML: Performed by: HOSPITALIST

## 2024-10-10 PROCEDURE — 87075 CULTR BACTERIA EXCEPT BLOOD: CPT | Performed by: NURSE PRACTITIONER

## 2024-10-10 PROCEDURE — 83540 ASSAY OF IRON: CPT | Performed by: HOSPITALIST

## 2024-10-10 PROCEDURE — 25010000002 HYDROMORPHONE 1 MG/ML SOLUTION: Performed by: INTERNAL MEDICINE

## 2024-10-10 PROCEDURE — 87015 SPECIMEN INFECT AGNT CONCNTJ: CPT | Performed by: NURSE PRACTITIONER

## 2024-10-10 PROCEDURE — 25010000002 VANCOMYCIN HCL 1.25 G RECONSTITUTED SOLUTION 1 EACH VIAL: Performed by: NURSE PRACTITIONER

## 2024-10-10 PROCEDURE — 25010000002 CEFAZOLIN PER 500 MG: Performed by: INTERNAL MEDICINE

## 2024-10-10 PROCEDURE — 86140 C-REACTIVE PROTEIN: CPT | Performed by: NURSE PRACTITIONER

## 2024-10-10 PROCEDURE — 99222 1ST HOSP IP/OBS MODERATE 55: CPT | Performed by: NURSE PRACTITIONER

## 2024-10-10 PROCEDURE — 75635 CT ANGIO ABDOMINAL ARTERIES: CPT

## 2024-10-10 PROCEDURE — 25010000002 HEPARIN (PORCINE) 25000-0.45 UT/250ML-% SOLUTION: Performed by: INTERNAL MEDICINE

## 2024-10-10 PROCEDURE — 25810000003 SODIUM CHLORIDE 0.9 % SOLUTION 250 ML FLEX CONT: Performed by: NURSE PRACTITIONER

## 2024-10-10 PROCEDURE — 82607 VITAMIN B-12: CPT | Performed by: HOSPITALIST

## 2024-10-10 PROCEDURE — 87186 SC STD MICRODIL/AGAR DIL: CPT | Performed by: NURSE PRACTITIONER

## 2024-10-10 PROCEDURE — 87070 CULTURE OTHR SPECIMN AEROBIC: CPT | Performed by: NURSE PRACTITIONER

## 2024-10-10 RX ORDER — MORPHINE SULFATE 2 MG/ML
2 INJECTION, SOLUTION INTRAMUSCULAR; INTRAVENOUS
Status: DISCONTINUED | OUTPATIENT
Start: 2024-10-10 | End: 2024-10-14

## 2024-10-10 RX ORDER — DIGOXIN 125 MCG
125 TABLET ORAL
Status: DISCONTINUED | OUTPATIENT
Start: 2024-10-10 | End: 2024-10-10

## 2024-10-10 RX ORDER — DIPHENOXYLATE HYDROCHLORIDE AND ATROPINE SULFATE 2.5; .025 MG/1; MG/1
1 TABLET ORAL DAILY
Status: DISCONTINUED | OUTPATIENT
Start: 2024-10-10 | End: 2024-10-18 | Stop reason: HOSPADM

## 2024-10-10 RX ORDER — POLYETHYLENE GLYCOL 3350 17 G/17G
17 POWDER, FOR SOLUTION ORAL DAILY
Status: DISCONTINUED | OUTPATIENT
Start: 2024-10-10 | End: 2024-10-18 | Stop reason: HOSPADM

## 2024-10-10 RX ORDER — IOPAMIDOL 755 MG/ML
100 INJECTION, SOLUTION INTRAVASCULAR
Status: COMPLETED | OUTPATIENT
Start: 2024-10-10 | End: 2024-10-10

## 2024-10-10 RX ORDER — HEPARIN SODIUM 10000 [USP'U]/100ML
12 INJECTION, SOLUTION INTRAVENOUS
Status: DISCONTINUED | OUTPATIENT
Start: 2024-10-10 | End: 2024-10-11

## 2024-10-10 RX ORDER — FUROSEMIDE 40 MG
40 TABLET ORAL DAILY
Status: DISCONTINUED | OUTPATIENT
Start: 2024-10-10 | End: 2024-10-16

## 2024-10-10 RX ORDER — DILTIAZEM HYDROCHLORIDE 5 MG/ML
20 INJECTION INTRAVENOUS ONCE
Status: COMPLETED | OUTPATIENT
Start: 2024-10-10 | End: 2024-10-10

## 2024-10-10 RX ORDER — DILTIAZEM HYDROCHLORIDE 5 MG/ML
10 INJECTION INTRAVENOUS ONCE
Status: COMPLETED | OUTPATIENT
Start: 2024-10-10 | End: 2024-10-10

## 2024-10-10 RX ORDER — ZINC SULFATE 50(220)MG
220 CAPSULE ORAL DAILY
Status: DISCONTINUED | OUTPATIENT
Start: 2024-10-10 | End: 2024-10-18 | Stop reason: HOSPADM

## 2024-10-10 RX ORDER — DIGOXIN 125 MCG
125 TABLET ORAL ONCE
Status: COMPLETED | OUTPATIENT
Start: 2024-10-10 | End: 2024-10-10

## 2024-10-10 RX ORDER — CITALOPRAM HYDROBROMIDE 20 MG/1
20 TABLET ORAL DAILY
Status: DISCONTINUED | OUTPATIENT
Start: 2024-10-10 | End: 2024-10-18 | Stop reason: HOSPADM

## 2024-10-10 RX ORDER — BUPROPION HYDROCHLORIDE 150 MG/1
150 TABLET ORAL DAILY
Status: DISCONTINUED | OUTPATIENT
Start: 2024-10-10 | End: 2024-10-18 | Stop reason: HOSPADM

## 2024-10-10 RX ORDER — OXYCODONE HYDROCHLORIDE 5 MG/1
5 TABLET ORAL EVERY 4 HOURS PRN
Status: DISCONTINUED | OUTPATIENT
Start: 2024-10-10 | End: 2024-10-18 | Stop reason: HOSPADM

## 2024-10-10 RX ORDER — FERROUS SULFATE 324(65)MG
324 TABLET, DELAYED RELEASE (ENTERIC COATED) ORAL
Status: DISCONTINUED | OUTPATIENT
Start: 2024-10-10 | End: 2024-10-18 | Stop reason: HOSPADM

## 2024-10-10 RX ORDER — DIGOXIN 125 MCG
125 TABLET ORAL
Status: DISCONTINUED | OUTPATIENT
Start: 2024-10-11 | End: 2024-10-18 | Stop reason: HOSPADM

## 2024-10-10 RX ORDER — METOPROLOL TARTRATE 25 MG/1
25 TABLET, FILM COATED ORAL EVERY 6 HOURS SCHEDULED
Status: DISCONTINUED | OUTPATIENT
Start: 2024-10-10 | End: 2024-10-15

## 2024-10-10 RX ORDER — LEVOTHYROXINE SODIUM 50 UG/1
50 TABLET ORAL
Status: DISCONTINUED | OUTPATIENT
Start: 2024-10-10 | End: 2024-10-18 | Stop reason: HOSPADM

## 2024-10-10 RX ORDER — FENTANYL 25 UG/1
1 PATCH TRANSDERMAL
Status: DISCONTINUED | OUTPATIENT
Start: 2024-10-10 | End: 2024-10-16

## 2024-10-10 RX ORDER — CETIRIZINE HYDROCHLORIDE 10 MG/1
10 TABLET ORAL DAILY
Status: DISCONTINUED | OUTPATIENT
Start: 2024-10-10 | End: 2024-10-18 | Stop reason: HOSPADM

## 2024-10-10 RX ADMIN — Medication 220 MG: at 09:59

## 2024-10-10 RX ADMIN — BUPROPION HYDROCHLORIDE 150 MG: 150 TABLET, EXTENDED RELEASE ORAL at 09:58

## 2024-10-10 RX ADMIN — POLYETHYLENE GLYCOL 3350 17 G: 17 POWDER, FOR SOLUTION ORAL at 09:59

## 2024-10-10 RX ADMIN — FUROSEMIDE 40 MG: 40 TABLET ORAL at 09:59

## 2024-10-10 RX ADMIN — ACETAMINOPHEN 650 MG: 325 TABLET, FILM COATED ORAL at 10:58

## 2024-10-10 RX ADMIN — FENTANYL 1 PATCH: 25 PATCH TRANSDERMAL at 12:01

## 2024-10-10 RX ADMIN — IOPAMIDOL 100 ML: 755 INJECTION, SOLUTION INTRAVENOUS at 16:53

## 2024-10-10 RX ADMIN — SODIUM CHLORIDE 1250 MG: 9 INJECTION, SOLUTION INTRAVENOUS at 15:26

## 2024-10-10 RX ADMIN — DILTIAZEM HYDROCHLORIDE 10 MG: 5 INJECTION, SOLUTION INTRAVENOUS at 08:29

## 2024-10-10 RX ADMIN — MORPHINE SULFATE 2 MG: 2 INJECTION, SOLUTION INTRAMUSCULAR; INTRAVENOUS at 20:26

## 2024-10-10 RX ADMIN — SODIUM CHLORIDE 100 ML/HR: 900 INJECTION INTRAVENOUS at 10:07

## 2024-10-10 RX ADMIN — METOPROLOL TARTRATE 25 MG: 25 TABLET, FILM COATED ORAL at 06:30

## 2024-10-10 RX ADMIN — OXYCODONE 5 MG: 5 TABLET ORAL at 10:58

## 2024-10-10 RX ADMIN — DILTIAZEM HYDROCHLORIDE 20 MG: 5 INJECTION, SOLUTION INTRAVENOUS at 03:55

## 2024-10-10 RX ADMIN — THERA TABS 1 TABLET: TAB at 12:02

## 2024-10-10 RX ADMIN — HEPARIN SODIUM 13 UNITS/KG/HR: 10000 INJECTION, SOLUTION INTRAVENOUS at 22:32

## 2024-10-10 RX ADMIN — MORPHINE SULFATE 2 MG: 2 INJECTION, SOLUTION INTRAMUSCULAR; INTRAVENOUS at 09:59

## 2024-10-10 RX ADMIN — LEVOTHYROXINE SODIUM 50 MCG: 0.05 TABLET ORAL at 09:59

## 2024-10-10 RX ADMIN — CITALOPRAM HYDROBROMIDE 20 MG: 20 TABLET ORAL at 09:58

## 2024-10-10 RX ADMIN — OXYCODONE 5 MG: 5 TABLET ORAL at 19:12

## 2024-10-10 RX ADMIN — CETIRIZINE HYDROCHLORIDE 10 MG: 10 TABLET, FILM COATED ORAL at 09:58

## 2024-10-10 RX ADMIN — FERROUS SULFATE TAB EC 324 MG (65 MG FE EQUIVALENT) 324 MG: 324 (65 FE) TABLET DELAYED RESPONSE at 09:59

## 2024-10-10 RX ADMIN — MORPHINE SULFATE 2 MG: 2 INJECTION, SOLUTION INTRAMUSCULAR; INTRAVENOUS at 16:54

## 2024-10-10 RX ADMIN — CEFTAZIDIME 2000 MG: 2 INJECTION, POWDER, FOR SOLUTION INTRAVENOUS at 16:54

## 2024-10-10 RX ADMIN — HYDROMORPHONE HYDROCHLORIDE 0.5 MG: 1 INJECTION, SOLUTION INTRAMUSCULAR; INTRAVENOUS; SUBCUTANEOUS at 01:25

## 2024-10-10 RX ADMIN — MORPHINE SULFATE 2 MG: 2 INJECTION, SOLUTION INTRAMUSCULAR; INTRAVENOUS at 12:55

## 2024-10-10 RX ADMIN — Medication 5 MG: at 20:26

## 2024-10-10 RX ADMIN — Medication 10 ML: at 08:46

## 2024-10-10 RX ADMIN — HEPARIN SODIUM 15 UNITS/KG/HR: 10000 INJECTION, SOLUTION INTRAVENOUS at 09:55

## 2024-10-10 RX ADMIN — OXYCODONE 5 MG: 5 TABLET ORAL at 06:30

## 2024-10-10 RX ADMIN — HYDROMORPHONE HYDROCHLORIDE 0.5 MG: 1 INJECTION, SOLUTION INTRAMUSCULAR; INTRAVENOUS; SUBCUTANEOUS at 05:20

## 2024-10-10 RX ADMIN — OXYCODONE 5 MG: 5 TABLET ORAL at 23:11

## 2024-10-10 RX ADMIN — OXYCODONE 5 MG: 5 TABLET ORAL at 00:32

## 2024-10-10 RX ADMIN — HYDROMORPHONE HYDROCHLORIDE 0.5 MG: 1 INJECTION, SOLUTION INTRAMUSCULAR; INTRAVENOUS; SUBCUTANEOUS at 03:15

## 2024-10-10 RX ADMIN — Medication 10 ML: at 20:26

## 2024-10-10 RX ADMIN — CILOSTAZOL 100 MG: 100 TABLET ORAL at 20:26

## 2024-10-10 RX ADMIN — HEPARIN SODIUM 12 UNITS/KG/HR: 10000 INJECTION, SOLUTION INTRAVENOUS at 02:33

## 2024-10-10 RX ADMIN — DIGOXIN 125 MCG: 125 TABLET ORAL at 01:16

## 2024-10-10 RX ADMIN — METOPROLOL TARTRATE 25 MG: 25 TABLET, FILM COATED ORAL at 01:16

## 2024-10-10 RX ADMIN — SODIUM CHLORIDE 2000 MG: 900 INJECTION INTRAVENOUS at 05:25

## 2024-10-10 NOTE — CONSULTS
Infectious Diseases Consult Note    Referring Provider: Brammell, Timothy Duane,*    Reason for Consultation: Leg wounds    Patient Care Team:  Francine Hampton MD as PCP - General    Chief complaint worsening leg wounds especially on the right    Subjective     History of present illness:      This is a 79-year-old female who presents to the hospital on 10/9/2024 with worsening bilateral leg wounds.  However right leg wounds are much worse than the left.  Patient had a traumatic fall several months ago and was at the hospital in September where she had a debridement on the right.  Patient states that she went to wound care 1 time but recently they have noticed that the right leg wounds have had a green drainage with a foul odor.  Patient was recently placed on Keflex.  Patient denies    Review of Systems   Review of Systems   Constitutional: Negative.    HENT: Negative.     Eyes: Negative.    Respiratory: Negative.     Cardiovascular: Negative.    Gastrointestinal: Negative.    Endocrine: Negative.    Genitourinary: Negative.    Musculoskeletal: Negative.    Skin:  Positive for color change and wound.   Neurological: Negative.    Psychiatric/Behavioral: Negative.     All other systems reviewed and are negative.      Medications  Medications Prior to Admission   Medication Sig Dispense Refill Last Dose    alendronate (FOSAMAX) 70 MG tablet Take 1 tablet by mouth Every 7 (Seven) Days. Saturday       buPROPion XL (WELLBUTRIN XL) 150 MG 24 hr tablet Take 1 tablet by mouth Daily.       cetirizine (zyrTEC) 10 MG tablet Take 1 tablet by mouth Daily.       citalopram (CeleXA) 20 MG tablet Take 1 tablet by mouth Daily.       digoxin (LANOXIN) 125 MCG tablet Take 1 tablet by mouth Daily.       ferrous sulfate 325 (65 FE) MG tablet Take 1 tablet by mouth Daily With Breakfast.       fluticasone (FLONASE) 50 MCG/ACT nasal spray Administer 2 sprays into the nostril(s) as directed by provider Daily As Needed for Rhinitis.        furosemide (LASIX) 40 MG tablet Take 1 tablet by mouth Daily. 30 tablet 0     levothyroxine (SYNTHROID, LEVOTHROID) 50 MCG tablet Take 1 tablet by mouth Daily.       Melatonin 12 MG tablet Take 1 tablet by mouth At Night As Needed.       metoprolol succinate XL (TOPROL-XL) 50 MG 24 hr tablet Take 2 tablets by mouth Daily. 30 tablet 0     oxyCODONE-acetaminophen (PERCOCET) 5-325 MG per tablet Take 1 tablet by mouth 2 (Two) Times a Day.       pantoprazole (PROTONIX) 40 MG EC tablet Take 1 tablet by mouth Daily.       rivaroxaban (XARELTO) 20 MG tablet Take 1 tablet by mouth Daily.       vitamin B-12 (CYANOCOBALAMIN) 1000 MCG tablet Take 1 tablet by mouth Daily.       vitamin D (ERGOCALCIFEROL) 1.25 MG (75191 UT) capsule capsule Take 1 capsule by mouth 1 (One) Time Per Week. Saturday       zolpidem (AMBIEN) 5 MG tablet Take 1 tablet by mouth every night at bedtime.          History  Past Medical History:   Diagnosis Date    Atrial fibrillation     Blind 07/10/2023    Histoplasmosis     Hypertension     Legally blind     Longstanding persistent atrial fibrillation 07/10/2023    Paroxysmal atrial fibrillation 07/10/2023     Past Surgical History:   Procedure Laterality Date    WOUND DEBRIDEMENT Right 9/16/2024    Procedure: RIGHT LEG DEBRIDEMENT;  Surgeon: Red Hazel II, MD;  Location: [facility];  Service: Vascular;  Laterality: Right;       Family History  Family History   Problem Relation Age of Onset    Cancer Mother     Dementia Father     Cancer Sister        Social History   reports that she has quit smoking. She has never used smokeless tobacco. She reports that she does not currently use alcohol. She reports that she does not currently use drugs.    Allergies  Patient has no known allergies.    Objective     Vital Signs   Vital Signs (last 24 hours)         10/09 0700  10/10 0659 10/10 0700  10/10 1833   Most Recent      Temp (°F) 97.7 -  99    98.9 -  99.2     98.9 (37.2) 10/10 1559    Heart Rate 69  -  186    109 -  124     112 10/10 1559    Resp 15 -  19    13 -  15     15 10/10 1559    /61 -  165/87    89/38 -  124/65     99/57 10/10 1605    SpO2 (%) 0 -  97      96     96 10/10 1559            Physical Exam:  Physical Exam  Vitals and nursing note reviewed.   Constitutional:       General: She is not in acute distress.     Appearance: Normal appearance. She is well-developed and normal weight. She is ill-appearing. She is not diaphoretic.   HENT:      Head: Normocephalic and atraumatic.   Eyes:      General: No scleral icterus.     Extraocular Movements: Extraocular movements intact.      Conjunctiva/sclera: Conjunctivae normal.      Pupils: Pupils are equal, round, and reactive to light.   Cardiovascular:      Rate and Rhythm: Normal rate and regular rhythm.      Heart sounds: Normal heart sounds, S1 normal and S2 normal. No murmur heard.  Pulmonary:      Effort: Pulmonary effort is normal. No respiratory distress.      Breath sounds: Normal breath sounds. No stridor. No wheezing or rales.   Chest:      Chest wall: No tenderness.   Abdominal:      General: Bowel sounds are normal. There is no distension.      Palpations: Abdomen is soft. There is no mass.      Tenderness: There is no abdominal tenderness. There is no guarding.   Musculoskeletal:         General: No swelling, tenderness or deformity. Normal range of motion.      Cervical back: Neck supple.   Skin:     General: Skin is warm and dry.      Coloration: Skin is not pale.      Findings: No bruising, erythema or rash.      Comments: Patient has a large wound to the right lower extremity covered in yellow eschar with a large amount of green drainage and a very foul odor.  Pulses are palpable on the right foot but it is very cold.  There is a blister on the medial aspect of the lower leg    The left lower leg has a full-thickness wound with some fibrinous slough but very little drainage and no foul odor.       Neurological:      General: No  focal deficit present.      Mental Status: She is alert and oriented to person, place, and time. Mental status is at baseline.      Cranial Nerves: No cranial nerve deficit.   Psychiatric:         Mood and Affect: Mood normal.                Microbiology  Microbiology Results (last 10 days)       Procedure Component Value - Date/Time    Blood Culture - Blood, Arm, Left [[id number]]  (Normal) Collected: 10/09/24 1440    Lab Status: Preliminary result Specimen: Blood from Arm, Left Updated: 10/10/24 1446     Blood Culture No growth at 24 hours    Blood Culture - Blood, Arm, Right [[id number]]  (Normal) Collected: 10/09/24 1440    Lab Status: Preliminary result Specimen: Blood from Arm, Right Updated: 10/10/24 1446     Blood Culture No growth at 24 hours            Laboratory  Results from last 7 days   Lab Units 10/09/24  2329   WBC 10*3/mm3 8.40   HEMOGLOBIN g/dL 8.6*   HEMATOCRIT % 30.5*   PLATELETS 10*3/mm3 578*     Results from last 7 days   Lab Units 10/09/24  2329   SODIUM mmol/L 138   POTASSIUM mmol/L 4.0   CHLORIDE mmol/L 101   CO2 mmol/L 28.7   BUN mg/dL 22   CREATININE mg/dL 1.14*   GLUCOSE mg/dL 123*   CALCIUM mg/dL 8.5*     Results from last 7 days   Lab Units 10/09/24  2329   SODIUM mmol/L 138   POTASSIUM mmol/L 4.0   CHLORIDE mmol/L 101   CO2 mmol/L 28.7   BUN mg/dL 22   CREATININE mg/dL 1.14*   GLUCOSE mg/dL 123*   CALCIUM mg/dL 8.5*     Results from last 7 days   Lab Units 10/09/24  1527   CK TOTAL U/L 76     Results from last 7 days   Lab Units 10/09/24  1440   SED RATE mm/hr 105*           Radiology  Imaging Results (Last 72 Hours)       Procedure Component Value Units Date/Time    CT Angio Abdominal Aorta Bilateral Iliofem Runoff [[id number]] Resulted: 10/10/24 1652     Updated: 10/10/24 1653    XR Tibia Fibula 2 View Right [[id number]] Collected: 10/09/24 1344     Updated: 10/09/24 1347    Narrative:      XR TIBIA FIBULA 2 VW RIGHT    Date of Exam: 10/9/2024 1:29 PM EDT    Indication: draining open  wound    Comparison: 9/12/2024.    Findings:  Soft tissue wound along the lateral leg is again identified. 2 films. There are no lytic lesions or erosions. There are no fractures. Bones appear normal.      Impression:      Impression:  Soft tissue defect again identified. No underlying bony abnormality.      Electronically Signed: Jazlyn Ramon MD    10/9/2024 1:45 PM EDT    Workstation ID: [id number]            Cardiology      Results Review:  I have reviewed all clinical data, test, lab, and imaging results.       Schedule Meds  buPROPion XL, 150 mg, Oral, Daily  cefTAZidime, 2,000 mg, Intravenous, Q12H  cetirizine, 10 mg, Oral, Daily  fentaNYL, 1 patch, Transdermal, Q72H   And  [START ON 10/11/2024] Check Fentanyl Patch Placement, 1 each, Does not apply, Q12H  cilostazol, 100 mg, Oral, BID  citalopram, 20 mg, Oral, Daily  [START ON 10/11/2024] digoxin, 125 mcg, Oral, Daily  ferrous sulfate, 324 mg, Oral, Daily With Breakfast  furosemide, 40 mg, Oral, Daily  levothyroxine, 50 mcg, Oral, Q AM  metoprolol tartrate, 25 mg, Oral, Q6H  multivitamin, 1 tablet, Oral, Daily  polyethylene glycol, 17 g, Oral, Daily  sodium chloride, 10 mL, Intravenous, Q12H  zinc sulfate, 220 mg, Oral, Daily        Infusion Meds  heparin, 12 Units/kg/hr, Last Rate: 13 Units/kg/hr (10/10/24 1723)  Pharmacy to dose vancomycin,   sodium chloride, 100 mL/hr, Last Rate: 100 mL/hr (10/10/24 1007)        PRN Meds    acetaminophen **OR** acetaminophen **OR** acetaminophen    Calcium Replacement - Follow Nurse / BPA Driven Protocol    heparin    heparin    influenza vaccine    Magnesium Standard Dose Replacement - Follow Nurse / BPA Driven Protocol    melatonin    Morphine    ondansetron ODT **OR** ondansetron    oxyCODONE    Pharmacy to dose vancomycin    Phosphorus Replacement - Follow Nurse / BPA Driven Protocol    Potassium Replacement - Follow Nurse / BPA Driven Protocol    [COMPLETED] Insert Peripheral IV **AND** sodium chloride    sodium  chloride    Vancomycin Pharmacy Intermittent/Pulse Dosing      Assessment & Plan       Assessment    Right leg wound infection with green drainage concerning for Pseudomonas infection.    Chronic bilateral lower extremity wounds concerning for underlying peripheral vascular disease    Recent hospital admission in September 2024 for right leg wound infection s/p debridement by vascular surgery service.    Patient is legally blind    Plan    Discontinue IV cefazolin  Start IV ceftazidime 2 g every 8 hours  Start IV vancomycin-pharmacy to monitor and dose  Wound culture of the right leg  Case discussed with wound care  Vascular surgery is already been consulted and is planning on a CTA  Continue supportive care  A.m. labs  Case discussed with patient and family at bedside        Kitty Peña, AZAEL  10/10/24  18:33 EDT    Note is dictated utilizing voice recognition software/Dragon

## 2024-10-10 NOTE — SIGNIFICANT NOTE
10/10/24 1057   OTHER   Discipline physical therapist   Rehab Time/Intention   Session Not Performed patient/family declined evaluation  (patient is painful and tearful despite IV pain med administration. Refusing PT eval at this time. Attempted x 2 this AM. Will follow up 10/11.)   Recommendation   PT - Next Appointment 10/11/24

## 2024-10-10 NOTE — PLAN OF CARE
Goal Outcome Evaluation:              Outcome Evaluation: Fallon Nur is a 79 y.o. female with a CMH of bilateral lower leg wounds, A.FIB, HTN, and is legally blind who presented to [facility] on 10/9/2024 with increasing pain and swelling to bilateral lower legs. Patient reported the right leg is worse than the left. Patient was admitted to [facility] on 9/12/24 for traumatic bilateral lower leg wounds after a fall and discharged on 9/21/24 after wound debridement and management.  Pt lives at home with granddaughter and daughter that she cares for.  She uses walker for mobility within home.  Per patient and family members she has declined significantly over the past 2 months due to pain and increasing weakness. This date pt requries assist for transfer and is unable to complete lower body dressing.  Pt appears to be far below basline level of function recommend SNF at discharge.      Anticipated Discharge Disposition (OT): skilled nursing facility

## 2024-10-10 NOTE — PLAN OF CARE
Goal Outcome Evaluation:    Patient on Afib with HR of 120-130s. Hospitalist NP made aware. Ordered IV cardizem x1; administered with HR improvement. IV meds given as needed for pain management. Awaiting PT/OT evaluation, WOCN and vascular sx consult this AM.

## 2024-10-10 NOTE — PROGRESS NOTES
"Pharmacy Antimicrobial Dosing Service    Subjective:  Fallon Nur is a 79 y.o.female admitted with wound of lower extremity. Pharmacy has been consulted to dose Vancomycin for possible SSTi.    PMH: wounds of lower extremity, had recent wound debridement 3 weeks ago of right leg wound      Assessment/Plan    1. Day #1 Vancomycin: Pulse dosing d/t renal dysfxn. Vancomycin 1250mg loading dose (19mg/kg ABW) followed by random level 10/11 @0600. Pt's renal fxn had previously been <1 mg/dL and is now running 1.14 mg/dL today.  Will dose patient when level is <20 mcg/mL.    2. Day #1 Ceftazidime: 2000mg IV q12h for estCrCl 30-59 mL/min.    Will continue to monitor drug levels, renal function, culture and sensitivities, and patient clinical status.       Objective:  Relevant clinical data and objective history reviewed:  160 cm (63\")   65.3 kg (144 lb)   Ideal body weight: 52.4 kg (115 lb 8.3 oz)  Adjusted ideal body weight: 57.6 kg (126 lb 14.6 oz)  Body mass index is 25.51 kg/m².        Results from last 7 days   Lab Units 10/09/24  2329 10/09/24  1527   CREATININE mg/dL 1.14* 1.03*     Estimated Creatinine Clearance: 36.4 mL/min (A) (by C-G formula based on SCr of 1.14 mg/dL (H)).  I/O last 3 completed shifts:  In: 600 [P.O.:500; IV Piggyback:100]  Out: -     Results from last 7 days   Lab Units 10/09/24  2329 10/09/24  1440   WBC 10*3/mm3 8.40 6.70     Temperature    10/09/24 2338 10/10/24 0338 10/10/24 1102   Temp: 98.3 °F (36.8 °C) 99 °F (37.2 °C) 99.2 °F (37.3 °C)     Baseline culture/source/susceptibility:  Microbiology Results (last 10 days)       ** No results found for the last 240 hours. **            Ene Aguilar RPH  10/10/24 14:35 EDT    "

## 2024-10-10 NOTE — THERAPY EVALUATION
Patient Name: Fallon Nur  : 1945    MRN: [id number]                              Today's Date: 10/10/2024       Admit Date: 10/9/2024    Visit Dx:     ICD-10-CM ICD-9-CM   1. Infected wound  T14.8XXA 958.3    L08.9    2. Pain in right lower leg  M79.661 729.5     Patient Active Problem List   Diagnosis    Generalized abdominal pain    SBO (small bowel obstruction)    H/O gastric bypass    Hypothyroidism    Chronic back pain    E. coli UTI    Ventral hernia    GERD (gastroesophageal reflux disease)    Blind    Longstanding persistent atrial fibrillation    Atrial fibrillation with rapid ventricular response    Cellulitis of leg    Open wound of both lower extremities    Acute renal insufficiency    Hypokalemia    Anemia, unspecified    Anxiety disorder    Wound of lower extremity     Past Medical History:   Diagnosis Date    Atrial fibrillation     Blind 07/10/2023    Histoplasmosis     Hypertension     Legally blind     Longstanding persistent atrial fibrillation 07/10/2023    Paroxysmal atrial fibrillation 07/10/2023     Past Surgical History:   Procedure Laterality Date    WOUND DEBRIDEMENT Right 2024    Procedure: RIGHT LEG DEBRIDEMENT;  Surgeon: Red Hazel II, MD;  Location: [facility];  Service: Vascular;  Laterality: Right;      General Information       Row Name 10/10/24 1313          OT Time and Intention    Document Type evaluation  -SR     Mode of Treatment individual therapy  -SR       Row Name 10/10/24 1313          Occupational Profile    Reason for Services/Referral (Occupational Profile) Fallon Nur is a 79 y.o. female with a CMH of bilateral lower leg wounds, A.FIB, HTN, and is legally blind who presented to [facility] on 10/9/2024 with increasing pain and swelling to bilateral lower legs. Patient reported the right leg is worse than the left. Patient was admitted to [facility] on 24 for traumatic bilateral lower leg wounds after a fall and discharged on  9/21/24 after wound debridement and management.  Pt lives at home with granddaughter and daughter that she cares for.  She uses walker for mobility within home.  Per patient and family members she has declined significantly over the past 2 months due to pain and increasing weakness.  -SR       Row Name 10/10/24 1313          Living Environment    People in Home child(elgin), adult;child(elgin), dependent  -SR       Row Name 10/10/24 1313          Cognition    Orientation Status (Cognition) oriented x 3  -SR               User Key  (r) = Recorded By, (t) = Taken By, (c) = Cosigned By      Initials Name Provider Type    SR Leda Goyal, OT Occupational Therapist                     Mobility/ADL's       Row Name 10/10/24 1343          Transfers    Transfers sit-stand transfer  -SR       Row Name 10/10/24 1343          Sit-Stand Transfer    Sit-Stand [place] (Transfers) moderate assist (50% patient effort)  -SR       Row Name 10/10/24 1343          Functional Mobility    Functional Mobility- Comment Unable to take steps due to pain  -SR       Row Name 10/10/24 1343          Activities of Daily Living    BADL Assessment/Intervention lower body dressing  -SR       Row Name 10/10/24 1343          Lower Body Dressing Assessment/Training    [place] Level (Lower Body Dressing) don;socks;dependent (less than 25% patient effort)  -SR               User Key  (r) = Recorded By, (t) = Taken By, (c) = Cosigned By      Initials Name Provider Type    SR Leda Goyal, OT Occupational Therapist                   Obj/Interventions       Row Name 10/10/24 1345          Range of Motion Comprehensive    Comment, General Range of Motion Limited shoulder ROM, good distal UE ROM  -SR       Row Name 10/10/24 1345          Strength Comprehensive (MMT)    Comment, General Manual Muscle Testing (MMT) Assessment Shoulders 3-/5, distal UE grossly 3+/5  -SR       Row Name 10/10/24 1345          Balance    Balance  Interventions sitting;standing;sit to stand;supported;static;dynamic;minimal challenge  -SR               User Key  (r) = Recorded By, (t) = Taken By, (c) = Cosigned By      Initials Name Provider Type    SR Leda Goyal OT Occupational Therapist                   Goals/Plan       Row Name 10/10/24 1351          Toileting Goal 1 (OT)    Activity/Device (Toileting Goal 1, OT) toileting skills, all  -SR     [place] Level/Cues Needed (Toileting Goal 1, OT) minimum assist (75% or more patient effort)  -SR     Time Frame (Toileting Goal 1, OT) 2 weeks  -SR       Row Name 10/10/24 1351          Grooming Goal 1 (OT)    Activity/Device (Grooming Goal 1, OT) grooming skills, all  -SR     [place] (Grooming Goal 1, OT) contact guard required  -SR     Time Frame (Grooming Goal 1, OT) 2 weeks  -SR       Row Name 10/10/24 1351          Therapy Assessment/Plan (OT)    Planned Therapy Interventions (OT) activity tolerance training;BADL retraining;IADL retraining;functional balance retraining;neuromuscular control/coordination retraining;ROM/therapeutic exercise;transfer/mobility retraining;strengthening exercise  -SR               User Key  (r) = Recorded By, (t) = Taken By, (c) = Cosigned By      Initials Name Provider Type    SR Leda Goyal OT Occupational Therapist                   Clinical Impression       Row Name 10/10/24 1347          Pain Assessment    Pretreatment Pain Rating 5/10  -SR     Posttreatment Pain Rating 5/10  -SR       Row Name 10/10/24 1347          Plan of Care Review    Outcome Evaluation Fallon Nur is a 79 y.o. female with a CMH of bilateral lower leg wounds, A.FIB, HTN, and is legally blind who presented to [facility] on 10/9/2024 with increasing pain and swelling to bilateral lower legs. Patient reported the right leg is worse than the left. Patient was admitted to [facility] on 9/12/24 for traumatic bilateral lower leg wounds after a fall and discharged on  9/21/24 after wound debridement and management.  Pt lives at home with granddaughter and daughter that she cares for.  She uses walker for mobility within home.  Per patient and family members she has declined significantly over the past 2 months due to pain and increasing weakness. This date pt requries assist for transfer and is unable to complete lower body dressing.  Pt appears to be far below basline level of function recommend SNF at discharge.  -SR       Row Name 10/10/24 1347          Therapy Assessment/Plan (OT)    Rehab Potential (OT) good, to achieve stated therapy goals  -SR     Criteria for Skilled Therapeutic Interventions Met (OT) yes  -SR     Therapy Frequency (OT) 5 times/wk  -SR     Predicted Duration of Therapy Intervention (OT) Until discharge.  -SR       Row Name 10/10/24 1347          Therapy Plan Review/Discharge Plan (OT)    Anticipated Discharge Disposition (OT) skilled nursing facility  -SR       Row Name 10/10/24 1347          Positioning and Restraints    Pre-Treatment Position sitting in chair/recliner  -SR     Post Treatment Position chair  -SR     In Chair call light within reach;encouraged to call for assist;exit alarm on  -SR               User Key  (r) = Recorded By, (t) = Taken By, (c) = Cosigned By      Initials Name Provider Type    SR Leda Goyal, OT Occupational Therapist                   Outcome Measures       Row Name 10/10/24 0900          How much help from another person do you currently need...    Turning from your back to your side while in flat bed without using bedrails? 3  -CM     Moving from lying on back to sitting on the side of a flat bed without bedrails? 3  -CM     Moving to and from a bed to a chair (including a wheelchair)? 2  -CM     Standing up from a chair using your arms (e.g., wheelchair, bedside chair)? 2  -CM     Climbing 3-5 steps with a railing? 1  -CM     To walk in hospital room? 2  -CM     AM-PAC 6 Clicks Score (PT) 13  -CM     Highest  Level of Mobility Goal 4 --> Transfer to chair/commode  -               User Key  (r) = Recorded By, (t) = Taken By, (c) = Cosigned By      Initials Name Provider Type    CM Connie Vasquez, RN Registered Nurse                    Occupational Therapy Education       Title: PT OT SLP Therapies (In Progress)       Topic: Occupational Therapy (In Progress)       Point: ADL training (In Progress)       Description:   Instruct learner(s) on proper safety adaptation and remediation techniques during self care or transfers.   Instruct in proper use of assistive devices.                  Learning Progress Summary             Patient Acceptance, E,TB, NR by SR at 10/10/2024 1351                         Point: Home exercise program (Not Started)       Description:   Instruct learner(s) on appropriate technique for monitoring, assisting and/or progressing therapeutic exercises/activities.                  Learner Progress:  Not documented in this visit.              Point: Precautions (Not Started)       Description:   Instruct learner(s) on prescribed precautions during self-care and functional transfers.                  Learner Progress:  Not documented in this visit.              Point: Body mechanics (In Progress)       Description:   Instruct learner(s) on proper positioning and spine alignment during self-care, functional mobility activities and/or exercises.                  Learning Progress Summary             Patient Acceptance, E,TB, NR by SR at 10/10/2024 1351                                         User Key       Initials Effective Dates Name Provider Type Discipline     06/16/21 -  Leda Goyal OT Occupational Therapist OT                  OT Recommendation and Plan  Planned Therapy Interventions (OT): activity tolerance training, BADL retraining, IADL retraining, functional balance retraining, neuromuscular control/coordination retraining, ROM/therapeutic exercise, transfer/mobility retraining,  strengthening exercise  Therapy Frequency (OT): 5 times/wk  Plan of Care Review  Outcome Evaluation: Fallon Nur is a 79 y.o. female with a CMH of bilateral lower leg wounds, A.FIB, HTN, and is legally blind who presented to [facility] on 10/9/2024 with increasing pain and swelling to bilateral lower legs. Patient reported the right leg is worse than the left. Patient was admitted to [facility] on 9/12/24 for traumatic bilateral lower leg wounds after a fall and discharged on 9/21/24 after wound debridement and management.  Pt lives at home with granddaughter and daughter that she cares for.  She uses walker for mobility within home.  Per patient and family members she has declined significantly over the past 2 months due to pain and increasing weakness. This date pt requries assist for transfer and is unable to complete lower body dressing.  Pt appears to be far below basline level of function recommend SNF at discharge.     Time Calculation:         Time Calculation- OT       Row Name 10/10/24 1351             Time Calculation- OT    OT Start Time 0836  -SR      OT Stop Time 0900  -SR      OT Time Calculation (min) 24 min  -SR      Total Timed Code Minutes- OT 0 minute(s)  -SR      OT Received On 10/10/24  -SR      OT - Next Appointment 10/11/24  -SR      OT Goal Re-Cert Due Date 10/24/24  -SR                User Key  (r) = Recorded By, (t) = Taken By, (c) = Cosigned By      Initials Name Provider Type    SR Leda Goyal OT Occupational Therapist                  Therapy Charges for Today       Code Description Service Date Service Provider Modifiers Qty    [id number]  OT EVAL MOD COMPLEXITY 4 10/10/2024 Leda Goyal OT GO 1                 Leda Goyal OT  10/10/2024   no

## 2024-10-10 NOTE — PROGRESS NOTES
[facility] MEDICINE SERVICE  DAILY PROGRESS NOTE    NAME: Fallon Nur  : 1945  MRN: [id number]      LOS: 0 days     PROVIDER OF SERVICE: Timothy Duane Brammell, MD    Chief Complaint: Wound of lower extremity    Subjective:     Interval History:  History taken from: patient/family    Merrily with issues with pain.  She is poorly ambulatory worsening with her infectious issues.  No palpitations or severe shortness of breath.  Denies any chest pain.  Denies any bowel or bladder issues.  IV heparin infusion.  Bedside.  No other acute issues.        Review of Systems:   Review of Systems   All other systems reviewed and are negative.      Objective:     Vital Signs  Temp:  [97.7 °F (36.5 °C)-99 °F (37.2 °C)] 99 °F (37.2 °C)  Heart Rate:  [] 124  Resp:  [15-19] 15  BP: (104-165)/() 124/65   Body mass index is 25.51 kg/m².    Physical Exam  Physical Exam  Vitals reviewed.   Constitutional:       Appearance: Normal appearance.   HENT:      Head: Normocephalic.   Cardiovascular:      Rate and Rhythm: Normal rate. Rhythm irregular.   Pulmonary:      Effort: Pulmonary effort is normal.      Breath sounds: Normal breath sounds.   Abdominal:      General: Bowel sounds are normal.      Palpations: Abdomen is soft.   Musculoskeletal:      Comments: Extremities with dressings in place.  Noted foul odor   Neurological:      Mental Status: She is alert.            Diagnostic Data    Results from last 7 days   Lab Units 10/09/24  2329 10/09/24  1527   WBC 10*3/mm3 8.40  --    HEMOGLOBIN g/dL 8.6*  --    HEMATOCRIT % 30.5*  --    PLATELETS 10*3/mm3 578*  --    GLUCOSE mg/dL 123* 87   CREATININE mg/dL 1.14* 1.03*   BUN mg/dL 22 21   SODIUM mmol/L 138 139   POTASSIUM mmol/L 4.0 4.1   AST (SGOT) U/L  --  36*   ALT (SGPT) U/L  --  14   ALK PHOS U/L  --  220*   BILIRUBIN mg/dL  --  0.2   ANION GAP mmol/L 8.3 8.0       XR Tibia Fibula 2 View Right    Result Date: 10/9/2024  Impression: Soft tissue defect  again identified. No underlying bony abnormality. Electronically Signed: Jazlyn Ramon MD  10/9/2024 1:45 PM EDT  Workstation ID: [id number]           Assessment/Plan:     Lower extremity cellulitis bilateral  Peripheral arterial disease  Pain control  Atrial fibs  Chronic anticoagulation currently on heparin  Vision loss  Anemia  CKD 3  Chronic diastolic congestive heart failure    Plan. Chart reviewed. Ongoing IV heparin.  Vascular surgery to see.  Infectious disease to evaluate.  Pain control discussed with daughter at bedside.  Wound care to see. Labs ordered. PT/OT.      Active and Resolved Problems  Active Hospital Problems    Diagnosis  POA    **Wound of lower extremity [S81.809A]  Yes      Resolved Hospital Problems   No resolved problems to display.           VTE Prophylaxis:  Pharmacologic VTE prophylaxis orders are present.             Disposition Planning:     Barriers to Discharge:clinical improvement, infection/ pain  Anticipated Date of Discharge: 10/14  Place of Discharge: home      Time: 45 minutes     Code Status and Medical Interventions: CPR (Attempt to Resuscitate); Full Support   Ordered at: 10/09/24 1707     Level Of Support Discussed With:    Patient     Code Status (Patient has no pulse and is not breathing):    CPR (Attempt to Resuscitate)     Medical Interventions (Patient has pulse or is breathing):    Full Support       Signature: Electronically signed by Timothy Duane Brammell, MD, 10/10/24, 09:20 EDT.  [facility] Hospitalist Team

## 2024-10-10 NOTE — CONSULTS
Name: Fallon Nur ADMIT: 10/9/2024   : 1945  PCP: Francine Hampton MD    MRN: [id number] LOS: 0 days   AGE/SEX: 79 y.o. female  ROOM: [address] [place] [address]      Patient Care Team:  Francine Hampton MD as PCP - General  Chief Complaint   Patient presents with    Wound Check       CC: Lower extremity wounds, PAD    Subjective     Inpatient Vascular Surgery Consult  Consult performed by: Phyllis Anguiano APRN  Consult ordered by: Fernando Rizzo MD          History of Present Illness  Fallon Nur is a 79 y.o. female with a past medical history of atrial fibrillation, legally blind, hypertension, PAD and tobacco abuse who presented to [facility] on 10/9/2024 for increasing pain and swelling to her lower extremities.  Patient has chronic traumatic wounds to her bilateral lower extremities, right worse than left.  She had an x-ray performed in the ER, no acute issues noted.  Blood cultures were obtained.  She was admitted for further workup and care.  Vascular surgery was consulted to evaluate her wounds.  The patient is known to our service and underwent debridement of her right leg wound on 2024 by Dr. Hazel.  She is scheduled to follow-up in our office in the next couple of weeks.  She reports compliance with outpatient wound care regimen.  She has had progressively worsening swelling to her lower extremities.  This has almost resolved since she has been in the hospital and her legs have been elevated.  She denies any fever, chills, or night sweats.  She is ambulating though she tries not to walk very long distances due to discomfort in her legs from the wounds.    Review of Systems   Cardiovascular:  Positive for leg swelling.   Musculoskeletal:  Positive for gait problem and myalgias.   Skin:  Positive for wound. Negative for color change.       Past Medical History:   Diagnosis Date    Atrial fibrillation     Blind 07/10/2023    Histoplasmosis     Hypertension      Legally blind     Longstanding persistent atrial fibrillation 07/10/2023    Paroxysmal atrial fibrillation 07/10/2023     Past Surgical History:   Procedure Laterality Date    WOUND DEBRIDEMENT Right 9/16/2024    Procedure: RIGHT LEG DEBRIDEMENT;  Surgeon: Red Hazel II, MD;  Location: [facility] MAIN OR;  Service: Vascular;  Laterality: Right;     Family History   Problem Relation Age of Onset    Cancer Mother     Dementia Father     Cancer Sister        Social History     Tobacco Use    Smoking status: Former    Smokeless tobacco: Never   Vaping Use    Vaping status: Never Used   Substance Use Topics    Alcohol use: Not Currently    Drug use: Not Currently     Medications Prior to Admission   Medication Sig Dispense Refill Last Dose    alendronate (FOSAMAX) 70 MG tablet Take 1 tablet by mouth Every 7 (Seven) Days. Saturday       buPROPion XL (WELLBUTRIN XL) 150 MG 24 hr tablet Take 1 tablet by mouth Daily.       cetirizine (zyrTEC) 10 MG tablet Take 1 tablet by mouth Daily.       citalopram (CeleXA) 20 MG tablet Take 1 tablet by mouth Daily.       digoxin (LANOXIN) 125 MCG tablet Take 1 tablet by mouth Daily.       ferrous sulfate 325 (65 FE) MG tablet Take 1 tablet by mouth Daily With Breakfast.       fluticasone (FLONASE) 50 MCG/ACT nasal spray Administer 2 sprays into the nostril(s) as directed by provider Daily As Needed for Rhinitis.       furosemide (LASIX) 40 MG tablet Take 1 tablet by mouth Daily. 30 tablet 0     levothyroxine (SYNTHROID, LEVOTHROID) 50 MCG tablet Take 1 tablet by mouth Daily.       Melatonin 12 MG tablet Take 1 tablet by mouth At Night As Needed.       metoprolol succinate XL (TOPROL-XL) 50 MG 24 hr tablet Take 2 tablets by mouth Daily. 30 tablet 0     oxyCODONE-acetaminophen (PERCOCET) 5-325 MG per tablet Take 1 tablet by mouth 2 (Two) Times a Day.       pantoprazole (PROTONIX) 40 MG EC tablet Take 1 tablet by mouth Daily.       rivaroxaban (XARELTO) 20 MG tablet Take 1 tablet by mouth  Daily.       vitamin B-12 (CYANOCOBALAMIN) 1000 MCG tablet Take 1 tablet by mouth Daily.       vitamin D (ERGOCALCIFEROL) 1.25 MG (71520 UT) capsule capsule Take 1 capsule by mouth 1 (One) Time Per Week. Saturday       zolpidem (AMBIEN) 5 MG tablet Take 1 tablet by mouth every night at bedtime.        buPROPion XL, 150 mg, Oral, Daily  ceFAZolin, 2,000 mg, Intravenous, Q8H  cetirizine, 10 mg, Oral, Daily  cilostazol, 100 mg, Oral, BID  citalopram, 20 mg, Oral, Daily  [START ON 10/11/2024] digoxin, 125 mcg, Oral, Daily  dilTIAZem, 10 mg, Intravenous, Once  ferrous sulfate, 324 mg, Oral, Daily With Breakfast  furosemide, 40 mg, Oral, Daily  levothyroxine, 50 mcg, Oral, Q AM  metoprolol tartrate, 25 mg, Oral, Q6H  sodium chloride, 10 mL, Intravenous, Q12H      heparin, 12 Units/kg/hr, Last Rate: 12 Units/kg/hr (10/10/24 0500)  sodium chloride, 100 mL/hr, Last Rate: 100 mL/hr (10/10/24 0645)        acetaminophen **OR** acetaminophen **OR** acetaminophen    Calcium Replacement - Follow Nurse / BPA Driven Protocol    heparin    heparin    HYDROmorphone    influenza vaccine    Magnesium Standard Dose Replacement - Follow Nurse / BPA Driven Protocol    melatonin    ondansetron ODT **OR** ondansetron    oxyCODONE    Phosphorus Replacement - Follow Nurse / BPA Driven Protocol    Potassium Replacement - Follow Nurse / BPA Driven Protocol    [COMPLETED] Insert Peripheral IV **AND** sodium chloride    sodium chloride  Patient has no known allergies.    Objective     Physical Exam:   NAD, alert and oriented  RRR  Lungs clear  Abd soft, benign  Vascular: Doppler signal present bilateral PT  Skin: Lower extremities with wraps in place    Right leg      Left leg        Vital Signs and Labs:  Vital Signs Patient Vitals for the past 24 hrs:   BP Temp Temp src Pulse Resp SpO2 Height Weight   10/10/24 1102 102/44 99.2 °F (37.3 °C) Oral 109 13 -- -- --   10/10/24 0828 124/65 -- -- (!) 124 -- -- -- --   10/10/24 0630 -- -- -- (!) 148 --  -- -- --   10/10/24 0625 -- -- -- (!) 142 -- -- -- --   10/10/24 0620 -- -- -- (!) 124 -- -- -- --   10/10/24 0615 131/80 -- -- (!) 141 -- -- -- --   10/10/24 0610 -- -- -- (!) 135 -- -- -- --   10/10/24 0605 -- -- -- (!) 132 -- -- -- --   10/10/24 0600 163/79 -- -- (!) 132 -- -- -- --   10/10/24 0555 -- -- -- (!) 134 -- -- -- --   10/10/24 0550 -- -- -- (!) 131 -- -- -- --   10/10/24 0545 146/76 -- -- (!) 136 -- -- -- --   10/10/24 0540 -- -- -- (!) 132 -- -- -- --   10/10/24 0535 -- -- -- (!) 134 -- -- -- --   10/10/24 0530 165/87 -- -- (!) 128 -- -- -- --   10/10/24 0529 -- -- -- (!) 126 -- -- -- --   10/10/24 0528 -- -- -- (!) 144 -- -- -- --   10/10/24 0527 -- -- -- (!) 123 -- -- -- --   10/10/24 0526 -- -- -- (!) 134 -- -- -- --   10/10/24 0525 -- -- -- (!) 135 -- -- -- --   10/10/24 0524 -- -- -- (!) 139 -- -- -- --   10/10/24 0523 -- -- -- (!) 129 -- -- -- --   10/10/24 0522 -- -- -- (!) 137 -- -- -- --   10/10/24 0521 -- -- -- (!) 130 -- -- -- --   10/10/24 0520 157/77 -- -- (!) 136 -- -- -- --   10/10/24 0519 -- -- -- (!) 127 -- -- -- --   10/10/24 0518 -- -- -- (!) 127 -- -- -- --   10/10/24 0517 -- -- -- (!) 146 -- -- -- --   10/10/24 0516 -- -- -- (!) 138 -- -- -- --   10/10/24 0515 -- -- -- (!) 138 -- -- -- --   10/10/24 0514 -- -- -- (!) 128 -- -- -- --   10/10/24 0513 -- -- -- (!) 154 -- -- -- --   10/10/24 0512 -- -- -- (!) 141 -- -- -- --   10/10/24 0511 -- -- -- (!) 141 -- -- -- --   10/10/24 0510 -- -- -- (!) 142 -- -- -- --   10/10/24 0509 -- -- -- (!) 124 -- -- -- --   10/10/24 0508 -- -- -- (!) 128 -- -- -- --   10/10/24 0507 -- -- -- (!) 122 -- -- -- --   10/10/24 0506 -- -- -- (!) 129 -- -- -- --   10/10/24 0505 -- -- -- (!) 134 -- -- -- --   10/10/24 0504 -- -- -- (!) 140 -- -- -- --   10/10/24 0503 -- -- -- (!) 126 -- -- -- --   10/10/24 0502 -- -- -- (!) 138 -- -- -- --   10/10/24 0501 -- -- -- (!) 137 -- -- -- --   10/10/24 0500 139/68 -- -- (!) 141 -- -- -- --   10/10/24 0459 -- -- --  (!) 137 -- -- -- --   10/10/24 0458 -- -- -- (!) 129 -- -- -- --   10/10/24 0457 -- -- -- (!) 132 -- -- -- --   10/10/24 0456 -- -- -- 118 -- -- -- --   10/10/24 0455 -- -- -- (!) 125 -- -- -- --   10/10/24 0454 -- -- -- (!) 141 -- -- -- --   10/10/24 0453 -- -- -- (!) 127 -- -- -- --   10/10/24 0452 -- -- -- (!) 133 -- -- -- --   10/10/24 0451 -- -- -- (!) 139 -- -- -- --   10/10/24 0450 -- -- -- (!) 125 -- -- -- --   10/10/24 0449 -- -- -- (!) 140 -- -- -- --   10/10/24 0448 -- -- -- (!) 125 -- -- -- --   10/10/24 0447 -- -- -- (!) 125 -- -- -- --   10/10/24 0446 -- -- -- 120 -- -- -- --   10/10/24 0445 117/90 -- -- (!) 135 -- -- -- --   10/10/24 0444 -- -- -- 104 -- -- -- --   10/10/24 0443 -- -- -- 111 -- -- -- --   10/10/24 0442 -- -- -- 117 -- -- -- --   10/10/24 0441 -- -- -- 120 -- -- -- --   10/10/24 0440 -- -- -- (!) 121 -- -- -- --   10/10/24 0439 -- -- -- 120 -- -- -- --   10/10/24 0438 -- -- -- (!) 122 -- -- -- --   10/10/24 0437 -- -- -- 119 -- -- -- --   10/10/24 0436 -- -- -- (!) 124 -- -- -- --   10/10/24 0435 -- -- -- 113 -- -- -- --   10/10/24 0434 -- -- -- (!) 130 -- -- -- --   10/10/24 0433 125/68 -- -- (!) 128 -- -- -- --   10/10/24 0432 -- -- -- 115 -- -- -- --   10/10/24 0431 -- -- -- (!) 141 -- -- -- --   10/10/24 0430 -- -- -- (!) 121 -- -- -- --   10/10/24 0429 -- -- -- 117 -- -- -- --   10/10/24 0428 -- -- -- (!) 124 -- -- -- --   10/10/24 0427 -- -- -- 118 -- -- -- --   10/10/24 0426 -- -- -- (!) 134 -- -- -- --   10/10/24 0425 -- -- -- 112 -- -- -- --   10/10/24 0424 -- -- -- 118 -- -- -- --   10/10/24 0423 -- -- -- (!) 128 -- -- -- --   10/10/24 0422 -- -- -- 114 -- -- -- --   10/10/24 0421 -- -- -- (!) 122 -- -- -- --   10/10/24 0420 -- -- -- 104 -- -- -- --   10/10/24 0419 -- -- -- 115 -- -- -- --   10/10/24 0418 -- -- -- (!) 122 -- -- -- --   10/10/24 0417 124/64 -- -- 117 -- -- -- --   10/10/24 0416 -- -- -- (!) 123 -- -- -- --   10/10/24 0415 -- -- -- 111 -- -- -- --   10/10/24  0414 -- -- -- 107 -- -- -- --   10/10/24 0413 -- -- -- 114 -- -- -- --   10/10/24 0412 -- -- -- 114 -- -- -- --   10/10/24 0411 -- -- -- 116 -- -- -- --   10/10/24 0410 -- -- -- 110 -- -- -- --   10/10/24 0409 -- -- -- 118 -- -- -- --   10/10/24 0408 -- -- -- 120 -- -- -- --   10/10/24 0407 -- -- -- 111 -- -- -- --   10/10/24 0406 -- -- -- 97 -- -- -- --   10/10/24 0405 -- -- -- 119 -- -- -- --   10/10/24 0404 -- -- -- 110 -- -- -- --   10/10/24 0403 -- -- -- 109 -- -- -- --   10/10/24 0402 109/85 -- -- 118 -- -- -- --   10/10/24 0401 -- -- -- 110 -- -- -- --   10/10/24 0400 113/64 -- -- (!) 126 -- -- -- --   10/10/24 0359 -- -- -- (!) 124 -- -- -- --   10/10/24 0358 -- -- -- 117 -- -- -- --   10/10/24 0357 -- -- -- 107 -- -- -- --   10/10/24 0356 -- -- -- 117 -- -- -- --   10/10/24 0355 -- -- -- (!) 160 -- -- -- --   10/10/24 0354 -- -- -- (!) 155 -- -- -- --   10/10/24 0353 -- -- -- (!) 156 -- -- -- --   10/10/24 0352 -- -- -- (!) 159 -- -- -- --   10/10/24 0351 -- -- -- (!) 156 -- -- -- --   10/10/24 0350 -- -- -- (!) 152 -- -- -- --   10/10/24 0349 -- -- -- (!) 163 -- -- -- --   10/10/24 0348 -- -- -- (!) 132 -- -- -- --   10/10/24 0347 -- -- -- (!) 151 -- -- -- --   10/10/24 0346 -- -- -- (!) 165 -- -- -- --   10/10/24 0345 -- -- -- (!) 145 -- -- -- --   10/10/24 0344 -- -- -- (!) 144 -- -- -- --   10/10/24 0343 -- -- -- (!) 156 -- -- -- --   10/10/24 0342 -- -- -- (!) 140 -- -- -- --   10/10/24 0341 -- -- -- (!) 166 -- -- -- --   10/10/24 0340 (!) 140/102 -- -- (!) 140 -- -- -- --   10/10/24 0339 -- -- -- (!) 166 -- -- -- --   10/10/24 0338 (!) 140/102 99 °F (37.2 °C) Oral (!) 162 15 -- -- --   10/10/24 0337 -- -- -- (!) 163 -- -- -- --   10/10/24 0336 -- -- -- (!) 146 -- -- -- --   10/10/24 0335 -- -- -- (!) 142 -- -- -- --   10/10/24 0334 -- -- -- (!) 146 -- -- -- --   10/10/24 0333 -- -- -- (!) 160 -- -- -- --   10/10/24 0332 -- -- -- (!) 154 -- -- -- --   10/10/24 0331 -- -- -- (!) 148 -- -- -- --    10/10/24 0330 129/71 -- -- (!) 136 -- -- -- --   10/10/24 0329 -- -- -- (!) 161 -- -- -- --   10/10/24 0328 -- -- -- (!) 157 -- -- -- --   10/10/24 0327 -- -- -- (!) 137 -- -- -- --   10/10/24 0326 -- -- -- (!) 165 -- -- -- --   10/10/24 0325 -- -- -- (!) 142 -- 97 % -- --   10/10/24 0324 -- -- -- (!) 148 -- -- -- --   10/10/24 0323 -- -- -- (!) 145 -- -- -- --   10/10/24 0322 -- -- -- (!) 165 -- -- -- --   10/10/24 0321 -- -- -- (!) 156 -- -- -- --   10/10/24 0320 -- -- -- (!) 147 -- -- -- --   10/10/24 0319 -- -- -- (!) 155 -- -- -- --   10/10/24 0318 -- -- -- (!) 146 -- -- -- --   10/10/24 0317 -- -- -- (!) 161 -- -- -- --   10/10/24 0316 -- -- -- (!) 149 -- -- -- --   10/10/24 0315 113/70 -- -- (!) 138 -- -- -- --   10/10/24 0314 -- -- -- (!) 145 -- -- -- --   10/10/24 0313 -- -- -- (!) 141 -- -- -- --   10/10/24 0312 -- -- -- (!) 146 -- -- -- --   10/10/24 0311 -- -- -- (!) 153 -- -- -- --   10/10/24 0310 -- -- -- (!) 143 -- -- -- --   10/10/24 0309 -- -- -- (!) 145 -- -- -- --   10/10/24 0308 -- -- -- (!) 160 -- -- -- --   10/10/24 0307 -- -- -- (!) 147 -- -- -- --   10/10/24 0306 -- -- -- (!) 148 -- -- -- --   10/10/24 0305 -- -- -- (!) 150 -- -- -- --   10/10/24 0304 -- -- -- (!) 157 -- -- -- --   10/10/24 0303 -- -- -- (!) 136 -- -- -- --   10/10/24 0302 -- -- -- (!) 157 -- -- -- --   10/10/24 0301 -- -- -- (!) 156 -- -- -- --   10/10/24 0300 -- -- -- (!) 139 -- -- -- --   10/10/24 0259 -- -- -- (!) 145 -- -- -- --   10/10/24 0258 -- -- -- (!) 138 -- -- -- --   10/10/24 0257 -- -- -- (!) 145 -- -- -- --   10/10/24 0256 -- -- -- (!) 158 -- -- -- --   10/10/24 0255 -- -- -- (!) 139 -- -- -- --   10/10/24 0254 -- -- -- (!) 142 -- -- -- --   10/10/24 0253 -- -- -- (!) 146 -- -- -- --   10/10/24 0252 -- -- -- (!) 133 -- -- -- --   10/10/24 0251 -- -- -- (!) 153 -- -- -- --   10/10/24 0250 -- -- -- (!) 140 -- -- -- --   10/10/24 0249 -- -- -- (!) 142 -- -- -- --   10/10/24 0248 -- -- -- (!) 142 -- -- -- --    10/10/24 0247 -- -- -- (!) 160 -- -- -- --   10/10/24 0246 -- -- -- (!) 162 -- -- -- --   10/10/24 0245 -- -- -- (!) 152 -- -- -- --   10/10/24 0244 -- -- -- (!) 144 -- -- -- --   10/10/24 0243 -- -- -- (!) 133 -- -- -- --   10/10/24 0242 -- -- -- (!) 131 -- -- -- --   10/10/24 0241 -- -- -- (!) 134 -- -- -- --   10/10/24 0240 -- -- -- (!) 144 -- -- -- --   10/10/24 0239 -- -- -- (!) 144 -- -- -- --   10/10/24 0238 -- -- -- (!) 145 -- -- -- --   10/10/24 0237 -- -- -- (!) 155 -- -- -- --   10/10/24 0236 -- -- -- (!) 152 -- -- -- --   10/10/24 0235 -- -- -- (!) 145 -- -- -- --   10/10/24 0234 -- -- -- (!) 147 -- -- -- --   10/10/24 0233 -- -- -- (!) 138 -- -- -- --   10/10/24 0232 -- -- -- (!) 151 -- -- -- --   10/10/24 0231 -- -- -- (!) 156 -- -- -- --   10/10/24 0230 -- -- -- (!) 126 -- -- -- --   10/10/24 0229 -- -- -- (!) 153 -- -- -- --   10/10/24 0228 -- -- -- (!) 158 -- -- -- --   10/10/24 0227 -- -- -- (!) 146 -- -- -- --   10/10/24 0226 -- -- -- (!) 142 -- -- -- --   10/10/24 0225 -- -- -- (!) 149 -- -- -- --   10/10/24 0224 -- -- -- (!) 147 -- -- -- --   10/10/24 0223 -- -- -- (!) 144 -- -- -- --   10/10/24 0222 -- -- -- (!) 152 -- -- -- --   10/10/24 0221 -- -- -- (!) 136 -- -- -- --   10/10/24 0220 -- -- -- (!) 145 -- -- -- --   10/10/24 0219 -- -- -- (!) 154 -- -- -- --   10/10/24 0218 -- -- -- (!) 135 -- -- -- --   10/10/24 0217 -- -- -- (!) 140 -- -- -- --   10/10/24 0216 -- -- -- (!) 134 -- -- -- --   10/10/24 0215 -- -- -- (!) 139 -- 92 % -- --   10/10/24 0214 -- -- -- (!) 123 -- -- -- --   10/10/24 0213 -- -- -- (!) 125 -- -- -- --   10/10/24 0212 -- -- -- (!) 151 -- -- -- --   10/10/24 0211 -- -- -- (!) 128 -- -- -- --   10/10/24 0210 -- -- -- (!) 186 -- -- -- --   10/10/24 0209 -- -- -- (!) 152 -- -- -- --   10/10/24 0208 -- -- -- (!) 138 -- -- -- --   10/10/24 0207 -- -- -- (!) 155 -- -- -- --   10/10/24 0206 -- -- -- (!) 130 -- -- -- --   10/10/24 0205 -- -- -- (!) 143 -- -- -- --   10/10/24  "0204 -- -- -- (!) 137 -- -- -- --   10/10/24 0203 -- -- -- (!) 149 -- -- -- --   10/10/24 0202 -- -- -- (!) 144 -- -- -- --   10/10/24 0201 -- -- -- (!) 144 -- -- -- --   10/10/24 0200 -- -- -- (!) 127 -- 92 % -- --   10/10/24 0145 -- -- -- (!) 144 -- 92 % -- --   10/10/24 0130 -- -- -- (!) 153 -- 92 % -- --   10/10/24 0115 -- -- -- (!) 136 -- -- -- --   10/10/24 0100 138/76 -- -- (!) 143 -- -- -- --   10/10/24 0045 -- -- -- (!) 134 -- -- -- --   10/10/24 0030 127/85 -- -- (!) 136 -- -- -- --   10/10/24 0015 (!) 131/112 -- -- (!) 122 -- -- -- --   10/10/24 0000 122/80 -- -- (!) 122 -- -- -- --   10/09/24 2345 129/87 -- -- 118 -- -- -- --   10/09/24 2338 137/90 98.3 °F (36.8 °C) Oral -- 17 (!) 0 % -- --   10/09/24 2330 115/78 -- -- -- -- -- -- --   10/09/24 2315 -- -- -- 69 -- -- -- --   10/09/24 2300 130/68 -- -- (!) 126 -- -- -- --   10/09/24 2245 124/87 -- -- (!) 122 -- -- -- --   10/09/24 2230 129/71 -- -- (!) 124 -- 97 % -- --   10/09/24 2215 128/71 -- -- (!) 142 -- -- -- --   10/09/24 2200 131/80 -- -- 111 -- -- -- --   10/09/24 2129 122/68 98.4 °F (36.9 °C) Oral 106 18 97 % -- --   10/09/24 2020 157/78 -- -- 85 -- 94 % -- --   10/09/24 1913 154/90 -- -- 86 -- 95 % -- --   10/09/24 1729 139/83 -- -- 86 17 94 % -- --   10/09/24 1656 123/98 -- -- 87 17 94 % -- --   10/09/24 1555 140/71 -- -- 83 16 94 % -- --   10/09/24 1454 118/73 -- -- 111 19 95 % -- --   10/09/24 1231 104/61 97.7 °F (36.5 °C) Oral (!) 123 18 96 % 160 cm (63\") 65.3 kg (144 lb)     BMI:  Body mass index is 25.51 kg/m².    CBC    Results from last 7 days   Lab Units 10/09/24  2329 10/09/24  1440   WBC 10*3/mm3 8.40 6.70   HEMOGLOBIN g/dL 8.6* 9.3*   PLATELETS 10*3/mm3 578* 376     BMP   Results from last 7 days   Lab Units 10/09/24  2329 10/09/24  1527   SODIUM mmol/L 138 139   POTASSIUM mmol/L 4.0 4.1   CHLORIDE mmol/L 101 102   CO2 mmol/L 28.7 29.0   BUN mg/dL 22 21   CREATININE mg/dL 1.14* 1.03*   GLUCOSE mg/dL 123* 87   MAGNESIUM mg/dL 2.4 2.4 " "    HbA1C No results found for: \"HGBA1C\"  Infection     Radiology(recent) XR Tibia Fibula 2 View Right    Result Date: 10/9/2024  Impression: Soft tissue defect again identified. No underlying bony abnormality. Electronically Signed: Jazlyn Ramon MD  10/9/2024 1:45 PM EDT  Workstation ID: [id number]     VTE Prophylaxis:  Pharmacologic VTE prophylaxis orders are present.        Active Hospital Problems    Diagnosis  POA    **Wound of lower extremity [S84.457Y]  Yes      Resolved Hospital Problems   No resolved problems to display.       Assessment & Plan   Assessment / Plan     Wound of lower extremity      79 y.o. female with chronic traumatic wounds to her bilateral lower extremities.  She is a little over 3 weeks status post debridement of her right leg wound.  She has had worsening swelling and pain in her lower extremities.  Wound care has been consulted to assist with wound care management.  ABIs show normal perfusion in the right lower extremity, moderate PAD in the left lower extremity.  She should have enough perfusion to heal her right lower extremity wound.  She may need debridement if no improvement seen in the right leg wound.  We will continue to monitor this.    Thank you for this consultation.           AZAEL Castañeda  [facility]  10/10/24   O: [phone]  F: [phone]            "

## 2024-10-10 NOTE — PLAN OF CARE
Goal Outcome Evaluation:   Pt admitted to room from ED. PT is alert and oriented. Pt complains of BLE pain, prn medications given per MD order. Wound pictures done and dressings applied. Falls/Safety/Skin precautions in place.

## 2024-10-10 NOTE — CASE MANAGEMENT/SOCIAL WORK
Discharge Planning Assessment   Juan J     Patient Name: Fallon Nur  MRN: [id number]  Today's Date: 10/10/2024    Admit Date: 10/9/2024    Plan: D/C Plan: Likely to skilled facility.  Dtr given list of choices.  PASRR requested;  Transport TBD   Discharge Needs Assessment       Row Name 10/10/24 1510       Living Environment    Potentially Unsafe Housing Conditions none    In the past 12 months has the electric, gas, oil, or water company threatened to shut off services in your home? No    Primary Care Provided by self    Provides Primary Care For no one, unable/limited ability to care for self    Family Caregiver if Needed grandchild(elgin), adult    Family Caregiver Names Belén-gdtr; Hilda-Dtr    Quality of Family Relationships supportive;helpful;involved    Able to Return to Prior Arrangements yes       Resource/Environmental Concerns    Resource/Environmental Concerns none    Transportation Concerns none       Transportation Needs    In the past 12 months, has lack of transportation kept you from medical appointments or from getting medications? no    In the past 12 months, has lack of transportation kept you from meetings, work, or from getting things needed for daily living? No       Food Insecurity    Within the past 12 months, you worried that your food would run out before you got the money to buy more. Never true    Within the past 12 months, the food you bought just didn't last and you didn't have money to get more. Never true       Transition Planning    Patient/Family Anticipates Transition to long-term care facility    Patient/Family Anticipated Services at Transition skilled nursing    Transportation Anticipated family or friend will provide       Discharge Needs Assessment    Readmission Within the Last 30 Days previous discharge plan unsuccessful    Current Outpatient/Agency/Support Group skilled nursing facility    Equipment Currently Used at Home walker, rolling;wheelchair    Concerns to  be Addressed discharge planning    Anticipated Changes Related to Illness inability to care for self    Equipment Needed After Discharge none    Outpatient/Agency/Support Group Needs skilled nursing facility    Discharge Facility/Level of Care Needs nursing facility, skilled    Provided Post Acute Provider List? Yes    Post Acute Provider List Nursing Home    Delivered To Support Person    Support Person Linda-Dtr    Method of Delivery In person    Patient's Choice of Community Agency(s) TBD    Current Discharge Risk chronically ill                   Discharge Plan       Row Name 10/10/24 1454       Plan    Plan D/C Plan: Likely to skilled facility.  Dtr given list of choices.  PASRR requested;  Transport TBD    Patient/Family in Agreement with Plan yes    Provided Post Acute Provider List? Yes    Post Acute Provider List Nursing Home    Provided Post Acute Provider Quality & Resource List? N/A    Delivered To Support Person    Support Person Alissa-dtr    Method of Delivery In person    Plan Comments Met with pt and dtr at bedside. Reviewed IMM, signed and copy given. Reviewed and completed re-admission assessment. Pt lives in home with dtr who has CP  and has F.T.pd caregivers thru waiver program. Grand-Dtr and her boyfriend live in the home, and assist with cooking and household chores. Verified PCP and Pharmacy. No difficulties affording medicaton. Declined M2B's.   Pt is mod ind.with self care activites and mobility with walker. Has needed DME for home use.  Mobility has declined over the last 3 weeks due to worsening LE swelling/cellulits, and general immobility.  Pt declined H.H last stay. Only agreed to outpt Wound Clinic visit. Had one W.C. visit on 10/2/24.  Would vac was removed, and she was placed on dsg changes only .  Grand dtr did daily drsg changes. PT/OT evals requested.  Barrier to D/C: Vascular Consult, ID consult; Wound Nurse consult, and blood and wound cultures awaiting results.                   Continued Care and Services - Admitted Since 10/9/2024    No active coordination exists for this encounter.       Expected Discharge Date and Time       Expected Discharge Date Expected Discharge Time    Oct 14, 2024            Demographic Summary       Row Name 10/10/24 1434       General Information    Admission Type inpatient    Arrived From emergency department    Required Notices Provided Important Message from Medicare    Referral Source admission list    Reason for Consult discharge planning    Preferred Language English                   Functional Status       Row Name 10/10/24 1434       Functional Status    Usual Activity Tolerance moderate    Current Activity Tolerance fair       Functional Status, IADL    Medications independent    Meal Preparation assistive person    Housekeeping assistive person    Laundry assistive person    Shopping assistive person       Mental Status    General Appearance WDL WDL       Mental Status Summary    Recent Changes in Mental Status/Cognitive Functioning no changes       Employment/    Employment Status retired    Current or Previous Occupation not applicable                     Honey Jenkins RN  RN/.  Office Ph. [phone]  Cell Ph.  [phone]

## 2024-10-10 NOTE — PLAN OF CARE
Problem: Adult Inpatient Plan of Care  Goal: Plan of Care Review  Outcome: Progressing  Flowsheets (Taken 10/10/2024 1346)  Progress: no change  Plan of Care Reviewed With: patient  Goal: Patient-Specific Goal (Individualized)  Outcome: Progressing  Goal: Absence of Hospital-Acquired Illness or Injury  Outcome: Progressing  Intervention: Identify and Manage Fall Risk  Recent Flowsheet Documentation  Taken 10/10/2024 1114 by Connie Vasquez RN  Safety Promotion/Fall Prevention:   assistive device/personal items within reach   clutter free environment maintained   fall prevention program maintained   nonskid shoes/slippers when out of bed   safety round/check completed   room organization consistent  Taken 10/10/2024 1000 by Connie Vasquez RN  Safety Promotion/Fall Prevention:   assistive device/personal items within reach   clutter free environment maintained   fall prevention program maintained   nonskid shoes/slippers when out of bed   safety round/check completed   room organization consistent  Intervention: Prevent Skin Injury  Recent Flowsheet Documentation  Taken 10/10/2024 1114 by Connie Vasquez RN  Skin Protection:   adhesive use limited   tubing/devices free from skin contact  Taken 10/10/2024 0900 by Connie Vasquez RN  Skin Protection:   adhesive use limited   tubing/devices free from skin contact  Intervention: Prevent and Manage VTE (Venous Thromboembolism) Risk  Recent Flowsheet Documentation  Taken 10/10/2024 1114 by Connie Vasquez RN  Activity Management: up in chair  Taken 10/10/2024 0900 by Connie Vasquez RN  Activity Management: up in chair  Intervention: Prevent Infection  Recent Flowsheet Documentation  Taken 10/10/2024 1114 by Connie Vasquez RN  Infection Prevention:   environmental surveillance performed   hand hygiene promoted   rest/sleep promoted   single patient room provided  Taken 10/10/2024 1000 by Connie Vasquez RN  Infection Prevention:   environmental surveillance performed   hand  hygiene promoted   rest/sleep promoted   single patient room provided  Goal: Optimal Comfort and Wellbeing  Outcome: Progressing  Intervention: Monitor Pain and Promote Comfort  Recent Flowsheet Documentation  Taken 10/10/2024 1114 by Connie Vasquez RN  Pain Management Interventions: see MAR  Taken 10/10/2024 0900 by Connie Vasquez RN  Pain Management Interventions: see MAR  Intervention: Provide Person-Centered Care  Recent Flowsheet Documentation  Taken 10/10/2024 1114 by Connie Vasquez RN  Trust Relationship/Rapport:   care explained   thoughts/feelings acknowledged  Taken 10/10/2024 0900 by Connie Vasquez RN  Trust Relationship/Rapport:   care explained   thoughts/feelings acknowledged  Goal: Readiness for Transition of Care  Outcome: Progressing     Problem: Fall Injury Risk  Goal: Absence of Fall and Fall-Related Injury  Outcome: Progressing  Intervention: Identify and Manage Contributors  Recent Flowsheet Documentation  Taken 10/10/2024 1114 by Connie Vasquez RN  Medication Review/Management: medications reviewed  Taken 10/10/2024 1000 by Connie Vasquez RN  Medication Review/Management: medications reviewed  Intervention: Promote Injury-Free Environment  Recent Flowsheet Documentation  Taken 10/10/2024 1114 by Connie Vasquez RN  Safety Promotion/Fall Prevention:   assistive device/personal items within reach   clutter free environment maintained   fall prevention program maintained   nonskid shoes/slippers when out of bed   safety round/check completed   room organization consistent  Taken 10/10/2024 1000 by Connie Vasquez RN  Safety Promotion/Fall Prevention:   assistive device/personal items within reach   clutter free environment maintained   fall prevention program maintained   nonskid shoes/slippers when out of bed   safety round/check completed   room organization consistent     Problem: Skin Injury Risk Increased  Goal: Skin Health and Integrity  Outcome: Progressing  Intervention: Optimize Skin  Protection  Recent Flowsheet Documentation  Taken 10/10/2024 1114 by Connie Vasquez RN  Pressure Reduction Techniques: frequent weight shift encouraged  Pressure Reduction Devices: pressure-redistributing mattress utilized  Skin Protection:   adhesive use limited   tubing/devices free from skin contact  Taken 10/10/2024 0900 by Connie Vasquez RN  Pressure Reduction Techniques:   frequent weight shift encouraged   weight shift assistance provided  Pressure Reduction Devices: pressure-redistributing mattress utilized  Skin Protection:   adhesive use limited   tubing/devices free from skin contact     Problem: Pain Acute  Goal: Acceptable Pain Control and Functional Ability  Outcome: Progressing  Intervention: Prevent or Manage Pain  Recent Flowsheet Documentation  Taken 10/10/2024 1114 by Connie Vasquez RN  Sensory Stimulation Regulation: care clustered  Medication Review/Management: medications reviewed  Taken 10/10/2024 1000 by Connie Vasquez RN  Medication Review/Management: medications reviewed  Taken 10/10/2024 0900 by Connie Vasquez RN  Sensory Stimulation Regulation: care clustered  Intervention: Develop Pain Management Plan  Recent Flowsheet Documentation  Taken 10/10/2024 1114 by Connie Vasquez RN  Pain Management Interventions: see MAR  Taken 10/10/2024 0900 by Connie Vasquez RN  Pain Management Interventions: see MAR  Intervention: Optimize Psychosocial Wellbeing  Recent Flowsheet Documentation  Taken 10/10/2024 1114 by Connie Vasquez RN  Diversional Activities:   smartphone   television  Taken 10/10/2024 0900 by Connie Vasquez RN  Diversional Activities:   television   smartphone     Problem: Impaired Wound Healing  Goal: Optimal Wound Healing  Outcome: Progressing  Intervention: Promote Wound Healing  Recent Flowsheet Documentation  Taken 10/10/2024 1114 by Connie Vasquez RN  Activity Management: up in chair  Pain Management Interventions: see MAR  Taken 10/10/2024 0900 by Connie Vasquez RN  Activity  Management: up in chair  Pain Management Interventions: see MAR     Problem: Dysrhythmia  Goal: Normalized Cardiac Rhythm  Outcome: Progressing     Problem: Mobility Impairment  Goal: Optimal Mobility  Outcome: Progressing  Intervention: Optimize Mobility  Recent Flowsheet Documentation  Taken 10/10/2024 1114 by Connie Vasquez RN  Activity Management: up in chair  Taken 10/10/2024 0900 by Connie Vasquez RN  Activity Management: up in chair     Problem: Activity Intolerance  Goal: Enhanced Capacity and Energy  Outcome: Progressing  Intervention: Optimize Activity Tolerance  Recent Flowsheet Documentation  Taken 10/10/2024 1114 by Connie Vasquez RN  Activity Management: up in chair  Taken 10/10/2024 0900 by Connie Vasquez RN  Activity Management: up in chair   Goal Outcome Evaluation:  Plan of Care Reviewed With: patient        Progress: no change

## 2024-10-11 PROBLEM — I73.9 PAD (PERIPHERAL ARTERY DISEASE): Status: ACTIVE | Noted: 2024-10-09

## 2024-10-11 PROBLEM — I70.232: Status: ACTIVE | Noted: 2024-10-09

## 2024-10-11 PROBLEM — T14.8XXA INFECTED WOUND: Status: ACTIVE | Noted: 2024-10-09

## 2024-10-11 PROBLEM — L08.9 INFECTED WOUND: Status: ACTIVE | Noted: 2024-10-09

## 2024-10-11 LAB
ANION GAP SERPL CALCULATED.3IONS-SCNC: 10.7 MMOL/L (ref 5–15)
ANION GAP SERPL CALCULATED.3IONS-SCNC: 8.8 MMOL/L (ref 5–15)
APTT PPP: 52.6 SECONDS (ref 61–76.5)
APTT PPP: 66.2 SECONDS (ref 61–76.5)
BASOPHILS # BLD AUTO: 0.05 10*3/MM3 (ref 0–0.2)
BASOPHILS NFR BLD AUTO: 0.4 % (ref 0–1.5)
BUN SERPL-MCNC: 21 MG/DL (ref 8–23)
BUN SERPL-MCNC: 22 MG/DL (ref 8–23)
BUN/CREAT SERPL: 19.6 (ref 7–25)
BUN/CREAT SERPL: 21 (ref 7–25)
CALCIUM SPEC-SCNC: 8 MG/DL (ref 8.6–10.5)
CALCIUM SPEC-SCNC: 8.1 MG/DL (ref 8.6–10.5)
CHLORIDE SERPL-SCNC: 101 MMOL/L (ref 98–107)
CHLORIDE SERPL-SCNC: 102 MMOL/L (ref 98–107)
CO2 SERPL-SCNC: 24.3 MMOL/L (ref 22–29)
CO2 SERPL-SCNC: 25.2 MMOL/L (ref 22–29)
CREAT SERPL-MCNC: 1 MG/DL (ref 0.57–1)
CREAT SERPL-MCNC: 1.12 MG/DL (ref 0.57–1)
DEPRECATED RDW RBC AUTO: 49.3 FL (ref 37–54)
DEPRECATED RDW RBC AUTO: 51 FL (ref 37–54)
EGFRCR SERPLBLD CKD-EPI 2021: 50.1 ML/MIN/1.73
EGFRCR SERPLBLD CKD-EPI 2021: 57.4 ML/MIN/1.73
EOSINOPHIL # BLD AUTO: 0.08 10*3/MM3 (ref 0–0.4)
EOSINOPHIL NFR BLD AUTO: 0.6 % (ref 0.3–6.2)
ERYTHROCYTE [DISTWIDTH] IN BLOOD BY AUTOMATED COUNT: 15.9 % (ref 12.3–15.4)
ERYTHROCYTE [DISTWIDTH] IN BLOOD BY AUTOMATED COUNT: 16 % (ref 12.3–15.4)
FOLATE SERPL-MCNC: 10.5 NG/ML (ref 4.78–24.2)
GLUCOSE SERPL-MCNC: 113 MG/DL (ref 65–99)
GLUCOSE SERPL-MCNC: 91 MG/DL (ref 65–99)
HCT VFR BLD AUTO: 26.4 % (ref 34–46.6)
HCT VFR BLD AUTO: 27.4 % (ref 34–46.6)
HGB BLD-MCNC: 7.7 G/DL (ref 12–15.9)
HGB BLD-MCNC: 8.2 G/DL (ref 12–15.9)
IMM GRANULOCYTES # BLD AUTO: 0.07 10*3/MM3 (ref 0–0.05)
IMM GRANULOCYTES NFR BLD AUTO: 0.5 % (ref 0–0.5)
LYMPHOCYTES # BLD AUTO: 0.87 10*3/MM3 (ref 0.7–3.1)
LYMPHOCYTES NFR BLD AUTO: 6.3 % (ref 19.6–45.3)
MCH RBC QN AUTO: 25.4 PG (ref 26.6–33)
MCH RBC QN AUTO: 25.4 PG (ref 26.6–33)
MCHC RBC AUTO-ENTMCNC: 29.2 G/DL (ref 31.5–35.7)
MCHC RBC AUTO-ENTMCNC: 29.9 G/DL (ref 31.5–35.7)
MCV RBC AUTO: 84.8 FL (ref 79–97)
MCV RBC AUTO: 87.1 FL (ref 79–97)
MONOCYTES # BLD AUTO: 1.05 10*3/MM3 (ref 0.1–0.9)
MONOCYTES NFR BLD AUTO: 7.6 % (ref 5–12)
NEUTROPHILS NFR BLD AUTO: 11.63 10*3/MM3 (ref 1.7–7)
NEUTROPHILS NFR BLD AUTO: 84.6 % (ref 42.7–76)
NRBC BLD AUTO-RTO: 0 /100 WBC (ref 0–0.2)
PLATELET # BLD AUTO: 470 10*3/MM3 (ref 140–450)
PLATELET # BLD AUTO: 504 10*3/MM3 (ref 140–450)
PMV BLD AUTO: 10.1 FL (ref 6–12)
PMV BLD AUTO: 10.2 FL (ref 6–12)
POTASSIUM SERPL-SCNC: 4 MMOL/L (ref 3.5–5.2)
POTASSIUM SERPL-SCNC: 4.1 MMOL/L (ref 3.5–5.2)
RBC # BLD AUTO: 3.03 10*6/MM3 (ref 3.77–5.28)
RBC # BLD AUTO: 3.23 10*6/MM3 (ref 3.77–5.28)
SODIUM SERPL-SCNC: 135 MMOL/L (ref 136–145)
SODIUM SERPL-SCNC: 137 MMOL/L (ref 136–145)
VANCOMYCIN SERPL-MCNC: 16.2 MCG/ML (ref 5–40)
VIT B12 BLD-MCNC: >2000 PG/ML (ref 211–946)
WBC NRBC COR # BLD AUTO: 13.34 10*3/MM3 (ref 3.4–10.8)
WBC NRBC COR # BLD AUTO: 13.75 10*3/MM3 (ref 3.4–10.8)

## 2024-10-11 PROCEDURE — 25010000002 CEFTAZIDIME 2 G RECONSTITUTED SOLUTION 1 EACH VIAL: Performed by: NURSE PRACTITIONER

## 2024-10-11 PROCEDURE — 25010000002 VANCOMYCIN 1 G RECONSTITUTED SOLUTION 1 EACH VIAL: Performed by: NURSE PRACTITIONER

## 2024-10-11 PROCEDURE — 25010000002 HEPARIN (PORCINE) 25000-0.45 UT/250ML-% SOLUTION: Performed by: INTERNAL MEDICINE

## 2024-10-11 PROCEDURE — 99232 SBSQ HOSP IP/OBS MODERATE 35: CPT | Performed by: NURSE PRACTITIONER

## 2024-10-11 PROCEDURE — 85027 COMPLETE CBC AUTOMATED: CPT | Performed by: HOSPITALIST

## 2024-10-11 PROCEDURE — 25810000003 SODIUM CHLORIDE 0.9 % SOLUTION 250 ML FLEX CONT: Performed by: NURSE PRACTITIONER

## 2024-10-11 PROCEDURE — 25010000002 MORPHINE PER 10 MG: Performed by: HOSPITALIST

## 2024-10-11 PROCEDURE — 80048 BASIC METABOLIC PNL TOTAL CA: CPT | Performed by: HOSPITALIST

## 2024-10-11 PROCEDURE — 85730 THROMBOPLASTIN TIME PARTIAL: CPT | Performed by: HOSPITALIST

## 2024-10-11 RX ORDER — MUPIROCIN 20 MG/G
1 OINTMENT TOPICAL
Status: DISCONTINUED | OUTPATIENT
Start: 2024-10-11 | End: 2024-10-18 | Stop reason: HOSPADM

## 2024-10-11 RX ORDER — HEPARIN SODIUM 5000 [USP'U]/ML
5000 INJECTION, SOLUTION INTRAVENOUS; SUBCUTANEOUS EVERY 12 HOURS SCHEDULED
Status: DISCONTINUED | OUTPATIENT
Start: 2024-10-11 | End: 2024-10-11

## 2024-10-11 RX ORDER — ERGOCALCIFEROL 1.25 MG/1
50000 CAPSULE, LIQUID FILLED ORAL WEEKLY
Status: DISCONTINUED | OUTPATIENT
Start: 2024-10-12 | End: 2024-10-18 | Stop reason: HOSPADM

## 2024-10-11 RX ORDER — PANTOPRAZOLE SODIUM 40 MG/1
40 TABLET, DELAYED RELEASE ORAL DAILY
Status: DISCONTINUED | OUTPATIENT
Start: 2024-10-11 | End: 2024-10-18 | Stop reason: HOSPADM

## 2024-10-11 RX ORDER — UREA 10 %
1000 LOTION (ML) TOPICAL DAILY
Status: DISCONTINUED | OUTPATIENT
Start: 2024-10-11 | End: 2024-10-18 | Stop reason: HOSPADM

## 2024-10-11 RX ORDER — MUPIROCIN 20 MG/G
1 OINTMENT TOPICAL EVERY 12 HOURS SCHEDULED
Status: DISCONTINUED | OUTPATIENT
Start: 2024-10-11 | End: 2024-10-11

## 2024-10-11 RX ORDER — ENOXAPARIN SODIUM 100 MG/ML
1 INJECTION SUBCUTANEOUS EVERY 12 HOURS
Status: DISCONTINUED | OUTPATIENT
Start: 2024-10-12 | End: 2024-10-14

## 2024-10-11 RX ADMIN — OXYCODONE 5 MG: 5 TABLET ORAL at 13:46

## 2024-10-11 RX ADMIN — CETIRIZINE HYDROCHLORIDE 10 MG: 10 TABLET, FILM COATED ORAL at 08:57

## 2024-10-11 RX ADMIN — OXYCODONE 5 MG: 5 TABLET ORAL at 10:08

## 2024-10-11 RX ADMIN — LEVOTHYROXINE SODIUM 50 MCG: 0.05 TABLET ORAL at 06:03

## 2024-10-11 RX ADMIN — Medication 5 MG: at 21:33

## 2024-10-11 RX ADMIN — CEFTAZIDIME 2000 MG: 2 INJECTION, POWDER, FOR SOLUTION INTRAVENOUS at 02:38

## 2024-10-11 RX ADMIN — SODIUM CHLORIDE 1000 MG: 9 INJECTION, SOLUTION INTRAVENOUS at 16:18

## 2024-10-11 RX ADMIN — COLLAGENASE SANTYL 1 APPLICATION: 250 OINTMENT TOPICAL at 21:33

## 2024-10-11 RX ADMIN — THERA TABS 1 TABLET: TAB at 08:57

## 2024-10-11 RX ADMIN — METOPROLOL TARTRATE 25 MG: 25 TABLET, FILM COATED ORAL at 17:30

## 2024-10-11 RX ADMIN — OXYCODONE 5 MG: 5 TABLET ORAL at 06:03

## 2024-10-11 RX ADMIN — DIGOXIN 125 MCG: 125 TABLET ORAL at 13:46

## 2024-10-11 RX ADMIN — BUPROPION HYDROCHLORIDE 150 MG: 150 TABLET, EXTENDED RELEASE ORAL at 08:57

## 2024-10-11 RX ADMIN — CITALOPRAM HYDROBROMIDE 20 MG: 20 TABLET ORAL at 08:57

## 2024-10-11 RX ADMIN — MORPHINE SULFATE 2 MG: 2 INJECTION, SOLUTION INTRAMUSCULAR; INTRAVENOUS at 22:31

## 2024-10-11 RX ADMIN — METOPROLOL TARTRATE 25 MG: 25 TABLET, FILM COATED ORAL at 06:03

## 2024-10-11 RX ADMIN — Medication 10 ML: at 08:58

## 2024-10-11 RX ADMIN — METOPROLOL TARTRATE 25 MG: 25 TABLET, FILM COATED ORAL at 21:33

## 2024-10-11 RX ADMIN — CILOSTAZOL 100 MG: 100 TABLET ORAL at 21:33

## 2024-10-11 RX ADMIN — MORPHINE SULFATE 2 MG: 2 INJECTION, SOLUTION INTRAMUSCULAR; INTRAVENOUS at 07:22

## 2024-10-11 RX ADMIN — OXYCODONE 5 MG: 5 TABLET ORAL at 17:30

## 2024-10-11 RX ADMIN — CYANOCOBALAMIN TAB 500 MCG 1000 MCG: 500 TAB at 10:08

## 2024-10-11 RX ADMIN — PANTOPRAZOLE SODIUM 40 MG: 40 TABLET, DELAYED RELEASE ORAL at 10:08

## 2024-10-11 RX ADMIN — FUROSEMIDE 40 MG: 40 TABLET ORAL at 08:57

## 2024-10-11 RX ADMIN — CILOSTAZOL 100 MG: 100 TABLET ORAL at 08:57

## 2024-10-11 RX ADMIN — FERROUS SULFATE TAB EC 324 MG (65 MG FE EQUIVALENT) 324 MG: 324 (65 FE) TABLET DELAYED RESPONSE at 08:57

## 2024-10-11 RX ADMIN — RIVAROXABAN 15 MG: 15 TABLET, FILM COATED ORAL at 10:08

## 2024-10-11 RX ADMIN — CEFTAZIDIME 2000 MG: 2 INJECTION, POWDER, FOR SOLUTION INTRAVENOUS at 13:48

## 2024-10-11 RX ADMIN — MUPIROCIN 1 APPLICATION: 20 OINTMENT TOPICAL at 21:33

## 2024-10-11 RX ADMIN — OXYCODONE 5 MG: 5 TABLET ORAL at 21:33

## 2024-10-11 RX ADMIN — Medication 220 MG: at 08:56

## 2024-10-11 NOTE — PROGRESS NOTES
Infectious Diseases Progress Note      LOS: 1 day   Patient Care Team:  Francine Hampton MD as PCP - General    Chief Complaint: Bilateral leg wounds    Subjective       The patient has been afebrile for the last 24 hours.  The patient is on room air, hemodynamically stable, and is tolerating antimicrobial therapy.  No new complaints      Review of Systems:   Review of Systems   Constitutional: Negative.    HENT: Negative.     Eyes: Negative.    Respiratory: Negative.     Cardiovascular: Negative.    Gastrointestinal: Negative.    Endocrine: Negative.    Genitourinary: Negative.    Musculoskeletal: Negative.    Skin:  Positive for color change and wound.   Neurological: Negative.    Psychiatric/Behavioral: Negative.     All other systems reviewed and are negative.       Objective     Vital Signs  Temp:  [98.1 °F (36.7 °C)-98.9 °F (37.2 °C)] 98.1 °F (36.7 °C)  Heart Rate:  [] 99  Resp:  [15-18] 18  BP: ()/(38-69) 97/51    Physical Exam:  Physical Exam  Vitals and nursing note reviewed.   Constitutional:       General: She is not in acute distress.     Appearance: She is well-developed and normal weight. She is ill-appearing. She is not diaphoretic.   HENT:      Head: Normocephalic and atraumatic.   Eyes:      General: No scleral icterus.     Comments: Legally blind   Cardiovascular:      Rate and Rhythm: Normal rate and regular rhythm.      Heart sounds: Normal heart sounds, S1 normal and S2 normal. No murmur heard.  Pulmonary:      Effort: Pulmonary effort is normal. No respiratory distress.      Breath sounds: Normal breath sounds. No stridor. No wheezing or rales.   Chest:      Chest wall: No tenderness.   Abdominal:      General: Bowel sounds are normal. There is no distension.      Palpations: Abdomen is soft. There is no mass.      Tenderness: There is no abdominal tenderness. There is no guarding.   Musculoskeletal:         General: No swelling, tenderness or deformity. Normal range of motion.       Cervical back: Neck supple.   Skin:     General: Skin is warm and dry.      Coloration: Skin is not pale.      Findings: No bruising, erythema or rash.      Comments: Patient has a large wound to the right lower extremity covered in yellow eschar with a large amount of green drainage and a very foul odor.  Pulses are palpable on the right foot but it is very cold.  There is a blister on the medial aspect of the lower leg     The left lower leg has a full-thickness wound with some fibrinous slough but very little drainage and no foul odor.     Neurological:      Mental Status: She is alert and oriented to person, place, and time.   Psychiatric:         Mood and Affect: Mood normal.          Results Review:    I have reviewed all clinical data, test, lab, and imaging results.     Radiology  CT Angio Abdominal Aorta Bilateral Iliofem Runoff    Result Date: 10/10/2024  CT ANGIO ABDOMINAL AORTA BILAT ILIOFEM RUNOFF Date of Exam: 10/10/2024 4:45 PM EDT Indication: PAD, worsening wounds and pain. Comparison: CT scan of the abdomen pelvis 12/31/2020. Technique: CTA of the abdomen, pelvis and both lower extremities was performed before and after the uneventful intravenous administration of iodinated contrast. Reconstructed coronal and sagittal images were also obtained. In addition, a 3-D volume rendered image was created for interpretation. Automated exposure control and iterative reconstruction methods were used. Findings: CTA ABDOMEN: The abdominal aorta has a normal caliber. Atherosclerotic disease is present. The celiac artery, superior mesenteric artery and inferior mesenteric artery are patent. The renal arteries are patent. No evidence of high-grade stenosis or aneurysm. Ventral hernia defect containing unobstructed loops of small bowel. There is generalized anasarca. Prior cholecystectomy probable gastric bypass. There is fatty infiltration of the liver. The spleen and adrenal glands are normal. The kidneys are  normal. There is fatty infiltration of the pancreas. Cardiomegaly is present. There is coronary artery calcification. The lung bases are clear. CTA PELVIS/RUNOFFS: The common iliac arteries, internal iliac arteries and external iliac arteries are patent. Atherosclerotic disease is present. The common femoral arteries, superficial femoral arteries and profunda femoral arteries are patent. Atherosclerotic disease is present. No evidence of high-grade stenosis or occlusion. 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 There is poor runoff in the distal lower extremities primarily through the posterior tibial arteries. There is generalized anasarca.     Impression: 1. No evidence of abdominal aortic aneurysm. No evidence of high-grade stenosis in the abdominal aorta or iliac vessels. 2. 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 There is poor distal runoff in the lower extremities primarily through the posterior tibial arteries. Electronically Signed: Emeka Jewell MD  10/10/2024 10:36 PM EDT  Workstation ID: [id number]     Cardiology    Laboratory    Results from last 7 days   Lab Units 10/11/24  0829 10/10/24  2322 10/09/24  2329 10/09/24  1440   WBC 10*3/mm3 13.34* 13.75* 8.40 6.70   HEMOGLOBIN g/dL 8.2* 7.7* 8.6* 9.3*   HEMATOCRIT % 27.4* 26.4* 30.5* 32.6*   PLATELETS 10*3/mm3 470* 504* 578* 376     Results from last 7 days   Lab Units 10/11/24  0829 10/10/24  2322 10/09/24  2329 10/09/24  1527   SODIUM mmol/L 137 135* 138 139   POTASSIUM mmol/L 4.1 4.0 4.0 4.1   CHLORIDE mmol/L 102 101 101 102   CO2 mmol/L 24.3 25.2 28.7 29.0   BUN mg/dL 21 22 22 21   CREATININE mg/dL 1.00 1.12* 1.14* 1.03*   GLUCOSE mg/dL 91 113* 123* 87   ALBUMIN g/dL  --   --   --  2.9*   BILIRUBIN mg/dL  --   --   --  0.2   ALK PHOS U/L  --   --   --  220*   AST (SGOT) U/L  --   --   --  36*   ALT (SGPT) U/L  --   --   --  14   CALCIUM mg/dL 8.1* 8.0* 8.5* 8.7     Results from last 7 days   Lab Units 10/09/24  1527   CK TOTAL U/L 76     Results from last 7  days   Lab Units 10/09/24  2329   SED RATE mm/hr 69*         Microbiology   Microbiology Results (last 10 days)       Procedure Component Value - Date/Time    Wound Culture - Wound, Leg, Right [[id number]] Collected: 10/10/24 1429    Lab Status: Preliminary result Specimen: Wound from Leg, Right Updated: 10/11/24 0718     Wound Culture Growth present, too young to evaluate     Gram Stain Rare (1+) WBCs per low power field      Few (2+) Gram negative bacilli    Blood Culture - Blood, Arm, Left [[id number]]  (Normal) Collected: 10/09/24 1440    Lab Status: Preliminary result Specimen: Blood from Arm, Left Updated: 10/11/24 1445     Blood Culture No growth at 2 days    Blood Culture - Blood, Arm, Right [[id number]]  (Normal) Collected: 10/09/24 1440    Lab Status: Preliminary result Specimen: Blood from Arm, Right Updated: 10/11/24 1445     Blood Culture No growth at 2 days            Medication Review:       Schedule Meds  buPROPion XL, 150 mg, Oral, Daily  cefTAZidime, 2,000 mg, Intravenous, Q12H  cetirizine, 10 mg, Oral, Daily  fentaNYL, 1 patch, Transdermal, Q72H   And  Check Fentanyl Patch Placement, 1 each, Does not apply, Q12H  cilostazol, 100 mg, Oral, BID  citalopram, 20 mg, Oral, Daily  digoxin, 125 mcg, Oral, Daily  [START ON 10/12/2024] enoxaparin, 1 mg/kg, Subcutaneous, Q12H  ferrous sulfate, 324 mg, Oral, Daily With Breakfast  furosemide, 40 mg, Oral, Daily  levothyroxine, 50 mcg, Oral, Q AM  metoprolol tartrate, 25 mg, Oral, Q6H  multivitamin, 1 tablet, Oral, Daily  pantoprazole, 40 mg, Oral, Daily  polyethylene glycol, 17 g, Oral, Daily  [Held by provider] rivaroxaban, 15 mg, Oral, Daily  sodium chloride, 10 mL, Intravenous, Q12H  vancomycin, 1,000 mg, Intravenous, Q24H  vitamin B-12, 1,000 mcg, Oral, Daily  [START ON 10/12/2024] vitamin D, 50,000 Units, Oral, Weekly  zinc sulfate, 220 mg, Oral, Daily        Infusion Meds  Pharmacy to dose vancomycin,         PRN Meds    acetaminophen **OR** acetaminophen  **OR** acetaminophen    Calcium Replacement - Follow Nurse / BPA Driven Protocol    influenza vaccine    Magnesium Standard Dose Replacement - Follow Nurse / BPA Driven Protocol    melatonin    Morphine    ondansetron ODT **OR** ondansetron    oxyCODONE    Pharmacy to dose vancomycin    Phosphorus Replacement - Follow Nurse / BPA Driven Protocol    Potassium Replacement - Follow Nurse / BPA Driven Protocol    [COMPLETED] Insert Peripheral IV **AND** sodium chloride    sodium chloride        Assessment & Plan       Antimicrobial Therapy   1.  IV ceftazidime        2.  IV vancomycin        3.        4.        5.            Assessment     Right leg wound infection with green drainage concerning for Pseudomonas infection.  Culture pending     Chronic bilateral lower extremity wounds concerning for underlying peripheral vascular disease.  CTA showed bilateral popliteal artery occlusions     Recent hospital admission in September 2024 for right leg wound infection s/p debridement by vascular surgery service.     Patient is legally blind     Plan     Continue IV ceftazidime 2 g every 8 hours  Continue IV vancomycin-pharmacy to monitor and dose  Wound culture of the right leg-pending  Vascular surgery considering a right angiogram with intervention and debridement on Monday  Continue supportive care  A.m. labs  Case discussed with patient and family at bedside      AZAEL Nguyen  10/11/24  15:27 EDT    Note is dictated utilizing voice recognition software/Dragon

## 2024-10-11 NOTE — PROGRESS NOTES
[facility] Vascular Surgery Progress Note    Name: Fallon Nur ADMIT: 10/9/2024   : 1945  PCP: Francine Hampton MD    MRN: [id number] LOS: 1 days   AGE/SEX: 79 y.o. female  ROOM: [address] [place] [address]    CC: PAD with wounds    Subjective     Patient resting in bed.  Understands plan for angiogram and wound debridement on Monday.    Objective     Scheduled Medications:   buPROPion XL, 150 mg, Oral, Daily  cefTAZidime, 2,000 mg, Intravenous, Q12H  cetirizine, 10 mg, Oral, Daily  fentaNYL, 1 patch, Transdermal, Q72H   And  Check Fentanyl Patch Placement, 1 each, Does not apply, Q12H  cilostazol, 100 mg, Oral, BID  citalopram, 20 mg, Oral, Daily  digoxin, 125 mcg, Oral, Daily  ferrous sulfate, 324 mg, Oral, Daily With Breakfast  furosemide, 40 mg, Oral, Daily  levothyroxine, 50 mcg, Oral, Q AM  metoprolol tartrate, 25 mg, Oral, Q6H  multivitamin, 1 tablet, Oral, Daily  polyethylene glycol, 17 g, Oral, Daily  sodium chloride, 10 mL, Intravenous, Q12H  zinc sulfate, 220 mg, Oral, Daily        Active Infusions:  heparin, 12 Units/kg/hr, Last Rate: 14 Units/kg/hr (10/11/24 0110)  Pharmacy to dose vancomycin,         As Needed Medications:    acetaminophen **OR** acetaminophen **OR** acetaminophen    Calcium Replacement - Follow Nurse / BPA Driven Protocol    heparin    heparin    influenza vaccine    Magnesium Standard Dose Replacement - Follow Nurse / BPA Driven Protocol    melatonin    Morphine    ondansetron ODT **OR** ondansetron    oxyCODONE    Pharmacy to dose vancomycin    Phosphorus Replacement - Follow Nurse / BPA Driven Protocol    Potassium Replacement - Follow Nurse / BPA Driven Protocol    [COMPLETED] Insert Peripheral IV **AND** sodium chloride    sodium chloride    Vancomycin Pharmacy Intermittent/Pulse Dosing    Vital Signs  Vitals:    10/11/24 0729   BP: 96/69   Pulse: 114   Resp: 16   Temp:    SpO2:       Body mass index is 25.51 kg/m².     Physical Exam:  NAD, alert and  "oriented  RRR  Lungs clear  Abd soft, benign  Vascular: Doppler signals present to the bilateral PT  Skin: Wounds to bilateral lower extremities    Right leg wound      Left leg wound      Results Review:     CBC    Results from last 7 days   Lab Units 10/10/24  2322 10/09/24  2329 10/09/24  1440   WBC 10*3/mm3 13.75* 8.40 6.70   HEMOGLOBIN g/dL 7.7* 8.6* 9.3*   PLATELETS 10*3/mm3 504* 578* 376     BMP   Results from last 7 days   Lab Units 10/10/24  2322 10/09/24  2329 10/09/24  1527   SODIUM mmol/L 135* 138 139   POTASSIUM mmol/L 4.0 4.0 4.1   CHLORIDE mmol/L 101 101 102   CO2 mmol/L 25.2 28.7 29.0   BUN mg/dL 22 22 21   CREATININE mg/dL 1.12* 1.14* 1.03*   GLUCOSE mg/dL 113* 123* 87   MAGNESIUM mg/dL  --  2.4 2.4     HbA1C No results found for: \"HGBA1C\"  Infection   Results from last 7 days   Lab Units 10/10/24  1429 10/09/24  1440   BLOODCX   --  No growth at 24 hours  No growth at 24 hours   WOUNDCX  Growth present, too young to evaluate  --      Radiology(recent) CT Angio Abdominal Aorta Bilateral Iliofem Runoff    Result Date: 10/10/2024  Impression: 1. No evidence of abdominal aortic aneurysm. No evidence of high-grade stenosis in the abdominal aorta or iliac vessels. 2. 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 There is poor distal runoff in the lower extremities primarily through the posterior tibial arteries. Electronically Signed: Emeka Jewell MD  10/10/2024 10:36 PM EDT  Workstation ID: [id number]    XR Tibia Fibula 2 View Right    Result Date: 10/9/2024  Impression: Soft tissue defect again identified. No underlying bony abnormality. Electronically Signed: Jazlyn Ramon MD  10/9/2024 1:45 PM EDT  Workstation ID: [id number]     VTE Prophylaxis:  Pharmacologic VTE prophylaxis orders are present.         Problems:    Active Hospital Problems:  Active Hospital Problems    Diagnosis  POA    **Wound of lower extremity [S81.802E]  Yes      Resolved Hospital Problems   No resolved problems to display.    "     Assessment & Plan   Assessment / Plan     Wound of lower extremity      79-year-old female who presents with worsening pain and swelling to the right leg  Right leg wound with worsening appearance and malodorous  CTA with AIF runoff shows bilateral popliteal artery occlusions  Plan for right leg angiogram with possible intervention and right leg wound debridement on Monday with Dr. Hazel  Recommend starting aspirin and statin  Xarelto is on hold  Continue local wound care per wound care nurse recommendations  Continue antibiotics per ID  Supportive care per primary team            AZAEL Castañeda  [facility] Vascular Surgery  10/11/24   O: [phone]  F: [phone]

## 2024-10-11 NOTE — PLAN OF CARE
Problem: Adult Inpatient Plan of Care  Goal: Plan of Care Review  Outcome: Progressing  Flowsheets (Taken 10/10/2024 1347 by Leda Goyla OT)  Outcome Evaluation: Fallon Nur is a 79 y.o. female with a CMH of bilateral lower leg wounds, A.FIB, HTN, and is legally blind who presented to [facility] on 10/9/2024 with increasing pain and swelling to bilateral lower legs. Patient reported the right leg is worse than the left. Patient was admitted to [facility] on 9/12/24 for traumatic bilateral lower leg wounds after a fall and discharged on 9/21/24 after wound debridement and management.  Pt lives at home with granddaughter and daughter that she cares for.  She uses walker for mobility within home.  Per patient and family members she has declined significantly over the past 2 months due to pain and increasing weakness. This date pt requries assist for transfer and is unable to complete lower body dressing.  Pt appears to be far below basline level of function recommend SNF at discharge.  Goal: Patient-Specific Goal (Individualized)  Outcome: Progressing  Goal: Absence of Hospital-Acquired Illness or Injury  Outcome: Progressing  Intervention: Identify and Manage Fall Risk  Recent Flowsheet Documentation  Taken 10/11/2024 0415 by Francine Tobar LPN  Safety Promotion/Fall Prevention:   safety round/check completed   nonskid shoes/slippers when out of bed   lighting adjusted   activity supervised   assistive device/personal items within reach   clutter free environment maintained  Taken 10/10/2024 2118 by Francine Tobar LPN  Safety Promotion/Fall Prevention:   safety round/check completed   nonskid shoes/slippers when out of bed   lighting adjusted   clutter free environment maintained   assistive device/personal items within reach   activity supervised  Intervention: Prevent Skin Injury  Recent Flowsheet Documentation  Taken 10/11/2024 0415 by Francine Tobar LPN  Body Position: dangle,  side of bed  Skin Protection:   adhesive use limited   tubing/devices free from skin contact  Taken 10/10/2024 2118 by Francine Tobar LPN  Body Position:   weight shifting   position changed independently  Intervention: Prevent and Manage VTE (Venous Thromboembolism) Risk  Recent Flowsheet Documentation  Taken 10/10/2024 2118 by Francine Tobar LPN  Activity Management: activity encouraged  Intervention: Prevent Infection  Recent Flowsheet Documentation  Taken 10/10/2024 2118 by Francine Tobar LPN  Infection Prevention:   rest/sleep promoted   hand hygiene promoted   single patient room provided  Goal: Optimal Comfort and Wellbeing  Outcome: Progressing  Intervention: Monitor Pain and Promote Comfort  Recent Flowsheet Documentation  Taken 10/10/2024 2118 by Mersmann, Francine, LPN  Pain Management Interventions:   quiet environment facilitated   see MAR  Goal: Readiness for Transition of Care  Outcome: Progressing   Goal Outcome Evaluation:

## 2024-10-11 NOTE — PROGRESS NOTES
[facility] MEDICINE SERVICE  DAILY PROGRESS NOTE    NAME: Fallon Nur  : 1945  MRN: [id number]      LOS: 1 day     PROVIDER OF SERVICE: Timothy Duane Brammell, MD    Chief Complaint: Wound of lower extremity    Subjective:     Interval History:  History taken from: patient    Patient with improved pain complaints at present.  Much worse pain with leg elevation needing to sleep with him dependent over the bed.  Denies any shortness of breath.  Eating some.  Denies any urinary or bowel issues.  Difficulty with phlebotomies and heparin IV.  Any other additional acute issues.        Review of Systems:   Review of Systems   All other systems reviewed and are negative.      Objective:     Vital Signs  Temp:  [98.1 °F (36.7 °C)-99.2 °F (37.3 °C)] 98.5 °F (36.9 °C)  Heart Rate:  [100-114] 114  Resp:  [13-17] 16  BP: ()/(38-69) 96/69   Body mass index is 25.51 kg/m².    Physical Exam  Physical Exam  Vitals reviewed.   Constitutional:       General: She is not in acute distress.     Appearance: Normal appearance.   HENT:      Head: Normocephalic.   Cardiovascular:      Rate and Rhythm: Normal rate. Rhythm irregular.   Pulmonary:      Effort: Pulmonary effort is normal.      Breath sounds: Normal breath sounds.   Abdominal:      Palpations: Abdomen is soft.      Tenderness: There is no abdominal tenderness.   Skin:     Comments: Dressings in place   Neurological:      Mental Status: She is alert.            Diagnostic Data    Results from last 7 days   Lab Units 10/10/24  2322 10/09/24  2329 10/09/24  1527   WBC 10*3/mm3 13.75*   < >  --    HEMOGLOBIN g/dL 7.7*   < >  --    HEMATOCRIT % 26.4*   < >  --    PLATELETS 10*3/mm3 504*   < >  --    GLUCOSE mg/dL 113*   < > 87   CREATININE mg/dL 1.12*   < > 1.03*   BUN mg/dL 22   < > 21   SODIUM mmol/L 135*   < > 139   POTASSIUM mmol/L 4.0   < > 4.1   AST (SGOT) U/L  --   --  36*   ALT (SGPT) U/L  --   --  14   ALK PHOS U/L  --   --  220*   BILIRUBIN mg/dL   --   --  0.2   ANION GAP mmol/L 8.8   < > 8.0    < > = values in this interval not displayed.       CT Angio Abdominal Aorta Bilateral Iliofem Runoff    Result Date: 10/10/2024  Impression: 1. No evidence of abdominal aortic aneurysm. No evidence of high-grade stenosis in the abdominal aorta or iliac vessels. 2. 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 There is poor distal runoff in the lower extremities primarily through the posterior tibial arteries. Electronically Signed: Emeka Jewell MD  10/10/2024 10:36 PM EDT  Workstation ID: [id number]    XR Tibia Fibula 2 View Right    Result Date: 10/9/2024  Impression: Soft tissue defect again identified. No underlying bony abnormality. Electronically Signed: Jazlyn Ramon MD  10/9/2024 1:45 PM EDT  Workstation ID: [id number]           Assessment:   Lower extremity cellulitis bilateral, worse on right  Peripheral arterial disease noted distal disease by CT angiogram.  Pain control/rest pain  Atrial fibs chronic  Chronic anticoagulation currently on heparin  Vision loss  Anemia  CKD 3  Chronic diastolic congestive heart failure      Plan: Ongoing antibiotic coverage.  Anticoagulation as per vascular surgery.  Ongoing pain management at present.  Follow-up labs.   Wound care.      Active and Resolved Problems  Active Hospital Problems    Diagnosis  POA    **Wound of lower extremity [S81.809A]  Yes      Resolved Hospital Problems   No resolved problems to display.           VTE Prophylaxis:  Pharmacologic VTE prophylaxis orders are present.             Disposition Planning:     Barriers to Discharge:cellulitis/ wounds/ antibiotics/ pain control  Anticipated Date of Discharge: 10/15  Place of Discharge: home      Time: 30 minutes     Code Status and Medical Interventions: CPR (Attempt to Resuscitate); Full Support   Ordered at: 10/09/24 3572     Level Of Support Discussed With:    Patient     Code Status (Patient has no pulse and is not breathing):    CPR (Attempt to Resuscitate)      Medical Interventions (Patient has pulse or is breathing):    Full Support       Signature: Electronically signed by Timothy Duane Brammell, MD, 10/11/24, 08:36 EDT.  [facility] Hospitalist Team

## 2024-10-11 NOTE — PROGRESS NOTES
"Pharmacy Antimicrobial Dosing Service    Subjective:  Fallon Nur is a 79 y.o.female admitted with wound of lower extremity. Pharmacy has been consulted to dose Vancomycin for possible SSTi.    PMH: wounds of lower extremity, had recent wound debridement 3 weeks ago of right leg wound      Assessment/Plan    1. Day #2 Vancomycin: Goal -600 mcg*h/mL. Vancomycin 1250mg loading dose (19mg/kg ABW) followed by random level of 10/10 =16.2 @2322.  Will schedule pt on Vanc 1000mg (15 mg/kg ABW) with projected GEI=812 mcg*h/mL and trough 10/13 @1400.     2. Day #2 Ceftazidime: 2000mg IV q12h for estCrCl 30-59 mL/min.    Will continue to monitor drug levels, renal function, culture and sensitivities, and patient clinical status.       Objective:  Relevant clinical data and objective history reviewed:  160 cm (63\")   65.3 kg (144 lb)   Ideal body weight: 52.4 kg (115 lb 8.3 oz)  Adjusted ideal body weight: 57.6 kg (126 lb 14.6 oz)  Body mass index is 25.51 kg/m².    Results from last 7 days   Lab Units 10/10/24  2322   VANCOMYCIN RM mcg/mL 16.20     Results from last 7 days   Lab Units 10/11/24  0829 10/10/24  2322 10/09/24  2329   CREATININE mg/dL 1.00 1.12* 1.14*     Estimated Creatinine Clearance: 41.5 mL/min (by C-G formula based on SCr of 1 mg/dL).  I/O last 3 completed shifts:  In: 990 [P.O.:740; IV Piggyback:250]  Out: -     Results from last 7 days   Lab Units 10/11/24  0829 10/10/24  2322 10/09/24  2329   WBC 10*3/mm3 13.34* 13.75* 8.40     Temperature    10/10/24 2300 10/11/24 0405 10/11/24 1101   Temp: 98.1 °F (36.7 °C) 98.5 °F (36.9 °C) 98.1 °F (36.7 °C)     Baseline culture/source/susceptibility:  Microbiology Results (last 10 days)       Procedure Component Value - Date/Time    Wound Culture - Wound, Leg, Right [[id number]] Collected: 10/10/24 1429    Lab Status: Preliminary result Specimen: Wound from Leg, Right Updated: 10/11/24 0718     Wound Culture Growth present, too young to evaluate     Gram " Stain Rare (1+) WBCs per low power field      Few (2+) Gram negative bacilli    Blood Culture - Blood, Arm, Left [[id number]]  (Normal) Collected: 10/09/24 1440    Lab Status: Preliminary result Specimen: Blood from Arm, Left Updated: 10/10/24 1446     Blood Culture No growth at 24 hours    Blood Culture - Blood, Arm, Right [[id number]]  (Normal) Collected: 10/09/24 1440    Lab Status: Preliminary result Specimen: Blood from Arm, Right Updated: 10/10/24 1446     Blood Culture No growth at 24 hours            Ene Aguilar RPH  10/11/24 13:28 EDT

## 2024-10-11 NOTE — SIGNIFICANT NOTE
10/11/24 1013   OTHER   Discipline physical therapist   Rehab Time/Intention   Session Not Performed patient/family declined evaluation  (pt and family declined. said she is just now getting pain under control and does not want to participate at this time. agreed to try again tomorrow)   Therapy Assessment/Plan (PT)   Criteria for Skilled Interventions Met (PT) yes;meets criteria;skilled treatment is necessary   Recommendation   PT - Next Appointment 10/12/24

## 2024-10-11 NOTE — PLAN OF CARE
Problem: Adult Inpatient Plan of Care  Goal: Plan of Care Review  Outcome: Progressing  Flowsheets (Taken 10/11/2024 1310)  Progress: no change  Plan of Care Reviewed With: patient  Goal: Patient-Specific Goal (Individualized)  Outcome: Progressing  Goal: Absence of Hospital-Acquired Illness or Injury  Outcome: Progressing  Intervention: Identify and Manage Fall Risk  Recent Flowsheet Documentation  Taken 10/11/2024 0959 by Connie Vasquez RN  Safety Promotion/Fall Prevention:   assistive device/personal items within reach   clutter free environment maintained   fall prevention program maintained   nonskid shoes/slippers when out of bed   safety round/check completed   room organization consistent  Taken 10/11/2024 0800 by Connie Vasquez RN  Safety Promotion/Fall Prevention:   assistive device/personal items within reach   clutter free environment maintained   fall prevention program maintained   nonskid shoes/slippers when out of bed   safety round/check completed   room organization consistent  Intervention: Prevent Skin Injury  Recent Flowsheet Documentation  Taken 10/11/2024 0800 by Connie Vasquez RN  Body Position: dangle, side of bed  Skin Protection:   adhesive use limited   tubing/devices free from skin contact  Intervention: Prevent and Manage VTE (Venous Thromboembolism) Risk  Recent Flowsheet Documentation  Taken 10/11/2024 0800 by Connie Vasquez RN  Activity Management: activity encouraged  Intervention: Prevent Infection  Recent Flowsheet Documentation  Taken 10/11/2024 0959 by Connie Vasquez RN  Infection Prevention:   environmental surveillance performed   hand hygiene promoted   rest/sleep promoted   single patient room provided  Taken 10/11/2024 0800 by Connie Vasquez RN  Infection Prevention:   environmental surveillance performed   hand hygiene promoted   rest/sleep promoted   single patient room provided  Goal: Optimal Comfort and Wellbeing  Outcome: Progressing  Intervention: Monitor Pain and  Promote Comfort  Recent Flowsheet Documentation  Taken 10/11/2024 0800 by Connie Vasquez RN  Pain Management Interventions:   see MAR   quiet environment facilitated  Intervention: Provide Person-Centered Care  Recent Flowsheet Documentation  Taken 10/11/2024 0800 by Connie Vasquez RN  Trust Relationship/Rapport:   care explained   thoughts/feelings acknowledged  Goal: Readiness for Transition of Care  Outcome: Progressing     Problem: Fall Injury Risk  Goal: Absence of Fall and Fall-Related Injury  Outcome: Progressing  Intervention: Identify and Manage Contributors  Recent Flowsheet Documentation  Taken 10/11/2024 0959 by Connie Vasquez RN  Medication Review/Management: medications reviewed  Taken 10/11/2024 0800 by Connie Vasquez RN  Medication Review/Management: medications reviewed  Intervention: Promote Injury-Free Environment  Recent Flowsheet Documentation  Taken 10/11/2024 0959 by Connie Vasquez RN  Safety Promotion/Fall Prevention:   assistive device/personal items within reach   clutter free environment maintained   fall prevention program maintained   nonskid shoes/slippers when out of bed   safety round/check completed   room organization consistent  Taken 10/11/2024 0800 by Connie Vasquez RN  Safety Promotion/Fall Prevention:   assistive device/personal items within reach   clutter free environment maintained   fall prevention program maintained   nonskid shoes/slippers when out of bed   safety round/check completed   room organization consistent     Problem: Skin Injury Risk Increased  Goal: Skin Health and Integrity  Outcome: Progressing  Intervention: Optimize Skin Protection  Recent Flowsheet Documentation  Taken 10/11/2024 0800 by Connie Vasquez RN  Pressure Reduction Techniques: weight shift assistance provided  Head of Bed (HOB) Positioning: HOB elevated  Pressure Reduction Devices: pressure-redistributing mattress utilized  Skin Protection:   adhesive use limited   tubing/devices free from skin  contact     Problem: Pain Acute  Goal: Acceptable Pain Control and Functional Ability  Outcome: Progressing  Intervention: Prevent or Manage Pain  Recent Flowsheet Documentation  Taken 10/11/2024 0959 by Connie Vasquez RN  Medication Review/Management: medications reviewed  Taken 10/11/2024 0800 by Connie Vasquez RN  Sensory Stimulation Regulation: care clustered  Medication Review/Management: medications reviewed  Intervention: Develop Pain Management Plan  Recent Flowsheet Documentation  Taken 10/11/2024 0800 by Connie Vasquez RN  Pain Management Interventions:   see MAR   quiet environment facilitated  Intervention: Optimize Psychosocial Wellbeing  Recent Flowsheet Documentation  Taken 10/11/2024 0800 by Connie Vasquez RN  Diversional Activities:   television   smartphone     Problem: Impaired Wound Healing  Goal: Optimal Wound Healing  Outcome: Progressing  Intervention: Promote Wound Healing  Recent Flowsheet Documentation  Taken 10/11/2024 0800 by Connie Vasquez RN  Activity Management: activity encouraged  Pain Management Interventions:   see MAR   quiet environment facilitated     Problem: Dysrhythmia  Goal: Normalized Cardiac Rhythm  Outcome: Progressing     Problem: Mobility Impairment  Goal: Optimal Mobility  Outcome: Progressing  Intervention: Optimize Mobility  Recent Flowsheet Documentation  Taken 10/11/2024 0800 by Connie Vasquez RN  Activity Management: activity encouraged     Problem: Activity Intolerance  Goal: Enhanced Capacity and Energy  Outcome: Progressing  Intervention: Optimize Activity Tolerance  Recent Flowsheet Documentation  Taken 10/11/2024 0800 by Connie Vasquez RN  Activity Management: activity encouraged     Problem: Adjustment to Illness (Sepsis/Septic Shock)  Goal: Optimal Coping  Outcome: Progressing  Intervention: Optimize Psychosocial Adjustment to Illness  Recent Flowsheet Documentation  Taken 10/11/2024 0800 by Connie Vasquez RN  Family/Support System Care:   caregiver stress  acknowledged   support provided     Problem: Bleeding (Sepsis/Septic Shock)  Goal: Absence of Bleeding  Outcome: Progressing     Problem: Glycemic Control Impaired (Sepsis/Septic Shock)  Goal: Blood Glucose Level Within Desired Range  Outcome: Progressing     Problem: Infection Progression (Sepsis/Septic Shock)  Goal: Absence of Infection Signs and Symptoms  Outcome: Progressing  Intervention: Initiate Sepsis Management  Recent Flowsheet Documentation  Taken 10/11/2024 0959 by Connie Vasquez, RN  Infection Prevention:   environmental surveillance performed   hand hygiene promoted   rest/sleep promoted   single patient room provided  Isolation Precautions: precautions maintained  Taken 10/11/2024 0800 by Connie Vasquez RN  Infection Prevention:   environmental surveillance performed   hand hygiene promoted   rest/sleep promoted   single patient room provided  Isolation Precautions: precautions maintained  Intervention: Promote Recovery  Recent Flowsheet Documentation  Taken 10/11/2024 0800 by Connie Vasquez, RN  Activity Management: activity encouraged     Problem: Nutrition Impaired (Sepsis/Septic Shock)  Goal: Optimal Nutrition Intake  Outcome: Progressing   Goal Outcome Evaluation:  Plan of Care Reviewed With: patient        Progress: no change

## 2024-10-12 LAB
ANION GAP SERPL CALCULATED.3IONS-SCNC: 8.9 MMOL/L (ref 5–15)
BASOPHILS # BLD AUTO: 0.04 10*3/MM3 (ref 0–0.2)
BASOPHILS NFR BLD AUTO: 0.3 % (ref 0–1.5)
BUN SERPL-MCNC: 21 MG/DL (ref 8–23)
BUN/CREAT SERPL: 22.1 (ref 7–25)
CALCIUM SPEC-SCNC: 8.2 MG/DL (ref 8.6–10.5)
CHLORIDE SERPL-SCNC: 100 MMOL/L (ref 98–107)
CO2 SERPL-SCNC: 25.1 MMOL/L (ref 22–29)
CREAT SERPL-MCNC: 0.95 MG/DL (ref 0.57–1)
DEPRECATED RDW RBC AUTO: 50.3 FL (ref 37–54)
EGFRCR SERPLBLD CKD-EPI 2021: 61.1 ML/MIN/1.73
EOSINOPHIL # BLD AUTO: 0.27 10*3/MM3 (ref 0–0.4)
EOSINOPHIL NFR BLD AUTO: 2 % (ref 0.3–6.2)
ERYTHROCYTE [DISTWIDTH] IN BLOOD BY AUTOMATED COUNT: 16.1 % (ref 12.3–15.4)
GLUCOSE SERPL-MCNC: 105 MG/DL (ref 65–99)
HCT VFR BLD AUTO: 26.9 % (ref 34–46.6)
HGB BLD-MCNC: 7.9 G/DL (ref 12–15.9)
IMM GRANULOCYTES # BLD AUTO: 0.08 10*3/MM3 (ref 0–0.05)
IMM GRANULOCYTES NFR BLD AUTO: 0.6 % (ref 0–0.5)
LYMPHOCYTES # BLD AUTO: 0.85 10*3/MM3 (ref 0.7–3.1)
LYMPHOCYTES NFR BLD AUTO: 6.2 % (ref 19.6–45.3)
MCH RBC QN AUTO: 25.2 PG (ref 26.6–33)
MCHC RBC AUTO-ENTMCNC: 29.4 G/DL (ref 31.5–35.7)
MCV RBC AUTO: 85.7 FL (ref 79–97)
MONOCYTES # BLD AUTO: 1.13 10*3/MM3 (ref 0.1–0.9)
MONOCYTES NFR BLD AUTO: 8.2 % (ref 5–12)
NEUTROPHILS NFR BLD AUTO: 11.34 10*3/MM3 (ref 1.7–7)
NEUTROPHILS NFR BLD AUTO: 82.7 % (ref 42.7–76)
NRBC BLD AUTO-RTO: 0 /100 WBC (ref 0–0.2)
PLATELET # BLD AUTO: 515 10*3/MM3 (ref 140–450)
PMV BLD AUTO: 10.3 FL (ref 6–12)
POTASSIUM SERPL-SCNC: 4 MMOL/L (ref 3.5–5.2)
RBC # BLD AUTO: 3.14 10*6/MM3 (ref 3.77–5.28)
SODIUM SERPL-SCNC: 134 MMOL/L (ref 136–145)
WBC NRBC COR # BLD AUTO: 13.71 10*3/MM3 (ref 3.4–10.8)

## 2024-10-12 PROCEDURE — 25010000002 MORPHINE PER 10 MG: Performed by: HOSPITALIST

## 2024-10-12 PROCEDURE — 80048 BASIC METABOLIC PNL TOTAL CA: CPT | Performed by: NURSE PRACTITIONER

## 2024-10-12 PROCEDURE — 25010000002 CEFTAZIDIME 2 G RECONSTITUTED SOLUTION 1 EACH VIAL: Performed by: NURSE PRACTITIONER

## 2024-10-12 PROCEDURE — 99232 SBSQ HOSP IP/OBS MODERATE 35: CPT | Performed by: SURGERY

## 2024-10-12 PROCEDURE — 85025 COMPLETE CBC W/AUTO DIFF WBC: CPT | Performed by: NURSE PRACTITIONER

## 2024-10-12 PROCEDURE — 25010000002 VANCOMYCIN 1 G RECONSTITUTED SOLUTION 1 EACH VIAL: Performed by: NURSE PRACTITIONER

## 2024-10-12 PROCEDURE — 25010000002 ENOXAPARIN PER 10 MG: Performed by: STUDENT IN AN ORGANIZED HEALTH CARE EDUCATION/TRAINING PROGRAM

## 2024-10-12 PROCEDURE — 25810000003 SODIUM CHLORIDE 0.9 % SOLUTION 250 ML FLEX CONT: Performed by: NURSE PRACTITIONER

## 2024-10-12 PROCEDURE — 25810000003 SODIUM CHLORIDE 0.9 % SOLUTION: Performed by: NURSE PRACTITIONER

## 2024-10-12 RX ORDER — SODIUM CHLORIDE 9 MG/ML
100 INJECTION, SOLUTION INTRAVENOUS CONTINUOUS
Status: DISPENSED | OUTPATIENT
Start: 2024-10-12 | End: 2024-10-12

## 2024-10-12 RX ADMIN — DIGOXIN 125 MCG: 125 TABLET ORAL at 12:21

## 2024-10-12 RX ADMIN — ENOXAPARIN SODIUM 70 MG: 100 INJECTION SUBCUTANEOUS at 08:19

## 2024-10-12 RX ADMIN — METOPROLOL TARTRATE 25 MG: 25 TABLET, FILM COATED ORAL at 18:09

## 2024-10-12 RX ADMIN — SODIUM CHLORIDE 100 ML/HR: 9 INJECTION, SOLUTION INTRAVENOUS at 03:01

## 2024-10-12 RX ADMIN — FERROUS SULFATE TAB EC 324 MG (65 MG FE EQUIVALENT) 324 MG: 324 (65 FE) TABLET DELAYED RESPONSE at 08:20

## 2024-10-12 RX ADMIN — OXYCODONE 5 MG: 5 TABLET ORAL at 22:58

## 2024-10-12 RX ADMIN — CEFTAZIDIME 2000 MG: 2 INJECTION, POWDER, FOR SOLUTION INTRAVENOUS at 15:53

## 2024-10-12 RX ADMIN — BUPROPION HYDROCHLORIDE 150 MG: 150 TABLET, EXTENDED RELEASE ORAL at 08:20

## 2024-10-12 RX ADMIN — MORPHINE SULFATE 2 MG: 2 INJECTION, SOLUTION INTRAMUSCULAR; INTRAVENOUS at 20:34

## 2024-10-12 RX ADMIN — OXYCODONE 5 MG: 5 TABLET ORAL at 08:20

## 2024-10-12 RX ADMIN — MORPHINE SULFATE 2 MG: 2 INJECTION, SOLUTION INTRAMUSCULAR; INTRAVENOUS at 01:31

## 2024-10-12 RX ADMIN — METOPROLOL TARTRATE 25 MG: 25 TABLET, FILM COATED ORAL at 12:21

## 2024-10-12 RX ADMIN — Medication 10 ML: at 08:21

## 2024-10-12 RX ADMIN — SODIUM CHLORIDE 1000 MG: 9 INJECTION, SOLUTION INTRAVENOUS at 15:52

## 2024-10-12 RX ADMIN — CILOSTAZOL 100 MG: 100 TABLET ORAL at 10:02

## 2024-10-12 RX ADMIN — ERGOCALCIFEROL 50000 UNITS: 1.25 CAPSULE ORAL at 10:02

## 2024-10-12 RX ADMIN — CYANOCOBALAMIN TAB 500 MCG 1000 MCG: 500 TAB at 08:20

## 2024-10-12 RX ADMIN — Medication 10 ML: at 20:35

## 2024-10-12 RX ADMIN — CITALOPRAM HYDROBROMIDE 20 MG: 20 TABLET ORAL at 08:21

## 2024-10-12 RX ADMIN — LEVOTHYROXINE SODIUM 50 MCG: 0.05 TABLET ORAL at 06:11

## 2024-10-12 RX ADMIN — PANTOPRAZOLE SODIUM 40 MG: 40 TABLET, DELAYED RELEASE ORAL at 08:20

## 2024-10-12 RX ADMIN — THERA TABS 1 TABLET: TAB at 08:20

## 2024-10-12 RX ADMIN — OXYCODONE 5 MG: 5 TABLET ORAL at 03:17

## 2024-10-12 RX ADMIN — FUROSEMIDE 40 MG: 40 TABLET ORAL at 08:20

## 2024-10-12 RX ADMIN — METOPROLOL TARTRATE 25 MG: 25 TABLET, FILM COATED ORAL at 08:21

## 2024-10-12 RX ADMIN — OXYCODONE 5 MG: 5 TABLET ORAL at 18:09

## 2024-10-12 RX ADMIN — Medication 220 MG: at 08:20

## 2024-10-12 RX ADMIN — CEFTAZIDIME 2000 MG: 2 INJECTION, POWDER, FOR SOLUTION INTRAVENOUS at 02:45

## 2024-10-12 RX ADMIN — CILOSTAZOL 100 MG: 100 TABLET ORAL at 20:34

## 2024-10-12 RX ADMIN — CETIRIZINE HYDROCHLORIDE 10 MG: 10 TABLET, FILM COATED ORAL at 08:20

## 2024-10-12 NOTE — PROGRESS NOTES
Name: Fallon Nur ADMIT: 10/9/2024   : 1945  PCP: Francine Hampton MD    MRN: [id number] LOS: 2 days   AGE/SEX: 79 y.o. female  ROOM:    [facility]    Chief Complaint   Patient presents with    Wound Check     CC: Bilateral lower extremity wounds with PVD  Subjective     79 y.o. female with bilateral lower extremity wounds right worse than left with known PVD.  Patient has plans for wound debridements on Monday with an attempted revascularization of the right leg.  She and her daughter understand risk benefits complications and are agreeable with the plan.    Review of Systems very sleepy on fentanyl    Objective     Scheduled Medications:   buPROPion XL, 150 mg, Oral, Daily  cefTAZidime, 2,000 mg, Intravenous, Q12H  cetirizine, 10 mg, Oral, Daily  fentaNYL, 1 patch, Transdermal, Q72H   And  Check Fentanyl Patch Placement, 1 each, Does not apply, Q12H  cilostazol, 100 mg, Oral, BID  citalopram, 20 mg, Oral, Daily  collagenase, 1 Application, Topical, Q24H  digoxin, 125 mcg, Oral, Daily  [Held by provider] enoxaparin, 1 mg/kg, Subcutaneous, Q12H  ferrous sulfate, 324 mg, Oral, Daily With Breakfast  furosemide, 40 mg, Oral, Daily  levothyroxine, 50 mcg, Oral, Q AM  metoprolol tartrate, 25 mg, Oral, Q6H  multivitamin, 1 tablet, Oral, Daily  mupirocin, 1 Application, Topical, Q24H  pantoprazole, 40 mg, Oral, Daily  polyethylene glycol, 17 g, Oral, Daily  [Held by provider] rivaroxaban, 15 mg, Oral, Daily  sodium chloride, 10 mL, Intravenous, Q12H  vancomycin, 1,000 mg, Intravenous, Q24H  vitamin B-12, 1,000 mcg, Oral, Daily  vitamin D, 50,000 Units, Oral, Weekly  zinc sulfate, 220 mg, Oral, Daily        Active Infusions:  Pharmacy to dose vancomycin,         As Needed Medications:    acetaminophen **OR** acetaminophen **OR** acetaminophen    Calcium Replacement - Follow Nurse / BPA Driven Protocol    influenza vaccine    Magnesium Standard Dose Replacement - Follow Nurse / BPA Driven Protocol     melatonin    Morphine    ondansetron ODT **OR** ondansetron    oxyCODONE    Pharmacy to dose vancomycin    Phosphorus Replacement - Follow Nurse / BPA Driven Protocol    Potassium Replacement - Follow Nurse / BPA Driven Protocol    [COMPLETED] Insert Peripheral IV **AND** sodium chloride    sodium chloride    Vital Signs  Vital Signs Patient Vitals for the past 24 hrs:   BP Temp Temp src Pulse Resp SpO2 Weight   10/12/24 1252 109/64 97.8 °F (36.6 °C) Oral 112 13 92 % --   10/12/24 0820 117/75 -- -- 106 -- -- --   10/12/24 0748 115/57 98.2 °F (36.8 °C) Oral 115 15 92 % --   10/12/24 0612 101/58 -- -- 113 -- -- --   10/12/24 0544 100/58 98.2 °F (36.8 °C) Oral 107 14 -- --   10/12/24 0500 -- -- -- -- -- -- 70.3 kg (154 lb 15.7 oz)   10/12/24 0111 129/65 98.4 °F (36.9 °C) Oral 112 18 99 % 70.3 kg (154 lb 15.7 oz)   10/11/24 2311 105/57 98 °F (36.7 °C) Oral 105 16 -- --   10/11/24 1920 101/59 98.3 °F (36.8 °C) Oral 82 17 91 % --   10/11/24 1558 112/60 98.2 °F (36.8 °C) Oral 90 17 99 % --     Vital Signs (range)  Temp:  [97.8 °F (36.6 °C)-98.4 °F (36.9 °C)] 97.8 °F (36.6 °C)  Heart Rate:  [] 112  Resp:  [13-18] 13  BP: (100-129)/(57-75) 109/64  I/O:  I/O last 3 completed shifts:  In: 180 [P.O.:180]  Out: 600 [Urine:600]  I/O:   Intake/Output Summary (Last 24 hours) at 10/12/2024 1537  Last data filed at 10/11/2024 1920  Gross per 24 hour   Intake 180 ml   Output --   Net 180 ml     BMI:  Body mass index is 27.45 kg/m².    Physical Exam:  Physical Exam   Lungs coarse and diminished  Extremities wrapped with gauze    Results Review:     CBC    Results from last 7 days   Lab Units 10/12/24  0027 10/11/24  0829 10/10/24  2322 10/09/24  2329 10/09/24  1440   WBC 10*3/mm3 13.71* 13.34* 13.75* 8.40 6.70   HEMOGLOBIN g/dL 7.9* 8.2* 7.7* 8.6* 9.3*   PLATELETS 10*3/mm3 515* 470* 504* 578* 376     BMP   Results from last 7 days   Lab Units 10/12/24  0027 10/11/24  0829 10/10/24  2322 10/09/24  2329 10/09/24  1527   SODIUM  "mmol/L 134* 137 135* 138 139   POTASSIUM mmol/L 4.0 4.1 4.0 4.0 4.1   CHLORIDE mmol/L 100 102 101 101 102   CO2 mmol/L 25.1 24.3 25.2 28.7 29.0   BUN mg/dL 21 21 22 22 21   CREATININE mg/dL 0.95 1.00 1.12* 1.14* 1.03*   GLUCOSE mg/dL 105* 91 113* 123* 87   MAGNESIUM mg/dL  --   --   --  2.4 2.4     Cr Clearance Estimated Creatinine Clearance: 45.2 mL/min (by C-G formula based on SCr of 0.95 mg/dL).  Coag   Results from last 7 days   Lab Units 10/11/24  0825 10/10/24  2322 10/10/24  1533 10/10/24  0846 10/10/24  0202 10/09/24  1527   INR   --   --   --   --   --  1.05   APTT seconds 66.2 52.6* 95.1* 32.5* 26.2* 27.4     HbA1C No results found for: \"HGBA1C\"  Blood Glucose No results found for: \"POCGLU\"  Infection   Results from last 7 days   Lab Units 10/10/24  1429 10/09/24  1440   BLOODCX   --  No growth at 3 days  No growth at 3 days   WOUNDCX  Heavy growth (4+) Gram Negative Bacilli*  --      CMP   Results from last 7 days   Lab Units 10/12/24  0027 10/11/24  0829 10/10/24  2322 10/09/24  2329 10/09/24  1527   SODIUM mmol/L 134* 137 135* 138 139   POTASSIUM mmol/L 4.0 4.1 4.0 4.0 4.1   CHLORIDE mmol/L 100 102 101 101 102   CO2 mmol/L 25.1 24.3 25.2 28.7 29.0   BUN mg/dL 21 21 22 22 21   CREATININE mg/dL 0.95 1.00 1.12* 1.14* 1.03*   GLUCOSE mg/dL 105* 91 113* 123* 87   ALBUMIN g/dL  --   --   --   --  2.9*   BILIRUBIN mg/dL  --   --   --   --  0.2   ALK PHOS U/L  --   --   --   --  220*   AST (SGOT) U/L  --   --   --   --  36*   ALT (SGPT) U/L  --   --   --   --  14     ABG      UA      SHAHRAM  No results found for: \"POCMETH\", \"POCAMPHET\", \"POCBARBITUR\", \"POCBENZO\", \"POCCOCAINE\", \"POCOPIATES\", \"POCOXYCODO\", \"POCPHENCYC\", \"POCPROPOXY\", \"POCTHC\", \"POCTRICYC\"  Radiology(recent) CT Angio Abdominal Aorta Bilateral Iliofem Runoff    Result Date: 10/10/2024  Impression: 1. No evidence of abdominal aortic aneurysm. No evidence of high-grade stenosis in the abdominal aorta or iliac vessels. 2. The popliteal arteries are " occluded. There is poor distal runoff in the lower extremities primarily through the posterior tibial arteries. 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 female with bilateral lower extremity ischemic wounds and right leg were greater than left leg peripheral vascular disease.  Attempts at revascularization of the right leg and debridement of the wounds are planned for Monday.  Patient and family are aware of the risk benefits complications and agreeable.  Will hold Lovenox after tomorrow's dose.  N.p.o. after midnight Sunday night for Monday.      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

## 2024-10-12 NOTE — PROGRESS NOTES
Infectious Diseases Progress Note      LOS: 2 days   Patient Care Team:  Francine Hampton MD as PCP - General    Chief Complaint: Bilateral leg wounds    Subjective       The patient has been afebrile for the last 24 hours.  The patient is on room air, hemodynamically stable, and is tolerating antimicrobial therapy.  Patient continues to have fairly painful lower legs      Review of Systems:   Review of Systems   Constitutional: Negative.    HENT: Negative.     Eyes: Negative.    Respiratory: Negative.     Cardiovascular: Negative.    Gastrointestinal: Negative.    Endocrine: Negative.    Genitourinary: Negative.    Musculoskeletal: Negative.         Lower leg pain   Skin:  Positive for color change and wound.   Neurological: Negative.    Psychiatric/Behavioral: Negative.     All other systems reviewed and are negative.       Objective     Vital Signs  Temp:  [97.8 °F (36.6 °C)-98.4 °F (36.9 °C)] 97.8 °F (36.6 °C)  Heart Rate:  [] 112  Resp:  [13-18] 13  BP: (100-129)/(57-75) 109/64    Physical Exam:  Physical Exam  Vitals and nursing note reviewed.   Constitutional:       General: She is not in acute distress.     Appearance: She is well-developed and normal weight. She is ill-appearing. She is not diaphoretic.   HENT:      Head: Normocephalic and atraumatic.   Eyes:      General: No scleral icterus.     Comments: Legally blind   Cardiovascular:      Rate and Rhythm: Normal rate and regular rhythm.      Heart sounds: Normal heart sounds, S1 normal and S2 normal. No murmur heard.  Pulmonary:      Effort: Pulmonary effort is normal. No respiratory distress.      Breath sounds: Normal breath sounds. No stridor. No wheezing or rales.   Chest:      Chest wall: No tenderness.   Abdominal:      General: Bowel sounds are normal. There is no distension.      Palpations: Abdomen is soft. There is no mass.      Tenderness: There is no abdominal tenderness. There is no guarding.   Musculoskeletal:         General:  No swelling, tenderness or deformity. Normal range of motion.      Cervical back: Neck supple.   Skin:     General: Skin is warm and dry.      Coloration: Skin is not pale.      Findings: No bruising, erythema or rash.      Comments: Patient has a large wound to the right lower extremity covered in yellow eschar with a large amount of green drainage and a very foul odor.  Pulses are palpable on the right foot but it is very cold.  There is a blister on the medial aspect of the lower leg     The left lower leg has a full-thickness wound with some fibrinous slough but very little drainage and no foul odor.     Neurological:      Mental Status: She is alert and oriented to person, place, and time.   Psychiatric:         Mood and Affect: Mood normal.          Results Review:    I have reviewed all clinical data, test, lab, and imaging results.     Radiology  No Radiology Exams Resulted Within Past 24 Hours    Cardiology    Laboratory    Results from last 7 days   Lab Units 10/12/24  0027 10/11/24  0829 10/10/24  2322 10/09/24  2329 10/09/24  1440   WBC 10*3/mm3 13.71* 13.34* 13.75* 8.40 6.70   HEMOGLOBIN g/dL 7.9* 8.2* 7.7* 8.6* 9.3*   HEMATOCRIT % 26.9* 27.4* 26.4* 30.5* 32.6*   PLATELETS 10*3/mm3 515* 470* 504* 578* 376     Results from last 7 days   Lab Units 10/12/24  0027 10/11/24  0829 10/10/24  2322 10/09/24  2329 10/09/24  1527   SODIUM mmol/L 134* 137 135* 138 139   POTASSIUM mmol/L 4.0 4.1 4.0 4.0 4.1   CHLORIDE mmol/L 100 102 101 101 102   CO2 mmol/L 25.1 24.3 25.2 28.7 29.0   BUN mg/dL 21 21 22 22 21   CREATININE mg/dL 0.95 1.00 1.12* 1.14* 1.03*   GLUCOSE mg/dL 105* 91 113* 123* 87   ALBUMIN g/dL  --   --   --   --  2.9*   BILIRUBIN mg/dL  --   --   --   --  0.2   ALK PHOS U/L  --   --   --   --  220*   AST (SGOT) U/L  --   --   --   --  36*   ALT (SGPT) U/L  --   --   --   --  14   CALCIUM mg/dL 8.2* 8.1* 8.0* 8.5* 8.7     Results from last 7 days   Lab Units 10/09/24  1527   CK TOTAL U/L 76     Results  from last 7 days   Lab Units 10/09/24  2329   SED RATE mm/hr 69*         Microbiology   Microbiology Results (last 10 days)       Procedure Component Value - Date/Time    Wound Culture - Wound, Leg, Right [[id number]]  (Abnormal) Collected: 10/10/24 1429    Lab Status: Preliminary result Specimen: Wound from Leg, Right Updated: 10/12/24 0944     Wound Culture Heavy growth (4+) Gram Negative Bacilli     Gram Stain Rare (1+) WBCs per low power field      Few (2+) Gram negative bacilli    Blood Culture - Blood, Arm, Left [[id number]]  (Normal) Collected: 10/09/24 1440    Lab Status: Preliminary result Specimen: Blood from Arm, Left Updated: 10/12/24 1445     Blood Culture No growth at 3 days    Blood Culture - Blood, Arm, Right [[id number]]  (Normal) Collected: 10/09/24 1440    Lab Status: Preliminary result Specimen: Blood from Arm, Right Updated: 10/12/24 1445     Blood Culture No growth at 3 days            Medication Review:       Schedule Meds  buPROPion XL, 150 mg, Oral, Daily  cefTAZidime, 2,000 mg, Intravenous, Q12H  cetirizine, 10 mg, Oral, Daily  fentaNYL, 1 patch, Transdermal, Q72H   And  Check Fentanyl Patch Placement, 1 each, Does not apply, Q12H  cilostazol, 100 mg, Oral, BID  citalopram, 20 mg, Oral, Daily  collagenase, 1 Application, Topical, Q24H  digoxin, 125 mcg, Oral, Daily  [Held by provider] enoxaparin, 1 mg/kg, Subcutaneous, Q12H  ferrous sulfate, 324 mg, Oral, Daily With Breakfast  furosemide, 40 mg, Oral, Daily  levothyroxine, 50 mcg, Oral, Q AM  metoprolol tartrate, 25 mg, Oral, Q6H  multivitamin, 1 tablet, Oral, Daily  mupirocin, 1 Application, Topical, Q24H  pantoprazole, 40 mg, Oral, Daily  polyethylene glycol, 17 g, Oral, Daily  [Held by provider] rivaroxaban, 15 mg, Oral, Daily  sodium chloride, 10 mL, Intravenous, Q12H  vancomycin, 1,000 mg, Intravenous, Q24H  vitamin B-12, 1,000 mcg, Oral, Daily  vitamin D, 50,000 Units, Oral, Weekly  zinc sulfate, 220 mg, Oral, Daily        Infusion  Meds  Pharmacy to dose vancomycin,         PRN Meds    acetaminophen **OR** acetaminophen **OR** acetaminophen    Calcium Replacement - Follow Nurse / BPA Driven Protocol    influenza vaccine    Magnesium Standard Dose Replacement - Follow Nurse / BPA Driven Protocol    melatonin    Morphine    ondansetron ODT **OR** ondansetron    oxyCODONE    Pharmacy to dose vancomycin    Phosphorus Replacement - Follow Nurse / BPA Driven Protocol    Potassium Replacement - Follow Nurse / BPA Driven Protocol    [COMPLETED] Insert Peripheral IV **AND** sodium chloride    sodium chloride        Assessment & Plan       Antimicrobial Therapy   1.  IV ceftazidime        2.  IV vancomycin        3.        4.        5.            Assessment     Right leg wound infection with green drainage concerning for Pseudomonas infection.  Culture pending     Chronic bilateral lower extremity wounds concerning for underlying peripheral vascular disease.  CTA showed bilateral popliteal artery occlusions     Recent hospital admission in September 2024 for right leg wound infection s/p debridement by vascular surgery service.     Patient is legally blind     Plan     Continue IV ceftazidime 2 g every 12 hours  Continue IV vancomycin-pharmacy to monitor and dose  Wound culture of the right leg-pending  Vascular surgery considering a right angiogram with intervention and debridement on Monday  Continue supportive care  A.m. labs  Case discussed with patient and family at bedside      Kitty Peña, APRN  10/12/24  15:51 EDT    Note is dictated utilizing voice recognition software/Dragon

## 2024-10-12 NOTE — PLAN OF CARE
Goal Outcome Evaluation:      Triggered positive for simple sepsis, and provider ordered fluids. Gave IV ABT. Patient resting in chair with call light in reach.

## 2024-10-12 NOTE — PROGRESS NOTES
[facility] [address] MEDICINE SERVICE  DAILY PROGRESS NOTE    NAME: Fallon Nur  : 1945  MRN: [id number]      LOS: 2 days     PROVIDER OF SERVICE: Warner León MD    Chief Complaint: Wound of lower extremity    Subjective:     Interval History:  History taken from: patient  No acute overnight events, denies chest pain, SOB, fever or chills.     Review of Systems:   Review of Systems Wounds of lower extremity    Objective:     Vital Signs  Temp:  [98 °F (36.7 °C)-98.4 °F (36.9 °C)] 98.2 °F (36.8 °C)  Heart Rate:  [] 106  Resp:  [14-18] 15  BP: (100-129)/(57-75) 117/75   Body mass index is 27.45 kg/m².    Physical Exam  General Appearance:  Awake, alert   Head:  Atraumatic normocephalic   Eyes:        No sclera icterus   Neck: Normal ROM, non tender   Pulm: Clear to auscultation   Cardio: Normal heart sounds, irregular    Extremities: BLE edema, wounds   Abdomen: Soft non tender   /Renal: No suprapubic tenderness   Musculoskeletal: Moves all extremities   Neuro: No focal neurological deficits                Scheduled Meds   buPROPion XL, 150 mg, Oral, Daily  cefTAZidime, 2,000 mg, Intravenous, Q12H  cetirizine, 10 mg, Oral, Daily  fentaNYL, 1 patch, Transdermal, Q72H   And  Check Fentanyl Patch Placement, 1 each, Does not apply, Q12H  cilostazol, 100 mg, Oral, BID  citalopram, 20 mg, Oral, Daily  collagenase, 1 Application, Topical, Q24H  digoxin, 125 mcg, Oral, Daily  enoxaparin, 1 mg/kg, Subcutaneous, Q12H  ferrous sulfate, 324 mg, Oral, Daily With Breakfast  furosemide, 40 mg, Oral, Daily  levothyroxine, 50 mcg, Oral, Q AM  metoprolol tartrate, 25 mg, Oral, Q6H  multivitamin, 1 tablet, Oral, Daily  mupirocin, 1 Application, Topical, Q24H  pantoprazole, 40 mg, Oral, Daily  polyethylene glycol, 17 g, Oral, Daily  [Held by provider] rivaroxaban, 15 mg, Oral, Daily  sodium chloride, 10 mL, Intravenous, Q12H  vancomycin, 1,000 mg, Intravenous, Q24H  vitamin B-12, 1,000 mcg, Oral,  Daily  vitamin D, 50,000 Units, Oral, Weekly  zinc sulfate, 220 mg, Oral, Daily       PRN Meds     acetaminophen **OR** acetaminophen **OR** acetaminophen    Calcium Replacement - Follow Nurse / BPA Driven Protocol    influenza vaccine    Magnesium Standard Dose Replacement - Follow Nurse / BPA Driven Protocol    melatonin    Morphine    ondansetron ODT **OR** ondansetron    oxyCODONE    Pharmacy to dose vancomycin    Phosphorus Replacement - Follow Nurse / BPA Driven Protocol    Potassium Replacement - Follow Nurse / BPA Driven Protocol    [COMPLETED] Insert Peripheral IV **AND** sodium chloride    sodium chloride   Infusions  Pharmacy to dose vancomycin,           Diagnostic Data    Results from last 7 days   Lab Units 10/12/24  0027 10/09/24  2329 10/09/24  1527   WBC 10*3/mm3 13.71*   < >  --    HEMOGLOBIN g/dL 7.9*   < >  --    HEMATOCRIT % 26.9*   < >  --    PLATELETS 10*3/mm3 515*   < >  --    GLUCOSE mg/dL 105*   < > 87   CREATININE mg/dL 0.95   < > 1.03*   BUN mg/dL 21   < > 21   SODIUM mmol/L 134*   < > 139   POTASSIUM mmol/L 4.0   < > 4.1   AST (SGOT) U/L  --   --  36*   ALT (SGPT) U/L  --   --  14   ALK PHOS U/L  --   --  220*   BILIRUBIN mg/dL  --   --  0.2   ANION GAP mmol/L 8.9   < > 8.0    < > = values in this interval not displayed.       CT Angio Abdominal Aorta Bilateral Iliofem Runoff    Result Date: 10/10/2024  Impression: 1. No evidence of abdominal aortic aneurysm. No evidence of high-grade stenosis in the abdominal aorta or iliac vessels. 2. 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 There is poor distal runoff in the lower extremities primarily through the posterior tibial arteries. Electronically Signed: Emeka Jewell MD  10/10/2024 10:36 PM EDT  Workstation ID: [id number]       I reviewed the patient's new clinical results.    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       Plan:   -on abx, ID reccs noted  -Plan for Lower extremity angio / wound debridement santana, will follow   -Continue home meds  -DVT ppx on hold tonight, AM labs     VTE Prophylaxis:  Pharmacologic VTE prophylaxis orders are present.         Code status is   Code Status and Medical Interventions: CPR (Attempt to Resuscitate); Full Support   Ordered at: 10/09/24 5296     Level Of Support Discussed With:    Patient     Code Status (Patient has no pulse and is not breathing):    CPR (Attempt to Resuscitate)     Medical Interventions (Patient has pulse or is breathing):    Full Support       Plan for disposition: Dis    Time: 30 minutes    Part of this note may be an electronic transcription/translation of spoken language to printed text using the Dragon Dictation System.    Signature: Electronically signed by Warner León MD, 10/12/24, 12:32 EDT.  [facility], [place], operated by [facility] Hospitalist Team

## 2024-10-12 NOTE — SIGNIFICANT NOTE
10/12/24 1105   OTHER   Discipline physical therapist   Rehab Time/Intention   Session Not Performed patient/family declined treatment;other (see comments)  (Pt and family declined evaluation again this date. PT to f/u following procedure on 10/14)   Recommendation   PT - Next Appointment 10/14/24

## 2024-10-12 NOTE — CASE MANAGEMENT/SOCIAL WORK
Social Work Assessment  [facility]     Patient Name: Fallon Nur  MRN: [id number]  Today's Date: 10/12/2024    Admit Date: 10/9/2024         Discharge Plan       Row Name 10/12/24 1238       Plan    Plan D/C Plan: Likely to skilled facility. Dtr given list of choices. PASRR approved; Transport TBD             FALLON Johnson, MSW    Phone: [phone]  Fax: [phone]  Email: [email or web address]

## 2024-10-12 NOTE — PLAN OF CARE
Problem: Adult Inpatient Plan of Care  Goal: Plan of Care Review  Outcome: Progressing  Flowsheets (Taken 10/12/2024 1700)  Outcome Evaluation: Pt sttes pain better today.  had oxy twice today on this shiftt. Pt reports pain is better when she has her legs dangling.  Pt sitting on side of bed and also up to chair and has legs dangling.  Wound cx resulted. pt on vanc and ceftaz.  will cont to monitor.  fm at bedside   Goal Outcome Evaluation:              Outcome Evaluation: Pt sttes pain better today.  had oxy twice today on this shiftt. Pt reports pain is better when she has her legs dangling.  Pt sitting on side of bed and also up to chair and has legs dangling.  Wound cx resulted. pt on vanc and ceftaz.  will cont to monitor.  fm at bedside

## 2024-10-13 LAB
ANION GAP SERPL CALCULATED.3IONS-SCNC: 8.9 MMOL/L (ref 5–15)
BASOPHILS # BLD AUTO: 0.04 10*3/MM3 (ref 0–0.2)
BASOPHILS NFR BLD AUTO: 0.3 % (ref 0–1.5)
BUN SERPL-MCNC: 19 MG/DL (ref 8–23)
BUN/CREAT SERPL: 19.2 (ref 7–25)
CALCIUM SPEC-SCNC: 8.4 MG/DL (ref 8.6–10.5)
CHLORIDE SERPL-SCNC: 101 MMOL/L (ref 98–107)
CO2 SERPL-SCNC: 26.1 MMOL/L (ref 22–29)
CREAT SERPL-MCNC: 0.99 MG/DL (ref 0.57–1)
DEPRECATED RDW RBC AUTO: 50.4 FL (ref 37–54)
EGFRCR SERPLBLD CKD-EPI 2021: 58.1 ML/MIN/1.73
EOSINOPHIL # BLD AUTO: 0.13 10*3/MM3 (ref 0–0.4)
EOSINOPHIL NFR BLD AUTO: 1.1 % (ref 0.3–6.2)
ERYTHROCYTE [DISTWIDTH] IN BLOOD BY AUTOMATED COUNT: 16.1 % (ref 12.3–15.4)
GLUCOSE SERPL-MCNC: 113 MG/DL (ref 65–99)
HCT VFR BLD AUTO: 27 % (ref 34–46.6)
HGB BLD-MCNC: 8 G/DL (ref 12–15.9)
HOLD SPECIMEN: NORMAL
IMM GRANULOCYTES # BLD AUTO: 0.06 10*3/MM3 (ref 0–0.05)
IMM GRANULOCYTES NFR BLD AUTO: 0.5 % (ref 0–0.5)
LYMPHOCYTES # BLD AUTO: 0.69 10*3/MM3 (ref 0.7–3.1)
LYMPHOCYTES NFR BLD AUTO: 5.8 % (ref 19.6–45.3)
MCH RBC QN AUTO: 25.3 PG (ref 26.6–33)
MCHC RBC AUTO-ENTMCNC: 29.6 G/DL (ref 31.5–35.7)
MCV RBC AUTO: 85.4 FL (ref 79–97)
MONOCYTES # BLD AUTO: 0.86 10*3/MM3 (ref 0.1–0.9)
MONOCYTES NFR BLD AUTO: 7.2 % (ref 5–12)
NEUTROPHILS NFR BLD AUTO: 10.21 10*3/MM3 (ref 1.7–7)
NEUTROPHILS NFR BLD AUTO: 85.1 % (ref 42.7–76)
NRBC BLD AUTO-RTO: 0 /100 WBC (ref 0–0.2)
PLATELET # BLD AUTO: 569 10*3/MM3 (ref 140–450)
PMV BLD AUTO: 10.2 FL (ref 6–12)
POTASSIUM SERPL-SCNC: 3.7 MMOL/L (ref 3.5–5.2)
RBC # BLD AUTO: 3.16 10*6/MM3 (ref 3.77–5.28)
SODIUM SERPL-SCNC: 136 MMOL/L (ref 136–145)
WBC NRBC COR # BLD AUTO: 11.99 10*3/MM3 (ref 3.4–10.8)

## 2024-10-13 PROCEDURE — 99024 POSTOP FOLLOW-UP VISIT: CPT | Performed by: SURGERY

## 2024-10-13 PROCEDURE — 25010000002 CEFTAZIDIME 2 G RECONSTITUTED SOLUTION 1 EACH VIAL: Performed by: NURSE PRACTITIONER

## 2024-10-13 PROCEDURE — 80048 BASIC METABOLIC PNL TOTAL CA: CPT | Performed by: NURSE PRACTITIONER

## 2024-10-13 PROCEDURE — 85025 COMPLETE CBC W/AUTO DIFF WBC: CPT | Performed by: NURSE PRACTITIONER

## 2024-10-13 RX ADMIN — CILOSTAZOL 100 MG: 100 TABLET ORAL at 08:28

## 2024-10-13 RX ADMIN — FUROSEMIDE 40 MG: 40 TABLET ORAL at 08:28

## 2024-10-13 RX ADMIN — FENTANYL 1 PATCH: 25 PATCH TRANSDERMAL at 12:18

## 2024-10-13 RX ADMIN — METOPROLOL TARTRATE 25 MG: 25 TABLET, FILM COATED ORAL at 05:04

## 2024-10-13 RX ADMIN — Medication 220 MG: at 08:28

## 2024-10-13 RX ADMIN — BUPROPION HYDROCHLORIDE 150 MG: 150 TABLET, EXTENDED RELEASE ORAL at 08:28

## 2024-10-13 RX ADMIN — OXYCODONE 5 MG: 5 TABLET ORAL at 08:35

## 2024-10-13 RX ADMIN — THERA TABS 1 TABLET: TAB at 08:28

## 2024-10-13 RX ADMIN — METOPROLOL TARTRATE 25 MG: 25 TABLET, FILM COATED ORAL at 12:17

## 2024-10-13 RX ADMIN — CITALOPRAM HYDROBROMIDE 20 MG: 20 TABLET ORAL at 08:28

## 2024-10-13 RX ADMIN — PANTOPRAZOLE SODIUM 40 MG: 40 TABLET, DELAYED RELEASE ORAL at 08:28

## 2024-10-13 RX ADMIN — CETIRIZINE HYDROCHLORIDE 10 MG: 10 TABLET, FILM COATED ORAL at 08:29

## 2024-10-13 RX ADMIN — METOPROLOL TARTRATE 25 MG: 25 TABLET, FILM COATED ORAL at 23:16

## 2024-10-13 RX ADMIN — FERROUS SULFATE TAB EC 324 MG (65 MG FE EQUIVALENT) 324 MG: 324 (65 FE) TABLET DELAYED RESPONSE at 08:28

## 2024-10-13 RX ADMIN — Medication 10 ML: at 08:29

## 2024-10-13 RX ADMIN — CEFTAZIDIME 2000 MG: 2 INJECTION, POWDER, FOR SOLUTION INTRAVENOUS at 02:38

## 2024-10-13 RX ADMIN — CEFTAZIDIME 2000 MG: 2 INJECTION, POWDER, FOR SOLUTION INTRAVENOUS at 14:38

## 2024-10-13 RX ADMIN — LEVOTHYROXINE SODIUM 50 MCG: 0.05 TABLET ORAL at 05:04

## 2024-10-13 RX ADMIN — OXYCODONE 5 MG: 5 TABLET ORAL at 21:16

## 2024-10-13 RX ADMIN — Medication 10 ML: at 20:08

## 2024-10-13 RX ADMIN — CYANOCOBALAMIN TAB 500 MCG 1000 MCG: 500 TAB at 08:28

## 2024-10-13 RX ADMIN — DIGOXIN 125 MCG: 125 TABLET ORAL at 12:17

## 2024-10-13 RX ADMIN — CILOSTAZOL 100 MG: 100 TABLET ORAL at 20:08

## 2024-10-13 NOTE — PLAN OF CARE
Problem: Adult Inpatient Plan of Care  Goal: Plan of Care Review  Outcome: Progressing  Flowsheets (Taken 10/13/2024 4249)  Outcome Evaluation: Pt wound cx resulted and ID aware.  see changes to ABX.  Pt cont with c/o leg pain but controlled pretty well unless pt moving around. Fm at bedside. Pt to be NPO after MN for procedure monday to attempt revascularazation and debridment.  will cont to monitor.   Goal Outcome Evaluation:              Outcome Evaluation: Pt wound cx resulted and ID aware.  see changes to ABX.  Pt cont with c/o leg pain but controlled pretty well unless pt moving around. Fm at bedside. Pt to be NPO after MN for procedure monday to attempt revascularazation and debridment.  will cont to monitor.

## 2024-10-13 NOTE — PLAN OF CARE
Goal Outcome Evaluation:      Premedicated patient prior to dressing change. Patient was tearful and required frequent breaks. Patient prefers to leave legs in dependent position related to managing bilateral lower extremity pain. Encouraged position changes every two hours. Held metoprolol related to soft pressure. Daughter remains at bedside and call light in reach.

## 2024-10-13 NOTE — PROGRESS NOTES
[facility] MEDICINE SERVICE  DAILY PROGRESS NOTE    NAME: Fallon Nur  : 1945  MRN: [id number]      LOS: 3 days     PROVIDER OF SERVICE: Warner León MD    Chief Complaint: Wound of lower extremity    Subjective:     Interval History:  History taken from: patient    No acute overnight events. Patient sleeping comfortably in bed    Review of Systems:   Review of Systems    Objective:     Vital Signs  Temp:  [97.4 °F (36.3 °C)-98.5 °F (36.9 °C)] 98.5 °F (36.9 °C)  Heart Rate:  [] 86  Resp:  [13-18] 17  BP: ()/(46-77) 95/55   Body mass index is 27.92 kg/m².    Physical Exam  General Appearance:  Awake, alert   Head:  Atraumatic normocephalic   Eyes:        No sclera icterus   Neck: Normal ROM, non tender   Pulm: Clear to auscultation   Cardio: Normal heart sounds, irregular    Extremities: BLE edema, wounds   Abdomen: Soft non tender   /Renal: No suprapubic tenderness   Musculoskeletal: Moves all extremities   Neuro: No focal neurological deficits              Scheduled Meds   buPROPion XL, 150 mg, Oral, Daily  cefTAZidime, 2,000 mg, Intravenous, Q12H  cetirizine, 10 mg, Oral, Daily  fentaNYL, 1 patch, Transdermal, Q72H   And  Check Fentanyl Patch Placement, 1 each, Does not apply, Q12H  cilostazol, 100 mg, Oral, BID  citalopram, 20 mg, Oral, Daily  collagenase, 1 Application, Topical, Q24H  digoxin, 125 mcg, Oral, Daily  [Held by provider] enoxaparin, 1 mg/kg, Subcutaneous, Q12H  ferrous sulfate, 324 mg, Oral, Daily With Breakfast  furosemide, 40 mg, Oral, Daily  levothyroxine, 50 mcg, Oral, Q AM  metoprolol tartrate, 25 mg, Oral, Q6H  multivitamin, 1 tablet, Oral, Daily  mupirocin, 1 Application, Topical, Q24H  pantoprazole, 40 mg, Oral, Daily  polyethylene glycol, 17 g, Oral, Daily  [Held by provider] rivaroxaban, 15 mg, Oral, Daily  sodium chloride, 500 mL, Intravenous, Once  sodium chloride, 10 mL, Intravenous, Q12H  vitamin B-12, 1,000 mcg, Oral, Daily  vitamin D,  50,000 Units, Oral, Weekly  zinc sulfate, 220 mg, Oral, Daily       PRN Meds     acetaminophen **OR** acetaminophen **OR** acetaminophen    Calcium Replacement - Follow Nurse / BPA Driven Protocol    influenza vaccine    Magnesium Standard Dose Replacement - Follow Nurse / BPA Driven Protocol    melatonin    Morphine    ondansetron ODT **OR** ondansetron    oxyCODONE    Phosphorus Replacement - Follow Nurse / BPA Driven Protocol    Potassium Replacement - Follow Nurse / BPA Driven Protocol    [COMPLETED] Insert Peripheral IV **AND** sodium chloride    sodium chloride   Infusions         Diagnostic Data    Results from last 7 days   Lab Units 10/13/24  0203 10/09/24  2329 10/09/24  1527   WBC 10*3/mm3 11.99*   < >  --    HEMOGLOBIN g/dL 8.0*   < >  --    HEMATOCRIT % 27.0*   < >  --    PLATELETS 10*3/mm3 569*   < >  --    GLUCOSE mg/dL 113*   < > 87   CREATININE mg/dL 0.99   < > 1.03*   BUN mg/dL 19   < > 21   SODIUM mmol/L 136   < > 139   POTASSIUM mmol/L 3.7   < > 4.1   AST (SGOT) U/L  --   --  36*   ALT (SGPT) U/L  --   --  14   ALK PHOS U/L  --   --  220*   BILIRUBIN mg/dL  --   --  0.2   ANION GAP mmol/L 8.9   < > 8.0    < > = values in this interval not displayed.       No radiology results for the last day      I reviewed the patient's new clinical results.    Assessment/Plan:   Fallon Nur is a 79 y.o. female with a CMH of bilateral lower leg wounds, A.FIB, HTN, and is legally blind who presented to [facility] on 10/9/2024 with increasing pain and swelling to bilateral lower legs. Patient reported the right leg is worse than the left. Patient was admitted to [facility] on 9/12/24 for traumatic bilateral lower leg wounds after a fall and discharged on 9/21/24 after wound debridement and management.     Bilateral lower extremity wounds R>L  Peripheral arterial disease  AFIB  HTN    Plan:   -on abx, ID reccs noted  -Plan for Lower extremity angio / wound debridement today  -On digoxin for afib, Lovenox  held per vascular (outpatient on DOACs)  -Continue home meds        VTE Prophylaxis:  Pharmacologic VTE prophylaxis orders are present.         Code status is   Code Status and Medical Interventions: CPR (Attempt to Resuscitate); Full Support   Ordered at: 10/09/24 1707     Level Of Support Discussed With:    Patient     Code Status (Patient has no pulse and is not breathing):    CPR (Attempt to Resuscitate)     Medical Interventions (Patient has pulse or is breathing):    Full Support       Plan for disposition:Vascular/ID reccs    Time: 30 minutes    Part of this note may be an electronic transcription/translation of spoken language to printed text using the Dragon Dictation System.    Signature: Electronically signed by Warner León MD, 10/13/24, 16:52 EDT.  [facility] Hospitalist Team

## 2024-10-13 NOTE — PROGRESS NOTES
Name: Fallon Nur ADMIT: 10/9/2024   : 1945  PCP: Francine Hampton MD    MRN: [id number] LOS: 3 days   AGE/SEX: 79 y.o. female  ROOM:    [facility]    Chief Complaint   Patient presents with    Wound Check     CC: Bilateral lower extremity wounds with PVD  Subjective     79 y.o. female with bilateral lower extremity wounds right worse than left with known PVD.  Patient has plans for wound debridements on Monday with an attempted revascularization of the right leg.  She and her daughter understand risk benefits complications and are agreeable with the plan.    Review of Systems more awake with complains of rest pain right worse than left leg.  Sits and sleeps sitting up with her legs hanging over the side of the bed    Objective     Scheduled Medications:   buPROPion XL, 150 mg, Oral, Daily  cefTAZidime, 2,000 mg, Intravenous, Q12H  cetirizine, 10 mg, Oral, Daily  fentaNYL, 1 patch, Transdermal, Q72H   And  Check Fentanyl Patch Placement, 1 each, Does not apply, Q12H  cilostazol, 100 mg, Oral, BID  citalopram, 20 mg, Oral, Daily  collagenase, 1 Application, Topical, Q24H  digoxin, 125 mcg, Oral, Daily  [Held by provider] enoxaparin, 1 mg/kg, Subcutaneous, Q12H  ferrous sulfate, 324 mg, Oral, Daily With Breakfast  furosemide, 40 mg, Oral, Daily  levothyroxine, 50 mcg, Oral, Q AM  metoprolol tartrate, 25 mg, Oral, Q6H  multivitamin, 1 tablet, Oral, Daily  mupirocin, 1 Application, Topical, Q24H  pantoprazole, 40 mg, Oral, Daily  polyethylene glycol, 17 g, Oral, Daily  [Held by provider] rivaroxaban, 15 mg, Oral, Daily  sodium chloride, 500 mL, Intravenous, Once  sodium chloride, 10 mL, Intravenous, Q12H  vitamin B-12, 1,000 mcg, Oral, Daily  vitamin D, 50,000 Units, Oral, Weekly  zinc sulfate, 220 mg, Oral, Daily        Active Infusions:         As Needed Medications:    acetaminophen **OR** acetaminophen **OR** acetaminophen    Calcium Replacement - Follow Nurse / BPA Driven Protocol     influenza vaccine    Magnesium Standard Dose Replacement - Follow Nurse / BPA Driven Protocol    melatonin    Morphine    ondansetron ODT **OR** ondansetron    oxyCODONE    Phosphorus Replacement - Follow Nurse / BPA Driven Protocol    Potassium Replacement - Follow Nurse / BPA Driven Protocol    [COMPLETED] Insert Peripheral IV **AND** sodium chloride    sodium chloride    Vital Signs  Vital Signs Patient Vitals for the past 24 hrs:   BP Temp Temp src Pulse Resp SpO2 Weight   10/13/24 1552 95/55 98.5 °F (36.9 °C) Oral 86 17 98 % --   10/13/24 1222 114/64 -- -- 109 -- 100 % --   10/13/24 1200 100/56 97.6 °F (36.4 °C) Oral 96 14 95 % --   10/13/24 0902 117/63 97.4 °F (36.3 °C) Oral 97 13 92 % --   10/13/24 0504 126/77 -- -- 107 16 100 % 71.5 kg (157 lb 10.1 oz)   10/13/24 0117 117/64 97.5 °F (36.4 °C) Oral -- 16 -- --   10/13/24 0047 (!) 87/46 -- -- -- -- -- --   10/12/24 2149 105/51 97.9 °F (36.6 °C) Oral 82 18 92 % --     Vital Signs (range)  Temp:  [97.4 °F (36.3 °C)-98.5 °F (36.9 °C)] 98.5 °F (36.9 °C)  Heart Rate:  [] 86  Resp:  [13-18] 17  BP: ()/(46-77) 95/55  I/O:  I/O last 3 completed shifts:  In: 420 [P.O.:420]  Out: 350 [Urine:350]  I/O: No intake or output data in the 24 hours ending 10/13/24 1706    BMI:  Body mass index is 27.92 kg/m².    Physical Exam:  Physical Exam   Lungs coarse and diminished  Extremities wrapped with gauze    Results Review:     CBC    Results from last 7 days   Lab Units 10/13/24  0203 10/12/24  0027 10/11/24  0829 10/10/24  2322 10/09/24  2329 10/09/24  1440   WBC 10*3/mm3 11.99* 13.71* 13.34* 13.75* 8.40 6.70   HEMOGLOBIN g/dL 8.0* 7.9* 8.2* 7.7* 8.6* 9.3*   PLATELETS 10*3/mm3 569* 515* 470* 504* 578* 376     BMP   Results from last 7 days   Lab Units 10/13/24  0203 10/12/24  0027 10/11/24  0829 10/10/24  2322 10/09/24  2329 10/09/24  1527   SODIUM mmol/L 136 134* 137 135* 138 139   POTASSIUM mmol/L 3.7 4.0 4.1 4.0 4.0 4.1   CHLORIDE mmol/L 101 100 102 101 101 102  "  CO2 mmol/L 26.1 25.1 24.3 25.2 28.7 29.0   BUN mg/dL 19 21 21 22 22 21   CREATININE mg/dL 0.99 0.95 1.00 1.12* 1.14* 1.03*   GLUCOSE mg/dL 113* 105* 91 113* 123* 87   MAGNESIUM mg/dL  --   --   --   --  2.4 2.4     Cr Clearance Estimated Creatinine Clearance: 43.6 mL/min (by C-G formula based on SCr of 0.99 mg/dL).  Coag   Results from last 7 days   Lab Units 10/11/24  0825 10/10/24  2322 10/10/24  1533 10/10/24  0846 10/10/24  0202 10/09/24  1527   INR   --   --   --   --   --  1.05   APTT seconds 66.2 52.6* 95.1* 32.5* 26.2* 27.4     HbA1C No results found for: \"HGBA1C\"  Blood Glucose No results found for: \"POCGLU\"  Infection   Results from last 7 days   Lab Units 10/10/24  1429 10/09/24  1440   BLOODCX   --  No growth at 4 days  No growth at 4 days   WOUNDCX  Heavy growth (4+) Pseudomonas aeruginosa*  Heavy growth (4+) Proteus mirabilis*  --      CMP   Results from last 7 days   Lab Units 10/13/24  0203 10/12/24  0027 10/11/24  0829 10/10/24  2322 10/09/24  2329 10/09/24  1527   SODIUM mmol/L 136 134* 137 135* 138 139   POTASSIUM mmol/L 3.7 4.0 4.1 4.0 4.0 4.1   CHLORIDE mmol/L 101 100 102 101 101 102   CO2 mmol/L 26.1 25.1 24.3 25.2 28.7 29.0   BUN mg/dL 19 21 21 22 22 21   CREATININE mg/dL 0.99 0.95 1.00 1.12* 1.14* 1.03*   GLUCOSE mg/dL 113* 105* 91 113* 123* 87   ALBUMIN g/dL  --   --   --   --   --  2.9*   BILIRUBIN mg/dL  --   --   --   --   --  0.2   ALK PHOS U/L  --   --   --   --   --  220*   AST (SGOT) U/L  --   --   --   --   --  36*   ALT (SGPT) U/L  --   --   --   --   --  14     ABG      UA      SHAHRAM  No results found for: \"POCMETH\", \"POCAMPHET\", \"POCBARBITUR\", \"POCBENZO\", \"POCCOCAINE\", \"POCOPIATES\", \"POCOXYCODO\", \"POCPHENCYC\", \"POCPROPOXY\", \"POCTHC\", \"POCTRICYC\"  Radiology(recent) No radiology results for the last day    Assessment & Plan     Assessment & Plan      Wound of lower extremity    Infected wound    PAD (peripheral artery disease)    Atherosclerosis of native arteries of right leg " with ulceration of calf      79 y.o. female with bilateral lower extremity ischemic wounds due to right leg greater than left leg peripheral vascular disease.  Attempts at revascularization of the right leg and debridement of the wounds are planned for tomorrow..  Patient and family are aware of the risk benefits complications and agreeable.  Lovenox held.  N.p.o. after midnight Sunday night for Monday.      Jake Metz MD  10/13/24  17:06 EDT    Please call my office with any question: [phone]    Active Hospital Problems    Diagnosis  POA    **Wound of lower extremity [S81.809A]  Yes    Infected wound [T14.8XXA, L08.9]  Unknown    PAD (peripheral artery disease) [I73.9]  Unknown    Atherosclerosis of native arteries of right leg with ulceration of calf [I70.232]  Unknown      Resolved Hospital Problems   No resolved problems to display.

## 2024-10-13 NOTE — PROGRESS NOTES
Infectious Diseases Progress Note      LOS: 3 days   Patient Care Team:  Francine Hampton MD as PCP - General    Chief Complaint: Bilateral leg wounds    Subjective       The patient has been afebrile for the last 24 hours.  The patient is on room air, hemodynamically stable, and is tolerating antimicrobial therapy.        Review of Systems:   Review of Systems   Constitutional: Negative.    HENT: Negative.     Eyes: Negative.    Respiratory: Negative.     Cardiovascular: Negative.    Gastrointestinal: Negative.    Endocrine: Negative.    Genitourinary: Negative.    Musculoskeletal: Negative.         Lower leg pain   Skin:  Positive for color change and wound.   Neurological: Negative.    Psychiatric/Behavioral: Negative.     All other systems reviewed and are negative.       Objective     Vital Signs  Temp:  [97.4 °F (36.3 °C)-98 °F (36.7 °C)] 97.6 °F (36.4 °C)  Heart Rate:  [] 109  Resp:  [13-19] 14  BP: ()/(46-77) 114/64    Physical Exam:  Physical Exam  Vitals and nursing note reviewed.   Constitutional:       General: She is not in acute distress.     Appearance: She is well-developed and normal weight. She is ill-appearing. She is not diaphoretic.   HENT:      Head: Normocephalic and atraumatic.   Eyes:      General: No scleral icterus.     Comments: Legally blind   Cardiovascular:      Rate and Rhythm: Normal rate and regular rhythm.      Heart sounds: Normal heart sounds, S1 normal and S2 normal. No murmur heard.  Pulmonary:      Effort: Pulmonary effort is normal. No respiratory distress.      Breath sounds: Normal breath sounds. No stridor. No wheezing or rales.   Chest:      Chest wall: No tenderness.   Abdominal:      General: Bowel sounds are normal. There is no distension.      Palpations: Abdomen is soft. There is no mass.      Tenderness: There is no abdominal tenderness. There is no guarding.   Musculoskeletal:         General: No swelling, tenderness or deformity. Normal range of  motion.      Cervical back: Neck supple.   Skin:     General: Skin is warm and dry.      Coloration: Skin is not pale.      Findings: No bruising, erythema or rash.      Comments: Patient has a large wound to the right lower extremity covered in yellow eschar with a large amount of green drainage and a very foul odor.  Pulses are palpable on the right foot but it is very cold.  There is a blister on the medial aspect of the lower leg     The left lower leg has a full-thickness wound with some fibrinous slough but very little drainage and no foul odor.     Neurological:      Mental Status: She is alert and oriented to person, place, and time.   Psychiatric:         Mood and Affect: Mood normal.          Results Review:    I have reviewed all clinical data, test, lab, and imaging results.     Radiology  No Radiology Exams Resulted Within Past 24 Hours    Cardiology    Laboratory    Results from last 7 days   Lab Units 10/13/24  0203 10/12/24  0027 10/11/24  0829 10/10/24  2322 10/09/24  2329 10/09/24  1440   WBC 10*3/mm3 11.99* 13.71* 13.34* 13.75* 8.40 6.70   HEMOGLOBIN g/dL 8.0* 7.9* 8.2* 7.7* 8.6* 9.3*   HEMATOCRIT % 27.0* 26.9* 27.4* 26.4* 30.5* 32.6*   PLATELETS 10*3/mm3 569* 515* 470* 504* 578* 376     Results from last 7 days   Lab Units 10/13/24  0203 10/12/24  0027 10/11/24  0829 10/10/24  2322 10/09/24  2329 10/09/24  1527   SODIUM mmol/L 136 134* 137 135* 138 139   POTASSIUM mmol/L 3.7 4.0 4.1 4.0 4.0 4.1   CHLORIDE mmol/L 101 100 102 101 101 102   CO2 mmol/L 26.1 25.1 24.3 25.2 28.7 29.0   BUN mg/dL 19 21 21 22 22 21   CREATININE mg/dL 0.99 0.95 1.00 1.12* 1.14* 1.03*   GLUCOSE mg/dL 113* 105* 91 113* 123* 87   ALBUMIN g/dL  --   --   --   --   --  2.9*   BILIRUBIN mg/dL  --   --   --   --   --  0.2   ALK PHOS U/L  --   --   --   --   --  220*   AST (SGOT) U/L  --   --   --   --   --  36*   ALT (SGPT) U/L  --   --   --   --   --  14   CALCIUM mg/dL 8.4* 8.2* 8.1* 8.0* 8.5* 8.7     Results from last 7 days    Lab Units 10/09/24  1527   CK TOTAL U/L 76     Results from last 7 days   Lab Units 10/09/24  2329   SED RATE mm/hr 69*         Microbiology   Microbiology Results (last 10 days)       Procedure Component Value - Date/Time    Wound Culture - Wound, Leg, Right [[id number]]  (Abnormal)  (Susceptibility) Collected: 10/10/24 1429    Lab Status: Preliminary result Specimen: Wound from Leg, Right Updated: 10/13/24 0932     Wound Culture Heavy growth (4+) Pseudomonas aeruginosa      Heavy growth (4+) Proteus mirabilis     Comment: MICs to follow.        Gram Stain Rare (1+) WBCs per low power field      Few (2+) Gram negative bacilli    Susceptibility        Pseudomonas aeruginosa      MO      Cefepime Susceptible      Ceftazidime Susceptible      Ciprofloxacin Susceptible      Levofloxacin Susceptible      Piperacillin + Tazobactam Susceptible      Tobramycin Susceptible                       Susceptibility Comments       Pseudomonas aeruginosa    With the exception of urinary-sourced infections, aminoglycosides should not be used as monotherapy.               Anaerobic Culture - Swab, Leg, Right [[id number]]  (Normal) Collected: 10/10/24 1429    Lab Status: Preliminary result Specimen: Swab from Leg, Right Updated: 10/13/24 0912     Anaerobic Culture No anaerobes isolated at 3 days    Blood Culture - Blood, Arm, Left [[id number]]  (Normal) Collected: 10/09/24 1440    Lab Status: Preliminary result Specimen: Blood from Arm, Left Updated: 10/13/24 1445     Blood Culture No growth at 4 days    Blood Culture - Blood, Arm, Right [[id number]]  (Normal) Collected: 10/09/24 1440    Lab Status: Preliminary result Specimen: Blood from Arm, Right Updated: 10/13/24 1445     Blood Culture No growth at 4 days            Medication Review:       Schedule Meds  buPROPion XL, 150 mg, Oral, Daily  cefTAZidime, 2,000 mg, Intravenous, Q12H  cetirizine, 10 mg, Oral, Daily  fentaNYL, 1 patch, Transdermal, Q72H   And  Check Fentanyl Patch  Placement, 1 each, Does not apply, Q12H  cilostazol, 100 mg, Oral, BID  citalopram, 20 mg, Oral, Daily  collagenase, 1 Application, Topical, Q24H  digoxin, 125 mcg, Oral, Daily  [Held by provider] enoxaparin, 1 mg/kg, Subcutaneous, Q12H  ferrous sulfate, 324 mg, Oral, Daily With Breakfast  furosemide, 40 mg, Oral, Daily  levothyroxine, 50 mcg, Oral, Q AM  metoprolol tartrate, 25 mg, Oral, Q6H  multivitamin, 1 tablet, Oral, Daily  mupirocin, 1 Application, Topical, Q24H  pantoprazole, 40 mg, Oral, Daily  polyethylene glycol, 17 g, Oral, Daily  [Held by provider] rivaroxaban, 15 mg, Oral, Daily  sodium chloride, 500 mL, Intravenous, Once  sodium chloride, 10 mL, Intravenous, Q12H  vitamin B-12, 1,000 mcg, Oral, Daily  vitamin D, 50,000 Units, Oral, Weekly  zinc sulfate, 220 mg, Oral, Daily        Infusion Meds         PRN Meds    acetaminophen **OR** acetaminophen **OR** acetaminophen    Calcium Replacement - Follow Nurse / BPA Driven Protocol    influenza vaccine    Magnesium Standard Dose Replacement - Follow Nurse / BPA Driven Protocol    melatonin    Morphine    ondansetron ODT **OR** ondansetron    oxyCODONE    Phosphorus Replacement - Follow Nurse / BPA Driven Protocol    Potassium Replacement - Follow Nurse / BPA Driven Protocol    [COMPLETED] Insert Peripheral IV **AND** sodium chloride    sodium chloride        Assessment & Plan       Antimicrobial Therapy   1.  IV ceftazidime        2.  IV vancomycin        3.        4.        5.            Assessment     Right leg wound infection with green drainage concerning for Pseudomonas infection.  Cultures are growing Pseudomonas aeruginosa and Proteus species.     Chronic bilateral lower extremity wounds concerning for underlying peripheral vascular disease.  CTA showed bilateral popliteal artery occlusions     Recent hospital admission in September 2024 for right leg wound infection s/p debridement by vascular surgery service.     Patient is legally blind      Plan     Continue IV ceftazidime 2 g every 12 hours waiting on final culture results  Discontinue IV vancomycin  Vascular surgery considering a right angiogram with intervention and debridement on Monday  Continue supportive care  A.m. labs  The case was discussed with the patient and her family at bedside      Arden Rizo MD  10/13/24  15:37 EDT    Note is dictated utilizing voice recognition software/Dragon

## 2024-10-14 ENCOUNTER — ANESTHESIA (OUTPATIENT)
Dept: PERIOP | Facility: HOSPITAL | Age: 79
End: 2024-10-14
Payer: MEDICARE

## 2024-10-14 ENCOUNTER — ANESTHESIA EVENT (OUTPATIENT)
Dept: PERIOP | Facility: HOSPITAL | Age: 79
End: 2024-10-14
Payer: MEDICARE

## 2024-10-14 ENCOUNTER — ANCILLARY PROCEDURE (OUTPATIENT)
Dept: PERIOP | Facility: HOSPITAL | Age: 79
DRG: 271 | End: 2024-10-14
Payer: MEDICARE

## 2024-10-14 LAB
ABO GROUP BLD: NORMAL
ALBUMIN SERPL-MCNC: 2.5 G/DL (ref 3.5–5.2)
ALBUMIN/GLOB SERPL: 0.6 G/DL
ALP SERPL-CCNC: 186 U/L (ref 39–117)
ALT SERPL W P-5'-P-CCNC: 14 U/L (ref 1–33)
ANION GAP SERPL CALCULATED.3IONS-SCNC: 8.3 MMOL/L (ref 5–15)
APTT PPP: 60.1 SECONDS (ref 61–76.5)
AST SERPL-CCNC: 47 U/L (ref 1–32)
BACTERIA SPEC AEROBE CULT: ABNORMAL
BACTERIA SPEC AEROBE CULT: ABNORMAL
BACTERIA SPEC AEROBE CULT: NORMAL
BACTERIA SPEC AEROBE CULT: NORMAL
BASOPHILS # BLD AUTO: 0.05 10*3/MM3 (ref 0–0.2)
BASOPHILS NFR BLD AUTO: 0.6 % (ref 0–1.5)
BILIRUB SERPL-MCNC: 0.2 MG/DL (ref 0–1.2)
BLD GP AB SCN SERPL QL: NEGATIVE
BUN SERPL-MCNC: 21 MG/DL (ref 8–23)
BUN/CREAT SERPL: 20.8 (ref 7–25)
CALCIUM SPEC-SCNC: 8.1 MG/DL (ref 8.6–10.5)
CHLORIDE SERPL-SCNC: 102 MMOL/L (ref 98–107)
CO2 SERPL-SCNC: 25.7 MMOL/L (ref 22–29)
CREAT SERPL-MCNC: 1.01 MG/DL (ref 0.57–1)
DEPRECATED RDW RBC AUTO: 49.5 FL (ref 37–54)
EGFRCR SERPLBLD CKD-EPI 2021: 56.7 ML/MIN/1.73
EOSINOPHIL # BLD AUTO: 0.2 10*3/MM3 (ref 0–0.4)
EOSINOPHIL NFR BLD AUTO: 2.2 % (ref 0.3–6.2)
ERYTHROCYTE [DISTWIDTH] IN BLOOD BY AUTOMATED COUNT: 16 % (ref 12.3–15.4)
GLOBULIN UR ELPH-MCNC: 4 GM/DL
GLUCOSE SERPL-MCNC: 96 MG/DL (ref 65–99)
GRAM STN SPEC: ABNORMAL
GRAM STN SPEC: ABNORMAL
HCT VFR BLD AUTO: 24.4 % (ref 34–46.6)
HGB BLD-MCNC: 7.5 G/DL (ref 12–15.9)
IMM GRANULOCYTES # BLD AUTO: 0.05 10*3/MM3 (ref 0–0.05)
IMM GRANULOCYTES NFR BLD AUTO: 0.6 % (ref 0–0.5)
INR PPP: 1.1 (ref 0.93–1.1)
LYMPHOCYTES # BLD AUTO: 0.82 10*3/MM3 (ref 0.7–3.1)
LYMPHOCYTES NFR BLD AUTO: 9 % (ref 19.6–45.3)
MAGNESIUM SERPL-MCNC: 2.1 MG/DL (ref 1.6–2.4)
MCH RBC QN AUTO: 26.2 PG (ref 26.6–33)
MCHC RBC AUTO-ENTMCNC: 30.7 G/DL (ref 31.5–35.7)
MCV RBC AUTO: 85.3 FL (ref 79–97)
MONOCYTES # BLD AUTO: 1.08 10*3/MM3 (ref 0.1–0.9)
MONOCYTES NFR BLD AUTO: 11.9 % (ref 5–12)
NEUTROPHILS NFR BLD AUTO: 6.87 10*3/MM3 (ref 1.7–7)
NEUTROPHILS NFR BLD AUTO: 75.7 % (ref 42.7–76)
NRBC BLD AUTO-RTO: 0 /100 WBC (ref 0–0.2)
PHOSPHATE SERPL-MCNC: 2 MG/DL (ref 2.5–4.5)
PHOSPHATE SERPL-MCNC: 2.8 MG/DL (ref 2.5–4.5)
PLATELET # BLD AUTO: 511 10*3/MM3 (ref 140–450)
PMV BLD AUTO: 9.7 FL (ref 6–12)
POTASSIUM SERPL-SCNC: 3.6 MMOL/L (ref 3.5–5.2)
POTASSIUM SERPL-SCNC: 3.8 MMOL/L (ref 3.5–5.2)
PROT SERPL-MCNC: 6.5 G/DL (ref 6–8.5)
PROTHROMBIN TIME: 11.9 SECONDS (ref 9.6–11.7)
RBC # BLD AUTO: 2.86 10*6/MM3 (ref 3.77–5.28)
RH BLD: POSITIVE
SODIUM SERPL-SCNC: 136 MMOL/L (ref 136–145)
T&S EXPIRATION DATE: NORMAL
WBC NRBC COR # BLD AUTO: 9.07 10*3/MM3 (ref 3.4–10.8)

## 2024-10-14 PROCEDURE — C1769 GUIDE WIRE: HCPCS | Performed by: STUDENT IN AN ORGANIZED HEALTH CARE EDUCATION/TRAINING PROGRAM

## 2024-10-14 PROCEDURE — C1894 INTRO/SHEATH, NON-LASER: HCPCS | Performed by: STUDENT IN AN ORGANIZED HEALTH CARE EDUCATION/TRAINING PROGRAM

## 2024-10-14 PROCEDURE — C1725 CATH, TRANSLUMIN NON-LASER: HCPCS | Performed by: STUDENT IN AN ORGANIZED HEALTH CARE EDUCATION/TRAINING PROGRAM

## 2024-10-14 PROCEDURE — B41F1ZZ FLUOROSCOPY OF RIGHT LOWER EXTREMITY ARTERIES USING LOW OSMOLAR CONTRAST: ICD-10-PCS | Performed by: STUDENT IN AN ORGANIZED HEALTH CARE EDUCATION/TRAINING PROGRAM

## 2024-10-14 PROCEDURE — 37185 PRIM ART M-THRMBC SBSQ VSL: CPT | Performed by: STUDENT IN AN ORGANIZED HEALTH CARE EDUCATION/TRAINING PROGRAM

## 2024-10-14 PROCEDURE — 86850 RBC ANTIBODY SCREEN: CPT | Performed by: STUDENT IN AN ORGANIZED HEALTH CARE EDUCATION/TRAINING PROGRAM

## 2024-10-14 PROCEDURE — C1760 CLOSURE DEV, VASC: HCPCS | Performed by: STUDENT IN AN ORGANIZED HEALTH CARE EDUCATION/TRAINING PROGRAM

## 2024-10-14 PROCEDURE — 047M3ZZ DILATION OF RIGHT POPLITEAL ARTERY, PERCUTANEOUS APPROACH: ICD-10-PCS | Performed by: STUDENT IN AN ORGANIZED HEALTH CARE EDUCATION/TRAINING PROGRAM

## 2024-10-14 PROCEDURE — C1757 CATH, THROMBECTOMY/EMBOLECT: HCPCS | Performed by: STUDENT IN AN ORGANIZED HEALTH CARE EDUCATION/TRAINING PROGRAM

## 2024-10-14 PROCEDURE — 25010000002 HEPARIN (PORCINE) 25000-0.45 UT/250ML-% SOLUTION: Performed by: STUDENT IN AN ORGANIZED HEALTH CARE EDUCATION/TRAINING PROGRAM

## 2024-10-14 PROCEDURE — C1887 CATHETER, GUIDING: HCPCS | Performed by: STUDENT IN AN ORGANIZED HEALTH CARE EDUCATION/TRAINING PROGRAM

## 2024-10-14 PROCEDURE — 85025 COMPLETE CBC W/AUTO DIFF WBC: CPT | Performed by: STUDENT IN AN ORGANIZED HEALTH CARE EDUCATION/TRAINING PROGRAM

## 2024-10-14 PROCEDURE — 25810000003 SODIUM CHLORIDE 0.9 % SOLUTION: Performed by: INTERNAL MEDICINE

## 2024-10-14 PROCEDURE — 37232 PR REVSC OPN/PRQ TIB/PERO W/ANGIOPLASTY UNI EA VSL: CPT | Performed by: STUDENT IN AN ORGANIZED HEALTH CARE EDUCATION/TRAINING PROGRAM

## 2024-10-14 PROCEDURE — 25010000002 HYALURONIDASE (HUMAN) 150 UNIT/ML SOLUTION 1 ML VIAL: Performed by: INTERNAL MEDICINE

## 2024-10-14 PROCEDURE — 25010000002 PHENYLEPHRINE 10 MG/ML SOLUTION 5 ML VIAL

## 2024-10-14 PROCEDURE — 25010000002 ONDANSETRON PER 1 MG

## 2024-10-14 PROCEDURE — 11045 DBRDMT SUBQ TISS EACH ADDL: CPT | Performed by: STUDENT IN AN ORGANIZED HEALTH CARE EDUCATION/TRAINING PROGRAM

## 2024-10-14 PROCEDURE — 86900 BLOOD TYPING SEROLOGIC ABO: CPT | Performed by: STUDENT IN AN ORGANIZED HEALTH CARE EDUCATION/TRAINING PROGRAM

## 2024-10-14 PROCEDURE — 85610 PROTHROMBIN TIME: CPT | Performed by: STUDENT IN AN ORGANIZED HEALTH CARE EDUCATION/TRAINING PROGRAM

## 2024-10-14 PROCEDURE — P9016 RBC LEUKOCYTES REDUCED: HCPCS

## 2024-10-14 PROCEDURE — 25010000002 SUGAMMADEX 200 MG/2ML SOLUTION

## 2024-10-14 PROCEDURE — 83735 ASSAY OF MAGNESIUM: CPT | Performed by: STUDENT IN AN ORGANIZED HEALTH CARE EDUCATION/TRAINING PROGRAM

## 2024-10-14 PROCEDURE — 25510000001 IOPAMIDOL PER 1 ML: Performed by: STUDENT IN AN ORGANIZED HEALTH CARE EDUCATION/TRAINING PROGRAM

## 2024-10-14 PROCEDURE — 11042 DBRDMT SUBQ TIS 1ST 20SQCM/<: CPT | Performed by: STUDENT IN AN ORGANIZED HEALTH CARE EDUCATION/TRAINING PROGRAM

## 2024-10-14 PROCEDURE — 86923 COMPATIBILITY TEST ELECTRIC: CPT

## 2024-10-14 PROCEDURE — B4101ZZ FLUOROSCOPY OF ABDOMINAL AORTA USING LOW OSMOLAR CONTRAST: ICD-10-PCS | Performed by: STUDENT IN AN ORGANIZED HEALTH CARE EDUCATION/TRAINING PROGRAM

## 2024-10-14 PROCEDURE — 25010000002 PHENYLEPHRINE 10 MG/ML SOLUTION

## 2024-10-14 PROCEDURE — 85730 THROMBOPLASTIN TIME PARTIAL: CPT | Performed by: STUDENT IN AN ORGANIZED HEALTH CARE EDUCATION/TRAINING PROGRAM

## 2024-10-14 PROCEDURE — 84100 ASSAY OF PHOSPHORUS: CPT | Performed by: STUDENT IN AN ORGANIZED HEALTH CARE EDUCATION/TRAINING PROGRAM

## 2024-10-14 PROCEDURE — 0JBN0ZZ EXCISION OF RIGHT LOWER LEG SUBCUTANEOUS TISSUE AND FASCIA, OPEN APPROACH: ICD-10-PCS | Performed by: STUDENT IN AN ORGANIZED HEALTH CARE EDUCATION/TRAINING PROGRAM

## 2024-10-14 PROCEDURE — 75710 ARTERY X-RAYS ARM/LEG: CPT | Performed by: STUDENT IN AN ORGANIZED HEALTH CARE EDUCATION/TRAINING PROGRAM

## 2024-10-14 PROCEDURE — 36430 TRANSFUSION BLD/BLD COMPNT: CPT

## 2024-10-14 PROCEDURE — 04CM3ZZ EXTIRPATION OF MATTER FROM RIGHT POPLITEAL ARTERY, PERCUTANEOUS APPROACH: ICD-10-PCS | Performed by: STUDENT IN AN ORGANIZED HEALTH CARE EDUCATION/TRAINING PROGRAM

## 2024-10-14 PROCEDURE — 25010000002 LIDOCAINE 1 % SOLUTION: Performed by: STUDENT IN AN ORGANIZED HEALTH CARE EDUCATION/TRAINING PROGRAM

## 2024-10-14 PROCEDURE — 25010000002 CEFTAZIDIME 2 G RECONSTITUTED SOLUTION 1 EACH VIAL: Performed by: NURSE PRACTITIONER

## 2024-10-14 PROCEDURE — 25010000002 PROPOFOL 1000 MG/100ML EMULSION

## 2024-10-14 PROCEDURE — 25010000002 HEPARIN (PORCINE) PER 1000 UNITS

## 2024-10-14 PROCEDURE — 25010000002 POTASSIUM CHLORIDE 10 MEQ/100ML SOLUTION: Performed by: INTERNAL MEDICINE

## 2024-10-14 PROCEDURE — 37228 PR REVSC OPN/PRQ TIB/PERO W/ANGIOPLASTY UNI: CPT | Performed by: STUDENT IN AN ORGANIZED HEALTH CARE EDUCATION/TRAINING PROGRAM

## 2024-10-14 PROCEDURE — C1884 EMBOLIZATION PROTECT SYST: HCPCS | Performed by: STUDENT IN AN ORGANIZED HEALTH CARE EDUCATION/TRAINING PROGRAM

## 2024-10-14 PROCEDURE — 84132 ASSAY OF SERUM POTASSIUM: CPT | Performed by: INTERNAL MEDICINE

## 2024-10-14 PROCEDURE — 37184 PRIM ART M-THRMBC 1ST VSL: CPT | Performed by: STUDENT IN AN ORGANIZED HEALTH CARE EDUCATION/TRAINING PROGRAM

## 2024-10-14 PROCEDURE — 84100 ASSAY OF PHOSPHORUS: CPT | Performed by: INTERNAL MEDICINE

## 2024-10-14 PROCEDURE — 25810000003 SODIUM CHLORIDE 0.9 % SOLUTION

## 2024-10-14 PROCEDURE — 25010000002 HEPARIN (PORCINE) PER 1000 UNITS: Performed by: STUDENT IN AN ORGANIZED HEALTH CARE EDUCATION/TRAINING PROGRAM

## 2024-10-14 PROCEDURE — 04CT3ZZ EXTIRPATION OF MATTER FROM RIGHT PERONEAL ARTERY, PERCUTANEOUS APPROACH: ICD-10-PCS | Performed by: STUDENT IN AN ORGANIZED HEALTH CARE EDUCATION/TRAINING PROGRAM

## 2024-10-14 PROCEDURE — 86901 BLOOD TYPING SEROLOGIC RH(D): CPT | Performed by: STUDENT IN AN ORGANIZED HEALTH CARE EDUCATION/TRAINING PROGRAM

## 2024-10-14 PROCEDURE — 25010000002 MORPHINE PER 10 MG: Performed by: HOSPITALIST

## 2024-10-14 PROCEDURE — 76937 US GUIDE VASCULAR ACCESS: CPT | Performed by: STUDENT IN AN ORGANIZED HEALTH CARE EDUCATION/TRAINING PROGRAM

## 2024-10-14 PROCEDURE — 86901 BLOOD TYPING SEROLOGIC RH(D): CPT

## 2024-10-14 PROCEDURE — 047P3ZZ DILATION OF RIGHT ANTERIOR TIBIAL ARTERY, PERCUTANEOUS APPROACH: ICD-10-PCS | Performed by: STUDENT IN AN ORGANIZED HEALTH CARE EDUCATION/TRAINING PROGRAM

## 2024-10-14 PROCEDURE — 25010000002 LIDOCAINE PF 2% 2 % SOLUTION

## 2024-10-14 PROCEDURE — 86900 BLOOD TYPING SEROLOGIC ABO: CPT

## 2024-10-14 PROCEDURE — 25810000003 SODIUM CHLORIDE 0.9 % SOLUTION 250 ML FLEX CONT

## 2024-10-14 PROCEDURE — 75710 ARTERY X-RAYS ARM/LEG: CPT

## 2024-10-14 PROCEDURE — 25810000003 LACTATED RINGERS PER 1000 ML: Performed by: ANESTHESIOLOGY

## 2024-10-14 PROCEDURE — 04CP3ZZ EXTIRPATION OF MATTER FROM RIGHT ANTERIOR TIBIAL ARTERY, PERCUTANEOUS APPROACH: ICD-10-PCS | Performed by: STUDENT IN AN ORGANIZED HEALTH CARE EDUCATION/TRAINING PROGRAM

## 2024-10-14 PROCEDURE — 75625 CONTRAST EXAM ABDOMINL AORTA: CPT

## 2024-10-14 PROCEDURE — 75625 CONTRAST EXAM ABDOMINL AORTA: CPT | Performed by: STUDENT IN AN ORGANIZED HEALTH CARE EDUCATION/TRAINING PROGRAM

## 2024-10-14 PROCEDURE — 80053 COMPREHEN METABOLIC PANEL: CPT | Performed by: STUDENT IN AN ORGANIZED HEALTH CARE EDUCATION/TRAINING PROGRAM

## 2024-10-14 PROCEDURE — 25010000002 HYDROMORPHONE 1 MG/ML SOLUTION

## 2024-10-14 RX ORDER — EPHEDRINE SULFATE 5 MG/ML
5 INJECTION INTRAVENOUS ONCE AS NEEDED
Status: DISCONTINUED | OUTPATIENT
Start: 2024-10-14 | End: 2024-10-14 | Stop reason: HOSPADM

## 2024-10-14 RX ORDER — NALOXONE HCL 0.4 MG/ML
0.4 VIAL (ML) INJECTION AS NEEDED
Status: DISCONTINUED | OUTPATIENT
Start: 2024-10-14 | End: 2024-10-14 | Stop reason: HOSPADM

## 2024-10-14 RX ORDER — PHENYLEPHRINE HYDROCHLORIDE 10 MG/ML
INJECTION INTRAVENOUS AS NEEDED
Status: DISCONTINUED | OUTPATIENT
Start: 2024-10-14 | End: 2024-10-14 | Stop reason: SURG

## 2024-10-14 RX ORDER — POTASSIUM CHLORIDE 7.45 MG/ML
10 INJECTION INTRAVENOUS
Status: DISPENSED | OUTPATIENT
Start: 2024-10-14 | End: 2024-10-14

## 2024-10-14 RX ORDER — LABETALOL HYDROCHLORIDE 5 MG/ML
5 INJECTION, SOLUTION INTRAVENOUS
Status: DISCONTINUED | OUTPATIENT
Start: 2024-10-14 | End: 2024-10-14 | Stop reason: HOSPADM

## 2024-10-14 RX ORDER — ONDANSETRON 2 MG/ML
4 INJECTION INTRAMUSCULAR; INTRAVENOUS ONCE AS NEEDED
Status: COMPLETED | OUTPATIENT
Start: 2024-10-14 | End: 2024-10-14

## 2024-10-14 RX ORDER — ONDANSETRON 2 MG/ML
INJECTION INTRAMUSCULAR; INTRAVENOUS AS NEEDED
Status: DISCONTINUED | OUTPATIENT
Start: 2024-10-14 | End: 2024-10-14 | Stop reason: SURG

## 2024-10-14 RX ORDER — SODIUM CHLORIDE, SODIUM LACTATE, POTASSIUM CHLORIDE, CALCIUM CHLORIDE 600; 310; 30; 20 MG/100ML; MG/100ML; MG/100ML; MG/100ML
1000 INJECTION, SOLUTION INTRAVENOUS CONTINUOUS
Status: DISPENSED | OUTPATIENT
Start: 2024-10-14 | End: 2024-10-14

## 2024-10-14 RX ORDER — HYDRALAZINE HYDROCHLORIDE 20 MG/ML
5 INJECTION INTRAMUSCULAR; INTRAVENOUS
Status: DISCONTINUED | OUTPATIENT
Start: 2024-10-14 | End: 2024-10-14 | Stop reason: HOSPADM

## 2024-10-14 RX ORDER — DIPHENHYDRAMINE HYDROCHLORIDE 50 MG/ML
12.5 INJECTION INTRAMUSCULAR; INTRAVENOUS
Status: DISCONTINUED | OUTPATIENT
Start: 2024-10-14 | End: 2024-10-14 | Stop reason: HOSPADM

## 2024-10-14 RX ORDER — MORPHINE SULFATE 2 MG/ML
2 INJECTION, SOLUTION INTRAMUSCULAR; INTRAVENOUS
Status: DISCONTINUED | OUTPATIENT
Start: 2024-10-14 | End: 2024-10-18 | Stop reason: HOSPADM

## 2024-10-14 RX ORDER — HEPARIN SODIUM 10000 [USP'U]/100ML
18 INJECTION, SOLUTION INTRAVENOUS
Status: DISCONTINUED | OUTPATIENT
Start: 2024-10-14 | End: 2024-10-16

## 2024-10-14 RX ORDER — HEPARIN SODIUM 1000 [USP'U]/ML
INJECTION, SOLUTION INTRAVENOUS; SUBCUTANEOUS AS NEEDED
Status: DISCONTINUED | OUTPATIENT
Start: 2024-10-14 | End: 2024-10-14 | Stop reason: SURG

## 2024-10-14 RX ORDER — FENTANYL/ROPIVACAINE/NS/PF 2-625MCG/1
15 PLASTIC BAG, INJECTION (ML) EPIDURAL ONCE
Status: COMPLETED | OUTPATIENT
Start: 2024-10-14 | End: 2024-10-14

## 2024-10-14 RX ORDER — IPRATROPIUM BROMIDE AND ALBUTEROL SULFATE 2.5; .5 MG/3ML; MG/3ML
3 SOLUTION RESPIRATORY (INHALATION) ONCE AS NEEDED
Status: DISCONTINUED | OUTPATIENT
Start: 2024-10-14 | End: 2024-10-14 | Stop reason: HOSPADM

## 2024-10-14 RX ORDER — ROCURONIUM BROMIDE 10 MG/ML
INJECTION, SOLUTION INTRAVENOUS AS NEEDED
Status: DISCONTINUED | OUTPATIENT
Start: 2024-10-14 | End: 2024-10-14 | Stop reason: SURG

## 2024-10-14 RX ORDER — IOPAMIDOL 755 MG/ML
INJECTION, SOLUTION INTRAVASCULAR AS NEEDED
Status: DISCONTINUED | OUTPATIENT
Start: 2024-10-14 | End: 2024-10-14 | Stop reason: HOSPADM

## 2024-10-14 RX ORDER — SODIUM CHLORIDE 9 MG/ML
INJECTION, SOLUTION INTRAVENOUS CONTINUOUS PRN
Status: DISCONTINUED | OUTPATIENT
Start: 2024-10-14 | End: 2024-10-14 | Stop reason: SURG

## 2024-10-14 RX ORDER — PROPOFOL 10 MG/ML
INJECTION, EMULSION INTRAVENOUS AS NEEDED
Status: DISCONTINUED | OUTPATIENT
Start: 2024-10-14 | End: 2024-10-14 | Stop reason: SURG

## 2024-10-14 RX ORDER — LIDOCAINE HYDROCHLORIDE 10 MG/ML
INJECTION, SOLUTION INFILTRATION; PERINEURAL AS NEEDED
Status: DISCONTINUED | OUTPATIENT
Start: 2024-10-14 | End: 2024-10-14 | Stop reason: HOSPADM

## 2024-10-14 RX ORDER — OXYCODONE HYDROCHLORIDE 5 MG/1
5 TABLET ORAL ONCE AS NEEDED
Status: DISCONTINUED | OUTPATIENT
Start: 2024-10-14 | End: 2024-10-14 | Stop reason: HOSPADM

## 2024-10-14 RX ORDER — LIDOCAINE HYDROCHLORIDE 20 MG/ML
INJECTION, SOLUTION EPIDURAL; INFILTRATION; INTRACAUDAL; PERINEURAL AS NEEDED
Status: DISCONTINUED | OUTPATIENT
Start: 2024-10-14 | End: 2024-10-14 | Stop reason: SURG

## 2024-10-14 RX ORDER — LIDOCAINE HYDROCHLORIDE 10 MG/ML
0.5 INJECTION, SOLUTION EPIDURAL; INFILTRATION; INTRACAUDAL; PERINEURAL ONCE AS NEEDED
Status: DISCONTINUED | OUTPATIENT
Start: 2024-10-14 | End: 2024-10-14 | Stop reason: HOSPADM

## 2024-10-14 RX ORDER — OXYCODONE HYDROCHLORIDE 5 MG/1
10 TABLET ORAL EVERY 4 HOURS PRN
Status: DISCONTINUED | OUTPATIENT
Start: 2024-10-14 | End: 2024-10-14 | Stop reason: HOSPADM

## 2024-10-14 RX ORDER — SODIUM CHLORIDE 0.9 % (FLUSH) 0.9 %
10 SYRINGE (ML) INJECTION AS NEEDED
Status: DISCONTINUED | OUTPATIENT
Start: 2024-10-14 | End: 2024-10-14 | Stop reason: HOSPADM

## 2024-10-14 RX ADMIN — CEFTAZIDIME 2000 MG: 2 INJECTION, POWDER, FOR SOLUTION INTRAVENOUS at 01:47

## 2024-10-14 RX ADMIN — Medication 10 ML: at 08:29

## 2024-10-14 RX ADMIN — HEPARIN SODIUM 2000 UNITS: 1000 INJECTION INTRAVENOUS; SUBCUTANEOUS at 15:01

## 2024-10-14 RX ADMIN — ONDANSETRON 4 MG: 2 INJECTION INTRAMUSCULAR; INTRAVENOUS at 16:52

## 2024-10-14 RX ADMIN — POTASSIUM PHOSPHATE, MONOBASIC AND POTASSIUM PHOSPHATE, DIBASIC 15 MMOL: 224; 236 INJECTION, SOLUTION, CONCENTRATE INTRAVENOUS at 08:27

## 2024-10-14 RX ADMIN — DIGOXIN 125 MCG: 125 TABLET ORAL at 11:59

## 2024-10-14 RX ADMIN — METOPROLOL TARTRATE 25 MG: 25 TABLET, FILM COATED ORAL at 05:43

## 2024-10-14 RX ADMIN — FERROUS SULFATE TAB EC 324 MG (65 MG FE EQUIVALENT) 324 MG: 324 (65 FE) TABLET DELAYED RESPONSE at 08:28

## 2024-10-14 RX ADMIN — PHENYLEPHRINE HYDROCHLORIDE 100 MCG: 10 INJECTION INTRAVENOUS at 14:13

## 2024-10-14 RX ADMIN — ROCURONIUM BROMIDE 40 MG: 10 INJECTION, SOLUTION INTRAVENOUS at 14:10

## 2024-10-14 RX ADMIN — HEPARIN SODIUM 7000 UNITS: 1000 INJECTION INTRAVENOUS; SUBCUTANEOUS at 14:36

## 2024-10-14 RX ADMIN — CITALOPRAM HYDROBROMIDE 20 MG: 20 TABLET ORAL at 08:28

## 2024-10-14 RX ADMIN — POTASSIUM CHLORIDE 10 MEQ: 10 INJECTION, SOLUTION INTRAVENOUS at 05:41

## 2024-10-14 RX ADMIN — CETIRIZINE HYDROCHLORIDE 10 MG: 10 TABLET, FILM COATED ORAL at 08:28

## 2024-10-14 RX ADMIN — SODIUM CHLORIDE: 9 INJECTION, SOLUTION INTRAVENOUS at 14:00

## 2024-10-14 RX ADMIN — PANTOPRAZOLE SODIUM 40 MG: 40 TABLET, DELAYED RELEASE ORAL at 08:28

## 2024-10-14 RX ADMIN — SODIUM CHLORIDE 150 UNITS: 9 INJECTION INTRAMUSCULAR; INTRAVENOUS; SUBCUTANEOUS at 12:17

## 2024-10-14 RX ADMIN — LIDOCAINE HYDROCHLORIDE 60 MG: 20 INJECTION, SOLUTION EPIDURAL; INFILTRATION; INTRACAUDAL; PERINEURAL at 14:10

## 2024-10-14 RX ADMIN — CILOSTAZOL 100 MG: 100 TABLET ORAL at 08:28

## 2024-10-14 RX ADMIN — CILOSTAZOL 100 MG: 100 TABLET ORAL at 20:52

## 2024-10-14 RX ADMIN — Medication 220 MG: at 08:28

## 2024-10-14 RX ADMIN — THERA TABS 1 TABLET: TAB at 08:28

## 2024-10-14 RX ADMIN — OXYCODONE 5 MG: 5 TABLET ORAL at 08:28

## 2024-10-14 RX ADMIN — PHENYLEPHRINE HYDROCHLORIDE 0.5 MCG/KG/MIN: 10 INJECTION INTRAVENOUS at 14:22

## 2024-10-14 RX ADMIN — MUPIROCIN 1 APPLICATION: 20 OINTMENT TOPICAL at 08:29

## 2024-10-14 RX ADMIN — CYANOCOBALAMIN TAB 500 MCG 1000 MCG: 500 TAB at 08:28

## 2024-10-14 RX ADMIN — METOPROLOL TARTRATE 25 MG: 25 TABLET, FILM COATED ORAL at 20:56

## 2024-10-14 RX ADMIN — HYDROMORPHONE HYDROCHLORIDE 0.5 MG: 1 INJECTION, SOLUTION INTRAMUSCULAR; INTRAVENOUS; SUBCUTANEOUS at 18:34

## 2024-10-14 RX ADMIN — SODIUM CHLORIDE, POTASSIUM CHLORIDE, SODIUM LACTATE AND CALCIUM CHLORIDE 1000 ML: 600; 310; 30; 20 INJECTION, SOLUTION INTRAVENOUS at 13:16

## 2024-10-14 RX ADMIN — METOPROLOL TARTRATE 25 MG: 25 TABLET, FILM COATED ORAL at 11:59

## 2024-10-14 RX ADMIN — FUROSEMIDE 40 MG: 40 TABLET ORAL at 08:28

## 2024-10-14 RX ADMIN — Medication 10 ML: at 20:52

## 2024-10-14 RX ADMIN — LEVOTHYROXINE SODIUM 50 MCG: 0.05 TABLET ORAL at 05:42

## 2024-10-14 RX ADMIN — PROPOFOL INJECTABLE EMULSION 150 MG: 10 INJECTION, EMULSION INTRAVENOUS at 14:10

## 2024-10-14 RX ADMIN — PHENYLEPHRINE HYDROCHLORIDE 100 MCG: 10 INJECTION INTRAVENOUS at 14:19

## 2024-10-14 RX ADMIN — HEPARIN SODIUM 18 UNITS/KG/HR: 10000 INJECTION, SOLUTION INTRAVENOUS at 21:05

## 2024-10-14 RX ADMIN — PROPOFOL INJECTABLE EMULSION 125 MCG/KG/MIN: 10 INJECTION, EMULSION INTRAVENOUS at 14:12

## 2024-10-14 RX ADMIN — ONDANSETRON 4 MG: 2 INJECTION, SOLUTION INTRAMUSCULAR; INTRAVENOUS at 18:26

## 2024-10-14 RX ADMIN — SUGAMMADEX 200 MG: 100 INJECTION, SOLUTION INTRAVENOUS at 16:52

## 2024-10-14 RX ADMIN — MORPHINE SULFATE 2 MG: 2 INJECTION, SOLUTION INTRAMUSCULAR; INTRAVENOUS at 02:41

## 2024-10-14 RX ADMIN — HEPARIN SODIUM 2000 UNITS: 1000 INJECTION INTRAVENOUS; SUBCUTANEOUS at 15:50

## 2024-10-14 RX ADMIN — BUPROPION HYDROCHLORIDE 150 MG: 150 TABLET, EXTENDED RELEASE ORAL at 08:28

## 2024-10-14 RX ADMIN — CEFTAZIDIME 2000 MG: 2 INJECTION, POWDER, FOR SOLUTION INTRAVENOUS at 13:53

## 2024-10-14 RX ADMIN — COLLAGENASE SANTYL 1 APPLICATION: 250 OINTMENT TOPICAL at 08:29

## 2024-10-14 NOTE — ANESTHESIA PREPROCEDURE EVALUATION
Anesthesia Evaluation     Patient summary reviewed and Nursing notes reviewed   no history of anesthetic complications:   NPO Solid Status: > 8 hours  NPO Liquid Status: > 2 hours           Airway   Mallampati: II  TM distance: >3 FB  Neck ROM: full  No difficulty expected  Dental - normal exam   (+) edentulous and poor dentition        Pulmonary - normal exam   (+) a smoker Former,  Cardiovascular - normal exam    ECG reviewed  PT is on anticoagulation therapy  Patient on routine beta blocker    (+) hypertension, dysrhythmias Atrial Fib, PVD      Neuro/Psych  (+) psychiatric history Anxiety    ROS Comment: SOME CONFUSION BUT COHERENT MOST TIMES  GI/Hepatic/Renal/Endo    (+) GERD, renal disease- ARF, thyroid problem hypothyroidism    Musculoskeletal     Abdominal  - normal exam    Bowel sounds: normal.   Substance History      OB/GYN          Other   blood dyscrasia anemia,     ROS/Med Hx Other: Additional History:  Afib/RVR, allergies, abd pain, h/o SBO, ventral hernia, cellulitis    PSH:  Gastric bypass      Phys Exam Other: No loose teeth per pt , broken on bottom            Anesthesia Plan    ASA 3     general   total IV anesthesia  (Patient identified; pre-operative vital signs, all relevant labs/studies, complete medical/surgical/anesthetic history, full medication list, full allergy list, and NPO status obtained/reviewed; physical assessment performed; anesthetic options, side effects, potential complications, risks, and benefits discussed; questions answered; written anesthesia consent obtained; patient cleared for procedure; anesthesia machine and equipment checked and functioning)  intravenous induction     Anesthetic plan, risks, benefits, and alternatives have been provided, discussed and informed consent has been obtained with: patient.    Plan discussed with CRNA and CAA.    CODE STATUS:    Level Of Support Discussed With: Patient  Code Status (Patient has no pulse and is not breathing): CPR (Attempt  to Resuscitate)  Medical Interventions (Patient has pulse or is breathing): Full Support

## 2024-10-14 NOTE — SIGNIFICANT NOTE
10/14/24 1456   OTHER   Discipline occupational therapist   Rehab Time/Intention   Session Not Performed other (see comments)  (PEDRITO for wound debridement, will follow up)   Therapy Assessment/Plan (PT)   Criteria for Skilled Interventions Met (PT) yes;meets criteria;skilled treatment is necessary   Recommendation   OT - Next Appointment 10/15/24

## 2024-10-14 NOTE — OP NOTE
Date of Admission:  10/9/2024  Today's Date:  10/14/24  Red Hazel II, MD  [facility]    Preoperative Diagnosis:   Peripheral arterial disease with tissue loss  Atrial fibrillation  Nonhealing right leg wound    Postoperative Diagnosis:   Same    Procedure Performed:   Ultrasound-guided access left common femoral artery  Abdominal aortogram  Right leg arteriogram  Selective catheter placement left common femoral artery into right anterior tibial artery and peroneal artery  Percutaneous mechanical thrombectomy with penumbra aspiration thrombectomy device, CAT 6 and CAT Rx, of popliteal artery, peroneal artery, anterior tibial artery  Balloon angioplasty right tibioperoneal trunk and popliteal artery with 4x80 mm angioplasty balloon  Balloon angioplasty right anterior tibial artery with 2.5x100 mm angioplasty balloon  Pro-glide Perclose closure left common femoral artery  Sharp excisional debridement right leg wound, skin and subcutaneous tissue, 15 x 7cm    Surgeon:   Red Hazel II, MD    Assistant:    Mari CHAPMAN, Provided critical assistance in exposure, retraction, and suction that overall decrease blood loss and operative time.    Anesthesia:   General    Estimated Blood Loss:   400mL    Findings:    Abdominal aortogram: Renal arteries patent bilaterally, and fetal aorta patent without evidence of aneurysm or stenosis, common iliac artery, external iliac arteries and internal iliac arteries patent bilaterally without aneurysm or stenosis    Right leg arteriogram: Common femoral artery widely patent, profundofemoral artery widely patent, superficial femoral artery widely patent, popliteal artery completely occludes behind the knee with pulsatile column of contrast concerning for thrombus with reconstitution of peroneal artery and anterior tibial arteries distally.  Peroneal artery with inline flow to its anatomic termination point just above the foot, anterior tibial artery has complete occlusion in  its midportion and then reconstitutes distally via collateral from peroneal artery, posterior tibial artery initially completely occluded on angiography.  Patient with occlusion of pedal arch and distal foot with no opacification of digital arteries concerning for microembolic disease in the foot.    The popliteal artery, tibioperoneal trunk, peroneal artery, and anterior tibial arteries were treated with penumbra aspiration mechanical thrombectomy device using a CAT 6 catheter and CAT Rx catheter.  Angioplasty performed of the popliteal artery and tibioperoneal trunk with 4 mm angioplasty balloon, angioplasty performed with anterior tibial artery with 2.5 mm angioplasty balloon.  We were able to restore inline flow to the foot via anterior tibial artery with inline flow through the peroneal artery as well.  Posterior tibial artery occluded in mid lower leg.    Angiography of our access site at conclusion of procedure showed a focal dissection flap.  Arteriotomy was closed with Pro-glide Perclose device without complication.  Duplex ultrasound with B-mode and color-flow imaging performed by me showed resolution of the dissection flap with excellent pulsatile flow through the common femoral artery superficial femoral artery and profundofemoral artery after deploying the Pro-glide Perclose device and no lumen irregularity.     At conclusion of procedure patient had biphasic DP and peroneal signals in the right foot and biphasic DP and monophasic PT signals on the left foot    Implants:    Nothing was implanted during the procedure    Staff:   Circulator: Yonathan Weber RN; Francine Amezcua RN  Radiology Technologist: Rudy Cheema RRT  Scrub Person: Gayathri Carlson  Assistant: Mari Hays CSA    Specimen:   none    Complications:   none    Dispo:   to PACU    Indication for procedure:   79 y.o. female with atrial fibrillation and other medical problems who has had a chronic nonhealing  wound on both of her lower extremities worse on the right than left.  Previous ABIs on the right side have been normal however she had worsening exam and wound was getting worse despite wound care and CTA was performed that showed occlusion of her popliteal artery.  It was unclear to me initially if this was acute or chronic but plans made for right leg arteriogram with possible intervention, as well as another debridement of her right leg wound.  Details of this procedure including risk benefits and alternatives were discussed with the patient she verbalized understanding and agreed to proceed.    Description of procedure:   Patient was taken to the hybrid room placed supine on the table.  General tracheal anesthesia was initiated by the anesthesia service.  Once the patient was asleep her groins were prepped with ChloraPrep bilaterally and she was draped in sterile fashion.  A timeout was performed.  I began procedure by marking the left femoral head under fluoroscopy.  Then used ultrasound evaluate the left common femoral artery.  Local anesthetic was infiltrated over the artery under ultrasound guidance and then under ultrasound guidance I accessed the left common femoral artery with a microneedle and a microwire was inserted.  Placement of the femoral head was confirmed with fluoroscopy.  I then exchanged microneedle for microsheath and performed an angiogram of the access site which looked appropriate.  0.035 Glidewire was advanced through the micro sheath and into the aorta.  I exchanged the micro sheath for a 10 cm 5 [place] sheath.  Omni Flush catheter was advanced over the wire and abdominal aortogram was performed with the above listed findings.  I then used the Omni Flush catheter to select the right iliac system.  Is able to pass the wire and catheter into the distal right external iliac artery.  Right leg arteriogram was performed from this position with sequential contrast injections with the above  listed findings.  It was not immediately clear to me if the popliteal occlusion was chronic or acute.  A 0.035 Glidewire advantage was advanced through the Omni Flush catheter and into the superficial femoral artery.  I then exchanged the 10 cm 5 [place] sheath for 45 cm 6 Wol[place] destination sheath and the patient was given 7000 units of heparin.  We did redosed with 2 further doses of 2000 units of heparin throughout the procedure.  Using an angled Espinal cross catheter I was very easily able to cross the popliteal lesion suggesting likely thrombus rather than an acute occlusion.  Repeat angiography showed a patent single column of contrast in the popliteal artery that confirmed to me that this was thrombus and not a chronic plaque.  Angiography was performed distally to confirm intraluminal placement in the peroneal artery and then we exchanged for a 0.014 5mm spider filter wire.  A penumbra aspiration mechanical device was obtained and using the CAT 6 catheter I performed aspiration mechanical thrombectomy of the popliteal artery and peroneal artery.  Initial completion angiography showed some improvement but persistent thrombus in the popliteal artery and in the peroneal artery distally.  Our filter wire retracted some and so we did ultimately have to take that out and it had a large amount of thrombus within the filter wire that appeared subacute.  Glidewire and angled Espinal cross catheter were advanced back through the popliteal artery and then exchanged for the filter wire again.  We repeated aspiration thrombectomy with a CAT 6 device.  Following this there was improvement still but still no inline flow.  A 4 mm angioplasty balloon was obtained and balloon angioplasty performed of the tibioperoneal trunk and the popliteal artery.  Flow was considerably improved after this however there was still some thrombus at the origin of the anterior tibial artery.  We did repeat thrombectomy again with the CAT 6 device.   Following this pass of the aspiration device we did have to remove the sheath and it was flushed due to not drawing back and concerned that I had wedged some thrombus from the aspiration device within the lumen of the sheath.  It was removed and flushed so as to prevent reimmunization and then reinserted under fluoroscopic guidance.  Following this we did have inline flow through the peroneal artery which reconstituted the anterior tibial artery distally but there was clear thrombus at the origin of the anterior tibial artery that was protruding into the lumen of the origin of the tibioperoneal trunk.  Additionally there is occlusion of the anterior tibial artery in the midportion of the lower leg.  Using angled Espinal cross catheter I was able to get a 0.035 Glidewire advantage into the anterior tibial artery and this was advanced distally and then exchanged for a 0.014 [place] core wire.  I then performed Penumbra aspiration mechanical thrombectomy of the origin of the anterior tibial artery with a CAT 6 device.  Completion angiography showed that this was successful in removing the thrombus from the origin of the anterior tibial artery however some thrombus did appear to have embolized down to the origin of the peroneal artery.  Using the 0.014 [place] core wire and a [place] catheter I was able to advance wire and catheter through the anterior tibial artery into the very distal anterior tibial artery at the level of the ankle.  I initially thought the occlusion in the midportion of the anterior tibial artery was chronic based on its angiographic appearance and balloon angioplasty was performed.  Initial angiography showed improvement in flow through the anterior tibial artery with inline flow to the foot.  I then removed the 014 wire from the anterior tibial artery and advanced down the peroneal artery.  I then used a CAT Rx penumbra catheter to perform aspiration thrombectomy of the peroneal artery.  This was  successful in removing the thrombus from the origin of the peroneal artery and completion angiography showed inline flow through the peroneal artery but did also show reocclusion of the midportion of the anterior tibial artery which I had treated with balloon angioplasty only minutes prior.  Then using an angled Espinal cross catheter I was able to get back into the anterior tibial artery with a Glidewire advantage and then this was exchanged for the 0.014 [place] core wire which was able to be advanced distally into the anterior tibial artery.  I did perform angiography distally to confirm intraluminal placement and we were within the lumen of the anterior tibial artery.  I then used the CAT Rx device to perform aspiration mechanical thrombectomy of the anterior tibial artery.  A significant amount of chronic appearing thrombus was removed and completion angiography showed inline flow through the anterior tibial artery and peroneal artery.  I decided to stop the endovascular portion of the procedure at that time.  The sheath was then retracted into the left common iliac artery and and Glidewire was advanced into the aorta.  The 45 cm 6 [place] destination sheath was exchanged for a 10 cm 6 [place] sheath and performed a repeat angiogram of our access site due to our having to remove and reinsert the sheath during the procedure.  This showed what appeared to be a focal dissection at our access site that did not appear to be flow-limiting.  I decided to proceed with closure of the access site with a ProGlide device which was uncomplicated and pressure was held until hemostasis was obtained.  I then performed a duplex ultrasound of the artery myself on the table which showed no dissection or thrombus within the common femoral artery superficial femoral artery or profundofemoral artery and excellent flow on color Doppler.  We then confirmed the patient had pedal signals in both of her feet.  I elected not to reverse  anticoagulation due to her need for ongoing anticoagulation for her atrial fibrillation and embolic disease.  Sterile dressings were placed on the left groin access site.  I then turned my attention to the right leg.  Scissors were used to cut a hole in the drapes over the right leg wound.  Betadine was then used to prep the wound and the surrounding skin.  It was then dressed with sterile towels.  A 15 blade scalpel was used to perform dissection of the clearly necrotic skin at the distal aspect of her old wound.  This was excised and passed off the field.  Some scissors were used to debride some of the skin edges back to healthy bleeding tissue.  A curette was used to perform curettage of the wound bed and the skin edges.  This remove some fibrinous slough.  There was no purulence.  I did use electrocautery for hemostasis which was meticulously obtained.  I did use electrocautery on the muscle underlying the wound to confirm that it was healthy and reactive, and it was.  Once I satisfied with debridement the wound was then dressed with Betadine dampened gauze, dry gauze, ABDs, Kerlix and Ace wrap.  I then confirmed again that the patient had pedal signals in her feet prior to leaving the OR.  The patient tolerated the procedure well and all wires catheters sheaths and other devices were removed and found to be whole and intact prior to the conclusion of the procedure.  At case completion all instrument count, needle count, and sponge counts were correct x2.  I discussed the outcome of the procedure with the patient's family and the consultation room.     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

## 2024-10-14 NOTE — CASE MANAGEMENT/SOCIAL WORK
Continued Stay Note  [facility]     Patient Name: Fallon Nur  MRN: [id number]  Today's Date: 10/14/2024    Admit Date: 10/9/2024    Plan: [facility] pending acceptance. Will require precSIMRAN martini approved. From home with daughter.   Discharge Plan       Row Name 10/14/24 1519       Plan    Plan [facility] referral pending acceptance. Will require precert, PASRR approved. From home with daughter.    Patient/Family in Agreement with Plan yes    Plan Comments CM met with patient and daughters at bedside to follow-up on SNF choices. They provided top choices for 1. [facility], 2. [facility], and 3. [facility]. CM added new referral in AW basket and sent to liaison Amy MOY to review. DC Barriers: Wound debridement surgery 10/14.                  Greta Carpenter RN     Office Phone: [phone]  Office Cell: [phone]

## 2024-10-14 NOTE — PLAN OF CARE
Goal Outcome Evaluation:         Patient is calm and cooperative, on room air, wound dressing done. Npo after midnight, consent signed. For surgery today. Family at bedside throughout the night. PRN pain meds given. Call light placed within reach. Plan of care ongoing

## 2024-10-14 NOTE — ANESTHESIA PROCEDURE NOTES
Airway  Urgency: elective    Date/Time: 10/14/2024 2:17 PM  Airway not difficult    General Information and Staff    Patient location during procedure: OR  Anesthesiologist: Manjula Johns MD  CRNA/CAA: Katelynn Miranda CRNA    Indications and Patient Condition  Indications for airway management: airway protection    Preoxygenated: yes  MILS maintained throughout  Mask difficulty assessment: 1 - vent by mask    Final Airway Details  Final airway type: endotracheal airway      Successful airway: ETT  Cuffed: yes   Successful intubation technique: direct laryngoscopy  Endotracheal tube insertion site: oral  Blade: Barreto  Blade size: 3  ETT size (mm): 7.0  Cormack-Lehane Classification: grade I - full view of glottis  Placement verified by: chest auscultation   Cuff volume (mL): 6  Measured from: gums  ETT/EBT to gums (cm): 20  Number of attempts at approach: 1  Assessment: lips, teeth, and gum same as pre-op and atraumatic intubation

## 2024-10-14 NOTE — ANESTHESIA POSTPROCEDURE EVALUATION
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        Post Anesthesia Care and Evaluation    Patient location during evaluation: PACU  Patient participation: complete - patient participated  Level of consciousness: awake  Pain scale: See nurse's notes for pain score.  Pain management: adequate    Airway patency: patent  Anesthetic complications: No anesthetic complications  PONV Status: none  Cardiovascular status: acceptable  Respiratory status: acceptable and spontaneous ventilation  Hydration status: acceptable    Comments: Patient seen and examined postoperatively; vital signs stable; SpO2 greater than or equal to 90%; cardiopulmonary status stable; nausea/vomiting adequately controlled; pain adequately controlled; no apparent anesthesia complications; patient discharged from anesthesia care when discharge criteria were met

## 2024-10-14 NOTE — SIGNIFICANT NOTE
10/14/24 1150   OTHER   Discipline physical therapist   Rehab Time/Intention   Session Not Performed other (see comments)  (pending debridement this date)   Therapy Assessment/Plan (PT)   Criteria for Skilled Interventions Met (PT) yes;meets criteria;skilled treatment is necessary   Recommendation   PT - Next Appointment 10/15/24

## 2024-10-15 ENCOUNTER — APPOINTMENT (OUTPATIENT)
Dept: CARDIOLOGY | Facility: HOSPITAL | Age: 79
DRG: 271 | End: 2024-10-15
Payer: MEDICARE

## 2024-10-15 PROBLEM — E53.8 B12 DEFICIENCY: Status: ACTIVE | Noted: 2022-01-22

## 2024-10-15 PROBLEM — E03.9 HYPOTHYROIDISM: Chronic | Status: ACTIVE | Noted: 2020-12-31

## 2024-10-15 PROBLEM — K56.609 SBO (SMALL BOWEL OBSTRUCTION): Status: RESOLVED | Noted: 2020-12-31 | Resolved: 2024-10-15

## 2024-10-15 PROBLEM — D64.9 NORMOCYTIC ANEMIA: Status: ACTIVE | Noted: 2021-06-03

## 2024-10-15 PROBLEM — G47.00 INSOMNIA: Status: ACTIVE | Noted: 2023-07-19

## 2024-10-15 PROBLEM — E87.6 ACUTE HYPOKALEMIA: Status: ACTIVE | Noted: 2024-10-15

## 2024-10-15 PROBLEM — H35.30 MACULAR DEGENERATION: Status: ACTIVE | Noted: 2024-10-15

## 2024-10-15 PROBLEM — R10.84 GENERALIZED ABDOMINAL PAIN: Status: RESOLVED | Noted: 2020-12-31 | Resolved: 2024-10-15

## 2024-10-15 PROBLEM — B96.20 E. COLI UTI: Status: RESOLVED | Noted: 2020-12-31 | Resolved: 2024-10-15

## 2024-10-15 PROBLEM — M79.673 CHRONIC FOOT PAIN: Status: ACTIVE | Noted: 2017-10-05

## 2024-10-15 PROBLEM — S12.9XXA COMPRESSION FRACTURE OF CERVICAL SPINE: Status: ACTIVE | Noted: 2024-10-15

## 2024-10-15 PROBLEM — M17.9 KNEE OSTEOARTHRITIS: Status: ACTIVE | Noted: 2020-12-14

## 2024-10-15 PROBLEM — N39.0 E. COLI UTI: Status: RESOLVED | Noted: 2020-12-31 | Resolved: 2024-10-15

## 2024-10-15 PROBLEM — I34.0 NONRHEUMATIC MITRAL VALVE REGURGITATION: Chronic | Status: ACTIVE | Noted: 2024-10-15

## 2024-10-15 PROBLEM — M81.0 OSTEOPOROSIS: Status: ACTIVE | Noted: 2024-10-15

## 2024-10-15 PROBLEM — L03.119 CELLULITIS OF LEG: Status: RESOLVED | Noted: 2024-09-12 | Resolved: 2024-10-15

## 2024-10-15 PROBLEM — G89.29 CHRONIC FOOT PAIN: Status: ACTIVE | Noted: 2017-10-05

## 2024-10-15 PROBLEM — I10 PRIMARY HYPERTENSION: Chronic | Status: ACTIVE | Noted: 2024-10-15

## 2024-10-15 PROBLEM — I48.91 ATRIAL FIBRILLATION WITH RAPID VENTRICULAR RESPONSE: Status: RESOLVED | Noted: 2024-08-04 | Resolved: 2024-10-15

## 2024-10-15 PROBLEM — F32.A ANXIETY AND DEPRESSION: Status: ACTIVE | Noted: 2017-10-05

## 2024-10-15 PROBLEM — N28.9 ACUTE RENAL INSUFFICIENCY: Status: RESOLVED | Noted: 2024-09-12 | Resolved: 2024-10-15

## 2024-10-15 PROBLEM — D64.9 NORMOCYTIC ANEMIA: Chronic | Status: ACTIVE | Noted: 2021-06-03

## 2024-10-15 PROBLEM — Z87.81 HISTORY OF COMPRESSION FRACTURE OF SPINE: Status: ACTIVE | Noted: 2024-10-15

## 2024-10-15 PROBLEM — F41.9 ANXIETY AND DEPRESSION: Status: ACTIVE | Noted: 2017-10-05

## 2024-10-15 LAB
ANION GAP SERPL CALCULATED.3IONS-SCNC: 7.9 MMOL/L (ref 5–15)
AORTIC DIMENSIONLESS INDEX: 0.45 (DI)
APTT PPP: 114 SECONDS (ref 61–76.5)
APTT PPP: 53.3 SECONDS (ref 61–76.5)
APTT PPP: 98.5 SECONDS (ref 61–76.5)
BACTERIA SPEC ANAEROBE CULT: NORMAL
BASOPHILS # BLD AUTO: 0.04 10*3/MM3 (ref 0–0.2)
BASOPHILS NFR BLD AUTO: 0.4 % (ref 0–1.5)
BH CV ECHO MEAS - ACS: 1.5 CM
BH CV ECHO MEAS - AO MAX PG: 19.7 MMHG
BH CV ECHO MEAS - AO MEAN PG: 12 MMHG
BH CV ECHO MEAS - AO V2 MAX: 222 CM/SEC
BH CV ECHO MEAS - AO V2 VTI: 45.1 CM
BH CV ECHO MEAS - AVA(I,D): 1.04 CM2
BH CV ECHO MEAS - EDV(CUBED): 42.9 ML
BH CV ECHO MEAS - EDV(MOD-SP4): 55.2 ML
BH CV ECHO MEAS - EF(MOD-BP): 54 %
BH CV ECHO MEAS - EF(MOD-SP4): 54 %
BH CV ECHO MEAS - ESV(CUBED): 19.7 ML
BH CV ECHO MEAS - ESV(MOD-SP4): 25.4 ML
BH CV ECHO MEAS - FS: 22.9 %
BH CV ECHO MEAS - IVS/LVPW: 0.71 CM
BH CV ECHO MEAS - IVSD: 1 CM
BH CV ECHO MEAS - LA DIMENSION: 5.1 CM
BH CV ECHO MEAS - LAT PEAK E' VEL: 11.7 CM/SEC
BH CV ECHO MEAS - LV DIASTOLIC VOL/BSA (35-75): 32.2 CM2
BH CV ECHO MEAS - LV MASS(C)D: 135.8 GRAMS
BH CV ECHO MEAS - LV MAX PG: 3.7 MMHG
BH CV ECHO MEAS - LV MEAN PG: 2 MMHG
BH CV ECHO MEAS - LV SYSTOLIC VOL/BSA (12-30): 14.8 CM2
BH CV ECHO MEAS - LV V1 MAX: 96.2 CM/SEC
BH CV ECHO MEAS - LV V1 VTI: 18.4 CM
BH CV ECHO MEAS - LVIDD: 3.5 CM
BH CV ECHO MEAS - LVIDS: 2.7 CM
BH CV ECHO MEAS - LVOT AREA: 2.5 CM2
BH CV ECHO MEAS - LVOT DIAM: 1.8 CM
BH CV ECHO MEAS - LVPWD: 1.4 CM
BH CV ECHO MEAS - MED PEAK E' VEL: 6 CM/SEC
BH CV ECHO MEAS - MR MAX PG: 72.6 MMHG
BH CV ECHO MEAS - MR MAX VEL: 426 CM/SEC
BH CV ECHO MEAS - MV DEC SLOPE: 593 CM/SEC2
BH CV ECHO MEAS - MV DEC TIME: 0.19 SEC
BH CV ECHO MEAS - MV E MAX VEL: 122 CM/SEC
BH CV ECHO MEAS - MV MAX PG: 8.1 MMHG
BH CV ECHO MEAS - MV MEAN PG: 3 MMHG
BH CV ECHO MEAS - MV P1/2T: 73.1 MSEC
BH CV ECHO MEAS - MV V2 VTI: 30.4 CM
BH CV ECHO MEAS - MVA(P1/2T): 3 CM2
BH CV ECHO MEAS - MVA(VTI): 1.54 CM2
BH CV ECHO MEAS - PA ACC TIME: 0.08 SEC
BH CV ECHO MEAS - PA V2 MAX: 141 CM/SEC
BH CV ECHO MEAS - RV MAX PG: 4.2 MMHG
BH CV ECHO MEAS - RV V1 MAX: 103 CM/SEC
BH CV ECHO MEAS - RV V1 VTI: 20.8 CM
BH CV ECHO MEAS - SV(LVOT): 46.8 ML
BH CV ECHO MEAS - SV(MOD-SP4): 29.8 ML
BH CV ECHO MEAS - SVI(LVOT): 27.3 ML/M2
BH CV ECHO MEAS - SVI(MOD-SP4): 17.4 ML/M2
BH CV ECHO MEAS - TAPSE (>1.6): 1.76 CM
BH CV ECHO MEAS - TR MAX PG: 24.4 MMHG
BH CV ECHO MEAS - TR MAX VEL: 247 CM/SEC
BH CV ECHO MEASUREMENTS AVERAGE E/E' RATIO: 13.79
BH CV ECHO SHUNT ASSESSMENT PERFORMED (HIDDEN SCRIPTING): 1
BH CV XLRA - TDI S': 16.4 CM/SEC
BUN SERPL-MCNC: 17 MG/DL (ref 8–23)
BUN/CREAT SERPL: 21.8 (ref 7–25)
CALCIUM SPEC-SCNC: 8 MG/DL (ref 8.6–10.5)
CHLORIDE SERPL-SCNC: 104 MMOL/L (ref 98–107)
CHOLEST SERPL-MCNC: 88 MG/DL (ref 0–200)
CO2 SERPL-SCNC: 25.1 MMOL/L (ref 22–29)
CREAT SERPL-MCNC: 0.78 MG/DL (ref 0.57–1)
DEPRECATED RDW RBC AUTO: 48.9 FL (ref 37–54)
EGFRCR SERPLBLD CKD-EPI 2021: 77.4 ML/MIN/1.73
EOSINOPHIL # BLD AUTO: 0.07 10*3/MM3 (ref 0–0.4)
EOSINOPHIL NFR BLD AUTO: 0.6 % (ref 0.3–6.2)
ERYTHROCYTE [DISTWIDTH] IN BLOOD BY AUTOMATED COUNT: 15.8 % (ref 12.3–15.4)
GLUCOSE SERPL-MCNC: 85 MG/DL (ref 65–99)
HBA1C MFR BLD: 5.12 % (ref 4.8–5.6)
HCT VFR BLD AUTO: 24 % (ref 34–46.6)
HDLC SERPL-MCNC: 39 MG/DL (ref 40–60)
HGB BLD-MCNC: 7.5 G/DL (ref 12–15.9)
IMM GRANULOCYTES # BLD AUTO: 0.05 10*3/MM3 (ref 0–0.05)
IMM GRANULOCYTES NFR BLD AUTO: 0.5 % (ref 0–0.5)
LDLC SERPL CALC-MCNC: 36 MG/DL (ref 0–100)
LDLC/HDLC SERPL: 0.97 {RATIO}
LYMPHOCYTES # BLD AUTO: 0.66 10*3/MM3 (ref 0.7–3.1)
LYMPHOCYTES NFR BLD AUTO: 6.1 % (ref 19.6–45.3)
MCH RBC QN AUTO: 26.9 PG (ref 26.6–33)
MCHC RBC AUTO-ENTMCNC: 31.3 G/DL (ref 31.5–35.7)
MCV RBC AUTO: 86 FL (ref 79–97)
MONOCYTES # BLD AUTO: 1.04 10*3/MM3 (ref 0.1–0.9)
MONOCYTES NFR BLD AUTO: 9.6 % (ref 5–12)
NEUTROPHILS NFR BLD AUTO: 82.8 % (ref 42.7–76)
NEUTROPHILS NFR BLD AUTO: 9.03 10*3/MM3 (ref 1.7–7)
NRBC BLD AUTO-RTO: 0 /100 WBC (ref 0–0.2)
PLATELET # BLD AUTO: 433 10*3/MM3 (ref 140–450)
PMV BLD AUTO: 9.7 FL (ref 6–12)
POTASSIUM SERPL-SCNC: 3.3 MMOL/L (ref 3.5–5.2)
POTASSIUM SERPL-SCNC: 4 MMOL/L (ref 3.5–5.2)
RBC # BLD AUTO: 2.79 10*6/MM3 (ref 3.77–5.28)
SINUS: 2.8 CM
SODIUM SERPL-SCNC: 137 MMOL/L (ref 136–145)
STJ: 2.4 CM
TRIGL SERPL-MCNC: 55 MG/DL (ref 0–150)
VLDLC SERPL-MCNC: 13 MG/DL (ref 5–40)
WBC NRBC COR # BLD AUTO: 10.89 10*3/MM3 (ref 3.4–10.8)

## 2024-10-15 PROCEDURE — 85730 THROMBOPLASTIN TIME PARTIAL: CPT | Performed by: STUDENT IN AN ORGANIZED HEALTH CARE EDUCATION/TRAINING PROGRAM

## 2024-10-15 PROCEDURE — 93922 UPR/L XTREMITY ART 2 LEVELS: CPT

## 2024-10-15 PROCEDURE — 83036 HEMOGLOBIN GLYCOSYLATED A1C: CPT | Performed by: INTERNAL MEDICINE

## 2024-10-15 PROCEDURE — 25810000003 SODIUM CHLORIDE 0.9 % SOLUTION: Performed by: INTERNAL MEDICINE

## 2024-10-15 PROCEDURE — 25010000002 HEPARIN (PORCINE) 25000-0.45 UT/250ML-% SOLUTION: Performed by: STUDENT IN AN ORGANIZED HEALTH CARE EDUCATION/TRAINING PROGRAM

## 2024-10-15 PROCEDURE — 99222 1ST HOSP IP/OBS MODERATE 55: CPT | Performed by: INTERNAL MEDICINE

## 2024-10-15 PROCEDURE — 93306 TTE W/DOPPLER COMPLETE: CPT | Performed by: INTERNAL MEDICINE

## 2024-10-15 PROCEDURE — 25010000002 CEFTAZIDIME 2 G RECONSTITUTED SOLUTION 1 EACH VIAL: Performed by: STUDENT IN AN ORGANIZED HEALTH CARE EDUCATION/TRAINING PROGRAM

## 2024-10-15 PROCEDURE — 93356 MYOCRD STRAIN IMG SPCKL TRCK: CPT | Performed by: INTERNAL MEDICINE

## 2024-10-15 PROCEDURE — 85025 COMPLETE CBC W/AUTO DIFF WBC: CPT | Performed by: STUDENT IN AN ORGANIZED HEALTH CARE EDUCATION/TRAINING PROGRAM

## 2024-10-15 PROCEDURE — 80061 LIPID PANEL: CPT | Performed by: INTERNAL MEDICINE

## 2024-10-15 PROCEDURE — 75710 ARTERY X-RAYS ARM/LEG: CPT

## 2024-10-15 PROCEDURE — 93922 UPR/L XTREMITY ART 2 LEVELS: CPT | Performed by: STUDENT IN AN ORGANIZED HEALTH CARE EDUCATION/TRAINING PROGRAM

## 2024-10-15 PROCEDURE — 75625 CONTRAST EXAM ABDOMINL AORTA: CPT

## 2024-10-15 PROCEDURE — 93306 TTE W/DOPPLER COMPLETE: CPT

## 2024-10-15 PROCEDURE — 85730 THROMBOPLASTIN TIME PARTIAL: CPT | Performed by: INTERNAL MEDICINE

## 2024-10-15 PROCEDURE — 99024 POSTOP FOLLOW-UP VISIT: CPT | Performed by: NURSE PRACTITIONER

## 2024-10-15 PROCEDURE — 25010000002 SULFUR HEXAFLUORIDE MICROSPH 60.7-25 MG RECONSTITUTED SUSPENSION: Performed by: INTERNAL MEDICINE

## 2024-10-15 PROCEDURE — 25010000002 NA FERRIC GLUC CPLX PER 12.5 MG: Performed by: INTERNAL MEDICINE

## 2024-10-15 PROCEDURE — 80048 BASIC METABOLIC PNL TOTAL CA: CPT | Performed by: STUDENT IN AN ORGANIZED HEALTH CARE EDUCATION/TRAINING PROGRAM

## 2024-10-15 PROCEDURE — 93356 MYOCRD STRAIN IMG SPCKL TRCK: CPT

## 2024-10-15 PROCEDURE — 84132 ASSAY OF SERUM POTASSIUM: CPT | Performed by: INTERNAL MEDICINE

## 2024-10-15 RX ORDER — ASPIRIN 81 MG/1
81 TABLET ORAL DAILY
Status: DISCONTINUED | OUTPATIENT
Start: 2024-10-15 | End: 2024-10-16

## 2024-10-15 RX ORDER — LEVOFLOXACIN 500 MG/1
500 TABLET, FILM COATED ORAL EVERY 24 HOURS
Status: DISCONTINUED | OUTPATIENT
Start: 2024-10-15 | End: 2024-10-18 | Stop reason: HOSPADM

## 2024-10-15 RX ORDER — METOPROLOL SUCCINATE 25 MG/1
25 TABLET, EXTENDED RELEASE ORAL
Status: DISCONTINUED | OUTPATIENT
Start: 2024-10-15 | End: 2024-10-16

## 2024-10-15 RX ORDER — POTASSIUM CHLORIDE 1500 MG/1
40 TABLET, EXTENDED RELEASE ORAL EVERY 4 HOURS
Status: COMPLETED | OUTPATIENT
Start: 2024-10-15 | End: 2024-10-15

## 2024-10-15 RX ORDER — ATORVASTATIN CALCIUM 40 MG/1
40 TABLET, FILM COATED ORAL NIGHTLY
Status: DISCONTINUED | OUTPATIENT
Start: 2024-10-15 | End: 2024-10-18 | Stop reason: HOSPADM

## 2024-10-15 RX ADMIN — LEVOFLOXACIN 500 MG: 500 TABLET, FILM COATED ORAL at 13:44

## 2024-10-15 RX ADMIN — POTASSIUM CHLORIDE 40 MEQ: 1500 TABLET, EXTENDED RELEASE ORAL at 09:26

## 2024-10-15 RX ADMIN — LEVOTHYROXINE SODIUM 50 MCG: 0.05 TABLET ORAL at 05:28

## 2024-10-15 RX ADMIN — SODIUM CHLORIDE 500 ML: 9 INJECTION, SOLUTION INTRAVENOUS at 12:42

## 2024-10-15 RX ADMIN — OXYCODONE 5 MG: 5 TABLET ORAL at 09:36

## 2024-10-15 RX ADMIN — Medication 10 ML: at 20:40

## 2024-10-15 RX ADMIN — METOPROLOL SUCCINATE 25 MG: 25 TABLET, FILM COATED, EXTENDED RELEASE ORAL at 09:36

## 2024-10-15 RX ADMIN — POTASSIUM CHLORIDE 40 MEQ: 1500 TABLET, EXTENDED RELEASE ORAL at 05:27

## 2024-10-15 RX ADMIN — ASPIRIN 81 MG: 81 TABLET, COATED ORAL at 09:36

## 2024-10-15 RX ADMIN — SULFUR HEXAFLUORIDE 2 ML: KIT at 12:07

## 2024-10-15 RX ADMIN — SODIUM CHLORIDE 62.5 MG: 9 INJECTION, SOLUTION INTRAVENOUS at 16:44

## 2024-10-15 RX ADMIN — CITALOPRAM HYDROBROMIDE 20 MG: 20 TABLET ORAL at 09:25

## 2024-10-15 RX ADMIN — CETIRIZINE HYDROCHLORIDE 10 MG: 10 TABLET, FILM COATED ORAL at 09:26

## 2024-10-15 RX ADMIN — HEPARIN SODIUM 17 UNITS/KG/HR: 10000 INJECTION, SOLUTION INTRAVENOUS at 18:33

## 2024-10-15 RX ADMIN — METOPROLOL TARTRATE 25 MG: 25 TABLET, FILM COATED ORAL at 00:00

## 2024-10-15 RX ADMIN — METOPROLOL TARTRATE 25 MG: 25 TABLET, FILM COATED ORAL at 05:28

## 2024-10-15 RX ADMIN — THERA TABS 1 TABLET: TAB at 09:26

## 2024-10-15 RX ADMIN — ATORVASTATIN CALCIUM 40 MG: 40 TABLET, FILM COATED ORAL at 20:39

## 2024-10-15 RX ADMIN — OXYCODONE 5 MG: 5 TABLET ORAL at 01:58

## 2024-10-15 RX ADMIN — OXYCODONE 5 MG: 5 TABLET ORAL at 18:33

## 2024-10-15 RX ADMIN — Medication 220 MG: at 09:26

## 2024-10-15 RX ADMIN — CILOSTAZOL 100 MG: 100 TABLET ORAL at 20:39

## 2024-10-15 RX ADMIN — CILOSTAZOL 100 MG: 100 TABLET ORAL at 09:26

## 2024-10-15 RX ADMIN — DIGOXIN 125 MCG: 125 TABLET ORAL at 12:28

## 2024-10-15 RX ADMIN — CYANOCOBALAMIN TAB 500 MCG 1000 MCG: 500 TAB at 09:26

## 2024-10-15 RX ADMIN — CEFTAZIDIME 2000 MG: 2 INJECTION, POWDER, FOR SOLUTION INTRAVENOUS at 01:46

## 2024-10-15 RX ADMIN — BUPROPION HYDROCHLORIDE 150 MG: 150 TABLET, EXTENDED RELEASE ORAL at 09:26

## 2024-10-15 RX ADMIN — FERROUS SULFATE TAB EC 324 MG (65 MG FE EQUIVALENT) 324 MG: 324 (65 FE) TABLET DELAYED RESPONSE at 09:26

## 2024-10-15 RX ADMIN — Medication 10 ML: at 09:44

## 2024-10-15 RX ADMIN — OXYCODONE 5 MG: 5 TABLET ORAL at 13:42

## 2024-10-15 RX ADMIN — PANTOPRAZOLE SODIUM 40 MG: 40 TABLET, DELAYED RELEASE ORAL at 09:25

## 2024-10-15 NOTE — SIGNIFICANT NOTE
10/15/24 1253   OTHER   Discipline physical therapist   Rehab Time/Intention   Session Not Performed   (mechanical thrombectomy completed during wound debridement, heparin began 17:15 on 10/14, will follow up 24 hr post anticoagluation administration)   Therapy Assessment/Plan (PT)   Criteria for Skilled Interventions Met (PT) yes;meets criteria;skilled treatment is necessary   Recommendation   PT - Next Appointment 10/16/24

## 2024-10-15 NOTE — PLAN OF CARE
Goal Outcome Evaluation:      Patient got back to the unit during the shift with complaints of pain, grimacing and groaning, PRN pain med was effective. Patient verbalized being sleepy but awakens to voice. Heparin gtt started. Family at bedside throughout the shift. Call light placed within reach. Plan of care ongoing

## 2024-10-15 NOTE — CONSULTS
Referring Provider: Mary Gamino MD    Reason for Consultation: Anticoagulation management for atrial fibrillation, rule out cardioembolic source.      Patient Care Team:  Francine Hampton MD as PCP - General      SUBJECTIVE     Chief Complaint:   Left leg cellulitis/ulcer    History of present illness:  Fallon Nur is a 79 y.o. female with complicated medical history involving previous gastric bypass, small bowel obstruction, atrial fibrillation, histoplasmosis, hypertension, hypothyroidism, peripheral arterial disease who presented to the hospital with lower extremity cellulitis.  Of note she presented with chronic anemia and hemoglobin of 7.5 requiring blood transfusion.  Wound culture grew Pseudomonas and Proteus.  She has been receiving IV antibiotics.  On 10/14/2024 she underwent mechanical thrombectomy with penumbra aspiration of popliteal,  peroneal and anterior tibial artery.  Cardiology has been consulted to rule out cardioembolic source of thrombus.    Review of systems:    Constitutional: No weakness, fatigue, fever, rigors, chills   Eyes: No vision changes, eye pain   ENT/oropharynx: No difficulty swallowing, sore throat, epistaxis, changes in hearing   Cardiovascular: No chest pain, chest tightness, palpitations, paroxysmal nocturnal dyspnea, orthopnea, diaphoresis, dizziness / syncopal episode   Respiratory: No shortness of breath, dyspnea on exertion, cough, wheezing, hemoptysis   Gastrointestinal: No abdominal pain, nausea, vomiting, diarrhea, bloody stools   Genitourinary: No hematuria, dysuria   Neurological: No headache, tremors, numbness, one-sided weakness    Musculoskeletal: No cramps, myalgias, joint pain, joint swelling   Integument: No rash, edema        Personal History:      Past Medical History:   Diagnosis Date    Atrial fibrillation     Blind 07/10/2023    Histoplasmosis     Hypertension     Infestation by bed bug 10/09/2024    Legally blind     Longstanding persistent  atrial fibrillation 07/10/2023    Paroxysmal atrial fibrillation 07/10/2023    PVD (peripheral vascular disease)        Past Surgical History:   Procedure Laterality Date    WOUND DEBRIDEMENT Right 9/16/2024    Procedure: RIGHT LEG DEBRIDEMENT;  Surgeon: Red Hazel II, MD;  Location: [facility] MAIN OR;  Service: Vascular;  Laterality: Right;       Family History   Problem Relation Age of Onset    Cancer Mother     Dementia Father     Cancer Sister        Social History     Tobacco Use    Smoking status: Former    Smokeless tobacco: Never   Vaping Use    Vaping status: Never Used   Substance Use Topics    Alcohol use: Not Currently    Drug use: Not Currently        Home meds:  Prior to Admission medications    Medication Sig Start Date End Date Taking? Authorizing Provider   alendronate (FOSAMAX) 70 MG tablet Take 1 tablet by mouth Every 7 (Seven) Days. Saturday   Yes Mel Brower MD   buPROPion XL (WELLBUTRIN XL) 150 MG 24 hr tablet Take 1 tablet by mouth Daily.   Yes Mel Brower MD   cetirizine (zyrTEC) 10 MG tablet Take 1 tablet by mouth Daily.   Yes Mel Brower MD   citalopram (CeleXA) 20 MG tablet Take 1 tablet by mouth Daily.   Yes Mel Brower MD   digoxin (LANOXIN) 125 MCG tablet Take 1 tablet by mouth Daily.   Yes Mel Brower MD   ferrous sulfate 325 (65 FE) MG tablet Take 1 tablet by mouth Daily With Breakfast.   Yes Mel Brower MD   fluticasone (FLONASE) 50 MCG/ACT nasal spray Administer 2 sprays into the nostril(s) as directed by provider Daily As Needed for Rhinitis.   Yes Mel Brower MD   furosemide (LASIX) 40 MG tablet Take 1 tablet by mouth Daily. 9/21/24  Yes Frank Dale MD   levothyroxine (SYNTHROID, LEVOTHROID) 50 MCG tablet Take 1 tablet by mouth Daily.   Yes Mel Brower MD   Melatonin 12 MG tablet Take 1 tablet by mouth At Night As Needed.   Yes Mel Brower MD   metoprolol succinate XL (TOPROL-XL)  50 MG 24 hr tablet Take 2 tablets by mouth Daily. 9/22/24  Yes Frank Dale MD   oxyCODONE-acetaminophen (PERCOCET) 5-325 MG per tablet Take 1 tablet by mouth 2 (Two) Times a Day.   Yes Mel Brower MD   pantoprazole (PROTONIX) 40 MG EC tablet Take 1 tablet by mouth Daily. 9/3/20  Yes Mel Brower MD   rivaroxaban (XARELTO) 20 MG tablet Take 1 tablet by mouth Daily.   Yes Mel Brower MD   vitamin B-12 (CYANOCOBALAMIN) 1000 MCG tablet Take 1 tablet by mouth Daily.   Yes Mel Brower MD   vitamin D (ERGOCALCIFEROL) 1.25 MG (32744 UT) capsule capsule Take 1 capsule by mouth 1 (One) Time Per Week. Saturday   Yes Mel Brower MD   zolpidem (AMBIEN) 5 MG tablet Take 1 tablet by mouth every night at bedtime.   Yes Mel Brower MD       Allergies:     Patient has no known allergies.    Scheduled Meds:buPROPion XL, 150 mg, Oral, Daily  cefTAZidime, 2,000 mg, Intravenous, Q12H  cetirizine, 10 mg, Oral, Daily  fentaNYL, 1 patch, Transdermal, Q72H   And  Check Fentanyl Patch Placement, 1 each, Does not apply, Q12H  cilostazol, 100 mg, Oral, BID  citalopram, 20 mg, Oral, Daily  collagenase, 1 Application, Topical, Q24H  digoxin, 125 mcg, Oral, Daily  ferrous sulfate, 324 mg, Oral, Daily With Breakfast  furosemide, 40 mg, Oral, Daily  levothyroxine, 50 mcg, Oral, Q AM  metoprolol tartrate, 25 mg, Oral, Q6H  multivitamin, 1 tablet, Oral, Daily  mupirocin, 1 Application, Topical, Q24H  pantoprazole, 40 mg, Oral, Daily  polyethylene glycol, 17 g, Oral, Daily  potassium chloride ER, 40 mEq, Oral, Q4H  sodium chloride, 500 mL, Intravenous, Once  sodium chloride, 10 mL, Intravenous, Q12H  vitamin B-12, 1,000 mcg, Oral, Daily  vitamin D, 50,000 Units, Oral, Weekly  zinc sulfate, 220 mg, Oral, Daily      Continuous Infusions:heparin, 18 Units/kg/hr, Last Rate: 20 Units/kg/hr (10/15/24 0340)      PRN Meds:  acetaminophen **OR** acetaminophen **OR** acetaminophen    Calcium  "Replacement - Follow Nurse / BPA Driven Protocol    influenza vaccine    Magnesium Standard Dose Replacement - Follow Nurse / BPA Driven Protocol    melatonin    Morphine    ondansetron ODT **OR** ondansetron    oxyCODONE    Phosphorus Replacement - Follow Nurse / BPA Driven Protocol    Potassium Replacement - Follow Nurse / BPA Driven Protocol    [COMPLETED] Insert Peripheral IV **AND** sodium chloride    sodium chloride      OBJECTIVE    Vital Signs  Vitals:    10/15/24 0157 10/15/24 0528 10/15/24 0541 10/15/24 0601   BP: 109/63 107/57 107/57    BP Location: Left arm  Left arm    Patient Position: Lying  Lying    Pulse: 92 103 92 92   Resp: 12  18    Temp: 97.9 °F (36.6 °C)  98.1 °F (36.7 °C)    TempSrc: Oral  Oral    SpO2: 94%  95% 94%   Weight:    68.7 kg (151 lb 7.3 oz)   Height:           Flowsheet Rows      Flowsheet Row First Filed Value   Admission Height 160 cm (63\") Documented at 10/09/2024 1231   Admission Weight 65.3 kg (144 lb) Documented at 10/09/2024 1231              Intake/Output Summary (Last 24 hours) at 10/15/2024 0810  Last data filed at 10/15/2024 0543  Gross per 24 hour   Intake 1070 ml   Output 650 ml   Net 420 ml        Telemetry: Atrial fibrillation    Physical Exam:  The patient is alert, oriented and in no distress.  Vital signs as noted above.  Head and neck revealed no carotid bruits or jugular venous distention.  No thyromegaly or lymphadenopathy is present  Lungs clear.  No wheezing.  Breath sounds are normal bilaterally.  Heart: Normal first and second heart sounds. No murmur.  No precordial rub is present.  No gallop is present.  Abdomen: Soft and nontender.  No organomegaly is present.  Extremities with good peripheral pulses   Skin: Dressing bilateral lower extremities  Musculoskeletal system is grossly normal.  CNS grossly normal.       Results Review:  I have personally reviewed the results from the time of this admission to 10/15/2024 08:10 EDT and agree with these " findings:  []  Laboratory  []  Microbiology  []  Radiology  []  EKG/Telemetry   []  Cardiology/Vascular   []  Pathology  []  Old records  []  Other:    Most notable findings include:     Lab Results (last 24 hours)       Procedure Component Value Units Date/Time    Anaerobic Culture - Swab, Leg, Right [[id number]]  (Normal) Collected: 10/10/24 1429    Specimen: Swab from Leg, Right Updated: 10/15/24 0651     Anaerobic Culture No anaerobes isolated at 5 days    Basic Metabolic Panel [[id number]]  (Abnormal) Collected: 10/15/24 0314    Specimen: Blood Updated: 10/15/24 0356     Glucose 85 mg/dL      BUN 17 mg/dL      Creatinine 0.78 mg/dL      Sodium 137 mmol/L      Potassium 3.3 mmol/L      Chloride 104 mmol/L      CO2 25.1 mmol/L      Calcium 8.0 mg/dL      BUN/Creatinine Ratio 21.8     Anion Gap 7.9 mmol/L      eGFR 77.4 mL/min/1.73     Narrative:      GFR Normal >60  Chronic Kidney Disease <60  Kidney Failure <15    The GFR formula is only valid for adults with stable renal function between ages 18 and 70.    aPTT [[id number]]  (Abnormal) Collected: 10/15/24 0314    Specimen: Blood Updated: 10/15/24 0333     PTT 53.3 seconds     CBC & Differential [[id number]]  (Abnormal) Collected: 10/15/24 0314    Specimen: Blood Updated: 10/15/24 0322    Narrative:      The following orders were created for panel order CBC & Differential.  Procedure                               Abnormality         Status                     ---------                               -----------         ------                     CBC Auto Differential[[id number]]        Abnormal            Final result                 Please view results for these tests on the individual orders.    CBC Auto Differential [[id number]]  (Abnormal) Collected: 10/15/24 0314    Specimen: Blood Updated: 10/15/24 0322     WBC 10.89 10*3/mm3      RBC 2.79 10*6/mm3      Hemoglobin 7.5 g/dL      Hematocrit 24.0 %      MCV 86.0 fL      MCH 26.9 pg      MCHC 31.3 g/dL      RDW 15.8  %      RDW-SD 48.9 fl      MPV 9.7 fL      Platelets 433 10*3/mm3      Neutrophil % 82.8 %      Lymphocyte % 6.1 %      Monocyte % 9.6 %      Eosinophil % 0.6 %      Basophil % 0.4 %      Immature Grans % 0.5 %      Neutrophils, Absolute 9.03 10*3/mm3      Lymphocytes, Absolute 0.66 10*3/mm3      Monocytes, Absolute 1.04 10*3/mm3      Eosinophils, Absolute 0.07 10*3/mm3      Basophils, Absolute 0.04 10*3/mm3      Immature Grans, Absolute 0.05 10*3/mm3      nRBC 0.0 /100 WBC     aPTT [[id number]]  (Abnormal) Collected: 10/14/24 1950    Specimen: Blood Updated: 10/14/24 2017     PTT 60.1 seconds     Protime-INR [[id number]]  (Abnormal) Collected: 10/14/24 1950    Specimen: Blood Updated: 10/14/24 2017     Protime 11.9 Seconds      INR 1.10    Blood Culture - Blood, Arm, Left [[id number]]  (Normal) Collected: 10/09/24 1440    Specimen: Blood from Arm, Left Updated: 10/14/24 1445     Blood Culture No growth at 5 days    Blood Culture - Blood, Arm, Right [[id number]]  (Normal) Collected: 10/09/24 1440    Specimen: Blood from Arm, Right Updated: 10/14/24 1445     Blood Culture No growth at 5 days    Phosphorus [[id number]]  (Normal) Collected: 10/14/24 1208    Specimen: Blood Updated: 10/14/24 1257     Phosphorus 2.8 mg/dL     Potassium [[id number]]  (Normal) Collected: 10/14/24 1208    Specimen: Blood Updated: 10/14/24 1257     Potassium 3.8 mmol/L      Comment: Specimen hemolyzed.  Result may be falsely elevated.       Wound Culture - Wound, Leg, Right [[id number]]  (Abnormal)  (Susceptibility) Collected: 10/10/24 1429    Specimen: Wound from Leg, Right Updated: 10/14/24 0932     Wound Culture Heavy growth (4+) Pseudomonas aeruginosa      Heavy growth (4+) Proteus mirabilis     Gram Stain Rare (1+) WBCs per low power field      Few (2+) Gram negative bacilli    Susceptibility        Pseudomonas aeruginosa      MO      Cefepime Susceptible      Ceftazidime Susceptible      Ciprofloxacin Susceptible      Levofloxacin  Susceptible      Piperacillin + Tazobactam Susceptible      Tobramycin Susceptible                       Susceptibility        Proteus mirabilis      MO      Amoxicillin + Clavulanate Susceptible      Ampicillin Susceptible      Ampicillin + Sulbactam Susceptible      Cefepime Susceptible      Ceftazidime Susceptible      Ceftriaxone Susceptible      Ertapenem Susceptible      Gentamicin Susceptible      Levofloxacin Susceptible      Piperacillin + Tazobactam Susceptible      Tetracycline Resistant      Trimethoprim + Sulfamethoxazole Susceptible                       Susceptibility Comments       Pseudomonas aeruginosa    With the exception of urinary-sourced infections, aminoglycosides should not be used as monotherapy.      Proteus mirabilis    Cefazolin sensitivity will not be reported for Enterobacteriaceae in non-urine isolates. If cefazolin is preferred, please call the microbiology lab to request an E-test.  With the exception of urinary-sourced infections, aminoglycosides should not be used as monotherapy.                       Imaging Results (Last 24 Hours)       Procedure Component Value Units Date/Time    Hybrid Vascular OR Imaging ([facility]) [[id number]] Resulted: 10/14/24 1658     Updated: 10/14/24 1658    Narrative:      Please see performing physician's note for result.            LAB RESULTS (LAST 7 DAYS)    CBC  Results from last 7 days   Lab Units 10/15/24  0314 10/14/24  0204 10/13/24  0203 10/12/24  0027 10/11/24  0829 10/10/24  2322 10/09/24  2329   WBC 10*3/mm3 10.89* 9.07 11.99* 13.71* 13.34* 13.75* 8.40   RBC 10*6/mm3 2.79* 2.86* 3.16* 3.14* 3.23* 3.03* 3.47*   HEMOGLOBIN g/dL 7.5* 7.5* 8.0* 7.9* 8.2* 7.7* 8.6*   HEMATOCRIT % 24.0* 24.4* 27.0* 26.9* 27.4* 26.4* 30.5*   MCV fL 86.0 85.3 85.4 85.7 84.8 87.1 87.9   PLATELETS 10*3/mm3 433 511* 569* 515* 470* 504* 578*       BMP  Results from last 7 days   Lab Units 10/15/24  0314 10/14/24  1208 10/14/24  0204 10/13/24  0203 10/12/24  0027  10/11/24  0829 10/10/24  2322 10/09/24  2329 10/09/24  1527   SODIUM mmol/L 137  --  136 136 134* 137 135* 138 139   POTASSIUM mmol/L 3.3* 3.8 3.6 3.7 4.0 4.1 4.0 4.0 4.1   CHLORIDE mmol/L 104  --  102 101 100 102 101 101 102   CO2 mmol/L 25.1  --  25.7 26.1 25.1 24.3 25.2 28.7 29.0   BUN mg/dL 17  --  21 19 21 21 22 22 21   CREATININE mg/dL 0.78  --  1.01* 0.99 0.95 1.00 1.12* 1.14* 1.03*   GLUCOSE mg/dL 85  --  96 113* 105* 91 113* 123* 87   MAGNESIUM mg/dL  --   --  2.1  --   --   --   --  2.4 2.4   PHOSPHORUS mg/dL  --  2.8 2.0*  --   --   --   --   --   --        CMP   Results from last 7 days   Lab Units 10/15/24  0314 10/14/24  1208 10/14/24  0204 10/13/24  0203 10/12/24  0027 10/11/24  0829 10/10/24  2322 10/09/24  2329 10/09/24  1527   SODIUM mmol/L 137  --  136 136 134* 137 135* 138 139   POTASSIUM mmol/L 3.3* 3.8 3.6 3.7 4.0 4.1 4.0 4.0 4.1   CHLORIDE mmol/L 104  --  102 101 100 102 101 101 102   CO2 mmol/L 25.1  --  25.7 26.1 25.1 24.3 25.2 28.7 29.0   BUN mg/dL 17  --  21 19 21 21 22 22 21   CREATININE mg/dL 0.78  --  1.01* 0.99 0.95 1.00 1.12* 1.14* 1.03*   GLUCOSE mg/dL 85  --  96 113* 105* 91 113* 123* 87   ALBUMIN g/dL  --   --  2.5*  --   --   --   --   --  2.9*   BILIRUBIN mg/dL  --   --  0.2  --   --   --   --   --  0.2   ALK PHOS U/L  --   --  186*  --   --   --   --   --  220*   AST (SGOT) U/L  --   --  47*  --   --   --   --   --  36*   ALT (SGPT) U/L  --   --  14  --   --   --   --   --  14       BNP        TROPONIN  Results from last 7 days   Lab Units 10/09/24  1527   CK TOTAL U/L 76       CoAg  Results from last 7 days   Lab Units 10/15/24  0314 10/14/24  1950 10/11/24  0825 10/10/24  2322 10/10/24  1533 10/10/24  0846 10/10/24  0202 10/09/24  1527   INR   --  1.10  --   --   --   --   --  1.05   APTT seconds 53.3* 60.1* 66.2 52.6* 95.1* 32.5* 26.2* 27.4       Creatinine Clearance  Estimated Creatinine Clearance: 54.4 mL/min (by C-G formula based on SCr of 0.78 mg/dL).    ABG           Radiology  No radiology results for the last day      EKG  I personally viewed and interpreted the patient's EKG/Telemetry data:  ECG 12 Lead Tachycardia   Final Result   HEART JFUY=908  bpm   RR Inbrddxt=744  ms   WA Interval=  ms   P Horizontal Axis=  deg   P Front Axis=  deg   QRSD Interval=68  ms   QT Lubzjdbq=477  ms   EVjQ=132  ms   QRS Axis=41  deg   T Wave Axis=  deg   - ABNORMAL ECG -   Atrial fibrillation   When compared with ECG of 04-Aug-2024 07:38:43,   Significant change in rhythm   Significant repolarization change   Electronically Signed By: Bryn Valdez ([facility]) 2024-10-10 18:02:07   Date and Time of Study:2024-10-10 00:29:00      Telemetry Scan   Final Result      Telemetry Scan   Final Result      Telemetry Scan   Final Result      Telemetry Scan   Final Result      Telemetry Scan   Final Result      Telemetry Scan   Final Result      Telemetry Scan   Final Result      Telemetry Scan   Final Result      Telemetry Scan   Final Result      Telemetry Scan   Final Result      Telemetry Scan   Final Result      Telemetry Scan   Final Result      Telemetry Scan   Final Result      Telemetry Scan   Final Result      Telemetry Scan   Final Result      Telemetry Scan   Final Result      Telemetry Scan   Final Result      Telemetry Scan   Final Result      Telemetry Scan   Final Result      Telemetry Scan   Final Result      Telemetry Scan   Final Result      Telemetry Scan   Final Result      Telemetry Scan   Final Result      Telemetry Scan   Final Result      Telemetry Scan   Final Result      Telemetry Scan   Final Result      Telemetry Scan   Final Result      Telemetry Scan   Final Result      Telemetry Scan   Final Result      Telemetry Scan   Final Result      Telemetry Scan   Final Result      Telemetry Scan   Final Result            Echocardiogram:          Stress Test:        Cardiac Catheterization:  No results found for this or any previous visit.        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  Status post balloon angioplasty of right tibioperoneal trunk and popliteal artery.  Balloon angioplasty of right anterior tibial artery  Currently on heparin drip  Will obtain an echocardiogram with contrast to look for thrombus  Start statin  Obtain a lipid panel    Atrial fibrillation  [id number] score is 5  HASBLED score is 3  Continue digoxin  Start Toprol-XL  Currently on heparin drip  Pending echocardiogram with contrast to look for thrombus  Will also benefit from transesophageal echocardiogram  Discussions regarding resuming Xarelto versus warfarin  H&H is low.  She may benefit from left atrial appendage closure    HFpEF  Secondary to hypertension, atrial fibrillation  Currently on diuretics  Pending echocardiogram    Hypothyroidism  Continue Synthroid  TSH is 4.4    Anemia requiring blood transfusion.  H&H 7.5/24.  Transfuse as needed      Bryn Valdez MD  10/15/24  08:10 EDT

## 2024-10-15 NOTE — DISCHARGE PLACEMENT REQUEST
"Urszula Serrano (79 y.o. Female)       Date of Birth   1945    Social Security Number       Address   [address] [place] [address] [place] [address]    Home Phone   [phone]    MRN   [id number]       Voodoo   Judaism    Marital Status                               Admission Date   10/9/24    Admission Type   Emergency    Admitting Provider   Brammell, Timothy Duane, MD    Attending Provider   Mary Gamino MD    Department, Room/Bed   [facility],        Discharge Date       Discharge Disposition       Discharge Destination                                 Attending Provider: Mary Gamino MD    Allergies: No Known Allergies    Isolation: Contact   Infection: ESBL E coli (23)   Code Status: CPR    Ht: 160 cm (63\")   Wt: 68.5 kg (151 lb)    Admission Cmt: None   Principal Problem: Wound of lower extremity [S81.809A]                   Active Insurance as of 10/9/2024       Primary Coverage       Payor Plan Insurance Group Employer/Plan Group    ANTHEM MEDICARE REPLACEMENT ANTHEM MEDICARE ADVANTAGE [id number]       Payor Plan Address Payor Plan Phone Number Payor Plan Fax Number Effective Dates    [address] [phone]  2022 - None Entered    [facility] [id number]         Subscriber Name Subscriber Birth Date Member ID       URSZULA SERRANO 1945 [id number]                     Emergency Contacts        (Rel.) Home Phone Work Phone Mobile Phone    CARLTON REN (Daughter) -- -- [phone]    JOSE ABREU (Daughter) -- -- [phone]                 History & Physical        Kalie Pool APRN at 10/09/24 1708       Attestation signed by Fernando Rizzo MD at 10/09/24 2306    I have reviewed this documentation and agree.                      [facility] Medicine Services  History & Physical    Patient Name: Urszula Serrano  : 1945  MRN: [id number]  Primary Care Physician:  Francine Hampton MD  Date of " "admission: 10/9/2024  Date and Time of Service: 10/9/2024 at 1635    Subjective      Chief Complaint: bilateral lower leg pain and swelling    History of Present Illness: Fallon Nur is a 79 y.o. female with a CMH of bilateral lower leg wounds, A.FIB, HTN, and is legally blind who presented to [facility] on 10/9/2024 with increasing pain and swelling to bilateral lower legs. Patient reported the right leg is worse than the left. Patient was admitted to [facility] on 9/12/24 for traumatic bilateral lower leg wounds after a fall and discharged on 9/21/24 after wound debridement and management. Patient reported she followed up with outpatient wound care one time, during which the wound vac was removed and patient was started on dressing changes at home. Patient reported blisters began forming on her right leg soon after she was discharged from [facility]. Patient reported her mobility has been decreased since discharge from [facility] and when she does ambulate, it is with walker and assistance of family member. Patient reported when she woke up this morning, she had increased pain and swelling of both legs and noticed an odor coming from the right leg wound, and came to the ER for evaluation. Hospitalist team admitting patient at this time.    In ED: Pertinent labs: alk phos 220, albumin 2.9, CRP 1.59, sed rate 105, Hgb 9.3, creat1.03, GFR 55.4. Initial lactic acid 4.5, upon recheck at 1528: 1.6, recheck at 1741: 5.1. WBC stable at 6.70. Blood cultures drawn. Nasal MRSA swab was negative. XR tib/fib showed \"Soft tissue defect again identified. No underlying bony abnormality.\" Patient received IV cefazolin. Patient received dilaudid and norco for pain management.     Review of Systems   Constitutional:  Positive for activity change. Negative for chills and fever.   Respiratory:  Negative for chest tightness and shortness of breath.    Cardiovascular:  Positive for leg swelling. Negative for chest pain.   Musculoskeletal:  " Positive for gait problem.   Skin:  Positive for color change and wound.        BLE wounds   All other systems reviewed and are negative.      Personal History     Past Medical History:   Diagnosis Date    Atrial fibrillation     Blind 07/10/2023    Histoplasmosis     Hypertension     Legally blind     Longstanding persistent atrial fibrillation 07/10/2023    Paroxysmal atrial fibrillation 07/10/2023       Past Surgical History:   Procedure Laterality Date    WOUND DEBRIDEMENT Right 9/16/2024    Procedure: RIGHT LEG DEBRIDEMENT;  Surgeon: Red Hazel II, MD;  Location: [facility];  Service: Vascular;  Laterality: Right;       Family History: family history includes Cancer in her mother and sister; Dementia in her father. Otherwise pertinent FHx was reviewed and not pertinent to current issue.    Social History:  reports that she has quit smoking. She has never used smokeless tobacco. She reports that she does not currently use alcohol. She reports that she does not currently use drugs.    Home Medications:  Prior to Admission Medications       Prescriptions Last Dose Informant Patient Reported? Taking?    alendronate (FOSAMAX) 70 MG tablet   Yes No    Take 1 tablet by mouth Every 7 (Seven) Days. Saturday    buPROPion XL (WELLBUTRIN XL) 150 MG 24 hr tablet   Yes No    Take 1 tablet by mouth Daily.    cetirizine (zyrTEC) 10 MG tablet   Yes No    Take 1 tablet by mouth Daily.    citalopram (CeleXA) 20 MG tablet   Yes No    Take 1 tablet by mouth Daily.    digoxin (LANOXIN) 125 MCG tablet   Yes No    Take 1 tablet by mouth Daily.    ferrous sulfate 325 (65 FE) MG tablet   Yes No    Take 1 tablet by mouth Daily With Breakfast.    fluticasone (FLONASE) 50 MCG/ACT nasal spray   Yes No    2 sprays into the nostril(s) as directed by provider Daily As Needed for Rhinitis.    furosemide (LASIX) 40 MG tablet   No No    Take 1 tablet by mouth Daily.    HYDROcodone-acetaminophen (NORCO) 5-325 MG per tablet   Yes No     Take 1 tablet by mouth Every 8 (Eight) Hours As Needed.    levothyroxine (SYNTHROID, LEVOTHROID) 50 MCG tablet   Yes No    Take 1 tablet by mouth Daily.    Melatonin 12 MG tablet   Yes No    Take 1 tablet by mouth At Night As Needed.    metoprolol succinate XL (TOPROL-XL) 50 MG 24 hr tablet   No No    Take 2 tablets by mouth Daily.    pantoprazole (PROTONIX) 40 MG EC tablet   Yes No    Take 1 tablet by mouth Daily.    rivaroxaban (XARELTO) 20 MG tablet   Yes No    Take 1 tablet by mouth Daily.    vitamin B-12 (CYANOCOBALAMIN) 1000 MCG tablet   Yes No    Take 1 tablet by mouth Daily.    vitamin D (ERGOCALCIFEROL) 1.25 MG (86846 UT) capsule capsule   Yes No    Take 1 capsule by mouth 1 (One) Time Per Week. Saturday    zolpidem (AMBIEN) 5 MG tablet   Yes No    Take 1 tablet by mouth every night at bedtime.              Allergies:  No Known Allergies    Objective      Vitals:   Temp:  [97.7 °F (36.5 °C)] 97.7 °F (36.5 °C)  Heart Rate:  [] 86  Resp:  [16-19] 17  BP: (104-154)/(61-98) 154/90  Body mass index is 25.51 kg/m².  Physical Exam  HENT:      Head: Normocephalic and atraumatic.      Nose: Nose normal.      Mouth/Throat:      Mouth: Mucous membranes are moist.      Pharynx: Oropharynx is clear.   Eyes:      Extraocular Movements: Extraocular movements intact.      Conjunctiva/sclera: Conjunctivae normal.      Pupils: Pupils are equal, round, and reactive to light.   Cardiovascular:      Rate and Rhythm: Normal rate.      Pulses:           Radial pulses are 2+ on the right side and 2+ on the left side.        Dorsalis pedis pulses are detected w/ Doppler on the right side and detected w/ Doppler on the left side.        Posterior tibial pulses are detected w/ Doppler on the right side and detected w/ Doppler on the left side.   Pulmonary:      Effort: Pulmonary effort is normal.      Breath sounds: Normal breath sounds.   Abdominal:      General: Bowel sounds are normal.      Palpations: Abdomen is soft.    Musculoskeletal:         General: Tenderness present.      Cervical back: Neck supple.      Right lower leg: 3+ Edema present.      Left lower le+ Edema present.      Comments: BLE painful at rest and upon palpation   Skin:     Findings: Erythema and lesion present.      Comments: BLE erythematous and edematous with open wounds R>L; RLE wound with purulence, malodorous; RLE with multiple blisters on calf, some weeping; fingertips of right hand discolored per baseline   Neurological:      General: No focal deficit present.      Mental Status: She is alert and oriented to person, place, and time.   Psychiatric:         Mood and Affect: Mood normal.         Behavior: Behavior normal.         Thought Content: Thought content normal.         Judgment: Judgment normal.         Diagnostic Data:  Lab Results (last 24 hours)       Procedure Component Value Units Date/Time    STAT Lactic Acid, Reflex [[id number]]  (Abnormal) Collected: 10/09/24 1741    Specimen: Blood Updated: 10/09/24 1810     Lactate 5.1 mmol/L     Magnesium [[id number]]  (Normal) Collected: 10/09/24 152    Specimen: Blood from Arm, Right Updated: 10/09/24 1739     Magnesium 2.4 mg/dL     Comprehensive Metabolic Panel [[id number]]  (Abnormal) Collected: 10/09/24 152    Specimen: Blood from Arm, Right Updated: 10/09/24 1613     Glucose 87 mg/dL      BUN 21 mg/dL      Creatinine 1.03 mg/dL      Sodium 139 mmol/L      Potassium 4.1 mmol/L      Chloride 102 mmol/L      CO2 29.0 mmol/L      Calcium 8.7 mg/dL      Total Protein 7.3 g/dL      Albumin 2.9 g/dL      ALT (SGPT) 14 U/L      AST (SGOT) 36 U/L      Alkaline Phosphatase 220 U/L      Total Bilirubin 0.2 mg/dL      Globulin 4.4 gm/dL      A/G Ratio 0.7 g/dL      BUN/Creatinine Ratio 20.4     Anion Gap 8.0 mmol/L      eGFR 55.4 mL/min/1.73     Narrative:      GFR Normal >60  Chronic Kidney Disease <60  Kidney Failure <15    The GFR formula is only valid for adults with stable renal function between  ages 18 and 70.    C-reactive Protein [[id number]]  (Abnormal) Collected: 10/09/24 1527    Specimen: Blood from Arm, Right Updated: 10/09/24 1613     C-Reactive Protein 1.59 mg/dL     Protime-INR [[id number]]  (Normal) Collected: 10/09/24 1527    Specimen: Blood from Arm, Right Updated: 10/09/24 1607     Protime 11.4 Seconds      INR 1.05    aPTT [[id number]]  (Normal) Collected: 10/09/24 1527    Specimen: Blood from Arm, Right Updated: 10/09/24 1607     PTT 27.4 seconds     POC Lactate [[id number]]  (Normal) Collected: 10/09/24 1528    Specimen: Blood Updated: 10/09/24 1530     Lactate 1.6 mmol/L      Comment: Serial Number: [id number]:  [id number]       CBC & Differential [[id number]]  (Abnormal) Collected: 10/09/24 1440    Specimen: Blood from Arm, Right Updated: 10/09/24 1502    Narrative:      The following orders were created for panel order CBC & Differential.  Procedure                               Abnormality         Status                     ---------                               -----------         ------                     CBC Auto Differential[[id number]]        Abnormal            Final result               Scan Slide[[id number]]                                       Final result                 Please view results for these tests on the individual orders.    CBC Auto Differential [[id number]]  (Abnormal) Collected: 10/09/24 1440    Specimen: Blood from Arm, Right Updated: 10/09/24 1502     WBC 6.70 10*3/mm3      RBC 3.62 10*6/mm3      Hemoglobin 9.3 g/dL      Hematocrit 32.6 %      MCV 90.1 fL      MCH 25.7 pg      MCHC 28.5 g/dL      RDW 16.3 %      RDW-SD 53.5 fl      MPV 11.1 fL      Platelets 376 10*3/mm3      Neutrophil % 71.1 %      Lymphocyte % 13.0 %      Monocyte % 5.1 %      Eosinophil % 9.7 %      Basophil % 0.7 %      Immature Grans % 0.4 %      Neutrophils, Absolute 4.76 10*3/mm3      Lymphocytes, Absolute 0.87 10*3/mm3      Monocytes, Absolute 0.34 10*3/mm3      Eosinophils, Absolute  0.65 10*3/mm3      Basophils, Absolute 0.05 10*3/mm3      Immature Grans, Absolute 0.03 10*3/mm3      nRBC 0.0 /100 WBC     Scan Slide [[id number]] Collected: 10/09/24 1440    Specimen: Blood from Arm, Right Updated: 10/09/24 1502     RBC Morphology Normal     WBC Morphology Normal     Platelet Estimate Adequate     Large Platelets Slight/1+    Sedimentation Rate [[id number]]  (Abnormal) Collected: 10/09/24 1440    Specimen: Blood from Arm, Right Updated: 10/09/24 1451     Sed Rate 105 mm/hr     POC Lactate [[id number]]  (Abnormal) Collected: 10/09/24 1443    Specimen: Blood Updated: 10/09/24 1445     Lactate 4.5 mmol/L      Comment: Serial Number: [id number]:  [id number]       Blood Culture - Blood, Arm, Left [[id number]] Collected: 10/09/24 1440    Specimen: Blood from Arm, Left Updated: 10/09/24 1443    Blood Culture - Blood, Arm, Right [[id number]] Collected: 10/09/24 1440    Specimen: Blood from Arm, Right Updated: 10/09/24 1443             Imaging Results (Last 24 Hours)       Procedure Component Value Units Date/Time    XR Tibia Fibula 2 View Right [[id number]] Collected: 10/09/24 1344     Updated: 10/09/24 1347    Narrative:      XR TIBIA FIBULA 2 VW RIGHT    Date of Exam: 10/9/2024 1:29 PM EDT    Indication: draining open wound    Comparison: 9/12/2024.    Findings:  Soft tissue wound along the lateral leg is again identified. 2 films. There are no lytic lesions or erosions. There are no fractures. Bones appear normal.      Impression:      Impression:  Soft tissue defect again identified. No underlying bony abnormality.      Electronically Signed: Jazlyn Ramon MD    10/9/2024 1:45 PM EDT    Workstation ID: [id number]              Assessment & Plan        This is a 79 y.o. female with:    Active and Resolved Problems  Active Hospital Problems    Diagnosis  POA    **Wound of lower extremity [S81.809A]  Yes      Resolved Hospital Problems   No resolved problems to display.         Bilateral lower extremity  "wounds R>L  Peripheral arterial disease  - XR right tib/fin: \"Soft tissue wound along the lateral leg is again identified. 2 films. There are no lytic lesions or erosions. There are no fractures. Bones appear normal.\"   - FRANSISCO on 9/13/24: \"The right FRANSISCO is normal. Severe digital insufficiency. The left FRANSISCO is moderately reduced. Severe digital insufficiency.\"  - WBC stable at 6.70; follow AM labs  - Blood cultures pending  - Continue cefazolin  - Started cilostazol  - Continue pain management  - Gentle hydration  - NPO at MN for possible debridement tomorrow  - Consult wound  - Consult vascular     AFIB  HTN  - Started lovenox  - Resume home meds once reconciled  - Continue VS per order      VTE Prophylaxis:  Pharmacologic VTE prophylaxis orders are present.        The patient desires to be as follows:    CODE STATUS:    Level Of Support Discussed With: Patient  Code Status (Patient has no pulse and is not breathing): CPR (Attempt to Resuscitate)  Medical Interventions (Patient has pulse or is breathing): Full Support        Hilda Springer (daughter), who can be contacted at [phone], is the designated person to make medical decisions on the patient's behalf if She is incapable of doing so. This was clarified with patient and/or next of kin on 10/9/2024 during the course of this H&P.    Admission Status:  I believe this patient meets observation status.    Expected Length of Stay: <24 hours    PDMP and Medication Dispenses via Sidebar reviewed and consistent with patient reported medications.    I discussed the patient's findings and my recommendations with patient and family.      Signature:     This document has been electronically signed by AZAEL Vazquez on October 9, 2024 20:10 EDT   [facility] Hospitalist Team     Electronically signed by Fernando Rizzo MD at 10/09/24 2303       "

## 2024-10-15 NOTE — CASE MANAGEMENT/SOCIAL WORK
Continued Stay Note  [facility]     Patient Name: Fallon Nur  MRN: [id number]  Today's Date: 10/15/2024    Admit Date: 10/9/2024    Plan: [facility] referral pending acceptance. Will require precertSIMRAN approved. From home with daughter.   Discharge Plan       Row Name 10/15/24 1238       Plan    Plan Comments CM contacted [facility] liaison Amy MOY to inquire status of pending referral. Liaison reported benefits sent for review and following pending beds. DC Barriers: BP monitoring (BP 87/49), heparin gtt, cardiology and vascular surgery following.                  Greta Carpenter RN     Office Phone: [phone]  Office Cell: [phone]

## 2024-10-15 NOTE — PROGRESS NOTES
[facility] MEDICINE SERVICE  DAILY PROGRESS NOTE    NAME: Fallon Nur  : 1945  MRN: [id number]      LOS: 5 days     PROVIDER OF SERVICE: Mary Gamino MD    Chief Complaint: Wound of lower extremity    Subjective:     Interval History:    Patient seen and evaluated at bedside.   Daughter at the bedside.  Patient denies any chest pain.  Noticed low blood pressure  Treatment plan discussed with patient. All questions addressed.     Review of Systems:   All 21 ROS were negative except mentioned above.    Objective:     Vital Signs  Temp:  [94.1 °F (34.5 °C)-98.8 °F (37.1 °C)] 98.8 °F (37.1 °C)  Heart Rate:  [] 96  Resp:  [10-22] 15  BP: ()/(49-75) 87/49  Flow (L/min) (Oxygen Therapy):  [6] 6   Body mass index is 26.75 kg/m².    Physical Exam   General: No acute distress, appears stated age  Neuro: Awake and alert, oriented x3, no focal deficits appreciated  Head: atraumatic, normocephalic  HEENT: EOMI, anicteric, normal sclera and conjunctivae, moist mucus membranes  Neck: supple, no lymphadenopathy  CV: IRRR, soft heart sounds, no murmurs appreciated, no peripheral edema  Pulm: Decreased breath sounds, no increased work of breathing, no adventitious sounds  Abd: Soft, nontender, nondistended  Skin: Warm, dry and dressed bilateral lower extremities  Psych: Appropriate mood and affect    Scheduled Meds   aspirin, 81 mg, Oral, Daily  atorvastatin, 40 mg, Oral, Nightly  buPROPion XL, 150 mg, Oral, Daily  cetirizine, 10 mg, Oral, Daily  fentaNYL, 1 patch, Transdermal, Q72H   And  Check Fentanyl Patch Placement, 1 each, Does not apply, Q12H  cilostazol, 100 mg, Oral, BID  citalopram, 20 mg, Oral, Daily  collagenase, 1 Application, Topical, Q24H  digoxin, 125 mcg, Oral, Daily  ferric gluconate, 62.5 mg, Intravenous, Once  ferrous sulfate, 324 mg, Oral, Daily With Breakfast  [Held by provider] furosemide, 40 mg, Oral, Daily  levoFLOXacin, 500 mg, Oral, Q24H  levothyroxine, 50 mcg, Oral, Q  AM  [Held by provider] metoprolol succinate XL, 25 mg, Oral, Q24H  multivitamin, 1 tablet, Oral, Daily  mupirocin, 1 Application, Topical, Q24H  pantoprazole, 40 mg, Oral, Daily  polyethylene glycol, 17 g, Oral, Daily  sodium chloride, 500 mL, Intravenous, Once  sodium chloride, 10 mL, Intravenous, Q12H  vitamin B-12, 1,000 mcg, Oral, Daily  vitamin D, 50,000 Units, Oral, Weekly  zinc sulfate, 220 mg, Oral, Daily       PRN Meds     acetaminophen **OR** acetaminophen **OR** acetaminophen    Calcium Replacement - Follow Nurse / BPA Driven Protocol    influenza vaccine    Magnesium Standard Dose Replacement - Follow Nurse / BPA Driven Protocol    melatonin    Morphine    ondansetron ODT **OR** ondansetron    oxyCODONE    Phosphorus Replacement - Follow Nurse / BPA Driven Protocol    Potassium Replacement - Follow Nurse / BPA Driven Protocol    [COMPLETED] Insert Peripheral IV **AND** sodium chloride    sodium chloride   Infusions  heparin, 18 Units/kg/hr, Last Rate: 17 Units/kg/hr (10/15/24 1232)          Diagnostic Data    Results from last 7 days   Lab Units 10/15/24  1235 10/15/24  0314 10/14/24  1208 10/14/24  0204   WBC 10*3/mm3  --  10.89*  --  9.07   HEMOGLOBIN g/dL  --  7.5*  --  7.5*   HEMATOCRIT %  --  24.0*  --  24.4*   PLATELETS 10*3/mm3  --  433  --  511*   GLUCOSE mg/dL  --  85  --  96   CREATININE mg/dL  --  0.78  --  1.01*   BUN mg/dL  --  17  --  21   SODIUM mmol/L  --  137  --  136   POTASSIUM mmol/L 4.0 3.3*   < > 3.6   AST (SGOT) U/L  --   --   --  47*   ALT (SGPT) U/L  --   --   --  14   ALK PHOS U/L  --   --   --  186*   BILIRUBIN mg/dL  --   --   --  0.2   ANION GAP mmol/L  --  7.9  --  8.3    < > = values in this interval not displayed.       No radiology results for the last day    Interval results reviewed.    Assessment/Plan:   Lower extremity ulcer/cellulitis  PAD  Anemia-normocytic  Hypotension  Atrial fibrillation    Patient s/p I&D of the lower extremity ulcer and possible  revascularization.  Patient on heparin drip to prevent cardioembolic event.  Cardiology/vascular on board.  patient's hemoglobin is 7.5 her baseline is around 9 , pt s/p transfusion of 1 unit of packed red blood cell  yesterday.  Will monitor hemoglobin.  Wound culture positive for heavy growth of Pseudomonas and Proteus mirabilis.  ID following the patient and patient on levofloxacin.  Add midodrine 5 mg 3 times daily  Heart rate controlled with digoxin.    Overall poor prognosis of this patient.    Treatment plan discussed with RN.     VTE Prophylaxis:  Pharmacologic VTE prophylaxis orders are present.         Code status is   Code Status and Medical Interventions: CPR (Attempt to Resuscitate); Full Support   Ordered at: 10/09/24 4111     Level Of Support Discussed With:    Patient     Code Status (Patient has no pulse and is not breathing):    CPR (Attempt to Resuscitate)     Medical Interventions (Patient has pulse or is breathing):    Full Support       Plan for disposition:     Barriers to Discharge: s/p surgery  Anticipated Date of Discharge: 10/17/2024  Place of Discharge: Skilled nursing       Time: 40 minutes     Signature: Electronically signed by Mary Gamino MD, 10/15/24, 14:18 EDT.  [facility] Hospitalist Team

## 2024-10-15 NOTE — PROGRESS NOTES
Infectious Diseases Progress Note      LOS: 5 days   Patient Care Team:  Francine Hampton MD as PCP - General    Chief Complaint: Bilateral leg wounds    Subjective       The patient has been afebrile for the last 24 hours.  The patient is on room air, hemodynamically stable, and is tolerating antimicrobial therapy.  Patient is status post surgery yesterday.  Very tired today      Review of Systems:   Review of Systems   Constitutional:  Positive for fatigue.   HENT: Negative.     Eyes: Negative.    Respiratory: Negative.     Cardiovascular: Negative.    Gastrointestinal: Negative.    Endocrine: Negative.    Genitourinary: Negative.    Musculoskeletal: Negative.         Lower leg pain   Skin:  Positive for color change and wound.   Neurological: Negative.    Psychiatric/Behavioral: Negative.     All other systems reviewed and are negative.       Objective     Vital Signs  Temp:  [94.1 °F (34.5 °C)-98.8 °F (37.1 °C)] 98.8 °F (37.1 °C)  Heart Rate:  [] 96  Resp:  [10-22] 15  BP: ()/(49-75) 87/49    Physical Exam:  Physical Exam  Vitals and nursing note reviewed.   Constitutional:       General: She is not in acute distress.     Appearance: She is well-developed and normal weight. She is ill-appearing. She is not diaphoretic.   HENT:      Head: Normocephalic and atraumatic.   Eyes:      General: No scleral icterus.     Comments: Legally blind   Cardiovascular:      Rate and Rhythm: Normal rate and regular rhythm.      Heart sounds: Normal heart sounds, S1 normal and S2 normal. No murmur heard.  Pulmonary:      Effort: Pulmonary effort is normal. No respiratory distress.      Breath sounds: Normal breath sounds. No stridor. No wheezing or rales.   Chest:      Chest wall: No tenderness.   Abdominal:      General: Bowel sounds are normal. There is no distension.      Palpations: Abdomen is soft. There is no mass.      Tenderness: There is no abdominal tenderness. There is no guarding.   Musculoskeletal:          General: No swelling, tenderness or deformity. Normal range of motion.      Cervical back: Neck supple.   Skin:     General: Skin is warm and dry.      Coloration: Skin is not pale.      Findings: No bruising, erythema or rash.      Comments: Patient has a large wound to the right lower extremity covered in yellow eschar with a large amount of green drainage and a very foul odor.  Pulses are palpable on the right foot but it is very cold.  There is a blister on the medial aspect of the lower leg     The left lower leg has a full-thickness wound with some fibrinous slough but very little drainage and no foul odor.     Neurological:      Mental Status: She is alert and oriented to person, place, and time.   Psychiatric:         Mood and Affect: Mood normal.          Results Review:    I have reviewed all clinical data, test, lab, and imaging results.     Radiology  Adult Transthoracic Echo Complete W/ Cont if Necessary Per Protocol    Result Date: 10/15/2024    Left ventricular systolic function is normal. Calculated left ventricular EF = 54% Left ventricular ejection fraction appears to be 51 - 55%.   Left ventricular diastolic function is consistent with age.   The left atrial cavity is dilated.   Saline test results are negative for right to left atrial level shunt.   Moderate aortic valve stenosis is present. Mean/peak gradient of 12/20 mmHg.  Peak velocity 222 cm/s   Estimated right ventricular systolic pressure from tricuspid regurgitation is normal (<35 mmHg).   No evidence of intracardiac thrombus.  No wall motion abnormalities.     Doppler Ankle Brachial Index Single Level CAR    Result Date: 10/15/2024    Right Conclusion: The right FRANSISCO is unable to be assessed due to patient not tolerating compression. Severe digital insufficiency.   Left Conclusion: The left FRANSISCO is unable to be assessed due to patient not tolerating compression. Severe digital insufficiency.     Cardiac Catheterization/Vascular  Study    Result Date: 10/15/2024  Abdominal aortogram: Renal arteries patent bilaterally, and fetal aorta patent without evidence of aneurysm or stenosis, common iliac artery, external iliac arteries and internal iliac arteries patent bilaterally without aneurysm or stenosis  Right leg arteriogram: Common femoral artery widely patent, profundofemoral artery widely patent, superficial femoral artery widely patent, popliteal artery completely occludes behind the knee with pulsatile column of contrast concerning for thrombus with reconstitution of peroneal artery and anterior tibial arteries distally.  Peroneal artery with inline flow to its anatomic termination point just above the foot, anterior tibial artery has complete occlusion in its midportion and then reconstitutes distally via collateral from peroneal artery, posterior tibial artery initially completely occluded on angiography.  Patient with occlusion of pedal arch and distal foot with no opacification of digital arteries concerning for microembolic disease in the foot.  The popliteal artery, tibioperoneal trunk, peroneal artery, and anterior tibial arteries were treated with penumbra aspiration mechanical thrombectomy device using a CAT 6 catheter and CAT Rx catheter.  Angioplasty performed of the popliteal artery and tibioperoneal trunk with 4 mm angioplasty balloon, angioplasty performed with anterior tibial artery with 2.5 mm angioplasty balloon.  We were able to restore inline flow to the foot via anterior tibial artery with inline flow through the peroneal artery as well.  Posterior tibial artery occluded in mid lower leg.  Angiography of our access site at conclusion of procedure showed a focal dissection flap.  Arteriotomy was closed with Pro-glide Perclose device without complication.  Duplex ultrasound with B-mode and color-flow imaging performed by me showed resolution of the dissection flap with excellent pulsatile flow through the common femoral  artery superficial femoral artery and profundofemoral artery after deploying the Pro-glide Perclose device and no lumen irregularity.  At conclusion of procedure patient had biphasic DP and peroneal signals in the right foot and biphasic DP and monophasic PT signals on the left foot     Hybrid Vascular OR Imaging ([employer])    Result Date: 10/14/2024  Please see performing physician's note for result.     Cardiology    Laboratory    Results from last 7 days   Lab Units 10/15/24  0314 10/14/24  0204 10/13/24  0203 10/12/24  0027 10/11/24  0829 10/10/24  2322 10/09/24  2329   WBC 10*3/mm3 10.89* 9.07 11.99* 13.71* 13.34* 13.75* 8.40   HEMOGLOBIN g/dL 7.5* 7.5* 8.0* 7.9* 8.2* 7.7* 8.6*   HEMATOCRIT % 24.0* 24.4* 27.0* 26.9* 27.4* 26.4* 30.5*   PLATELETS 10*3/mm3 433 511* 569* 515* 470* 504* 578*     Results from last 7 days   Lab Units 10/15/24  1235 10/15/24  0314 10/14/24  1208 10/14/24  0204 10/13/24  0203 10/12/24  0027 10/11/24  0829 10/10/24  2322 10/09/24  2329 10/09/24  1527   SODIUM mmol/L  --  137  --  136 136 134* 137 135* 138 139   POTASSIUM mmol/L 4.0 3.3* 3.8 3.6 3.7 4.0 4.1 4.0 4.0 4.1   CHLORIDE mmol/L  --  104  --  102 101 100 102 101 101 102   CO2 mmol/L  --  25.1  --  25.7 26.1 25.1 24.3 25.2 28.7 29.0   BUN mg/dL  --  17  --  21 19 21 21 22 22 21   CREATININE mg/dL  --  0.78  --  1.01* 0.99 0.95 1.00 1.12* 1.14* 1.03*   GLUCOSE mg/dL  --  85  --  96 113* 105* 91 113* 123* 87   ALBUMIN g/dL  --   --   --  2.5*  --   --   --   --   --  2.9*   BILIRUBIN mg/dL  --   --   --  0.2  --   --   --   --   --  0.2   ALK PHOS U/L  --   --   --  186*  --   --   --   --   --  220*   AST (SGOT) U/L  --   --   --  47*  --   --   --   --   --  36*   ALT (SGPT) U/L  --   --   --  14  --   --   --   --   --  14   CALCIUM mg/dL  --  8.0*  --  8.1* 8.4* 8.2* 8.1* 8.0* 8.5* 8.7     Results from last 7 days   Lab Units 10/09/24  1527   CK TOTAL U/L 76     Results from last 7 days   Lab Units 10/09/24  7693   SED RATE  mm/hr 69*         Microbiology   Microbiology Results (last 10 days)       Procedure Component Value - Date/Time    Wound Culture - Wound, Leg, Right [[id number]]  (Abnormal)  (Susceptibility) Collected: 10/10/24 1429    Lab Status: Final result Specimen: Wound from Leg, Right Updated: 10/14/24 0932     Wound Culture Heavy growth (4+) Pseudomonas aeruginosa      Heavy growth (4+) Proteus mirabilis     Gram Stain Rare (1+) WBCs per low power field      Few (2+) Gram negative bacilli    Susceptibility        Pseudomonas aeruginosa      MO      Cefepime Susceptible      Ceftazidime Susceptible      Ciprofloxacin Susceptible      Levofloxacin Susceptible      Piperacillin + Tazobactam Susceptible      Tobramycin Susceptible                       Susceptibility        Proteus mirabilis      MO      Amoxicillin + Clavulanate Susceptible      Ampicillin Susceptible      Ampicillin + Sulbactam Susceptible      Cefepime Susceptible      Ceftazidime Susceptible      Ceftriaxone Susceptible      Ertapenem Susceptible      Gentamicin Susceptible      Levofloxacin Susceptible      Piperacillin + Tazobactam Susceptible      Tetracycline Resistant      Trimethoprim + Sulfamethoxazole Susceptible                       Susceptibility Comments       Pseudomonas aeruginosa    With the exception of urinary-sourced infections, aminoglycosides should not be used as monotherapy.      Proteus mirabilis    Cefazolin sensitivity will not be reported for Enterobacteriaceae in non-urine isolates. If cefazolin is preferred, please call the microbiology lab to request an E-test.  With the exception of urinary-sourced infections, aminoglycosides should not be used as monotherapy.               Anaerobic Culture - Swab, Leg, Right [[id number]]  (Normal) Collected: 10/10/24 1429    Lab Status: Final result Specimen: Swab from Leg, Right Updated: 10/15/24 0651     Anaerobic Culture No anaerobes isolated at 5 days    Blood Culture - Blood, Arm, Left  [[id number]]  (Normal) Collected: 10/09/24 1440    Lab Status: Final result Specimen: Blood from Arm, Left Updated: 10/14/24 1445     Blood Culture No growth at 5 days    Blood Culture - Blood, Arm, Right [[id number]]  (Normal) Collected: 10/09/24 1440    Lab Status: Final result Specimen: Blood from Arm, Right Updated: 10/14/24 1445     Blood Culture No growth at 5 days            Medication Review:       Schedule Meds  aspirin, 81 mg, Oral, Daily  atorvastatin, 40 mg, Oral, Nightly  buPROPion XL, 150 mg, Oral, Daily  cefTAZidime, 2,000 mg, Intravenous, Q12H  cetirizine, 10 mg, Oral, Daily  fentaNYL, 1 patch, Transdermal, Q72H   And  Check Fentanyl Patch Placement, 1 each, Does not apply, Q12H  cilostazol, 100 mg, Oral, BID  citalopram, 20 mg, Oral, Daily  collagenase, 1 Application, Topical, Q24H  digoxin, 125 mcg, Oral, Daily  ferrous sulfate, 324 mg, Oral, Daily With Breakfast  [Held by provider] furosemide, 40 mg, Oral, Daily  levothyroxine, 50 mcg, Oral, Q AM  metoprolol succinate XL, 25 mg, Oral, Q24H  multivitamin, 1 tablet, Oral, Daily  mupirocin, 1 Application, Topical, Q24H  pantoprazole, 40 mg, Oral, Daily  polyethylene glycol, 17 g, Oral, Daily  sodium chloride, 500 mL, Intravenous, Once  sodium chloride, 10 mL, Intravenous, Q12H  vitamin B-12, 1,000 mcg, Oral, Daily  vitamin D, 50,000 Units, Oral, Weekly  zinc sulfate, 220 mg, Oral, Daily        Infusion Meds  heparin, 18 Units/kg/hr, Last Rate: 17 Units/kg/hr (10/15/24 1232)          PRN Meds    acetaminophen **OR** acetaminophen **OR** acetaminophen    Calcium Replacement - Follow Nurse / BPA Driven Protocol    influenza vaccine    Magnesium Standard Dose Replacement - Follow Nurse / BPA Driven Protocol    melatonin    Morphine    ondansetron ODT **OR** ondansetron    oxyCODONE    Phosphorus Replacement - Follow Nurse / BPA Driven Protocol    Potassium Replacement - Follow Nurse / BPA Driven Protocol    [COMPLETED] Insert Peripheral IV **AND** sodium  chloride    sodium chloride        Assessment & Plan       Antimicrobial Therapy   1.  IV ceftazidime        2.         3.        4.        5.            Assessment     Right leg wound infection with green drainage concerning for Pseudomonas infection.  Cultures are growing Pseudomonas aeruginosa and Proteus mirabilis.  Both organisms were susceptible to quinolone     Chronic bilateral lower extremity wounds concerning for underlying peripheral vascular disease.  CTA showed bilateral popliteal artery occlusions.  Status post right lower extremity arteriogram, thrombectomy, angioplasty and wound debridement on 10/15/2024     Recent hospital admission in September 2024 for right leg wound infection s/p debridement by vascular surgery service.     Patient is legally blind     Plan     Discontinue IV ceftazidime  Start p.o. levofloxacin 500 mg daily for 13 days for 14 days of treatment since debridement  QTc interval was appropriate for quinolone use  Continue supportive care  A.m. labs  The case was discussed with the patient and her family at bedside        AZAEL Nguyen  10/15/24  13:37 EDT    Note is dictated utilizing voice recognition software/Dragon

## 2024-10-15 NOTE — PROGRESS NOTES
[facility] Vascular Surgery Progress Note    Name: Fallon Nur ADMIT: 10/9/2024   : 1945  PCP: Francine Hampton MD    MRN: [id number] LOS: 5 days   AGE/SEX: 79 y.o. female  ROOM:    [facility]    CC: Postop; status post percutaneous mechanical thrombectomy of right popliteal, peroneal, AT arteries, balloon angioplasty of the right TPT, popliteal artery, AT, sharp excisional debridement of right leg wound on 10/14    Subjective     Patient resting in bed.  Reports her leg feels about the same as it did preop.  Right foot neuromotor intact.    Objective     Scheduled Medications:   buPROPion XL, 150 mg, Oral, Daily  cefTAZidime, 2,000 mg, Intravenous, Q12H  cetirizine, 10 mg, Oral, Daily  fentaNYL, 1 patch, Transdermal, Q72H   And  Check Fentanyl Patch Placement, 1 each, Does not apply, Q12H  cilostazol, 100 mg, Oral, BID  citalopram, 20 mg, Oral, Daily  collagenase, 1 Application, Topical, Q24H  digoxin, 125 mcg, Oral, Daily  ferrous sulfate, 324 mg, Oral, Daily With Breakfast  furosemide, 40 mg, Oral, Daily  levothyroxine, 50 mcg, Oral, Q AM  metoprolol succinate XL, 25 mg, Oral, Q24H  multivitamin, 1 tablet, Oral, Daily  mupirocin, 1 Application, Topical, Q24H  pantoprazole, 40 mg, Oral, Daily  polyethylene glycol, 17 g, Oral, Daily  potassium chloride ER, 40 mEq, Oral, Q4H  sodium chloride, 500 mL, Intravenous, Once  sodium chloride, 10 mL, Intravenous, Q12H  vitamin B-12, 1,000 mcg, Oral, Daily  vitamin D, 50,000 Units, Oral, Weekly  zinc sulfate, 220 mg, Oral, Daily        Active Infusions:  heparin, 18 Units/kg/hr, Last Rate: 20 Units/kg/hr (10/15/24 0340)        As Needed Medications:    acetaminophen **OR** acetaminophen **OR** acetaminophen    Calcium Replacement - Follow Nurse / BPA Driven Protocol    influenza vaccine    Magnesium Standard Dose Replacement - Follow Nurse / BPA Driven Protocol    melatonin    Morphine    ondansetron ODT **OR** ondansetron    oxyCODONE     "Phosphorus Replacement - Follow Nurse / BPA Driven Protocol    Potassium Replacement - Follow Nurse / BPA Driven Protocol    [COMPLETED] Insert Peripheral IV **AND** sodium chloride    sodium chloride    Vital Signs  Vitals:    10/15/24 0601   BP:    Pulse: 92   Resp:    Temp:    SpO2: 94%      Body mass index is 26.83 kg/m².     Physical Exam:  NAD, alert and oriented  A-fib on the monitor, heart rate 90s to 100s  Lungs clear  Abd soft, benign  Vascular: Doppler signals present to the bilateral DP/PT/peroneal  Skin: Left groin access site is CDI and soft, no signs of hematoma; right foot is warm to touch, improved and appropriate capillary refill to the right foot: Bilateral legs wrapped    Results Review:     CBC    Results from last 7 days   Lab Units 10/15/24  0314 10/14/24  0204 10/13/24  0203 10/12/24  0027 10/11/24  0829 10/10/24  2322 10/09/24  2329   WBC 10*3/mm3 10.89* 9.07 11.99* 13.71* 13.34* 13.75* 8.40   HEMOGLOBIN g/dL 7.5* 7.5* 8.0* 7.9* 8.2* 7.7* 8.6*   PLATELETS 10*3/mm3 433 511* 569* 515* 470* 504* 578*     BMP   Results from last 7 days   Lab Units 10/15/24  0314 10/14/24  1208 10/14/24  0204 10/13/24  0203 10/12/24  0027 10/11/24  0829 10/10/24  2322 10/09/24  2329 10/09/24  1527   SODIUM mmol/L 137  --  136 136 134* 137 135* 138 139   POTASSIUM mmol/L 3.3* 3.8 3.6 3.7 4.0 4.1 4.0 4.0 4.1   CHLORIDE mmol/L 104  --  102 101 100 102 101 101 102   CO2 mmol/L 25.1  --  25.7 26.1 25.1 24.3 25.2 28.7 29.0   BUN mg/dL 17  --  21 19 21 21 22 22 21   CREATININE mg/dL 0.78  --  1.01* 0.99 0.95 1.00 1.12* 1.14* 1.03*   GLUCOSE mg/dL 85  --  96 113* 105* 91 113* 123* 87   MAGNESIUM mg/dL  --   --  2.1  --   --   --   --  2.4 2.4   PHOSPHORUS mg/dL  --  2.8 2.0*  --   --   --   --   --   --      HbA1C No results found for: \"HGBA1C\"  Infection   Results from last 7 days   Lab Units 10/10/24  1429 10/09/24  1440   BLOODCX   --  No growth at 5 days  No growth at 5 days   WOUNDCX  Heavy growth (4+) Pseudomonas " aeruginosa*  Heavy growth (4+) Proteus mirabilis*  --      Radiology(recent) No radiology results for the last day    VTE Prophylaxis:  Pharmacologic VTE prophylaxis orders are present.         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 Hazel    POD1  VSS, afebrile  ABLA, status post 1 unit PRBC transfusion last night, 7.5/24.0 this morning  Pain is controlled, continue current regimen  Bilateral leg wounds are wrapped, wound care nurses are following, wound care per their recommendations  Left groin access site is CDI and soft, no signs of hematoma  Based on IntraOp findings, patient has had a clear embolic event from her atrial fibrillation despite appropriate anticoagulation, we will ask cardiology to evaluate treatment for her A-fib  She does have improvement in  the appearance of her right foot/toes, she has appropriate capillary refill and all toes of her foot, motor and sensory intact, continue close monitoring  Repeat FRANSISCO today  Will start aspirin and statin  Continue heparin drip  Medical management per primary team          AZAEL Castañead  [facility]  10/15/24   O: [phone]  F: [phone]

## 2024-10-15 NOTE — SIGNIFICANT NOTE
10/15/24 0837   OTHER   Discipline occupational therapist   Rehab Time/Intention   Session Not Performed other (see comments)  (mechanical thrombectomy completed during wound debridement, heparin began 17:15 on 10/14, will follow up 24 hr post anticoagluation administration)   Therapy Assessment/Plan (PT)   Criteria for Skilled Interventions Met (PT) yes;meets criteria;skilled treatment is necessary   Recommendation   OT - Next Appointment 10/16/24

## 2024-10-16 LAB
ANION GAP SERPL CALCULATED.3IONS-SCNC: 6.7 MMOL/L (ref 5–15)
APTT PPP: 53.2 SECONDS (ref 61–76.5)
APTT PPP: 82.6 SECONDS (ref 61–76.5)
BASOPHILS # BLD AUTO: 0.05 10*3/MM3 (ref 0–0.2)
BASOPHILS NFR BLD AUTO: 0.4 % (ref 0–1.5)
BH BB BLOOD EXPIRATION DATE: NORMAL
BH BB BLOOD TYPE BARCODE: 5100
BH BB DISPENSE STATUS: NORMAL
BH BB PRODUCT CODE: NORMAL
BH BB UNIT NUMBER: NORMAL
BUN SERPL-MCNC: 17 MG/DL (ref 8–23)
BUN/CREAT SERPL: 19.1 (ref 7–25)
CALCIUM SPEC-SCNC: 8.3 MG/DL (ref 8.6–10.5)
CHLORIDE SERPL-SCNC: 105 MMOL/L (ref 98–107)
CO2 SERPL-SCNC: 25.3 MMOL/L (ref 22–29)
CREAT SERPL-MCNC: 0.89 MG/DL (ref 0.57–1)
CROSSMATCH INTERPRETATION: NORMAL
DEPRECATED RDW RBC AUTO: 50.6 FL (ref 37–54)
EGFRCR SERPLBLD CKD-EPI 2021: 66 ML/MIN/1.73
EOSINOPHIL # BLD AUTO: 0.11 10*3/MM3 (ref 0–0.4)
EOSINOPHIL NFR BLD AUTO: 0.8 % (ref 0.3–6.2)
ERYTHROCYTE [DISTWIDTH] IN BLOOD BY AUTOMATED COUNT: 16.2 % (ref 12.3–15.4)
GLUCOSE SERPL-MCNC: 79 MG/DL (ref 65–99)
HCT VFR BLD AUTO: 22.9 % (ref 34–46.6)
HCT VFR BLD AUTO: 30.6 % (ref 34–46.6)
HGB BLD-MCNC: 7 G/DL (ref 12–15.9)
HGB BLD-MCNC: 9.3 G/DL (ref 12–15.9)
IMM GRANULOCYTES # BLD AUTO: 0.12 10*3/MM3 (ref 0–0.05)
IMM GRANULOCYTES NFR BLD AUTO: 0.9 % (ref 0–0.5)
LYMPHOCYTES # BLD AUTO: 1.21 10*3/MM3 (ref 0.7–3.1)
LYMPHOCYTES NFR BLD AUTO: 8.7 % (ref 19.6–45.3)
MCH RBC QN AUTO: 26.4 PG (ref 26.6–33)
MCHC RBC AUTO-ENTMCNC: 30.6 G/DL (ref 31.5–35.7)
MCV RBC AUTO: 86.4 FL (ref 79–97)
MONOCYTES # BLD AUTO: 1.75 10*3/MM3 (ref 0.1–0.9)
MONOCYTES NFR BLD AUTO: 12.6 % (ref 5–12)
NEUTROPHILS NFR BLD AUTO: 10.64 10*3/MM3 (ref 1.7–7)
NEUTROPHILS NFR BLD AUTO: 76.6 % (ref 42.7–76)
NRBC BLD AUTO-RTO: 0 /100 WBC (ref 0–0.2)
PLATELET # BLD AUTO: 445 10*3/MM3 (ref 140–450)
PMV BLD AUTO: 9.7 FL (ref 6–12)
POTASSIUM SERPL-SCNC: 4.2 MMOL/L (ref 3.5–5.2)
RBC # BLD AUTO: 2.65 10*6/MM3 (ref 3.77–5.28)
SODIUM SERPL-SCNC: 137 MMOL/L (ref 136–145)
UNIT  ABO: NORMAL
UNIT  RH: NORMAL
WBC NRBC COR # BLD AUTO: 13.88 10*3/MM3 (ref 3.4–10.8)

## 2024-10-16 PROCEDURE — 97162 PT EVAL MOD COMPLEX 30 MIN: CPT

## 2024-10-16 PROCEDURE — 85025 COMPLETE CBC W/AUTO DIFF WBC: CPT | Performed by: STUDENT IN AN ORGANIZED HEALTH CARE EDUCATION/TRAINING PROGRAM

## 2024-10-16 PROCEDURE — 97535 SELF CARE MNGMENT TRAINING: CPT

## 2024-10-16 PROCEDURE — 85730 THROMBOPLASTIN TIME PARTIAL: CPT | Performed by: STUDENT IN AN ORGANIZED HEALTH CARE EDUCATION/TRAINING PROGRAM

## 2024-10-16 PROCEDURE — 85730 THROMBOPLASTIN TIME PARTIAL: CPT | Performed by: INTERNAL MEDICINE

## 2024-10-16 PROCEDURE — 80048 BASIC METABOLIC PNL TOTAL CA: CPT | Performed by: STUDENT IN AN ORGANIZED HEALTH CARE EDUCATION/TRAINING PROGRAM

## 2024-10-16 PROCEDURE — 99233 SBSQ HOSP IP/OBS HIGH 50: CPT | Performed by: INTERNAL MEDICINE

## 2024-10-16 PROCEDURE — 85018 HEMOGLOBIN: CPT | Performed by: INTERNAL MEDICINE

## 2024-10-16 PROCEDURE — P9016 RBC LEUKOCYTES REDUCED: HCPCS

## 2024-10-16 PROCEDURE — 85014 HEMATOCRIT: CPT | Performed by: INTERNAL MEDICINE

## 2024-10-16 PROCEDURE — 97530 THERAPEUTIC ACTIVITIES: CPT

## 2024-10-16 PROCEDURE — 36430 TRANSFUSION BLD/BLD COMPNT: CPT

## 2024-10-16 PROCEDURE — 86900 BLOOD TYPING SEROLOGIC ABO: CPT

## 2024-10-16 RX ORDER — ARGININE/GLUTAMINE/CALCIUM BMB 7G-7G-1.5G
1 POWDER IN PACKET (EA) ORAL 2 TIMES DAILY
Status: DISCONTINUED | OUTPATIENT
Start: 2024-10-16 | End: 2024-10-18 | Stop reason: HOSPADM

## 2024-10-16 RX ORDER — METOPROLOL SUCCINATE 25 MG/1
12.5 TABLET, EXTENDED RELEASE ORAL
Status: DISCONTINUED | OUTPATIENT
Start: 2024-10-16 | End: 2024-10-18 | Stop reason: HOSPADM

## 2024-10-16 RX ORDER — FUROSEMIDE 20 MG
20 TABLET ORAL DAILY
Status: DISCONTINUED | OUTPATIENT
Start: 2024-10-16 | End: 2024-10-18 | Stop reason: HOSPADM

## 2024-10-16 RX ORDER — FENTANYL 12.5 UG/1
1 PATCH TRANSDERMAL
Status: DISCONTINUED | OUTPATIENT
Start: 2024-10-16 | End: 2024-10-18 | Stop reason: HOSPADM

## 2024-10-16 RX ADMIN — CYANOCOBALAMIN TAB 500 MCG 1000 MCG: 500 TAB at 08:20

## 2024-10-16 RX ADMIN — OXYCODONE 5 MG: 5 TABLET ORAL at 11:48

## 2024-10-16 RX ADMIN — OXYCODONE 5 MG: 5 TABLET ORAL at 18:45

## 2024-10-16 RX ADMIN — ASPIRIN 81 MG: 81 TABLET, COATED ORAL at 08:20

## 2024-10-16 RX ADMIN — BUPROPION HYDROCHLORIDE 150 MG: 150 TABLET, EXTENDED RELEASE ORAL at 08:21

## 2024-10-16 RX ADMIN — OXYCODONE 5 MG: 5 TABLET ORAL at 01:12

## 2024-10-16 RX ADMIN — ATORVASTATIN CALCIUM 40 MG: 40 TABLET, FILM COATED ORAL at 20:44

## 2024-10-16 RX ADMIN — METOPROLOL SUCCINATE 12.5 MG: 25 TABLET, FILM COATED, EXTENDED RELEASE ORAL at 11:48

## 2024-10-16 RX ADMIN — MUPIROCIN 1 APPLICATION: 20 OINTMENT TOPICAL at 11:50

## 2024-10-16 RX ADMIN — Medication 220 MG: at 08:21

## 2024-10-16 RX ADMIN — Medication 10 ML: at 20:45

## 2024-10-16 RX ADMIN — THERA TABS 1 TABLET: TAB at 08:20

## 2024-10-16 RX ADMIN — RIVAROXABAN 15 MG: 15 TABLET, FILM COATED ORAL at 11:48

## 2024-10-16 RX ADMIN — FERROUS SULFATE TAB EC 324 MG (65 MG FE EQUIVALENT) 324 MG: 324 (65 FE) TABLET DELAYED RESPONSE at 08:20

## 2024-10-16 RX ADMIN — PANTOPRAZOLE SODIUM 40 MG: 40 TABLET, DELAYED RELEASE ORAL at 08:21

## 2024-10-16 RX ADMIN — DIGOXIN 125 MCG: 125 TABLET ORAL at 11:48

## 2024-10-16 RX ADMIN — FENTANYL 1 PATCH: 12.5 PATCH TRANSDERMAL at 12:02

## 2024-10-16 RX ADMIN — Medication 10 ML: at 08:21

## 2024-10-16 RX ADMIN — CITALOPRAM HYDROBROMIDE 20 MG: 20 TABLET ORAL at 08:20

## 2024-10-16 RX ADMIN — LEVOTHYROXINE SODIUM 50 MCG: 0.05 TABLET ORAL at 05:28

## 2024-10-16 RX ADMIN — CILOSTAZOL 100 MG: 100 TABLET ORAL at 20:44

## 2024-10-16 RX ADMIN — OXYCODONE 5 MG: 5 TABLET ORAL at 06:23

## 2024-10-16 RX ADMIN — LEVOFLOXACIN 500 MG: 500 TABLET, FILM COATED ORAL at 14:02

## 2024-10-16 RX ADMIN — CETIRIZINE HYDROCHLORIDE 10 MG: 10 TABLET, FILM COATED ORAL at 08:20

## 2024-10-16 RX ADMIN — CILOSTAZOL 100 MG: 100 TABLET ORAL at 08:21

## 2024-10-16 RX ADMIN — FUROSEMIDE 20 MG: 20 TABLET ORAL at 14:00

## 2024-10-16 RX ADMIN — Medication 1 PACKET: at 20:44

## 2024-10-16 RX ADMIN — COLLAGENASE SANTYL 1 APPLICATION: 250 OINTMENT TOPICAL at 11:50

## 2024-10-16 NOTE — PROGRESS NOTES
[facility] MEDICINE SERVICE  DAILY PROGRESS NOTE    NAME: Fallon Nur  : 1945  MRN: [id number]      LOS: 6 days     PROVIDER OF SERVICE: Mary Gamino MD    Chief Complaint: Wound of lower extremity    Subjective:     Interval History:    Patient seen and evaluated at bedside.   Daughter at the bedside.  Patient sleepy and did not voice any complaint.  Treatment plan discussed with patient. All questions addressed.     Review of Systems:   All 21 ROS were negative except mentioned above.    Objective:     Vital Signs  Temp:  [97.4 °F (36.3 °C)-98.6 °F (37 °C)] 97.4 °F (36.3 °C)  Heart Rate:  [] 95  Resp:  [13-17] 14  BP: ()/(46-71) 103/54   Body mass index is 26.36 kg/m².    Physical Exam   General: No acute distress, appears stated age  Neuro: Sleepy, oriented x3, no focal deficits appreciated  Head: atraumatic, normocephalic  HEENT: EOMI, anicteric, normal sclera and conjunctivae, moist mucus membranes  Neck: supple, no lymphadenopathy  CV: RRR, soft heart sounds, no murmurs appreciated, no peripheral edema  Pulm: Decreased breath sounds, no increased work of breathing, no adventitious sounds  Abd: Soft, nontender, nondistended  Skin: Warm, dry and dressed bilateral lower extremities  Psych: Appropriate mood and affect    Scheduled Meds   aspirin, 81 mg, Oral, Daily  atorvastatin, 40 mg, Oral, Nightly  buPROPion XL, 150 mg, Oral, Daily  cetirizine, 10 mg, Oral, Daily  fentaNYL, 1 patch, Transdermal, Q72H   And  Check Fentanyl Patch Placement, 1 each, Does not apply, Q12H  cilostazol, 100 mg, Oral, BID  citalopram, 20 mg, Oral, Daily  collagenase, 1 Application, Topical, Q24H  digoxin, 125 mcg, Oral, Daily  ferrous sulfate, 324 mg, Oral, Daily With Breakfast  [Held by provider] furosemide, 40 mg, Oral, Daily  levoFLOXacin, 500 mg, Oral, Q24H  levothyroxine, 50 mcg, Oral, Q AM  [Held by provider] metoprolol succinate XL, 25 mg, Oral, Q24H  multivitamin, 1 tablet, Oral,  Daily  mupirocin, 1 Application, Topical, Q24H  pantoprazole, 40 mg, Oral, Daily  polyethylene glycol, 17 g, Oral, Daily  sodium chloride, 500 mL, Intravenous, Once  sodium chloride, 10 mL, Intravenous, Q12H  vitamin B-12, 1,000 mcg, Oral, Daily  vitamin D, 50,000 Units, Oral, Weekly  zinc sulfate, 220 mg, Oral, Daily       PRN Meds     acetaminophen **OR** acetaminophen **OR** acetaminophen    Calcium Replacement - Follow Nurse / BPA Driven Protocol    influenza vaccine    Magnesium Standard Dose Replacement - Follow Nurse / BPA Driven Protocol    melatonin    Morphine    ondansetron ODT **OR** ondansetron    oxyCODONE    Phosphorus Replacement - Follow Nurse / BPA Driven Protocol    Potassium Replacement - Follow Nurse / BPA Driven Protocol    [COMPLETED] Insert Peripheral IV **AND** sodium chloride    sodium chloride   Infusions  heparin, 18 Units/kg/hr, Last Rate: 15 Units/kg/hr (10/16/24 0909)          Diagnostic Data    Results from last 7 days   Lab Units 10/16/24  0111 10/14/24  1208 10/14/24  0204   WBC 10*3/mm3 13.88*   < > 9.07   HEMOGLOBIN g/dL 7.0*   < > 7.5*   HEMATOCRIT % 22.9*   < > 24.4*   PLATELETS 10*3/mm3 445   < > 511*   GLUCOSE mg/dL 79   < > 96   CREATININE mg/dL 0.89   < > 1.01*   BUN mg/dL 17   < > 21   SODIUM mmol/L 137   < > 136   POTASSIUM mmol/L 4.2   < > 3.6   AST (SGOT) U/L  --   --  47*   ALT (SGPT) U/L  --   --  14   ALK PHOS U/L  --   --  186*   BILIRUBIN mg/dL  --   --  0.2   ANION GAP mmol/L 6.7   < > 8.3    < > = values in this interval not displayed.       No radiology results for the last day    Interval results reviewed.    Assessment/Plan:   Lower extremity ulcer/cellulitis  PAD  Iron deficiency and anemia of chronic diseases -normocytic  Hypotension  Atrial fibrillation     Patient s/p percutaneous mechanical thrombectomy of right popliteal, peroneal, AT arteries, balloon angioplasty of the right TPT, popliteal artery, AT, sharp excisional debridement of right leg wound on  10/14.    Patient on heparin drip to prevent cardioembolic event.  Will switch to Eliquis 5 mg twice daily cardiology/vascular on board and okayed to switch to oral anticoagulant.  patient's hemoglobin is 7.0 her baseline is around 9 , pt s/p transfusion of 1 unit of packed red blood cell on 10/14/2024, will transfuse 2 units of blood today and will monitor hemoglobin.    Wound culture positive for heavy growth of Pseudomonas and Proteus mirabilis.  ID following the patient and patient on levofloxacin 500 mg daily.    midodrine 5 mg 3 times daily, restart metoprolol XL 12.5 mg daily.  Monitor blood pressure and hemoglobin.    Will decrease metoprolol dose to 12.5 XL daily and furosemide 20 mg daily hold if blood systolic blood pressure less than 90 mmHg.    Heart rate controlled with digoxin.     Poor long-term prognosis of this patient           Treatment plan discussed with RN.  And pharmacy    VTE Prophylaxis:  Pharmacologic VTE prophylaxis orders are present.         Code status is   Code Status and Medical Interventions: CPR (Attempt to Resuscitate); Full Support   Ordered at: 10/09/24 1707     Level Of Support Discussed With:    Patient     Code Status (Patient has no pulse and is not breathing):    CPR (Attempt to Resuscitate)     Medical Interventions (Patient has pulse or is breathing):    Full Support       Plan for disposition:   Barriers to Discharge: Workup  Anticipated Date of Discharge: 10/18/2024  Place of Discharge: Skilled nursing        Time: 40 minutes     Signature: Electronically signed by Mary Gamino MD, 10/16/24, 09:39 EDT.  [facility] Hospitalist Team

## 2024-10-16 NOTE — PLAN OF CARE
Goal Outcome Evaluation:  Plan of Care Reviewed With: patient           Outcome Evaluation: 79 y.o. female with bilateral lower extremity wounds right worse than left with known PVD.  Patient  underwent RIGHT LOWER EXTREMITY ARTERIOGRAM, PERCUTANEOUS THROMBECTOMY, ANGIOPLASTY,  AND WOUND DEBRIDEMENT 10/14/24.  Heparin gtt started 10/14/24.  Patient is cleared for therapy by nursing, the patient is agreeable with encouragement A&Ox4.  Tearful at times and requires increased time for repositioning and initiation of functional tasks.  In her normal state the patient resides with her granddaughter in a suite attached to the home.  Until her fall 1 mo ago she was independent with ambulation and MRADLs; since that fall she has been unable to ambulate and was limited funtionally due to significant LE pain.  Upon evaluation she demonstrates a marked decline in functional independence with transfers and mobility; she would benefit for ongoing skilled PT at the intensity of SNF level of care to address strengthening, balance, and progressive mobility training.  Will follow while IP to address aforementioned deficits and continue to advise in discharge disposition as it relates to her mobility status.    Anticipated Discharge Disposition (PT): skilled nursing facility

## 2024-10-16 NOTE — CASE MANAGEMENT/SOCIAL WORK
Continued Stay Note  ALEA Arita     Patient Name: Fallon Nur  MRN: [id number]  Today's Date: 10/16/2024    Admit Date: 10/9/2024    Plan: [facility] referral (pending) Will require precert, PASRR approved. Awaiting PT/OT evals   Discharge Plan       Row Name 10/16/24 1208       Plan    Plan [facility] referral (pending) Will require precert, PASRR approved. Awaiting PT/OT evals    Plan Comments [facility] notifed CM that facility is backfilled until atleast Saturday 10/19. CM inquired about next choice [facility]. Added in basket and referral pending. Updated PT/OT notes pending. Notified MD and RN in unit rounds that precert required to SNF. DC Barriers: blood transfusion               Expected Discharge Date and Time       Expected Discharge Date Expected Discharge Time    Oct 19, 2024           Mily Gordon RN     Office: [phone]

## 2024-10-16 NOTE — PLAN OF CARE
"Assessment: Fallon Nur presents with ADL impairments affecting function including balance, endurance / activity tolerance, pain, postural / trunk control, range of motion (ROM), and strength. Demonstrated functioning below baseline abilities indicate the need for continued skilled intervention while inpatient. Pt continues to require assist with functional transfers, however able to progress to take steps this date, increasing participation in therapy session. Pt fearful of falling and with increased pain, hesitant to mobilize, requiring coaxing to increase activity. Pt progressing towards stated goals, however continues to function below baseline. Tolerating session today without incident. Will continue to follow and progress as tolerated.     Plan/Recommendations:   Moderate Intensity Therapy recommended post-acute care. This is recommended as therapy feels the patient would require 3-4 days per week and wouldn't tolerate \"3 hour daily\" rehab intensity. SNF would be the preferred choice. If the patient does not agree to SNF, arrange HH or OP depending on home bound status. If patient is medically complex, consider LTACH.. Pt requires no DME at discharge.     Pt desires Skilled Rehab placement at discharge. Pt cooperative; agreeable to therapeutic recommendations and plan of care.   "

## 2024-10-16 NOTE — PLAN OF CARE
Goal Outcome Evaluation:              Outcome Evaluation: Pt continues to have pain in bilateral lower extremities. Vascular aware of new discoloration and numbness in right fingers, pulses found by vascular md. Heparin gtt changed to xarelto today. 1 unit PRBC for hmg of 7.0, repeat hmg 9.3

## 2024-10-16 NOTE — THERAPY TREATMENT NOTE
"Subjective: Pt agreeable to therapeutic plan of care.Pt with family at bedside.    Pt underwent RLE arteriogram, mechanical thrombectomy and wound debridement 10/14/24 by Dr. Ilir johnson 10/14/24 at 1715.    Cognition: oriented to Person, Time, and Situation disoriented to place    Objective:     Precautions - BLE wounds, skin integrity, high falls risk     Bed Mobility: Max-A   Functional Transfers: Mod-A and Max-A     Balance: sitting EOB, unsupported, and static CGA  Functional Ambulation: Mod-A and with rolling walker    Lower Body Dressing: Dependent  ADL Position: edge of bed sitting  ADL Comments: don briefs, don socks     Vitals: WNL    Pain: 10 VAS  Location: BLE R>L   Interventions for pain: Repositioned, Increased Activity, and Therapeutic Presence  Education: Provided education on the importance of mobility in the acute care setting, Verbal/Tactile Cues, ADL training, and Transfer Training      Assessment: Fallon Nur presents with ADL impairments affecting function including balance, endurance / activity tolerance, pain, postural / trunk control, range of motion (ROM), and strength. Demonstrated functioning below baseline abilities indicate the need for continued skilled intervention while inpatient. Pt continues to require assist with functional transfers, however able to progress to take steps this date, increasing participation in therapy session. Pt fearful of falling and with increased pain, hesitant to mobilize, requiring coaxing to increase activity. Pt progressing towards stated goals, however continues to function below baseline. Tolerating session today without incident. Will continue to follow and progress as tolerated.     Plan/Recommendations:   Moderate Intensity Therapy recommended post-acute care. This is recommended as therapy feels the patient would require 3-4 days per week and wouldn't tolerate \"3 hour daily\" rehab intensity. SNF would be the preferred choice. If " the patient does not agree to SNF, arrange HH or OP depending on home bound status. If patient is medically complex, consider LTACH.. Pt requires no DME at discharge.     Pt desires Skilled Rehab placement at discharge. Pt cooperative; agreeable to therapeutic recommendations and plan of care.     Modified Williamson: N/A = No pre-op stroke/TIA    Post-Tx Position: Up in Chair, Alarms activated, and Call light and personal items within reach  PPE: gloves and gown

## 2024-10-16 NOTE — PROGRESS NOTES
[facility] Vascular Surgery Progress Note    Name: Fallon Nur ADMIT: 10/9/2024   : 1945  PCP: Francine Hampton MD    MRN: [id number] LOS: 6 days   AGE/SEX: 79 y.o. female  ROOM:    [facility]    CC: Postop; status post percutaneous mechanical thrombectomy of right popliteal, peroneal, AT arteries, balloon angioplasty of the right TPT, popliteal artery, AT, sharp excisional debridement of right leg wound on 10/14    Subjective     Patient resting in bed.  Reports continued pain to her legs.  Also reports discoloration to her right middle finger, this started about 2 days ago.    Objective     Scheduled Medications:   aspirin, 81 mg, Oral, Daily  atorvastatin, 40 mg, Oral, Nightly  buPROPion XL, 150 mg, Oral, Daily  cetirizine, 10 mg, Oral, Daily  fentaNYL, 1 patch, Transdermal, Q72H   And  Check Fentanyl Patch Placement, 1 each, Does not apply, Q12H  cilostazol, 100 mg, Oral, BID  citalopram, 20 mg, Oral, Daily  collagenase, 1 Application, Topical, Q24H  digoxin, 125 mcg, Oral, Daily  ferrous sulfate, 324 mg, Oral, Daily With Breakfast  [Held by provider] furosemide, 40 mg, Oral, Daily  levoFLOXacin, 500 mg, Oral, Q24H  levothyroxine, 50 mcg, Oral, Q AM  [Held by provider] metoprolol succinate XL, 25 mg, Oral, Q24H  multivitamin, 1 tablet, Oral, Daily  mupirocin, 1 Application, Topical, Q24H  pantoprazole, 40 mg, Oral, Daily  polyethylene glycol, 17 g, Oral, Daily  sodium chloride, 500 mL, Intravenous, Once  sodium chloride, 10 mL, Intravenous, Q12H  vitamin B-12, 1,000 mcg, Oral, Daily  vitamin D, 50,000 Units, Oral, Weekly  zinc sulfate, 220 mg, Oral, Daily        Active Infusions:  heparin, 18 Units/kg/hr, Last Rate: 13 Units/kg/hr (10/16/24 0143)        As Needed Medications:    acetaminophen **OR** acetaminophen **OR** acetaminophen    Calcium Replacement - Follow Nurse / BPA Driven Protocol    influenza vaccine    Magnesium Standard Dose Replacement - Follow Nurse / BPA Driven  Protocol    melatonin    Morphine    ondansetron ODT **OR** ondansetron    oxyCODONE    Phosphorus Replacement - Follow Nurse / BPA Driven Protocol    Potassium Replacement - Follow Nurse / BPA Driven Protocol    [COMPLETED] Insert Peripheral IV **AND** sodium chloride    sodium chloride    Vital Signs  Vitals:    10/16/24 0820   BP: 103/54   Pulse: 95   Resp:    Temp:    SpO2:       Body mass index is 26.36 kg/m².     Physical Exam:  NAD, alert and oriented  A-fib on the monitor, heart rate 90s to 100s  Lungs clear  Abd soft, benign  Vascular: Biphasic Doppler signal to the right DP, multiphasic PT signal  Doppler signals present to the left DP/PT, right ulnar artery, unable to obtain signal to the right radial  Skin: Left groin access site is CDI and soft, no signs of hematoma; right foot is warm to touch, improved and appropriate capillary refill to the right foot: Right leg wound with healthy appearing tissue, no slough or purulence noted    Right leg wound 10/16      Results Review:     CBC    Results from last 7 days   Lab Units 10/16/24  0111 10/15/24  0314 10/14/24  0204 10/13/24  0203 10/12/24  0027 10/11/24  0829 10/10/24  2322   WBC 10*3/mm3 13.88* 10.89* 9.07 11.99* 13.71* 13.34* 13.75*   HEMOGLOBIN g/dL 7.0* 7.5* 7.5* 8.0* 7.9* 8.2* 7.7*   PLATELETS 10*3/mm3 445 433 511* 569* 515* 470* 504*     BMP   Results from last 7 days   Lab Units 10/16/24  0111 10/15/24  1235 10/15/24  0314 10/14/24  1208 10/14/24  0204 10/13/24  0203 10/12/24  0027 10/11/24  0829 10/10/24  2322 10/09/24  2329 10/09/24  1527   SODIUM mmol/L 137  --  137  --  136 136 134* 137 135* 138 139   POTASSIUM mmol/L 4.2 4.0 3.3* 3.8 3.6 3.7 4.0 4.1 4.0 4.0 4.1   CHLORIDE mmol/L 105  --  104  --  102 101 100 102 101 101 102   CO2 mmol/L 25.3  --  25.1  --  25.7 26.1 25.1 24.3 25.2 28.7 29.0   BUN mg/dL 17  --  17  --  21 19 21 21 22 22 21   CREATININE mg/dL 0.89  --  0.78  --  1.01* 0.99 0.95 1.00 1.12* 1.14* 1.03*   GLUCOSE mg/dL 79  --   85  --  96 113* 105* 91 113* 123* 87   MAGNESIUM mg/dL  --   --   --   --  2.1  --   --   --   --  2.4 2.4   PHOSPHORUS mg/dL  --   --   --  2.8 2.0*  --   --   --   --   --   --      HbA1C   Lab Results   Component Value Date    HGBA1C 5.12 10/15/2024     Infection   Results from last 7 days   Lab Units 10/10/24  1429 10/09/24  1440   BLOODCX   --  No growth at 5 days  No growth at 5 days   WOUNDCX  Heavy growth (4+) Pseudomonas aeruginosa*  Heavy growth (4+) Proteus mirabilis*  --      Radiology(recent) No radiology results for the last day    VTE Prophylaxis:  Pharmacologic VTE prophylaxis orders are present.         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 Hazel    POD2  VSS, afebrile  ABLA, 7.0/22.9 this morning, asymptomatic,  Pain is controlled, continue  current regimen  Right leg wound appears healthy with healthy appearing wound base, will change our RLE dressings to silver alginate foam dressings daily  Based on IntraOp findings, patient has had a clear embolic event from her atrial fibrillation despite appropriate anticoagulation, cardiology is following, echo is unremarkable, plans for outpatient KIM to evaluate treatment for her A-fib, possible atrial appendage closure  Now with discoloration to the right middle finger, likely another location of cardioembolism, unable to obtain signal to the right radial, she does have a Doppler signal to the right ulnar artery  Continued improvement in the appearance of her right foot/toes, she has appropriate capillary refill and all toes of her foot, motor and sensory intact, continue close monitoring  Continue aspirin and statin  On heparin drip, okay to transition to oral AC per cardiology  Medical management per primary team          AZAEL Castañeda  [facility] Vascular Surgery  10/16/24   O: [phone]  F: [phone]

## 2024-10-16 NOTE — THERAPY EVALUATION
Patient Name: Fallon Nur  : 1945    MRN: [id number]                              Today's Date: 10/16/2024       Admit Date: 10/9/2024    Visit Dx:     ICD-10-CM ICD-9-CM   1. Infected wound  T14.8XXA 958.3    L08.9    2. Pain in right lower leg  M79.661 729.5   3. Wound of right lower extremity, subsequent encounter  S81.801D V58.89     894.0   4. PAD (peripheral artery disease)  I73.9 443.9   5. Atherosclerosis of native arteries of right leg with ulceration of calf  I70.232 440.23     707.12     Patient Active Problem List   Diagnosis    H/O gastric bypass    Hypothyroidism    Chronic back pain    Ventral hernia    GERD (gastroesophageal reflux disease)    Blind    Longstanding persistent atrial fibrillation    Open wound of both lower extremities    Acute hypokalemia    Normocytic anemia    Anxiety and depression    Wound of lower extremity    Infected wound    PAD (peripheral artery disease)    Atherosclerosis of native arteries of right leg with ulceration of calf    Nonrheumatic mitral valve regurgitation    Primary hypertension    B12 deficiency    Chronic foot pain    Compression fracture of cervical spine    Histoplasmosis    History of compression fracture of spine    Insomnia    Knee osteoarthritis    Macular degeneration    Osteoporosis    Vitamin D deficiency     Past Medical History:   Diagnosis Date    Atrial fibrillation     Blind 07/10/2023    Cellulitis of leg 2024    Duodenal ulcer, perforated 10/01/2013    [facility]    2021 - EGD clear      DVT of upper extremity (deep vein thrombosis) 1995    subclavian - premarin      E. coli UTI 2020    H/O gastric bypass 2020    Histoplasmosis     Hypertension     Infestation by bed bug 10/09/2024    Legally blind     Longstanding persistent atrial fibrillation 07/10/2023    Macular degeneration 10/15/2024    Nonrheumatic mitral valve regurgitation 10/15/2024    Open wound of both lower extremities 2024     Primary hypertension 10/15/2024    PVD (peripheral vascular disease)     SBO (small bowel obstruction) 12/31/2020     Past Surgical History:   Procedure Laterality Date    CARDIAC CATHETERIZATION Right 10/14/2024    Procedure: RIGHT LOWER EXTREMITY ARTERIOGRAM, PERCUTANEOUS THROMBECTOMY, ANGIOPLASTY,  AND WOUND DEBRIDEMENT;  Surgeon: Red Hazel II, MD;  Location: [facility] HYBRID OR;  Service: Vascular;  Laterality: Right;    WOUND DEBRIDEMENT Right 9/16/2024    Procedure: RIGHT LEG DEBRIDEMENT;  Surgeon: Red Hazel II, MD;  Location: [facility] MAIN OR;  Service: Vascular;  Laterality: Right;      General Information       Row Name 10/16/24 1011          Physical Therapy Time and Intention    Document Type evaluation  -RR     Mode of Treatment physical therapy  -RR       Row Name 10/16/24 1011          General Information    Patient Profile Reviewed yes  -RR     Prior Level of Function independent:;gait;transfer  until fall 1 mo ago, patient has been unable to ambulate due to leg pain and wounds; utilized wheelchair for this amount of time  -RR     Existing Precautions/Restrictions fall  -RR       Row Name 10/16/24 1011          Living Environment    People in Home grandchild(elgin)  -RR       Row Name 10/16/24 1011          Home Main Entrance    Number of Stairs, Main Entrance none  able to access living area without steps; 2 steps shantelle access the rest of the home  -RR       Row Name 10/16/24 1011          Stairs Within Home, Primary    Number of Stairs, Within Home, Primary two  -RR     Stair Railings, Within Home, Primary railings safe and in good condition  -RR       Row Name 10/16/24 1011          Cognition    Orientation Status (Cognition) oriented to;person;situation;time  -RR               User Key  (r) = Recorded By, (t) = Taken By, (c) = Cosigned By      Initials Name Provider Type    RR Chery Pierre PT Physical Therapist                   Mobility       Row Name 10/16/24 1300          Bed  Mobility    Bed Mobility bed mobility (all) activities  -RR     All Activities, [place] (Bed Mobility) maximum assist (25% patient effort)  -RR     Comment, (Bed Mobility) for LE and trunk management  -RR       Row Name 10/16/24 1300          Bed-Chair Transfer    Bed-Chair [place] (Transfers) moderate assist (50% patient effort);contact guard  -RR     Comment, (Bed-Chair Transfer) mod to CTS and fully extend hips; CGA-alon of 2nd.  assist to preposition BLE .  Initiated standing to RW x2 with poor tolerance to standing and difficulty extending hips.  Completed sit<>stand and SPS transfer without RW with PT facilitating hip extension and erect posture.  -RR       Row Name 10/16/24 1300          Sit-Stand Transfer    Sit-Stand [place] (Transfers) moderate assist (50% patient effort)  -RR       Row Name 10/16/24 1300          Gait/Stairs (Locomotion)    Patient was able to Ambulate no, other medical factors prevent ambulation  -RR     Reason Patient was unable to Ambulate Uncontrolled Pain  patient has not been ambulatory x1 mo  -RR     [place] Level (Stairs) not tested  -RR               User Key  (r) = Recorded By, (t) = Taken By, (c) = Cosigned By      Initials Name Provider Type    RR Chery Pierre PT Physical Therapist                   Obj/Interventions       Row Name 10/16/24 1304          Range of Motion Comprehensive    Comment, General Range of Motion BLE limited with knee flexion >90*AROM/AAROM  -RR       Row Name 10/16/24 1304          Strength Comprehensive (MMT)    Comment, General Manual Muscle Testing (MMT) Assessment BLE grossly3-/5  -RR       Row Name 10/16/24 1304          Sensory Assessment (Somatosensory)    Sensory Assessment (Somatosensory) sensation intact  -RR               User Key  (r) = Recorded By, (t) = Taken By, (c) = Cosigned By      Initials Name Provider Type    RR Chery Pierre, PT Physical Therapist                   Goals/Plan       Row Name 10/16/24  1310          Bed Mobility Goal 1 (PT)    Activity/Assistive Device (Bed Mobility Goal 1, PT) bed mobility activities, all  -RR     [place] Level/Cues Needed (Bed Mobility Goal 1, PT) standby assist  -RR     Time Frame (Bed Mobility Goal 1, PT) long term goal (LTG);2 weeks  -RR       Row Name 10/16/24 1310          Transfer Goal 1 (PT)    Activity/Assistive Device (Transfer Goal 1, PT) transfers, all  -RR     [place] Level/Cues Needed (Transfer Goal 1, PT) standby assist  -RR     Time Frame (Transfer Goal 1, PT) long term goal (LTG);2 weeks  -RR       Row Name 10/16/24 1310          Gait Training Goal 1 (PT)    Activity/Assistive Device (Gait Training Goal 1, PT) gait (walking locomotion);walker, rolling  -RR     [place] Level (Gait Training Goal 1, PT) minimum assist (75% or more patient effort)  -RR     Distance (Gait Training Goal 1, PT) 50  -RR     Time Frame (Gait Training Goal 1, PT) long term goal (LTG);10 days  -RR       Row Name 10/16/24 1310          Therapy Assessment/Plan (PT)    Planned Therapy Interventions (PT) balance training;bed mobility training;gait training;home exercise program;neuromuscular re-education;patient/family education;ROM (range of motion);stair training;strengthening;transfer training;stretching  -RR               User Key  (r) = Recorded By, (t) = Taken By, (c) = Cosigned By      Initials Name Provider Type    RR Chery Pierre, PT Physical Therapist                   Clinical Impression       Row Name 10/16/24 1304          Pain    Pretreatment Pain Rating 3/10  -RR     Posttreatment Pain Rating 8/10  -RR     Pain Side/Orientation bilateral;generalized  -RR     Pain Management Interventions activity modification encouraged  -RR       Row Name 10/16/24 1305          Plan of Care Review    Plan of Care Reviewed With patient  -RR     Outcome Evaluation 79 y.o. female with bilateral lower extremity wounds right worse than left with known PVD.  Patient  underwent  RIGHT LOWER EXTREMITY ARTERIOGRAM, PERCUTANEOUS THROMBECTOMY, ANGIOPLASTY,  AND WOUND DEBRIDEMENT 10/14/24.  Heparin gtt started 10/14/24.  Patient is cleared for therapy by nursing, the patient is agreeable with encouragement A&Ox4.  Tearful at times and requires increased time for repositioning and initiation of functional tasks.  In her normal state the patient resides with her granddaughter in a suite attached to the home.  Until her fall 1 mo ago she was independent with ambulation and MRADLs; since that fall she has been unable to ambulate and was limited funtionally due to significant LE pain.  Upon evaluation she demonstrates a marked decline in functional independence with transfers and mobility; she would benefit for ongoing skilled PT at the intensity of SNF level of care to address strengthening, balance, and progressive mobility training.  Will follow while IP to address aforementioned deficits and continue to advise in discharge disposition as it relates to her mobility status.  -RR       Row Name 10/16/24 1304          Therapy Assessment/Plan (PT)    Patient/Family Therapy Goals Statement (PT) rehab to home  -RR     Rehab Potential (PT) good  -RR     Criteria for Skilled Interventions Met (PT) yes;meets criteria;skilled treatment is necessary  -RR     Therapy Frequency (PT) 5 times/wk  -RR     Predicted Duration of Therapy Intervention (PT) dc  -RR       Row Name 10/16/24 1304          Vital Signs    Pre Systolic BP Rehab 111  -RR     Pre Treatment Diastolic   -RR     Pretreatment Heart Rate (beats/min) 99  -RR     Intratreatment Heart Rate (beats/min) 141  -RR     Posttreatment Heart Rate (beats/min) 106  -RR     Pre SpO2 (%) 92  -RR     O2 Delivery Pre Treatment room air  -RR       Row Name 10/16/24 1304          Positioning and Restraints    Pre-Treatment Position in bed  -RR     Post Treatment Position chair  -RR     In Chair notified nsg;with family/caregiver;exit alarm on;call light within  reach  -RR               User Key  (r) = Recorded By, (t) = Taken By, (c) = Cosigned By      Initials Name Provider Type    RR Chery Pierre, THIAGO Physical Therapist                   Outcome Measures       Row Name 10/16/24 1311 10/16/24 1218       How much help from another person do you currently need...    Turning from your back to your side while in flat bed without using bedrails? 2  -RR 2  -BM    Moving from lying on back to sitting on the side of a flat bed without bedrails? 2  -RR 2  -BM    Moving to and from a bed to a chair (including a wheelchair)? 2  -RR 2  -BM    Standing up from a chair using your arms (e.g., wheelchair, bedside chair)? 2  -RR 2  -BM    Climbing 3-5 steps with a railing? 2  -RR 2  -BM    To walk in hospital room? 2  -RR 2  -BM    AM-PAC 6 Clicks Score (PT) 12  -RR 12  -BM              User Key  (r) = Recorded By, (t) = Taken By, (c) = Cosigned By      Initials Name Provider Type     Amita Parr RN Registered Nurse    RR Chery Pierre, THIAGO Physical Therapist                                 Physical Therapy Education       Title: PT OT SLP Therapies (In Progress)       Topic: Physical Therapy (Done)       Point: Mobility training (Done)       Learning Progress Summary            Patient Acceptance, TB,E, VU by RR at 10/16/2024 1313                      Point: Home exercise program (Done)       Learning Progress Summary            Patient Acceptance, TB,E, VU by RR at 10/16/2024 1313                                      User Key       Initials Effective Dates Name Provider Type [facility] 07/01/24 -  Chery Pierre PT Physical Therapist PT                  PT Recommendation and Plan  Planned Therapy Interventions (PT): balance training, bed mobility training, gait training, home exercise program, neuromuscular re-education, patient/family education, ROM (range of motion), stair training, strengthening, transfer training, stretching  Outcome Evaluation: 79 y.o.  female with bilateral lower extremity wounds right worse than left with known PVD.  Patient  underwent RIGHT LOWER EXTREMITY ARTERIOGRAM, PERCUTANEOUS THROMBECTOMY, ANGIOPLASTY,  AND WOUND DEBRIDEMENT 10/14/24.  Heparin gtt started 10/14/24.  Patient is cleared for therapy by nursing, the patient is agreeable with encouragement A&Ox4.  Tearful at times and requires increased time for repositioning and initiation of functional tasks.  In her normal state the patient resides with her granddaughter in a suite attached to the home.  Until her fall 1 mo ago she was independent with ambulation and MRADLs; since that fall she has been unable to ambulate and was limited funtionally due to significant LE pain.  Upon evaluation she demonstrates a marked decline in functional independence with transfers and mobility; she would benefit for ongoing skilled PT at the intensity of SNF level of care to address strengthening, balance, and progressive mobility training.  Will follow while IP to address aforementioned deficits and continue to advise in discharge disposition as it relates to her mobility status.     Time Calculation:         PT Charges       Row Name 10/16/24 1254             Time Calculation    Start Time 1008  -RR      Stop Time 1052  -RR      Time Calculation (min) 44 min  -RR      PT Received On 10/16/24  -RR      PT - Next Appointment 10/17/24  -RR      PT Goal Re-Cert Due Date 10/30/24  -RR                User Key  (r) = Recorded By, (t) = Taken By, (c) = Cosigned By      Initials Name Provider Type    Chery Boyd PT Physical Therapist                  Therapy Charges for Today       Code Description Service Date Service Provider Modifiers Qty    52803534612 HC-PT EVAL MOD COMPLEXITY 5 10/16/2024 Chery Pierre PT  1            PT G-Codes  AM-PAC 6 Clicks Score (PT): 12  PT Discharge Summary  Anticipated Discharge Disposition (PT): skilled nursing facility    Chery Pierre PT  10/16/2024

## 2024-10-16 NOTE — PROGRESS NOTES
Infectious Diseases Progress Note      LOS: 6 days   Patient Care Team:  Francine Hampton MD as PCP - General    Chief Complaint: Bilateral leg wounds    Subjective       The patient has been afebrile for the last 24 hours.  The patient is on room air, hemodynamically stable, and is tolerating antimicrobial therapy.  Patient feeling much better today      Review of Systems:   Review of Systems   Constitutional:  Positive for fatigue.   HENT: Negative.     Eyes: Negative.    Respiratory: Negative.     Cardiovascular: Negative.    Gastrointestinal: Negative.    Endocrine: Negative.    Genitourinary: Negative.    Musculoskeletal: Negative.         Lower leg pain   Skin:  Positive for color change and wound.   Neurological: Negative.    Psychiatric/Behavioral: Negative.     All other systems reviewed and are negative.       Objective     Vital Signs  Temp:  [97.4 °F (36.3 °C)-98.6 °F (37 °C)] 97.8 °F (36.6 °C)  Heart Rate:  [] 116  Resp:  [12-17] 13  BP: ()/(46-71) 110/62    Physical Exam:  Physical Exam  Vitals and nursing note reviewed.   Constitutional:       General: She is not in acute distress.     Appearance: She is well-developed and normal weight. She is ill-appearing. She is not diaphoretic.   HENT:      Head: Normocephalic and atraumatic.   Eyes:      General: No scleral icterus.     Comments: Legally blind   Cardiovascular:      Rate and Rhythm: Normal rate and regular rhythm.      Heart sounds: Normal heart sounds, S1 normal and S2 normal. No murmur heard.  Pulmonary:      Effort: Pulmonary effort is normal. No respiratory distress.      Breath sounds: Normal breath sounds. No stridor. No wheezing or rales.   Chest:      Chest wall: No tenderness.   Abdominal:      General: Bowel sounds are normal. There is no distension.      Palpations: Abdomen is soft. There is no mass.      Tenderness: There is no abdominal tenderness. There is no guarding.   Musculoskeletal:         General: No  swelling, tenderness or deformity. Normal range of motion.      Cervical back: Neck supple.   Skin:     General: Skin is warm and dry.      Coloration: Skin is not pale.      Findings: No bruising, erythema or rash.      Comments: Patient has a large wound to the right lower extremity covered in yellow eschar with a large amount of green drainage and a very foul odor.  Pulses are palpable on the right foot but it is very cold.  There is a blister on the medial aspect of the lower leg     The left lower leg has a full-thickness wound with some fibrinous slough but very little drainage and no foul odor.     Neurological:      Mental Status: She is alert and oriented to person, place, and time.   Psychiatric:         Mood and Affect: Mood normal.          Results Review:    I have reviewed all clinical data, test, lab, and imaging results.     Radiology  No Radiology Exams Resulted Within Past 24 Hours    Cardiology    Laboratory    Results from last 7 days   Lab Units 10/16/24  1443 10/16/24  0111 10/15/24  0314 10/14/24  0204 10/13/24  0203 10/12/24  0027 10/11/24  0829 10/10/24  2322   WBC 10*3/mm3  --  13.88* 10.89* 9.07 11.99* 13.71* 13.34* 13.75*   HEMOGLOBIN g/dL 9.3* 7.0* 7.5* 7.5* 8.0* 7.9* 8.2* 7.7*   HEMATOCRIT % 30.6* 22.9* 24.0* 24.4* 27.0* 26.9* 27.4* 26.4*   PLATELETS 10*3/mm3  --  445 433 511* 569* 515* 470* 504*     Results from last 7 days   Lab Units 10/16/24  0111 10/15/24  1235 10/15/24  0314 10/14/24  1208 10/14/24  0204 10/13/24  0203 10/12/24  0027 10/11/24  0829 10/10/24  2322 10/09/24  2329 10/09/24  1527   SODIUM mmol/L 137  --  137  --  136 136 134* 137 135*   < > 139   POTASSIUM mmol/L 4.2 4.0 3.3* 3.8 3.6 3.7 4.0 4.1 4.0   < > 4.1   CHLORIDE mmol/L 105  --  104  --  102 101 100 102 101   < > 102   CO2 mmol/L 25.3  --  25.1  --  25.7 26.1 25.1 24.3 25.2   < > 29.0   BUN mg/dL 17  --  17  --  21 19 21 21 22   < > 21   CREATININE mg/dL 0.89  --  0.78  --  1.01* 0.99 0.95 1.00 1.12*   < >  1.03*   GLUCOSE mg/dL 79  --  85  --  96 113* 105* 91 113*   < > 87   ALBUMIN g/dL  --   --   --   --  2.5*  --   --   --   --   --  2.9*   BILIRUBIN mg/dL  --   --   --   --  0.2  --   --   --   --   --  0.2   ALK PHOS U/L  --   --   --   --  186*  --   --   --   --   --  220*   AST (SGOT) U/L  --   --   --   --  47*  --   --   --   --   --  36*   ALT (SGPT) U/L  --   --   --   --  14  --   --   --   --   --  14   CALCIUM mg/dL 8.3*  --  8.0*  --  8.1* 8.4* 8.2* 8.1* 8.0*   < > 8.7    < > = values in this interval not displayed.     Results from last 7 days   Lab Units 10/09/24  1527   CK TOTAL U/L 76     Results from last 7 days   Lab Units 10/09/24  2329   SED RATE mm/hr 69*         Microbiology   Microbiology Results (last 10 days)       Procedure Component Value - Date/Time    Wound Culture - Wound, Leg, Right [[id number]]  (Abnormal)  (Susceptibility) Collected: 10/10/24 1429    Lab Status: Final result Specimen: Wound from Leg, Right Updated: 10/14/24 0932     Wound Culture Heavy growth (4+) Pseudomonas aeruginosa      Heavy growth (4+) Proteus mirabilis     Gram Stain Rare (1+) WBCs per low power field      Few (2+) Gram negative bacilli    Susceptibility        Pseudomonas aeruginosa      MO      Cefepime Susceptible      Ceftazidime Susceptible      Ciprofloxacin Susceptible      Levofloxacin Susceptible      Piperacillin + Tazobactam Susceptible      Tobramycin Susceptible                       Susceptibility        Proteus mirabilis      MO      Amoxicillin + Clavulanate Susceptible      Ampicillin Susceptible      Ampicillin + Sulbactam Susceptible      Cefepime Susceptible      Ceftazidime Susceptible      Ceftriaxone Susceptible      Ertapenem Susceptible      Gentamicin Susceptible      Levofloxacin Susceptible      Piperacillin + Tazobactam Susceptible      Tetracycline Resistant      Trimethoprim + Sulfamethoxazole Susceptible                       Susceptibility Comments       Pseudomonas  aeruginosa    With the exception of urinary-sourced infections, aminoglycosides should not be used as monotherapy.      Proteus mirabilis    Cefazolin sensitivity will not be reported for Enterobacteriaceae in non-urine isolates. If cefazolin is preferred, please call the microbiology lab to request an E-test.  With the exception of urinary-sourced infections, aminoglycosides should not be used as monotherapy.               Anaerobic Culture - Swab, Leg, Right [[id number]]  (Normal) Collected: 10/10/24 1429    Lab Status: Final result Specimen: Swab from Leg, Right Updated: 10/15/24 0651     Anaerobic Culture No anaerobes isolated at 5 days    Blood Culture - Blood, Arm, Left [[id number]]  (Normal) Collected: 10/09/24 1440    Lab Status: Final result Specimen: Blood from Arm, Left Updated: 10/14/24 1445     Blood Culture No growth at 5 days    Blood Culture - Blood, Arm, Right [[id number]]  (Normal) Collected: 10/09/24 1440    Lab Status: Final result Specimen: Blood from Arm, Right Updated: 10/14/24 1445     Blood Culture No growth at 5 days            Medication Review:       Schedule Meds  atorvastatin, 40 mg, Oral, Nightly  buPROPion XL, 150 mg, Oral, Daily  cetirizine, 10 mg, Oral, Daily  fentaNYL, 1 patch, Transdermal, Q72H   And  [START ON 10/17/2024] Check Fentanyl Patch Placement, 1 each, Does not apply, Q12H  cilostazol, 100 mg, Oral, BID  citalopram, 20 mg, Oral, Daily  collagenase, 1 Application, Topical, Q24H  digoxin, 125 mcg, Oral, Daily  ferrous sulfate, 324 mg, Oral, Daily With Breakfast  furosemide, 20 mg, Oral, Daily  Estiven, 1 packet, Oral, BID  levoFLOXacin, 500 mg, Oral, Q24H  levothyroxine, 50 mcg, Oral, Q AM  metoprolol succinate XL, 12.5 mg, Oral, Q24H  multivitamin, 1 tablet, Oral, Daily  mupirocin, 1 Application, Topical, Q24H  pantoprazole, 40 mg, Oral, Daily  polyethylene glycol, 17 g, Oral, Daily  [START ON 10/17/2024] rivaroxaban, 15 mg, Oral, Daily With Dinner  sodium chloride, 500 mL,  Intravenous, Once  sodium chloride, 10 mL, Intravenous, Q12H  vitamin B-12, 1,000 mcg, Oral, Daily  vitamin D, 50,000 Units, Oral, Weekly  zinc sulfate, 220 mg, Oral, Daily        Infusion Meds           PRN Meds    acetaminophen **OR** acetaminophen **OR** acetaminophen    Calcium Replacement - Follow Nurse / BPA Driven Protocol    influenza vaccine    Magnesium Standard Dose Replacement - Follow Nurse / BPA Driven Protocol    melatonin    Morphine    ondansetron ODT **OR** ondansetron    oxyCODONE    Phosphorus Replacement - Follow Nurse / BPA Driven Protocol    Potassium Replacement - Follow Nurse / BPA Driven Protocol    [COMPLETED] Insert Peripheral IV **AND** sodium chloride    sodium chloride        Assessment & Plan       Antimicrobial Therapy   1.  P.o. Levaquin        2.         3.        4.        5.            Assessment     Right leg wound infection with green drainage concerning for Pseudomonas infection.  Cultures are growing Pseudomonas aeruginosa and Proteus mirabilis.  Both organisms were susceptible to quinolone     Chronic bilateral lower extremity wounds concerning for underlying peripheral vascular disease.  CTA showed bilateral popliteal artery occlusions.  Status post right lower extremity arteriogram, thrombectomy, angioplasty and wound debridement on 10/15/2024     Recent hospital admission in September 2024 for right leg wound infection s/p debridement by vascular surgery service.     Patient is legally blind     Plan     Continue p.o. levofloxacin 500 mg daily for 13 days for 14 days of treatment since debridement  QTc interval was appropriate for quinolone use  Continue supportive care  Okay to discharge from Infectious Disease standpoint  The case was discussed with the patient and her family at bedside        AZAEL Nguyen  10/16/24  15:01 EDT    Note is dictated utilizing voice recognition software/Dragon

## 2024-10-16 NOTE — PROGRESS NOTES
Referring Provider: Mary Gamino MD    Reason for follow-up: Anticoagulation management for atrial fibrillation, rule out cardiac source of emboli.     Patient Care Team:  Francine Hampton MD as PCP - General      SUBJECTIVE  Resting comfortably in bed.  Family members at bedside.     ROS  Review of all systems negative except as indicated.    Since I have last seen, the patient has been without any chest discomfort, shortness of breath, palpitations, dizziness or syncope.  Denies having any headache, abdominal pain, nausea, vomiting, diarrhea, constipation, loss of weight or loss of appetite.  Denies having any excessive bruising, hematuria or blood in the stool.        Personal History:    Past Medical History:   Diagnosis Date    Atrial fibrillation     Blind 07/10/2023    Cellulitis of leg 09/12/2024    Duodenal ulcer, perforated 10/01/2013    [facility] - Dr. Peters    12/2021 - EGD clear      DVT of upper extremity (deep vein thrombosis) 01/01/1995    subclavian - premarin      E. 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    Histoplasmosis     Hypertension     Infestation by bed bug 10/09/2024    Legally blind     Longstanding persistent atrial fibrillation 07/10/2023    Macular degeneration 10/15/2024    Nonrheumatic mitral valve regurgitation 10/15/2024    Open wound of both lower extremities 09/12/2024    Primary hypertension 10/15/2024    PVD (peripheral vascular disease)     SBO (small bowel obstruction) 12/31/2020       Past Surgical History:   Procedure Laterality Date    CARDIAC CATHETERIZATION Right 10/14/2024    Procedure: RIGHT LOWER EXTREMITY ARTERIOGRAM, PERCUTANEOUS THROMBECTOMY, ANGIOPLASTY,  AND WOUND DEBRIDEMENT;  Surgeon: Red Hazel II, MD;  Location: [facility] HYBRID OR;  Service: Vascular;  Laterality: Right;    WOUND DEBRIDEMENT Right 9/16/2024    Procedure: RIGHT LEG DEBRIDEMENT;  Surgeon: Red Hazel II, MD;  Location: [facility] MAIN OR;  Service: Vascular;  Laterality:  Right;       Family History   Problem Relation Age of Onset    Cancer Mother     Dementia Father     Cancer Sister        Social History     Tobacco Use    Smoking status: Former    Smokeless tobacco: Never   Vaping Use    Vaping status: Never Used   Substance Use Topics    Alcohol use: Not Currently    Drug use: Not Currently        Home meds:  Prior to Admission medications    Medication Sig Start Date End Date Taking? Authorizing Provider   alendronate (FOSAMAX) 70 MG tablet Take 1 tablet by mouth Every 7 (Seven) Days. Saturday   Yes Mel Brower MD   buPROPion XL (WELLBUTRIN XL) 150 MG 24 hr tablet Take 1 tablet by mouth Daily.   Yes Mel Brower MD   cetirizine (zyrTEC) 10 MG tablet Take 1 tablet by mouth Daily.   Yes Mel Brower MD   citalopram (CeleXA) 20 MG tablet Take 1 tablet by mouth Daily.   Yes Mel Brower MD   digoxin (LANOXIN) 125 MCG tablet Take 1 tablet by mouth Daily.   Yes Mel Brower MD   ferrous sulfate 325 (65 FE) MG tablet Take 1 tablet by mouth Daily With Breakfast.   Yes Mel Brower MD   fluticasone (FLONASE) 50 MCG/ACT nasal spray Administer 2 sprays into the nostril(s) as directed by provider Daily As Needed for Rhinitis.   Yes Mel Brower MD   furosemide (LASIX) 40 MG tablet Take 1 tablet by mouth Daily. 9/21/24  Yes Frank Dale MD   levothyroxine (SYNTHROID, LEVOTHROID) 50 MCG tablet Take 1 tablet by mouth Daily.   Yes Mel Brower MD   Melatonin 12 MG tablet Take 1 tablet by mouth At Night As Needed.   Yes Mel Brower MD   metoprolol succinate XL (TOPROL-XL) 50 MG 24 hr tablet Take 2 tablets by mouth Daily. 9/22/24  Yes Frank Dale MD   oxyCODONE-acetaminophen (PERCOCET) 5-325 MG per tablet Take 1 tablet by mouth 2 (Two) Times a Day.   Yes Mel Brower MD   pantoprazole (PROTONIX) 40 MG EC tablet Take 1 tablet by mouth Daily. 9/3/20  Yes Mel Brower MD    rivaroxaban (XARELTO) 20 MG tablet Take 1 tablet by mouth Daily.   Yes Mel Brower MD   vitamin B-12 (CYANOCOBALAMIN) 1000 MCG tablet Take 1 tablet by mouth Daily.   Yes Mel Brower MD   vitamin D (ERGOCALCIFEROL) 1.25 MG (36922 UT) capsule capsule Take 1 capsule by mouth 1 (One) Time Per Week. Saturday   Yes Mel Brower MD   zolpidem (AMBIEN) 5 MG tablet Take 1 tablet by mouth every night at bedtime.   Yes ProviderMel MD       Allergies:  Patient has no known allergies.    Scheduled Meds:aspirin, 81 mg, Oral, Daily  atorvastatin, 40 mg, Oral, Nightly  buPROPion XL, 150 mg, Oral, Daily  cetirizine, 10 mg, Oral, Daily  fentaNYL, 1 patch, Transdermal, Q72H   And  Check Fentanyl Patch Placement, 1 each, Does not apply, Q12H  cilostazol, 100 mg, Oral, BID  citalopram, 20 mg, Oral, Daily  collagenase, 1 Application, Topical, Q24H  digoxin, 125 mcg, Oral, Daily  ferrous sulfate, 324 mg, Oral, Daily With Breakfast  [Held by provider] furosemide, 40 mg, Oral, Daily  levoFLOXacin, 500 mg, Oral, Q24H  levothyroxine, 50 mcg, Oral, Q AM  [Held by provider] metoprolol succinate XL, 25 mg, Oral, Q24H  multivitamin, 1 tablet, Oral, Daily  mupirocin, 1 Application, Topical, Q24H  pantoprazole, 40 mg, Oral, Daily  polyethylene glycol, 17 g, Oral, Daily  sodium chloride, 500 mL, Intravenous, Once  sodium chloride, 10 mL, Intravenous, Q12H  vitamin B-12, 1,000 mcg, Oral, Daily  vitamin D, 50,000 Units, Oral, Weekly  zinc sulfate, 220 mg, Oral, Daily      Continuous Infusions:heparin, 18 Units/kg/hr, Last Rate: 13 Units/kg/hr (10/16/24 0143)      PRN Meds:.  acetaminophen **OR** acetaminophen **OR** acetaminophen    Calcium Replacement - Follow Nurse / BPA Driven Protocol    influenza vaccine    Magnesium Standard Dose Replacement - Follow Nurse / BPA Driven Protocol    melatonin    Morphine    ondansetron ODT **OR** ondansetron    oxyCODONE    Phosphorus Replacement - Follow Nurse / BPA Driven  "Protocol    Potassium Replacement - Follow Nurse / BPA Driven Protocol    [COMPLETED] Insert Peripheral IV **AND** sodium chloride    sodium chloride      OBJECTIVE    Vital Signs  Vitals:    10/15/24 2115 10/16/24 0154 10/16/24 0525 10/16/24 0615   BP: 105/54 113/71 94/46    BP Location: Left arm Left arm Left arm    Patient Position: Lying Lying Lying    Pulse: 90 98 101 106   Resp: 17 14 14    Temp: 98.6 °F (37 °C) 98.3 °F (36.8 °C) 98.1 °F (36.7 °C)    TempSrc: Axillary Axillary Axillary    SpO2: 92% 95% 92% 91%   Weight:    67.5 kg (148 lb 13 oz)   Height:           Flowsheet Rows      Flowsheet Row First Filed Value   Admission Height 160 cm (63\") Documented at 10/09/2024 1231   Admission Weight 65.3 kg (144 lb) Documented at 10/09/2024 1231              Intake/Output Summary (Last 24 hours) at 10/16/2024 0738  Last data filed at 10/16/2024 0526  Gross per 24 hour   Intake 720 ml   Output 1350 ml   Net -630 ml          Telemetry: Atrial fibrillation with heart rate in the 100    Physical Exam:  The patient is alert, oriented and in no distress.  Vital signs as noted above.  Head and neck revealed no carotid bruits or jugular venous distention.  No thyromegaly or lymphadenopathy is present  Lungs clear.  No wheezing.  Breath sounds are normal bilaterally.  Heart normal first and second heart sounds.  No murmur. No precordial rub is present.  No gallop is present.  Abdomen soft and nontender.  No organomegaly is present.  Extremities with good peripheral pulses without any pedal edema.  Skin warm and dry.  Musculoskeletal system is grossly normal.  CNS grossly normal.       Results Review:  I have personally reviewed the results from the time of this admission to 10/16/2024 07:38 EDT and agree with these findings:  []  Laboratory  []  Microbiology  []  Radiology  []  EKG/Telemetry   []  Cardiology/Vascular   []  Pathology  []  Old records  []  Other:    Most notable findings include:    Lab Results (last 24 " hours)       Procedure Component Value Units Date/Time    Basic Metabolic Panel [[id number]]  (Abnormal) Collected: 10/16/24 0111    Specimen: Blood Updated: 10/16/24 0139     Glucose 79 mg/dL      BUN 17 mg/dL      Creatinine 0.89 mg/dL      Sodium 137 mmol/L      Potassium 4.2 mmol/L      Chloride 105 mmol/L      CO2 25.3 mmol/L      Calcium 8.3 mg/dL      BUN/Creatinine Ratio 19.1     Anion Gap 6.7 mmol/L      eGFR 66.0 mL/min/1.73     Narrative:      GFR Normal >60  Chronic Kidney Disease <60  Kidney Failure <15    The GFR formula is only valid for adults with stable renal function between ages 18 and 70.    aPTT [[id number]]  (Abnormal) Collected: 10/16/24 0111    Specimen: Blood Updated: 10/16/24 0138     PTT 82.6 seconds     CBC & Differential [[id number]]  (Abnormal) Collected: 10/16/24 0111    Specimen: Blood Updated: 10/16/24 0119    Narrative:      The following orders were created for panel order CBC & Differential.  Procedure                               Abnormality         Status                     ---------                               -----------         ------                     CBC Auto Differential[[id number]]        Abnormal            Final result                 Please view results for these tests on the individual orders.    CBC Auto Differential [[id number]]  (Abnormal) Collected: 10/16/24 0111    Specimen: Blood Updated: 10/16/24 0119     WBC 13.88 10*3/mm3      RBC 2.65 10*6/mm3      Hemoglobin 7.0 g/dL      Hematocrit 22.9 %      MCV 86.4 fL      MCH 26.4 pg      MCHC 30.6 g/dL      RDW 16.2 %      RDW-SD 50.6 fl      MPV 9.7 fL      Platelets 445 10*3/mm3      Neutrophil % 76.6 %      Lymphocyte % 8.7 %      Monocyte % 12.6 %      Eosinophil % 0.8 %      Basophil % 0.4 %      Immature Grans % 0.9 %      Neutrophils, Absolute 10.64 10*3/mm3      Lymphocytes, Absolute 1.21 10*3/mm3      Monocytes, Absolute 1.75 10*3/mm3      Eosinophils, Absolute 0.11 10*3/mm3      Basophils, Absolute 0.05  10*3/mm3      Immature Grans, Absolute 0.12 10*3/mm3      nRBC 0.0 /100 WBC     aPTT [[id number]]  (Abnormal) Collected: 10/15/24 1813    Specimen: Blood Updated: 10/15/24 1903     PTT 98.5 seconds     Potassium [[id number]]  (Normal) Collected: 10/15/24 1235    Specimen: Blood Updated: 10/15/24 1254     Potassium 4.0 mmol/L     aPTT [[id number]]  (Abnormal) Collected: 10/15/24 0934    Specimen: Blood Updated: 10/15/24 1015     .0 seconds     Hemoglobin A1c [[id number]]  (Normal) Collected: 10/15/24 0314    Specimen: Blood Updated: 10/15/24 0938     Hemoglobin A1C 5.12 %     Lipid Panel [[id number]]  (Abnormal) Collected: 10/15/24 0314    Specimen: Blood Updated: 10/15/24 0937     Total Cholesterol 88 mg/dL      Triglycerides 55 mg/dL      HDL Cholesterol 39 mg/dL      LDL Cholesterol  36 mg/dL      VLDL Cholesterol 13 mg/dL      LDL/HDL Ratio 0.97    Narrative:      Cholesterol Reference Ranges  (U.S. Department of Health and Human Services ATP III Classifications)    Desirable          <200 mg/dL  Borderline High    200-239 mg/dL  High Risk          >240 mg/dL      Triglyceride Reference Ranges  (U.S. Department of Health and Human Services ATP III Classifications)    Normal           <150 mg/dL  Borderline High  150-199 mg/dL  High             200-499 mg/dL  Very High        >500 mg/dL    HDL Reference Ranges  (U.S. Department of Health and Human Services ATP III Classifications)    Low     <40 mg/dl (major risk factor for CHD)  High    >60 mg/dl ('negative' risk factor for CHD)        LDL Reference Ranges  (U.S. Department of Health and Human Services ATP III Classifications)    Optimal          <100 mg/dL  Near Optimal     100-129 mg/dL  Borderline High  130-159 mg/dL  High             160-189 mg/dL  Very High        >189 mg/dL            Imaging Results (Last 24 Hours)       ** No results found for the last 24 hours. **            LAB RESULTS (LAST 7 DAYS)    CBC  Results from last 7 days   Lab Units  10/16/24  0111 10/15/24  0314 10/14/24  0204 10/13/24  0203 10/12/24  0027 10/11/24  0829 10/10/24  2322   WBC 10*3/mm3 13.88* 10.89* 9.07 11.99* 13.71* 13.34* 13.75*   RBC 10*6/mm3 2.65* 2.79* 2.86* 3.16* 3.14* 3.23* 3.03*   HEMOGLOBIN g/dL 7.0* 7.5* 7.5* 8.0* 7.9* 8.2* 7.7*   HEMATOCRIT % 22.9* 24.0* 24.4* 27.0* 26.9* 27.4* 26.4*   MCV fL 86.4 86.0 85.3 85.4 85.7 84.8 87.1   PLATELETS 10*3/mm3 445 433 511* 569* 515* 470* 504*       BMP  Results from last 7 days   Lab Units 10/16/24  0111 10/15/24  1235 10/15/24  0314 10/14/24  1208 10/14/24  0204 10/13/24  0203 10/12/24  0027 10/11/24  0829 10/10/24  2322 10/09/24  2329 10/09/24  1527   SODIUM mmol/L 137  --  137  --  136 136 134* 137 135* 138 139   POTASSIUM mmol/L 4.2 4.0 3.3* 3.8 3.6 3.7 4.0 4.1 4.0 4.0 4.1   CHLORIDE mmol/L 105  --  104  --  102 101 100 102 101 101 102   CO2 mmol/L 25.3  --  25.1  --  25.7 26.1 25.1 24.3 25.2 28.7 29.0   BUN mg/dL 17  --  17  --  21 19 21 21 22 22 21   CREATININE mg/dL 0.89  --  0.78  --  1.01* 0.99 0.95 1.00 1.12* 1.14* 1.03*   GLUCOSE mg/dL 79  --  85  --  96 113* 105* 91 113* 123* 87   MAGNESIUM mg/dL  --   --   --   --  2.1  --   --   --   --  2.4 2.4   PHOSPHORUS mg/dL  --   --   --  2.8 2.0*  --   --   --   --   --   --        CMP   Results from last 7 days   Lab Units 10/16/24  0111 10/15/24  1235 10/15/24  0314 10/14/24  1208 10/14/24  0204 10/13/24  0203 10/12/24  0027 10/11/24  0829 10/10/24  2322 10/09/24  2329 10/09/24  1527   SODIUM mmol/L 137  --  137  --  136 136 134* 137 135*   < > 139   POTASSIUM mmol/L 4.2 4.0 3.3* 3.8 3.6 3.7 4.0 4.1 4.0   < > 4.1   CHLORIDE mmol/L 105  --  104  --  102 101 100 102 101   < > 102   CO2 mmol/L 25.3  --  25.1  --  25.7 26.1 25.1 24.3 25.2   < > 29.0   BUN mg/dL 17  --  17  --  21 19 21 21 22   < > 21   CREATININE mg/dL 0.89  --  0.78  --  1.01* 0.99 0.95 1.00 1.12*   < > 1.03*   GLUCOSE mg/dL 79  --  85  --  96 113* 105* 91 113*   < > 87   ALBUMIN g/dL  --   --   --   --  2.5*   --   --   --   --   --  2.9*   BILIRUBIN mg/dL  --   --   --   --  0.2  --   --   --   --   --  0.2   ALK PHOS U/L  --   --   --   --  186*  --   --   --   --   --  220*   AST (SGOT) U/L  --   --   --   --  47*  --   --   --   --   --  36*   ALT (SGPT) U/L  --   --   --   --  14  --   --   --   --   --  14    < > = values in this interval not displayed.       BNP        TROPONIN  Results from last 7 days   Lab Units 10/09/24  1527   CK TOTAL U/L 76       CoAg  Results from last 7 days   Lab Units 10/16/24  0111 10/15/24  1813 10/15/24  0934 10/15/24  0314 10/14/24  1950 10/11/24  0825 10/10/24  2322 10/10/24  0202 10/09/24  1527   INR   --   --   --   --  1.10  --   --   --  1.05   APTT seconds 82.6* 98.5* 114.0* 53.3* 60.1* 66.2 52.6*   < > 27.4    < > = values in this interval not displayed.       Creatinine Clearance  Estimated Creatinine Clearance: 47.3 mL/min (by C-G formula based on SCr of 0.89 mg/dL).    ABG        Radiology  No radiology results for the last day      EKG  I personally viewed and interpreted the patient's EKG/Telemetry data:  ECG 12 Lead Tachycardia   Final Result   HEART YJRD=261  bpm   RR Qijcdyse=214  ms   WV Interval=  ms   P Horizontal Axis=  deg   P Front Axis=  deg   QRSD Interval=68  ms   QT Ygtywfkz=615  ms   TTrN=584  ms   QRS Axis=41  deg   T Wave Axis=  deg   - ABNORMAL ECG -   Atrial fibrillation   When compared with ECG of 04-Aug-2024 07:38:43,   Significant change in rhythm   Significant repolarization change   Electronically Signed By: Bryn Valdez (ELLA) 2024-10-10 18:02:07   Date and Time of Study:2024-10-10 00:29:00      Telemetry Scan   Final Result      Telemetry Scan   Final Result      Telemetry Scan   Final Result      Telemetry Scan   Final Result      Telemetry Scan   Final Result      Telemetry Scan   Final Result      Telemetry Scan   Final Result      Telemetry Scan   Final Result      Telemetry Scan   Final Result      Telemetry Scan   Final Result      Telemetry  Scan   Final Result      Telemetry Scan   Final Result      Telemetry Scan   Final Result      Telemetry Scan   Final Result      Telemetry Scan   Final Result      Telemetry Scan   Final Result      Telemetry Scan   Final Result      Telemetry Scan   Final Result      Telemetry Scan   Final Result      Telemetry Scan   Final Result      Telemetry Scan   Final Result      Telemetry Scan   Final Result      Telemetry Scan   Final Result      Telemetry Scan   Final Result      Telemetry Scan   Final Result      Telemetry Scan   Final Result      Telemetry Scan   Final Result      Telemetry Scan   Final Result      Telemetry Scan   Final Result      Telemetry Scan   Final Result      Telemetry Scan   Final Result      Telemetry Scan   Final Result      Telemetry Scan   Final Result      Telemetry Scan   Final Result      Telemetry Scan   Final Result      Telemetry Scan   Final Result      Telemetry Scan   Final Result            Echocardiogram:    Results for orders placed during the hospital encounter of 10/09/24    Adult Transthoracic Echo Complete W/ Cont if Necessary Per Protocol    Interpretation Summary    Left ventricular systolic function is normal. Calculated left ventricular EF = 54% Left ventricular ejection fraction appears to be 51 - 55%.    Left ventricular diastolic function is consistent with age.    The left atrial cavity is dilated.    Saline test results are negative for right to left atrial level shunt.    Moderate aortic valve stenosis is present. Mean/peak gradient of 12/20 mmHg.  Peak velocity 222 cm/s    Estimated right ventricular systolic pressure from tricuspid regurgitation is normal (<35 mmHg).    No evidence of intracardiac thrombus.  No wall motion abnormalities.        Stress Test:         Cardiac Catheterization:  Results for orders placed during the hospital encounter of 10/09/24    Cardiac Catheterization/Vascular Study    Conclusion  Abdominal aortogram: Renal arteries patent  bilaterally, and fetal aorta patent without evidence of aneurysm or stenosis, common iliac artery, external iliac arteries and internal iliac arteries patent bilaterally without aneurysm or stenosis    Right leg arteriogram: Common femoral artery widely patent, profundofemoral artery widely patent, superficial femoral artery widely patent, popliteal artery completely occludes behind the knee with pulsatile column of contrast concerning for thrombus with reconstitution of peroneal artery and anterior tibial arteries distally.  Peroneal artery with inline flow to its anatomic termination point just above the foot, anterior tibial artery has complete occlusion in its midportion and then reconstitutes distally via collateral from peroneal artery, posterior tibial artery initially completely occluded on angiography.  Patient with occlusion of pedal arch and distal foot with no opacification of digital arteries concerning for microembolic disease in the foot.  The popliteal artery, tibioperoneal trunk, peroneal artery, and anterior tibial arteries were treated with penumbra aspiration mechanical thrombectomy device using a CAT 6 catheter and CAT Rx catheter.  Angioplasty performed of the popliteal artery and tibioperoneal trunk with 4 mm angioplasty balloon, angioplasty performed with anterior tibial artery with 2.5 mm angioplasty balloon.  We were able to restore inline flow to the foot via anterior tibial artery with inline flow through the peroneal artery as well.  Posterior tibial artery occluded in mid lower leg.    Angiography of our access site at conclusion of procedure showed a focal dissection flap.  Arteriotomy was closed with Pro-glide Perclose device without complication.  Duplex ultrasound with B-mode and color-flow imaging performed by me showed resolution of the dissection flap with excellent pulsatile flow through the common femoral artery superficial femoral artery and profundofemoral artery after  deploying the Pro-glide Perclose device and no lumen irregularity.    At conclusion of procedure patient had biphasic DP and peroneal signals in the right foot and biphasic DP and monophasic PT signals on the left foot         Other:         ASSESSMENT & PLAN:    Principal Problem:    Wound of lower extremity  Active Problems:    Hypothyroidism    Longstanding persistent atrial fibrillation    Acute hypokalemia    Normocytic anemia    Infected wound    PAD (peripheral artery disease)    Atherosclerosis of native arteries of right leg with ulceration of calf    Nonrheumatic mitral valve regurgitation    Primary hypertension      PAD and lower extremity wound  Currently on antibiotics per ID  Status post mechanical thrombectomy with penumbra of popliteal, peroneal and anterior tibial artery.  Status post balloon angioplasty of right tibioperoneal trunk and popliteal artery.  Balloon angioplasty of right anterior tibial artery  Currently on heparin drip: Okay to switch to p.o. Xarelto or Eliquis  Echocardiogram with bubble study and contrast ruled out cardiac source of emboli  Continue high intensity statin and aspirin  LDL is 36 which is at goal    Atrial fibrillation  [id number] score is 5  HASBLED score is 3  Continue digoxin  Continue Toprol-XL.  Do not hold if systolic blood pressure is more than 90 mmHg  Currently on heparin drip: Okay to switch to Xarelto or Eliquis.  Echocardiogram with no evidence of intracardiac thrombus  Plan for outpatient KIM to evaluate candidacy for left atrial appendage closure.  H&H is low.  Will require blood transfusion  She may benefit from left atrial appendage closure if H&H continues to drop on anticoagulation     HFpEF  Secondary to hypertension, atrial fibrillation  Currently on diuretics  Echocardiogram with preserved LV function, diastolic dysfunction     Hypothyroidism  Continue Synthroid  TSH is 4.4     Anemia requiring blood transfusion.  H&H 7/22.9  Transfuse today to  keep hemoglobin more than 7    Bryn Valdez MD  10/16/24  07:38 EDT

## 2024-10-16 NOTE — CONSULTS
"Nutrition Services    Patient Name: Fallon Nur  YOB: 1945  MRN: [id number]  Admission date: 10/9/2024    RD to add Estiven BID to provide 90 calories, 7 grams L-Arginine, 7 grams L-Glutamine and 2.5 grams Protein (Collagen)      NUTRITION SCREENING      Trending Narrative: 10/16: Nutrition screening r/t LOS x 7 days. Pt presented to ED on 10/9 with complaints of wounds on both legs that are red and \"don't smell good.\" Pt admitted in September with the same symptoms. Pt reports wounds sustained when a refrigerator fell on her a couple months ago. S/p surgical debridement reported \"about a month ago.\" Pt reports that left leg is healing but right leg wounds are healing poorly and not draining and malodorous. Wound care following. S/P Right lower extremity mechanical thrombectomy, angioplasty and debridement on 10/14. Pt is tolerating po diet without noted issues at this time.        PO Diet: Diet: Regular/House; Fluid Consistency: Thin (IDDSI 0)   PO Supplements:    Trending PO Intake:  10/16:  71% average po intake x last 7 documented meals        Nutritionally-Pertinent Medications RDN Reviewed, C/W clinical course         Labs (reviewed below): Reviewed. Management per attending.      Results from last 7 days   Lab Units 10/16/24  0111 10/15/24  1235 10/15/24  0314 10/14/24  1208 10/14/24  0204 10/09/24  2329 10/09/24  1527   SODIUM mmol/L 137  --  137  --  136   < > 139   POTASSIUM mmol/L 4.2 4.0 3.3*   < > 3.6   < > 4.1   CHLORIDE mmol/L 105  --  104  --  102   < > 102   CO2 mmol/L 25.3  --  25.1  --  25.7   < > 29.0   BUN mg/dL 17  --  17  --  21   < > 21   CREATININE mg/dL 0.89  --  0.78  --  1.01*   < > 1.03*   CALCIUM mg/dL 8.3*  --  8.0*  --  8.1*   < > 8.7   BILIRUBIN mg/dL  --   --   --   --  0.2  --  0.2   ALK PHOS U/L  --   --   --   --  186*  --  220*   ALT (SGPT) U/L  --   --   --   --  14  --  14   AST (SGOT) U/L  --   --   --   --  47*  --  36*   GLUCOSE mg/dL 79  --  85  --  96   " "< > 87    < > = values in this interval not displayed.     Results from last 7 days   Lab Units 10/16/24  0111 10/15/24  0314 10/14/24  1208 10/14/24  0204 10/10/24  2322 10/09/24  2329 10/09/24  1527   MAGNESIUM mg/dL  --   --   --  2.1  --  2.4 2.4   PHOSPHORUS mg/dL  --   --  2.8 2.0*   < >  --   --    HEMOGLOBIN g/dL 7.0* 7.5*  --  7.5*   < > 8.6*  --    HEMATOCRIT % 22.9* 24.0*  --  24.4*   < > 30.5*  --    TRIGLYCERIDES mg/dL  --  55  --   --   --   --   --     < > = values in this interval not displayed.     Lab Results   Component Value Date    HGBA1C 5.12 10/15/2024          GI Function:  Last documented BM 10/11       Skin: Bilateral chronic leg wounds, cultures + Pseudomonas and Proteus mirabilis       Weight Review: Estimated body mass index is 26.36 kg/m² as calculated from the following:    Height as of this encounter: 160 cm (63\").    Weight as of this encounter: 67.5 kg (148 lb 13 oz).    Comment:   10/16: 148#  5.7% weight loss in the last 3 months    Wt Readings from Last 30 Encounters:   10/16/24 0615 67.5 kg (148 lb 13 oz)   10/15/24 1104 68.5 kg (151 lb)   10/15/24 0601 68.7 kg (151 lb 7.3 oz)   10/14/24 0610 71.8 kg (158 lb 4.6 oz)   10/13/24 0504 71.5 kg (157 lb 10.1 oz)   10/12/24 0500 70.3 kg (154 lb 15.7 oz)   10/12/24 0111 70.3 kg (154 lb 15.7 oz)   10/09/24 1231 65.3 kg (144 lb)   09/21/24 0300 63.1 kg (139 lb 1.8 oz)   09/20/24 0315 62.7 kg (138 lb 3.7 oz)   09/15/24 0350 62.5 kg (137 lb 12.6 oz)   09/14/24 0315 62.9 kg (138 lb 10.7 oz)   09/12/24 1851 62.1 kg (136 lb 14.5 oz)   09/04/24 1851 62.1 kg (137 lb)   08/04/24 0109 63.5 kg (140 lb)   08/01/24 1312 63.5 kg (140 lb)   07/07/23 2204 71.6 kg (157 lb 12.8 oz)   07/07/23 1331 71.7 kg (158 lb)   07/12/22 1410 77.1 kg (170 lb)   08/17/21 1700 86.8 kg (191 lb 5.8 oz)   12/31/20 1018 83.2 kg (183 lb 6.8 oz)   12/31/20 0148 81.7 kg (180 lb 1.9 oz)   12/11/20 1714 79.4 kg (175 lb)          --  Protein Requirements    EST Needs, Method, Wt " used  g PRO (1.2-1.6 g/kg CBW 67.5 kg)            Nutrition Problem Statement: Increased protein/arginine needs related to wound healing as evidenced by chronic wounds.         Nutrition Intervention: Add Estiven BID to provide 90 calories, 7 grams L-Arginine, 7 grams L-Glutamine and 2.5 grams Protein (Collagen)    Continue to encourage good po intake.           Monitoring/Evaluation Per protocol, I&O, PO intake, Supplement intake, Pertinent labs, Weight, Skin status, GI status, Swallow function, Hemodynamic stability            RD to follow up per protocol.    Electronically signed by:  Tamera Stanley, SHERRY  10/16/24 13:03 EDT

## 2024-10-16 NOTE — PLAN OF CARE
Goal Outcome Evaluation:         Patient is calm and cooperative,on heparin gtt.  VSS. Patient had pain medication given during this shift, family at bedside, call light placed within reach. Plan of care ongoing

## 2024-10-17 LAB
ANION GAP SERPL CALCULATED.3IONS-SCNC: 6.1 MMOL/L (ref 5–15)
BASOPHILS # BLD AUTO: 0.05 10*3/MM3 (ref 0–0.2)
BASOPHILS NFR BLD AUTO: 0.4 % (ref 0–1.5)
BH BB BLOOD EXPIRATION DATE: NORMAL
BH BB BLOOD TYPE BARCODE: 7300
BH BB DISPENSE STATUS: NORMAL
BH BB PRODUCT CODE: NORMAL
BH BB UNIT NUMBER: NORMAL
BUN SERPL-MCNC: 17 MG/DL (ref 8–23)
BUN/CREAT SERPL: 19.1 (ref 7–25)
CALCIUM SPEC-SCNC: 8.3 MG/DL (ref 8.6–10.5)
CHLORIDE SERPL-SCNC: 103 MMOL/L (ref 98–107)
CO2 SERPL-SCNC: 27.9 MMOL/L (ref 22–29)
CREAT SERPL-MCNC: 0.89 MG/DL (ref 0.57–1)
CROSSMATCH INTERPRETATION: NORMAL
DEPRECATED RDW RBC AUTO: 52.6 FL (ref 37–54)
EGFRCR SERPLBLD CKD-EPI 2021: 66 ML/MIN/1.73
EOSINOPHIL # BLD AUTO: 0.27 10*3/MM3 (ref 0–0.4)
EOSINOPHIL NFR BLD AUTO: 2.3 % (ref 0.3–6.2)
ERYTHROCYTE [DISTWIDTH] IN BLOOD BY AUTOMATED COUNT: 16.4 % (ref 12.3–15.4)
GLUCOSE SERPL-MCNC: 111 MG/DL (ref 65–99)
HCT VFR BLD AUTO: 26.9 % (ref 34–46.6)
HGB BLD-MCNC: 8.3 G/DL (ref 12–15.9)
IMM GRANULOCYTES # BLD AUTO: 0.29 10*3/MM3 (ref 0–0.05)
IMM GRANULOCYTES NFR BLD AUTO: 2.5 % (ref 0–0.5)
LYMPHOCYTES # BLD AUTO: 1.17 10*3/MM3 (ref 0.7–3.1)
LYMPHOCYTES NFR BLD AUTO: 10 % (ref 19.6–45.3)
MCH RBC QN AUTO: 27.2 PG (ref 26.6–33)
MCHC RBC AUTO-ENTMCNC: 30.9 G/DL (ref 31.5–35.7)
MCV RBC AUTO: 88.2 FL (ref 79–97)
MONOCYTES # BLD AUTO: 1.58 10*3/MM3 (ref 0.1–0.9)
MONOCYTES NFR BLD AUTO: 13.5 % (ref 5–12)
NEUTROPHILS NFR BLD AUTO: 71.3 % (ref 42.7–76)
NEUTROPHILS NFR BLD AUTO: 8.33 10*3/MM3 (ref 1.7–7)
NRBC BLD AUTO-RTO: 0.2 /100 WBC (ref 0–0.2)
PLATELET # BLD AUTO: 419 10*3/MM3 (ref 140–450)
PMV BLD AUTO: 9.6 FL (ref 6–12)
POTASSIUM SERPL-SCNC: 4 MMOL/L (ref 3.5–5.2)
RBC # BLD AUTO: 3.05 10*6/MM3 (ref 3.77–5.28)
SODIUM SERPL-SCNC: 137 MMOL/L (ref 136–145)
UNIT  ABO: NORMAL
UNIT  RH: NORMAL
WBC NRBC COR # BLD AUTO: 11.69 10*3/MM3 (ref 3.4–10.8)

## 2024-10-17 PROCEDURE — 97530 THERAPEUTIC ACTIVITIES: CPT

## 2024-10-17 PROCEDURE — 99232 SBSQ HOSP IP/OBS MODERATE 35: CPT | Performed by: INTERNAL MEDICINE

## 2024-10-17 PROCEDURE — 80048 BASIC METABOLIC PNL TOTAL CA: CPT | Performed by: STUDENT IN AN ORGANIZED HEALTH CARE EDUCATION/TRAINING PROGRAM

## 2024-10-17 PROCEDURE — 97110 THERAPEUTIC EXERCISES: CPT

## 2024-10-17 PROCEDURE — 97112 NEUROMUSCULAR REEDUCATION: CPT

## 2024-10-17 PROCEDURE — 25010000002 MORPHINE PER 10 MG: Performed by: STUDENT IN AN ORGANIZED HEALTH CARE EDUCATION/TRAINING PROGRAM

## 2024-10-17 PROCEDURE — 63710000001 ONDANSETRON ODT 4 MG TABLET DISPERSIBLE: Performed by: STUDENT IN AN ORGANIZED HEALTH CARE EDUCATION/TRAINING PROGRAM

## 2024-10-17 PROCEDURE — 97535 SELF CARE MNGMENT TRAINING: CPT

## 2024-10-17 PROCEDURE — 85025 COMPLETE CBC W/AUTO DIFF WBC: CPT | Performed by: STUDENT IN AN ORGANIZED HEALTH CARE EDUCATION/TRAINING PROGRAM

## 2024-10-17 RX ORDER — ZINC SULFATE 50(220)MG
220 CAPSULE ORAL DAILY
Qty: 30 CAPSULE | Refills: 0 | Status: ON HOLD | OUTPATIENT
Start: 2024-10-18 | End: 2024-11-17

## 2024-10-17 RX ORDER — FUROSEMIDE 20 MG
20 TABLET ORAL DAILY
Qty: 30 TABLET | Refills: 0 | Status: ON HOLD | OUTPATIENT
Start: 2024-10-18

## 2024-10-17 RX ORDER — DIPHENOXYLATE HYDROCHLORIDE AND ATROPINE SULFATE 2.5; .025 MG/1; MG/1
1 TABLET ORAL DAILY
Qty: 30 TABLET | Refills: 0 | Status: ON HOLD | OUTPATIENT
Start: 2024-10-18

## 2024-10-17 RX ORDER — ARGININE/GLUTAMINE/CALCIUM BMB 7G-7G-1.5G
1 POWDER IN PACKET (EA) ORAL 2 TIMES DAILY
Qty: 60 PACKET | Refills: 0 | Status: ON HOLD | OUTPATIENT
Start: 2024-10-17

## 2024-10-17 RX ORDER — METOPROLOL SUCCINATE 25 MG/1
12.5 TABLET, EXTENDED RELEASE ORAL
Qty: 15 TABLET | Refills: 0 | Status: ON HOLD | OUTPATIENT
Start: 2024-10-18

## 2024-10-17 RX ORDER — MUPIROCIN 20 MG/G
1 OINTMENT TOPICAL
Qty: 15 G | Refills: 0 | Status: SHIPPED | OUTPATIENT
Start: 2024-10-18 | End: 2024-10-26

## 2024-10-17 RX ORDER — OXYCODONE HYDROCHLORIDE 5 MG/1
5 TABLET ORAL EVERY 6 HOURS PRN
Qty: 40 TABLET | Refills: 0 | Status: ON HOLD | OUTPATIENT
Start: 2024-10-17 | End: 2024-10-27

## 2024-10-17 RX ORDER — POLYETHYLENE GLYCOL 3350 17 G/17G
17 POWDER, FOR SOLUTION ORAL DAILY
Qty: 30 PACKET | Refills: 0 | Status: ON HOLD | OUTPATIENT
Start: 2024-10-18 | End: 2024-11-17

## 2024-10-17 RX ORDER — LEVOFLOXACIN 500 MG/1
500 TABLET, FILM COATED ORAL EVERY 24 HOURS
Qty: 10 TABLET | Refills: 0 | Status: ON HOLD | OUTPATIENT
Start: 2024-10-18 | End: 2024-10-28

## 2024-10-17 RX ORDER — ATORVASTATIN CALCIUM 40 MG/1
40 TABLET, FILM COATED ORAL NIGHTLY
Qty: 90 TABLET | Refills: 0 | Status: ON HOLD | OUTPATIENT
Start: 2024-10-17

## 2024-10-17 RX ORDER — FENTANYL 12.5 UG/1
1 PATCH TRANSDERMAL
Qty: 1 PATCH | Refills: 0 | Status: SHIPPED | OUTPATIENT
Start: 2024-10-19 | End: 2024-10-20

## 2024-10-17 RX ORDER — CILOSTAZOL 100 MG/1
100 TABLET ORAL 2 TIMES DAILY
Qty: 60 TABLET | Refills: 0 | Status: ON HOLD | OUTPATIENT
Start: 2024-10-17

## 2024-10-17 RX ORDER — COLLAGENASE SANTYL 250 [ARB'U]/G
1 OINTMENT TOPICAL
Qty: 30 G | Refills: 0 | Status: SHIPPED | OUTPATIENT
Start: 2024-10-18 | End: 2024-10-26

## 2024-10-17 RX ADMIN — Medication 1 PACKET: at 09:37

## 2024-10-17 RX ADMIN — CITALOPRAM HYDROBROMIDE 20 MG: 20 TABLET ORAL at 09:34

## 2024-10-17 RX ADMIN — COLLAGENASE SANTYL 1 APPLICATION: 250 OINTMENT TOPICAL at 09:45

## 2024-10-17 RX ADMIN — Medication 5 MG: at 23:08

## 2024-10-17 RX ADMIN — BUPROPION HYDROCHLORIDE 150 MG: 150 TABLET, EXTENDED RELEASE ORAL at 09:37

## 2024-10-17 RX ADMIN — RIVAROXABAN 15 MG: 15 TABLET, FILM COATED ORAL at 17:17

## 2024-10-17 RX ADMIN — POLYETHYLENE GLYCOL 3350 17 G: 17 POWDER, FOR SOLUTION ORAL at 09:37

## 2024-10-17 RX ADMIN — OXYCODONE 5 MG: 5 TABLET ORAL at 09:34

## 2024-10-17 RX ADMIN — MUPIROCIN 1 APPLICATION: 20 OINTMENT TOPICAL at 09:45

## 2024-10-17 RX ADMIN — FUROSEMIDE 20 MG: 20 TABLET ORAL at 09:34

## 2024-10-17 RX ADMIN — OXYCODONE 5 MG: 5 TABLET ORAL at 17:17

## 2024-10-17 RX ADMIN — LEVOFLOXACIN 500 MG: 500 TABLET, FILM COATED ORAL at 13:43

## 2024-10-17 RX ADMIN — PANTOPRAZOLE SODIUM 40 MG: 40 TABLET, DELAYED RELEASE ORAL at 09:37

## 2024-10-17 RX ADMIN — CILOSTAZOL 100 MG: 100 TABLET ORAL at 20:12

## 2024-10-17 RX ADMIN — OXYCODONE 5 MG: 5 TABLET ORAL at 05:02

## 2024-10-17 RX ADMIN — Medication 220 MG: at 09:34

## 2024-10-17 RX ADMIN — CYANOCOBALAMIN TAB 500 MCG 1000 MCG: 500 TAB at 09:34

## 2024-10-17 RX ADMIN — MORPHINE SULFATE 2 MG: 2 INJECTION, SOLUTION INTRAMUSCULAR; INTRAVENOUS at 23:08

## 2024-10-17 RX ADMIN — Medication 10 ML: at 09:45

## 2024-10-17 RX ADMIN — ONDANSETRON 4 MG: 4 TABLET, ORALLY DISINTEGRATING ORAL at 09:34

## 2024-10-17 RX ADMIN — CILOSTAZOL 100 MG: 100 TABLET ORAL at 09:34

## 2024-10-17 RX ADMIN — OXYCODONE 5 MG: 5 TABLET ORAL at 00:39

## 2024-10-17 RX ADMIN — OXYCODONE 5 MG: 5 TABLET ORAL at 21:41

## 2024-10-17 RX ADMIN — CETIRIZINE HYDROCHLORIDE 10 MG: 10 TABLET, FILM COATED ORAL at 09:37

## 2024-10-17 RX ADMIN — LEVOTHYROXINE SODIUM 50 MCG: 0.05 TABLET ORAL at 05:02

## 2024-10-17 RX ADMIN — THERA TABS 1 TABLET: TAB at 09:34

## 2024-10-17 RX ADMIN — FERROUS SULFATE TAB EC 324 MG (65 MG FE EQUIVALENT) 324 MG: 324 (65 FE) TABLET DELAYED RESPONSE at 09:34

## 2024-10-17 RX ADMIN — ATORVASTATIN CALCIUM 40 MG: 40 TABLET, FILM COATED ORAL at 20:12

## 2024-10-17 RX ADMIN — DIGOXIN 125 MCG: 125 TABLET ORAL at 13:43

## 2024-10-17 RX ADMIN — METOPROLOL SUCCINATE 12.5 MG: 25 TABLET, FILM COATED, EXTENDED RELEASE ORAL at 09:34

## 2024-10-17 RX ADMIN — Medication 1 PACKET: at 20:12

## 2024-10-17 NOTE — THERAPY TREATMENT NOTE
Subjective: Pt agreeable to therapeutic plan of care.  Patient cleared for therapy by nursing; patient is agreeable with encouragement    Objective:     Precautions - BLE wounds, falls, tachy    Bed mobility - Max-A and Assist x 2 assist for trunk and LE management supine<> sit; increased time for pain management.  Transfers - refused  Ambulation - refused  Multiple attempts to encourage standing, WB, lateral scooting and partial stand but patient refusing due to pain and fear    Therapeutic Exercise - BLE: AP, GS, QS, hip abd/ad x10 reps AA in supine  Cues for completion when inactive and alone for pain management and to assist with venous return    Patient instructed in dynamic sitting EOB x25 minutes encouraging core activation, posture, and extremity movements to facilitate venous return and assist with pain management.  Patient requires CGA for balance, refuses transition into standing.  Educated on importance of mobility for prevention of secondary complications and for pain management.    Vitals:   102-129bpm; 96%, 107/58mmHG and stable with transitions    Pain: 10 VAS   Location: BLE  Intervention for pain: Repositioned and RN provided medication    Education: Provided education on the importance of mobility in the acute care setting and Transfer Training    Assessment: Fallon Nur presents with functional mobility impairments which indicate the need for skilled intervention.  Limited participation in upright and mobility tasks due to pain/pain avoidance/ and fear of falling.  Plan to time therapy after pain meds tomorrow with hopes of increased mobility.  Continue to recommend SNF level of care at discharge.  Increased time for all acts and for education/encouragement regarding mobility and upright postures.  Tolerating session today without incident. Will continue to follow and progress as tolerated.     Plan/Recommendations:   If medically appropriate, Moderate Intensity Therapy recommended  "post-acute care. This is recommended as therapy feels the patient would require 3-4 days per week and wouldn't tolerate \"3 hour daily\" rehab intensity. SNF would be the preferred choice. If the patient does not agree to SNF, arrange HH or OP depending on home bound status. If patient is medically complex, consider LTACH. Pt requires no DME at discharge.     Pt desires Skilled Rehab placement at discharge. Pt cooperative; agreeable to therapeutic recommendations and plan of care.         Basic Mobility 6-click:  Rollin = Total, A lot = 2, A little = 3; 4 = None  Supine>Sit:   1 = Total, A lot = 2, A little = 3; 4 = None   Sit>Stand with arms:  1 = Total, A lot = 2, A little = 3; 4 = None  Bed>Chair:   1 = Total, A lot = 2, A little = 3; 4 = None  Ambulate in room:  1 = Total, A lot = 2, A little = 3; 4 = None  3-5 Steps with railin = Total, A lot = 2, A little = 3; 4 = None  Score: 7    Modified Ramon: N/A = No pre-op stroke/TIA    Post-Tx Position: Supine with HOB Elevated, Alarms activated, and Call light and personal items within reach  PPE: gloves    "

## 2024-10-17 NOTE — CASE MANAGEMENT/SOCIAL WORK
Discharge Planning Assessment  [facility]     Patient Name: Fallon Nur  MRN: [id number]  Today's Date: 10/17/2024    Admit Date: 10/9/2024    Plan: [facility] accepted. Precert approved (valid 10/17-10/23). PASRR approved.       Discharge Plan       Row Name 10/17/24 1603       Plan    Plan [facility] accepted. Precert approved (valid 10/17-10/23). PASRR approved.    Patient/Family in Agreement with Plan yes    Plan Comments [facility] liaison states not beds available. CM made referral to patient's next choice, [facility], liaison accepted. Precert completed by [facility], approved, valid 10/17-10/23. [facility] liaison confirms bed available today. CM met with patient's daughterHilda, at bedside. Daughter agreeable. IMM letter reviewed with daughter, consent obtained and copy left at bedside.                      Expected Discharge Date and Time       Expected Discharge Date Expected Discharge Time    Oct 17, 2024                Patient Forms       Row Name 10/17/24 1605       Patient Forms    Important Message from Medicare (IMM) Delivered  10/17/24    Delivered to Support person  Hilda glass    Method of delivery In person                  Met with patient in room.  Maintained distance greater than six feet and spent less than 15 minutes in the room.       KAITLIN ShellN, RN    [address] [address]    Office: [phone]  Fax: [phone]

## 2024-10-17 NOTE — PROGRESS NOTES
Referring Provider: Mary Gamino MD    Reason for follow-up: Anticoagulation management for atrial fibrillation, rule out cardiac source of emboli.     Patient Care Team:  Francine Hampton MD as PCP - General      SUBJECTIVE  Resting comfortably in bed.  Family members at bedside.     ROS  Review of all systems negative except as indicated.    Since I have last seen, the patient has been without any chest discomfort, shortness of breath, palpitations, dizziness or syncope.  Denies having any headache, abdominal pain, nausea, vomiting, diarrhea, constipation, loss of weight or loss of appetite.  Denies having any excessive bruising, hematuria or blood in the stool.        Personal History:    Past Medical History:   Diagnosis Date    Atrial fibrillation     Blind 07/10/2023    Cellulitis of leg 09/12/2024    Duodenal ulcer, perforated 10/01/2013    [facility] - Dr. Peters    12/2021 - EGD clear      DVT of upper extremity (deep vein thrombosis) 01/01/1995    subclavian - premarin      E. 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    Histoplasmosis     Hypertension     Infestation by bed bug 10/09/2024    Legally blind     Longstanding persistent atrial fibrillation 07/10/2023    Macular degeneration 10/15/2024    Nonrheumatic mitral valve regurgitation 10/15/2024    Open wound of both lower extremities 09/12/2024    Primary hypertension 10/15/2024    PVD (peripheral vascular disease)     SBO (small bowel obstruction) 12/31/2020       Past Surgical History:   Procedure Laterality Date    CARDIAC CATHETERIZATION Right 10/14/2024    Procedure: RIGHT LOWER EXTREMITY ARTERIOGRAM, PERCUTANEOUS THROMBECTOMY, ANGIOPLASTY,  AND WOUND DEBRIDEMENT;  Surgeon: Red Hazel II, MD;  Location: [facility] HYBRID OR;  Service: Vascular;  Laterality: Right;    WOUND DEBRIDEMENT Right 9/16/2024    Procedure: RIGHT LEG DEBRIDEMENT;  Surgeon: Red Hazel II, MD;  Location: [facility] MAIN OR;  Service: Vascular;  Laterality:  Right;       Family History   Problem Relation Age of Onset    Cancer Mother     Dementia Father     Cancer Sister        Social History     Tobacco Use    Smoking status: Former    Smokeless tobacco: Never   Vaping Use    Vaping status: Never Used   Substance Use Topics    Alcohol use: Not Currently    Drug use: Not Currently        Home meds:  Prior to Admission medications    Medication Sig Start Date End Date Taking? Authorizing Provider   alendronate (FOSAMAX) 70 MG tablet Take 1 tablet by mouth Every 7 (Seven) Days. Saturday   Yes Mel Brower MD   buPROPion XL (WELLBUTRIN XL) 150 MG 24 hr tablet Take 1 tablet by mouth Daily.   Yes Mel Brower MD   cetirizine (zyrTEC) 10 MG tablet Take 1 tablet by mouth Daily.   Yes Mel Brower MD   citalopram (CeleXA) 20 MG tablet Take 1 tablet by mouth Daily.   Yes Mel Brower MD   digoxin (LANOXIN) 125 MCG tablet Take 1 tablet by mouth Daily.   Yes Mel Brower MD   ferrous sulfate 325 (65 FE) MG tablet Take 1 tablet by mouth Daily With Breakfast.   Yes Mel Brower MD   fluticasone (FLONASE) 50 MCG/ACT nasal spray Administer 2 sprays into the nostril(s) as directed by provider Daily As Needed for Rhinitis.   Yes Mel Brower MD   furosemide (LASIX) 40 MG tablet Take 1 tablet by mouth Daily. 9/21/24  Yes Frank Dale MD   levothyroxine (SYNTHROID, LEVOTHROID) 50 MCG tablet Take 1 tablet by mouth Daily.   Yes Mel Brower MD   Melatonin 12 MG tablet Take 1 tablet by mouth At Night As Needed.   Yes Mel Brower MD   metoprolol succinate XL (TOPROL-XL) 50 MG 24 hr tablet Take 2 tablets by mouth Daily. 9/22/24  Yes Frank Dale MD   oxyCODONE-acetaminophen (PERCOCET) 5-325 MG per tablet Take 1 tablet by mouth 2 (Two) Times a Day.   Yes Mel Brower MD   pantoprazole (PROTONIX) 40 MG EC tablet Take 1 tablet by mouth Daily. 9/3/20  Yes Mel Brower MD    rivaroxaban (XARELTO) 20 MG tablet Take 1 tablet by mouth Daily.   Yes ProviderMel MD   vitamin B-12 (CYANOCOBALAMIN) 1000 MCG tablet Take 1 tablet by mouth Daily.   Yes ProviderMel MD   vitamin D (ERGOCALCIFEROL) 1.25 MG (62728 UT) capsule capsule Take 1 capsule by mouth 1 (One) Time Per Week. Saturday   Yes ProviderMel MD   zolpidem (AMBIEN) 5 MG tablet Take 1 tablet by mouth every night at bedtime.   Yes Provider, MD Mel       Allergies:  Patient has no known allergies.    Scheduled Meds:atorvastatin, 40 mg, Oral, Nightly  buPROPion XL, 150 mg, Oral, Daily  cetirizine, 10 mg, Oral, Daily  fentaNYL, 1 patch, Transdermal, Q72H   And  Check Fentanyl Patch Placement, 1 each, Does not apply, Q12H  cilostazol, 100 mg, Oral, BID  citalopram, 20 mg, Oral, Daily  collagenase, 1 Application, Topical, Q24H  digoxin, 125 mcg, Oral, Daily  ferrous sulfate, 324 mg, Oral, Daily With Breakfast  furosemide, 20 mg, Oral, Daily  Estiven, 1 packet, Oral, BID  levoFLOXacin, 500 mg, Oral, Q24H  levothyroxine, 50 mcg, Oral, Q AM  metoprolol succinate XL, 12.5 mg, Oral, Q24H  multivitamin, 1 tablet, Oral, Daily  mupirocin, 1 Application, Topical, Q24H  pantoprazole, 40 mg, Oral, Daily  polyethylene glycol, 17 g, Oral, Daily  rivaroxaban, 15 mg, Oral, Daily With Dinner  sodium chloride, 500 mL, Intravenous, Once  sodium chloride, 10 mL, Intravenous, Q12H  vitamin B-12, 1,000 mcg, Oral, Daily  vitamin D, 50,000 Units, Oral, Weekly  zinc sulfate, 220 mg, Oral, Daily      Continuous Infusions:     PRN Meds:.  acetaminophen **OR** acetaminophen **OR** acetaminophen    Calcium Replacement - Follow Nurse / BPA Driven Protocol    influenza vaccine    Magnesium Standard Dose Replacement - Follow Nurse / BPA Driven Protocol    melatonin    Morphine    ondansetron ODT **OR** ondansetron    oxyCODONE    Phosphorus Replacement - Follow Nurse / BPA Driven Protocol    Potassium Replacement - Follow Nurse / BPA  "Driven Protocol    [COMPLETED] Insert Peripheral IV **AND** sodium chloride    sodium chloride      OBJECTIVE    Vital Signs  Vitals:    10/16/24 1712 10/16/24 2035 10/17/24 0106 10/17/24 0500   BP: 100/54 94/56 110/54 109/59   BP Location: Left arm Left arm Left arm Left arm   Patient Position: Lying Lying Lying Lying   Pulse: 97 101 109 103   Resp: 14 11 13 14   Temp: 97.7 °F (36.5 °C) 98.7 °F (37.1 °C) 98 °F (36.7 °C) 98.1 °F (36.7 °C)   TempSrc: Oral Oral Oral Oral   SpO2: (!) 89% 97% 95% 92%   Weight:    66.1 kg (145 lb 11.6 oz)   Height:           Flowsheet Rows      Flowsheet Row First Filed Value   Admission Height 160 cm (63\") Documented at 10/09/2024 1231   Admission Weight 65.3 kg (144 lb) Documented at 10/09/2024 1231              Intake/Output Summary (Last 24 hours) at 10/17/2024 0615  Last data filed at 10/17/2024 0500  Gross per 24 hour   Intake 1568.75 ml   Output 1250 ml   Net 318.75 ml          Telemetry: Atrial fibrillation with heart rate in the 100    Physical Exam:  The patient is alert, oriented and in no distress.  Vital signs as noted above.  Head and neck revealed no carotid bruits or jugular venous distention.  No thyromegaly or lymphadenopathy is present  Lungs clear.  No wheezing.  Breath sounds are normal bilaterally.  Heart normal first and second heart sounds.  No murmur. No precordial rub is present.  No gallop is present.  Abdomen soft and nontender.  No organomegaly is present.  Extremities with good peripheral pulses without any pedal edema.  Skin warm and dry.  Musculoskeletal system is grossly normal.  CNS grossly normal.       Results Review:  I have personally reviewed the results from the time of this admission to 10/17/2024 06:15 EDT and agree with these findings:  []  Laboratory  []  Microbiology  []  Radiology  []  EKG/Telemetry   []  Cardiology/Vascular   []  Pathology  []  Old records  []  Other:    Most notable findings include:    Lab Results (last 24 hours)       " Procedure Component Value Units Date/Time    Basic Metabolic Panel [[id number]]  (Abnormal) Collected: 10/17/24 0255    Specimen: Blood from Arm, Right Updated: 10/17/24 0328     Glucose 111 mg/dL      BUN 17 mg/dL      Creatinine 0.89 mg/dL      Sodium 137 mmol/L      Potassium 4.0 mmol/L      Chloride 103 mmol/L      CO2 27.9 mmol/L      Calcium 8.3 mg/dL      BUN/Creatinine Ratio 19.1     Anion Gap 6.1 mmol/L      eGFR 66.0 mL/min/1.73     Narrative:      GFR Normal >60  Chronic Kidney Disease <60  Kidney Failure <15    The GFR formula is only valid for adults with stable renal function between ages 18 and 70.    CBC & Differential [[id number]]  (Abnormal) Collected: 10/17/24 0255    Specimen: Blood from Arm, Right Updated: 10/17/24 0309    Narrative:      The following orders were created for panel order CBC & Differential.  Procedure                               Abnormality         Status                     ---------                               -----------         ------                     CBC Auto Differential[[id number]]        Abnormal            Final result                 Please view results for these tests on the individual orders.    CBC Auto Differential [[id number]]  (Abnormal) Collected: 10/17/24 0255    Specimen: Blood from Arm, Right Updated: 10/17/24 0309     WBC 11.69 10*3/mm3      RBC 3.05 10*6/mm3      Hemoglobin 8.3 g/dL      Hematocrit 26.9 %      MCV 88.2 fL      MCH 27.2 pg      MCHC 30.9 g/dL      RDW 16.4 %      RDW-SD 52.6 fl      MPV 9.6 fL      Platelets 419 10*3/mm3      Neutrophil % 71.3 %      Lymphocyte % 10.0 %      Monocyte % 13.5 %      Eosinophil % 2.3 %      Basophil % 0.4 %      Immature Grans % 2.5 %      Neutrophils, Absolute 8.33 10*3/mm3      Lymphocytes, Absolute 1.17 10*3/mm3      Monocytes, Absolute 1.58 10*3/mm3      Eosinophils, Absolute 0.27 10*3/mm3      Basophils, Absolute 0.05 10*3/mm3      Immature Grans, Absolute 0.29 10*3/mm3      nRBC 0.2 /100 WBC      Hemoglobin & Hematocrit, Blood [[id number]]  (Abnormal) Collected: 10/16/24 1443    Specimen: Blood Updated: 10/16/24 1451     Hemoglobin 9.3 g/dL      Hematocrit 30.6 %     aPTT [[id number]]  (Abnormal) Collected: 10/16/24 0819    Specimen: Blood Updated: 10/16/24 0842     PTT 53.2 seconds             Imaging Results (Last 24 Hours)       ** No results found for the last 24 hours. **            LAB RESULTS (LAST 7 DAYS)    CBC  Results from last 7 days   Lab Units 10/17/24  0255 10/16/24  1443 10/16/24  0111 10/15/24  0314 10/14/24  0204 10/13/24  0203 10/12/24  0027 10/11/24  0829   WBC 10*3/mm3 11.69*  --  13.88* 10.89* 9.07 11.99* 13.71* 13.34*   RBC 10*6/mm3 3.05*  --  2.65* 2.79* 2.86* 3.16* 3.14* 3.23*   HEMOGLOBIN g/dL 8.3* 9.3* 7.0* 7.5* 7.5* 8.0* 7.9* 8.2*   HEMATOCRIT % 26.9* 30.6* 22.9* 24.0* 24.4* 27.0* 26.9* 27.4*   MCV fL 88.2  --  86.4 86.0 85.3 85.4 85.7 84.8   PLATELETS 10*3/mm3 419  --  445 433 511* 569* 515* 470*       BMP  Results from last 7 days   Lab Units 10/17/24  0255 10/16/24  0111 10/15/24  1235 10/15/24  0314 10/14/24  1208 10/14/24  0204 10/13/24  0203 10/12/24  0027 10/11/24  0829   SODIUM mmol/L 137 137  --  137  --  136 136 134* 137   POTASSIUM mmol/L 4.0 4.2 4.0 3.3* 3.8 3.6 3.7 4.0 4.1   CHLORIDE mmol/L 103 105  --  104  --  102 101 100 102   CO2 mmol/L 27.9 25.3  --  25.1  --  25.7 26.1 25.1 24.3   BUN mg/dL 17 17  --  17  --  21 19 21 21   CREATININE mg/dL 0.89 0.89  --  0.78  --  1.01* 0.99 0.95 1.00   GLUCOSE mg/dL 111* 79  --  85  --  96 113* 105* 91   MAGNESIUM mg/dL  --   --   --   --   --  2.1  --   --   --    PHOSPHORUS mg/dL  --   --   --   --  2.8 2.0*  --   --   --        CMP   Results from last 7 days   Lab Units 10/17/24  0255 10/16/24  0111 10/15/24  1235 10/15/24  0314 10/14/24  1208 10/14/24  0204 10/13/24  0203 10/12/24  0027 10/11/24  0829   SODIUM mmol/L 137 137  --  137  --  136 136 134* 137   POTASSIUM mmol/L 4.0 4.2 4.0 3.3* 3.8 3.6 3.7 4.0 4.1   CHLORIDE  mmol/L 103 105  --  104  --  102 101 100 102   CO2 mmol/L 27.9 25.3  --  25.1  --  25.7 26.1 25.1 24.3   BUN mg/dL 17 17  --  17  --  21 19 21 21   CREATININE mg/dL 0.89 0.89  --  0.78  --  1.01* 0.99 0.95 1.00   GLUCOSE mg/dL 111* 79  --  85  --  96 113* 105* 91   ALBUMIN g/dL  --   --   --   --   --  2.5*  --   --   --    BILIRUBIN mg/dL  --   --   --   --   --  0.2  --   --   --    ALK PHOS U/L  --   --   --   --   --  186*  --   --   --    AST (SGOT) U/L  --   --   --   --   --  47*  --   --   --    ALT (SGPT) U/L  --   --   --   --   --  14  --   --   --        BNP        TROPONIN          CoAg  Results from last 7 days   Lab Units 10/16/24  0819 10/16/24  0111 10/15/24  1813 10/15/24  0934 10/15/24  0314 10/14/24  1950 10/11/24  0825   INR   --   --   --   --   --  1.10  --    APTT seconds 53.2* 82.6* 98.5* 114.0* 53.3* 60.1* 66.2       Creatinine Clearance  Estimated Creatinine Clearance: 46.8 mL/min (by C-G formula based on SCr of 0.89 mg/dL).    ABG        Radiology  No radiology results for the last day      EKG  I personally viewed and interpreted the patient's EKG/Telemetry data:  ECG 12 Lead Tachycardia   Final Result   HEART GUIS=952  bpm   RR Tsotimvo=228  ms   VA Interval=  ms   P Horizontal Axis=  deg   P Front Axis=  deg   QRSD Interval=68  ms   QT Xfzqsidw=246  ms   UKrE=591  ms   QRS Axis=41  deg   T Wave Axis=  deg   - ABNORMAL ECG -   Atrial fibrillation   When compared with ECG of 04-Aug-2024 07:38:43,   Significant change in rhythm   Significant repolarization change   Electronically Signed By: Bryn Valdez ([facility]) 2024-10-10 18:02:07   Date and Time of Study:2024-10-10 00:29:00      Telemetry Scan   Final Result      Telemetry Scan   Final Result      Telemetry Scan   Final Result      Telemetry Scan   Final Result      Telemetry Scan   Final Result      Telemetry Scan   Final Result      Telemetry Scan   Final Result      Telemetry Scan   Final Result      Telemetry Scan   Final Result       Telemetry Scan   Final Result      Telemetry Scan   Final Result      Telemetry Scan   Final Result      Telemetry Scan   Final Result      Telemetry Scan   Final Result      Telemetry Scan   Final Result      Telemetry Scan   Final Result      Telemetry Scan   Final Result      Telemetry Scan   Final Result      Telemetry Scan   Final Result      Telemetry Scan   Final Result      Telemetry Scan   Final Result      Telemetry Scan   Final Result      Telemetry Scan   Final Result      Telemetry Scan   Final Result      Telemetry Scan   Final Result      Telemetry Scan   Final Result      Telemetry Scan   Final Result      Telemetry Scan   Final Result      Telemetry Scan   Final Result      Telemetry Scan   Final Result      Telemetry Scan   Final Result      Telemetry Scan   Final Result      Telemetry Scan   Final Result      Telemetry Scan   Final Result      Telemetry Scan   Final Result      Telemetry Scan   Final Result      Telemetry Scan   Final Result      Telemetry Scan   Final Result      Telemetry Scan   Final Result      Telemetry Scan   Final Result      Telemetry Scan   Final Result      Telemetry Scan   Final Result      Telemetry Scan   Final Result            Echocardiogram:    Results for orders placed during the hospital encounter of 10/09/24    Adult Transthoracic Echo Complete W/ Cont if Necessary Per Protocol    Interpretation Summary    Left ventricular systolic function is normal. Calculated left ventricular EF = 54% Left ventricular ejection fraction appears to be 51 - 55%.    Left ventricular diastolic function is consistent with age.    The left atrial cavity is dilated.    Saline test results are negative for right to left atrial level shunt.    Moderate aortic valve stenosis is present. Mean/peak gradient of 12/20 mmHg.  Peak velocity 222 cm/s    Estimated right ventricular systolic pressure from tricuspid regurgitation is normal (<35 mmHg).    No evidence of intracardiac thrombus.   No wall motion abnormalities.        Stress Test:         Cardiac Catheterization:  Results for orders placed during the hospital encounter of 10/09/24    Cardiac Catheterization/Vascular Study    Conclusion  Abdominal aortogram: Renal arteries patent bilaterally, and fetal aorta patent without evidence of aneurysm or stenosis, common iliac artery, external iliac arteries and internal iliac arteries patent bilaterally without aneurysm or stenosis    Right leg arteriogram: Common femoral artery widely patent, profundofemoral artery widely patent, superficial femoral artery widely patent, popliteal artery completely occludes behind the knee with pulsatile column of contrast concerning for thrombus with reconstitution of peroneal artery and anterior tibial arteries distally.  Peroneal artery with inline flow to its anatomic termination point just above the foot, anterior tibial artery has complete occlusion in its midportion and then reconstitutes distally via collateral from peroneal artery, posterior tibial artery initially completely occluded on angiography.  Patient with occlusion of pedal arch and distal foot with no opacification of digital arteries concerning for microembolic disease in the foot.  The popliteal artery, tibioperoneal trunk, peroneal artery, and anterior tibial arteries were treated with penumbra aspiration mechanical thrombectomy device using a CAT 6 catheter and CAT Rx catheter.  Angioplasty performed of the popliteal artery and tibioperoneal trunk with 4 mm angioplasty balloon, angioplasty performed with anterior tibial artery with 2.5 mm angioplasty balloon.  We were able to restore inline flow to the foot via anterior tibial artery with inline flow through the peroneal artery as well.  Posterior tibial artery occluded in mid lower leg.    Angiography of our access site at conclusion of procedure showed a focal dissection flap.  Arteriotomy was closed with Pro-glide Perclose device without  complication.  Duplex ultrasound with B-mode and color-flow imaging performed by me showed resolution of the dissection flap with excellent pulsatile flow through the common femoral artery superficial femoral artery and profundofemoral artery after deploying the Pro-glide Perclose device and no lumen irregularity.    At conclusion of procedure patient had biphasic DP and peroneal signals in the right foot and biphasic DP and monophasic PT signals on the left foot         Other:         ASSESSMENT & PLAN:    Principal Problem:    Wound of lower extremity  Active Problems:    Hypothyroidism    Longstanding persistent atrial fibrillation    Acute hypokalemia    Normocytic anemia    Infected wound    PAD (peripheral artery disease)    Atherosclerosis of native arteries of right leg with ulceration of calf    Nonrheumatic mitral valve regurgitation    Primary hypertension      PAD and lower extremity wound  Currently on antibiotics per ID  Status post mechanical thrombectomy with penumbra of popliteal, peroneal and anterior tibial artery.  Status post balloon angioplasty of right tibioperoneal trunk and popliteal artery.  Balloon angioplasty of right anterior tibial artery  High suspicion for cardioembolic phenomena per vascular findings  Heparin drip has been stopped.  Started Xarelto  Echocardiogram with bubble study and contrast ruled out cardiac source of emboli  Continue high intensity statin and aspirin  LDL is 36 which is at goal    Atrial fibrillation  [id number] score is 5  HASBLED score is 3  Continue digoxin  Continue Toprol-XL  Now on Xarelto  Echocardiogram with no evidence of intracardiac thrombus  Will proceed with outpatient KIM to evaluate candidacy for left atrial appendage closure  Low H&H requiring blood transfusion     HFpEF  Secondary to hypertension, atrial fibrillation  Continue stable diuretics  Echocardiogram with preserved LV function, diastolic dysfunction     Hypothyroidism  Continue  Synthroid  TSH is 4.4     Anemia requiring blood transfusion.  H&H 8.3/26.9 after blood transfusion    Bryn Valdez MD  10/17/24  06:15 EDT

## 2024-10-17 NOTE — PLAN OF CARE
"Assessment: Fallon Nur presents with ADL impairments affecting function including balance, endurance / activity tolerance, grasp, pain, postural / trunk control, range of motion (ROM), sensation / sensory awareness, and strength. Demonstrated functioning below baseline abilities indicate the need for continued skilled intervention while inpatient. Pt reports decreased sensation in R hand, difficulty feeding self. Pt increased seated activity tolerance needed for ADL routine. OT continues to recommend SNF when medically appropriate for dc. Tolerating session today without incident. Will continue to follow and progress as tolerated.     Plan/Recommendations:   Moderate Intensity Therapy recommended post-acute care. This is recommended as therapy feels the patient would require 3-4 days per week and wouldn't tolerate \"3 hour daily\" rehab intensity. SNF would be the preferred choice. If the patient does not agree to SNF, arrange HH or OP depending on home bound status. If patient is medically complex, consider LTACH.. Pt requires no DME at discharge.     Pt desires Skilled Rehab placement at discharge. Pt cooperative; agreeable to therapeutic recommendations and plan of care.   "

## 2024-10-17 NOTE — DISCHARGE SUMMARY
"             [facility]  Discharge Summary    Date of Service: 10/17/2024  Patient Name: Fallon Nur  : 1945  MRN: [id number]    Date of Admission: 10/9/2024  Discharge Diagnosis: Wound of lower extremity  Date of Discharge: 10/17/2024  Primary Care Physician: Francine Hampton MD    Presenting Problem:   Infected wound [T14.8XXA, L08.9]  Wound of lower extremity [S81.809A]  Pain in right lower leg [M79.661]    Active and Resolved Hospital Problems:  Active Hospital Problems    Diagnosis POA    **Wound of lower extremity [S81.809A] Yes    Acute hypokalemia [E87.6] No    Nonrheumatic mitral valve regurgitation [I34.0] Yes    Primary hypertension [I10] Yes    Infected wound [T14.8XXA, L08.9] Yes    PAD (peripheral artery disease) [I73.9] Yes    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         Hospital Course     HPI:  \"Fallon Nur is a 79 y.o. female with a CMH of bilateral lower leg wounds, A.FIB, HTN, and is legally blind who presented to [facility] on 10/9/2024 with increasing pain and swelling to bilateral lower legs. Patient reported the right leg is worse than the left. Patient was admitted to [facility] on 24 for traumatic bilateral lower leg wounds after a fall and discharged on 24 after wound debridement and management. Patient reported she followed up with outpatient wound care one time, during which the wound vac was removed and patient was started on dressing changes at home. Patient reported blisters began forming on her right leg soon after she was discharged from [facility]. Patient reported her mobility has been decreased since discharge from [facility] and when she does ambulate, it is with walker and assistance of family " "member. Patient reported when she woke up this morning, she had increased pain and swelling of both legs and noticed an odor coming from the right leg wound, and came to the ER for evaluation. Hospitalist team admitting patient at this time.     In ED: Pertinent labs: alk phos 220, albumin 2.9, CRP 1.59, sed rate 105, Hgb 9.3, creat1.03, GFR 55.4. Initial lactic acid 4.5, upon recheck at 1528: 1.6, recheck at 1741: 5.1. WBC stable at 6.70. Blood cultures drawn. Nasal MRSA swab was negative. XR tib/fib showed \"Soft tissue defect again identified. No underlying bony abnormality.\" Patient received IV cefazolin. Patient received dilaudid and norco for pain management. \"    Hospital Course:  Was admitted to the hospital, a vascular surgery, cardiology consulted.  Patient status post percutaneous mechanical thrombectomy of right popliteal, peroneal, AT arteries, balloon angioplasty of the right TPT, popliteal artery, AT, sharp excisional debridement of right leg wound on 10/14.  Patient was started on heparin drip to prevent cardioembolic event  Later switched to Xarelto 15 mg later as per cardiology/vascular.  Continue Toprol-XL. Do not hold if systolic blood pressure is more than 90 mmHg.  Echocardiogram with no evidence of intracardiac thrombus  Plan for outpatient KIM to evaluate candidacy for left atrial appendage closure.    She may benefit from left atrial appendage closure if H&H continues to drop on anticoagulation     patient's hemoglobin dropped down to 7.0 her baseline is around 9 , pt s/p transfusion of 1 unit of packed red blood cell on 10/14/2024, 2 units of blood on 10/16/24      Wound culture positive for heavy growth of Pseudomonas and Proteus mirabilis.  ID followed the patient and patient on levofloxacin 500 mg daily for 13 days.     midodrine 5 mg 3 times daily, restarted metoprolol XL 12.5 mg daily.       Echocardiogram with bubble study and contrast ruled out cardiac source of emboli  Continue high " intensity statin and aspirin  LDL is 36 which is at goal.    Heart rate controlled with digoxin     Right leg wound infection with green drainage concerning for Pseudomonas infection.  Cultures are growing Pseudomonas aeruginosa and Proteus mirabilis.  Both organisms were susceptible to quinolone     Chronic bilateral lower extremity wounds concerning for underlying peripheral vascular disease.  CTA showed bilateral popliteal artery occlusions.  Status post right lower extremity arteriogram, thrombectomy, angioplasty and wound debridement on 10/15/2024  She was started on levofloxacin 500 mg daily for 13 days for 14 days of treatment since debridement before that patient was on ceftazidime.  QTc interval was appropriate for quinolone use.     Patient is legally blind  DISCHARGE Follow Up Recommendations for labs and diagnostics:   Follow with PCP in 2 to 3 days  Follow-up with vascular surgery in 2 weeks  Follow-up with cardiology in 3 weeks    Reasons For Change In Medications and Indications for New Medications:      Day of Discharge     Vital Signs:  Temp:  [97.7 °F (36.5 °C)-98.7 °F (37.1 °C)] 97.8 °F (36.6 °C)  Heart Rate:  [] 102  Resp:  [11-17] 17  BP: ()/(54-59) 99/58    Physical Exam:  Vitals and nursing note reviewed.   Constitutional:       Appearance: Normal appearance.   HENT:      Head: Normocephalic and atraumatic.   Cardiovascular:      Rate and Rhythm: Normal rate and rhythm.      Heart sounds: Normal heart sounds.   Pulmonary:      Effort: Pulmonary effort is normal.      Breath sounds: Decreased breath sounds.   Abdominal:      Palpations: Abdomen is soft.   Musculoskeletal:      Cervical back: Neck supple.   Vascular: Biphasic Doppler signal to the right DP, multiphasic PT signal  Doppler signals present to the left DP/PT, right ulnar artery, unable to obtain signal to the right radial  Skin: Left groin access site is CDI and soft, no signs of hematoma; right foot is warm to touch,  improved and appropriate capillary refill to the right foot: Right leg wound with healthy appearing tissue, no slough or purulence noted  Neurological:      Mental Status: Mental status is at baseline.     Pertinent  and/or Most Recent Results     LAB RESULTS:      Lab 10/17/24  0255 10/16/24  1443 10/16/24  0819 10/16/24  0111 10/15/24  1813 10/15/24  0934 10/15/24  0314 10/14/24  1950 10/14/24  0204 10/13/24  0203   WBC 11.69*  --   --  13.88*  --   --  10.89*  --  9.07 11.99*   HEMOGLOBIN 8.3* 9.3*  --  7.0*  --   --  7.5*  --  7.5* 8.0*   HEMATOCRIT 26.9* 30.6*  --  22.9*  --   --  24.0*  --  24.4* 27.0*   PLATELETS 419  --   --  445  --   --  433  --  511* 569*   NEUTROS ABS 8.33*  --   --  10.64*  --   --  9.03*  --  6.87 10.21*   IMMATURE GRANS (ABS) 0.29*  --   --  0.12*  --   --  0.05  --  0.05 0.06*   LYMPHS ABS 1.17  --   --  1.21  --   --  0.66*  --  0.82 0.69*   MONOS ABS 1.58*  --   --  1.75*  --   --  1.04*  --  1.08* 0.86   EOS ABS 0.27  --   --  0.11  --   --  0.07  --  0.20 0.13   MCV 88.2  --   --  86.4  --   --  86.0  --  85.3 85.4   PROTIME  --   --   --   --   --   --   --  11.9*  --   --    APTT  --   --  53.2* 82.6* 98.5* 114.0* 53.3* 60.1*  --   --          Lab 10/17/24  0255 10/16/24  0111 10/15/24  1235 10/15/24  0314 10/14/24  1208 10/14/24  0204 10/13/24  0203   SODIUM 137 137  --  137  --  136 136   POTASSIUM 4.0 4.2 4.0 3.3* 3.8 3.6 3.7   CHLORIDE 103 105  --  104  --  102 101   CO2 27.9 25.3  --  25.1  --  25.7 26.1   ANION GAP 6.1 6.7  --  7.9  --  8.3 8.9   BUN 17 17  --  17  --  21 19   CREATININE 0.89 0.89  --  0.78  --  1.01* 0.99   EGFR 66.0 66.0  --  77.4  --  56.7* 58.1*   GLUCOSE 111* 79  --  85  --  96 113*   CALCIUM 8.3* 8.3*  --  8.0*  --  8.1* 8.4*   MAGNESIUM  --   --   --   --   --  2.1  --    PHOSPHORUS  --   --   --   --  2.8 2.0*  --    HEMOGLOBIN A1C  --   --   --  5.12  --   --   --          Lab 10/14/24  0204   TOTAL PROTEIN 6.5   ALBUMIN 2.5*   GLOBULIN 4.0   ALT  (SGPT) 14   AST (SGOT) 47*   BILIRUBIN 0.2   ALK PHOS 186*         Lab 10/14/24  1950   PROTIME 11.9*   INR 1.10         Lab 10/15/24  0314   CHOLESTEROL 88   LDL CHOL 36   HDL CHOL 39*   TRIGLYCERIDES 55         Lab 10/14/24  1208 10/10/24  2322   FOLATE  --  10.50   VITAMIN B 12  --  >2,000*   ABO TYPING B  --    RH TYPING Positive  --    ANTIBODY SCREEN Negative  --          Brief Urine Lab Results       None          Microbiology Results (last 10 days)       Procedure Component Value - Date/Time    Wound Culture - Wound, Leg, Right [[id number]]  (Abnormal)  (Susceptibility) Collected: 10/10/24 1429    Lab Status: Final result Specimen: Wound from Leg, Right Updated: 10/14/24 0932     Wound Culture Heavy growth (4+) Pseudomonas aeruginosa      Heavy growth (4+) Proteus mirabilis     Gram Stain Rare (1+) WBCs per low power field      Few (2+) Gram negative bacilli    Susceptibility        Pseudomonas aeruginosa      MO      Cefepime Susceptible      Ceftazidime Susceptible      Ciprofloxacin Susceptible      Levofloxacin Susceptible      Piperacillin + Tazobactam Susceptible      Tobramycin Susceptible                       Susceptibility        Proteus mirabilis      MO      Amoxicillin + Clavulanate Susceptible      Ampicillin Susceptible      Ampicillin + Sulbactam Susceptible      Cefepime Susceptible      Ceftazidime Susceptible      Ceftriaxone Susceptible      Ertapenem Susceptible      Gentamicin Susceptible      Levofloxacin Susceptible      Piperacillin + Tazobactam Susceptible      Tetracycline Resistant      Trimethoprim + Sulfamethoxazole Susceptible                       Susceptibility Comments       Pseudomonas aeruginosa    With the exception of urinary-sourced infections, aminoglycosides should not be used as monotherapy.      Proteus mirabilis    Cefazolin sensitivity will not be reported for Enterobacteriaceae in non-urine isolates. If cefazolin is preferred, please call the microbiology lab to  request an E-test.  With the exception of urinary-sourced infections, aminoglycosides should not be used as monotherapy.               Anaerobic Culture - Swab, Leg, Right [[id number]]  (Normal) Collected: 10/10/24 1429    Lab Status: Final result Specimen: Swab from Leg, Right Updated: 10/15/24 0651     Anaerobic Culture No anaerobes isolated at 5 days    Blood Culture - Blood, Arm, Left [[id number]]  (Normal) Collected: 10/09/24 1440    Lab Status: Final result Specimen: Blood from Arm, Left Updated: 10/14/24 1445     Blood Culture No growth at 5 days    Blood Culture - Blood, Arm, Right [[id number]]  (Normal) Collected: 10/09/24 1440    Lab Status: Final result Specimen: Blood from Arm, Right Updated: 10/14/24 1445     Blood Culture No growth at 5 days            CT Angio Abdominal Aorta Bilateral Iliofem Runoff    Result Date: 10/10/2024  Impression: Impression: 1. No evidence of abdominal aortic aneurysm. No evidence of high-grade stenosis in the abdominal aorta or iliac vessels. 2. 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 There is poor distal runoff in the lower extremities primarily through the posterior tibial arteries. Electronically Signed: Emeka Jewell MD  10/10/2024 10:36 PM EDT  Workstation ID: [id number]    XR Tibia Fibula 2 View Right    Result Date: 10/9/2024  Impression: Impression: Soft tissue defect again identified. No underlying bony abnormality. Electronically Signed: Jazlyn Ramon MD  10/9/2024 1:45 PM EDT  Workstation ID: [id number]     Results for orders placed during the hospital encounter of 10/09/24    Doppler Ankle Brachial Index Single Level CAR    Interpretation Summary    Right Conclusion: The right FRANSISCO is unable to be assessed due to patient not tolerating compression. Severe digital insufficiency.    Left Conclusion: The left FRANSISCO is unable to be assessed due to patient not tolerating compression. Severe digital insufficiency.      Results for orders placed during the hospital encounter of  10/09/24    Doppler Ankle Brachial Index Single Level CAR    Interpretation Summary    Right Conclusion: The right FRANSISCO is unable to be assessed due to patient not tolerating compression. Severe digital insufficiency.    Left Conclusion: The left FRANSISCO is unable to be assessed due to patient not tolerating compression. Severe digital insufficiency.      Results for orders placed during the hospital encounter of 10/09/24    Adult Transthoracic Echo Complete W/ Cont if Necessary Per Protocol    Interpretation Summary    Left ventricular systolic function is normal. Calculated left ventricular EF = 54% Left ventricular ejection fraction appears to be 51 - 55%.    Left ventricular diastolic function is consistent with age.    The left atrial cavity is dilated.    Saline test results are negative for right to left atrial level shunt.    Moderate aortic valve stenosis is present. Mean/peak gradient of 12/20 mmHg.  Peak velocity 222 cm/s    Estimated right ventricular systolic pressure from tricuspid regurgitation is normal (<35 mmHg).    No evidence of intracardiac thrombus.  No wall motion abnormalities.      Labs Pending at Discharge:      Procedures Performed  Procedure(s):  RIGHT LOWER EXTREMITY ARTERIOGRAM, PERCUTANEOUS THROMBECTOMY, ANGIOPLASTY,  AND WOUND DEBRIDEMENT       Consults:   Consults       Date and Time Order Name Status Description    10/14/2024  5:14 PM Inpatient Cardiology Consult Completed     10/10/2024  9:30 AM Inpatient Infectious Diseases Consult Completed     10/9/2024  5:52 PM Inpatient Vascular Surgery Consult Completed     10/9/2024  4:20 PM Hospitalist (on-call MD unless specified)      9/20/2024 10:18 AM Inpatient Cardiology Consult Completed     9/14/2024  1:35 PM Inpatient Vascular Surgery Consult Completed     9/13/2024  7:22 AM Inpatient Infectious Diseases Consult Completed             Discharge Details        Discharge Medications        New Medications        Instructions Start Date    atorvastatin 40 MG tablet  Commonly known as: LIPITOR   40 mg, Oral, Nightly      cilostazol 100 MG tablet  Commonly known as: PLETAL   100 mg, Oral, 2 Times Daily      fentaNYL 12 MCG/HR  Commonly known as: DURAGESIC   1 patch, Transdermal, Every 72 Hours   Start Date: October 19, 2024     Estiven pack   1 packet, Oral, 2 Times Daily      levoFLOXacin 500 MG tablet  Commonly known as: LEVAQUIN   500 mg, Oral, Every 24 Hours   Start Date: October 18, 2024     multivitamin tablet tablet   1 tablet, Oral, Daily   Start Date: October 18, 2024     mupirocin 2 % ointment  Commonly known as: BACTROBAN   1 Application, Topical, Every 24 Hours Scheduled   Start Date: October 18, 2024     oxyCODONE 5 MG immediate release tablet  Commonly known as: ROXICODONE  Replaces: oxyCODONE-acetaminophen 5-325 MG per tablet   5 mg, Oral, Every 6 Hours PRN      polyethylene glycol 17 g packet  Commonly known as: MIRALAX   17 g, Oral, Daily   Start Date: October 18, 2024     Santyl 250 UNIT/GM ointment  Generic drug: collagenase   1 Application, Topical, Every 24 Hours Scheduled   Start Date: October 18, 2024     zinc sulfate 220 (50 Zn) MG capsule  Commonly known as: ZINCATE   220 mg, Oral, Daily   Start Date: October 18, 2024            Changes to Medications        Instructions Start Date   furosemide 20 MG tablet  Commonly known as: LASIX  What changed:   medication strength  how much to take   20 mg, Oral, Daily   Start Date: October 18, 2024     metoprolol succinate XL 25 MG 24 hr tablet  Commonly known as: TOPROL-XL  What changed:   medication strength  how much to take   12.5 mg, Oral, Every 24 Hours Scheduled   Start Date: October 18, 2024     rivaroxaban 15 MG tablet  Commonly known as: XARELTO  What changed:   medication strength  how much to take  when to take this   15 mg, Oral, Daily With Dinner             Continue These Medications        Instructions Start Date   alendronate 70 MG tablet  Commonly known as: FOSAMAX   70  mg, Oral, Every 7 Days, Saturday       buPROPion  MG 24 hr tablet  Commonly known as: WELLBUTRIN XL   150 mg, Oral, Daily      cetirizine 10 MG tablet  Commonly known as: zyrTEC   10 mg, Oral, Daily      citalopram 20 MG tablet  Commonly known as: CeleXA   20 mg, Oral, Daily      digoxin 125 MCG tablet  Commonly known as: LANOXIN   125 mcg, Oral, Daily Digoxin      ferrous sulfate 325 (65 FE) MG tablet   325 mg, Oral, Daily With Breakfast      fluticasone 50 MCG/ACT nasal spray  Commonly known as: FLONASE   2 sprays, Nasal, Daily PRN      levothyroxine 50 MCG tablet  Commonly known as: SYNTHROID, LEVOTHROID   50 mcg, Oral, Daily      Melatonin 12 MG tablet   1 tablet, Oral, Nightly PRN      pantoprazole 40 MG EC tablet  Commonly known as: PROTONIX   40 mg, Oral, Daily      vitamin B-12 1000 MCG tablet  Commonly known as: CYANOCOBALAMIN   1,000 mcg, Oral, Daily      vitamin D 1.25 MG (53603 UT) capsule capsule  Commonly known as: ERGOCALCIFEROL   50,000 Units, Oral, Weekly, Saturday              Stop These Medications      oxyCODONE-acetaminophen 5-325 MG per tablet  Commonly known as: PERCOCET  Replaced by: oxyCODONE 5 MG immediate release tablet     zolpidem 5 MG tablet  Commonly known as: AMBIEN              No Known Allergies    Discharge Disposition:   Home or Self Care    Diet:  Diet Instructions       Diet: Regular/House Diet; Thin (IDDSI 0)      Discharge Diet: Regular/House Diet    Fluid Consistency: Thin (IDDSI 0)            Discharge Activity:   Activity Instructions       Activity as Tolerated              CODE STATUS:  Code Status and Medical Interventions: CPR (Attempt to Resuscitate); Full Support   Ordered at: 10/09/24 0203     Level Of Support Discussed With:    Patient     Code Status (Patient has no pulse and is not breathing):    CPR (Attempt to Resuscitate)     Medical Interventions (Patient has pulse or is breathing):    Full Support       Future Appointments   Date Time Provider  Department Center   10/29/2024 10:00 AM Red Hazel II, MD [facility]       Additional Instructions for the Follow-ups that You Need to Schedule       Discharge Follow-up with PCP   As directed       Currently Documented PCP:    Francine Hampton MD    PCP Phone Number:    [phone]     Follow Up Details: 2-3 days        Discharge Follow-up with Specified Provider: Wes Bose; 2 Weeks   As directed      To: Wes Bose   Follow Up: 2 Weeks   Follow Up Details: Follow-up on right lower extremity wound and cyanosed fingers        Discharge Follow-up with Specified Provider: Bryn Valdez; 6 Weeks   As directed      To: Bryn Valdez   Follow Up: 6 Weeks   Follow Up Details: Follow up on atrial fibrillation                Time spent on Discharge including face to face service:  >30 minutes    Signature: Electronically signed by Mary Gamino MD, 10/17/24, 16:25 EDT.  [facility] Hospitalist Team

## 2024-10-17 NOTE — THERAPY TREATMENT NOTE
Subjective: Pt agreeable to therapeutic plan of care. Pt tearful throughout session. Pt expresses difficulty holding fork to eat, reports decreased sensation in R hand since admission. Nsg notified.     Doppler FRANSISCO indicates Right Conclusion: The right FRANSISCO is unable to be assessed due to patient not tolerating compression. Severe digital insufficiency.    Left Conclusion: The left FRANSISCO is unable to be assessed due to patient not tolerating compression. Severe digital insufficiency.     Cognition: oriented to Person, Place, Time, and Situation    Objective:     Precautions -BLE wounds, high falls risk, tachycardic     Bed Mobility: Max-A and Assist x 2 supine <>sit, head of bed elevated, bed rails   Functional Transfers: N/A or Not attempted.     Balance: sitting EOB, unsupported, and static CGA  Functional Ambulation: N/A or Not attempted.    Lower Body Dressing: Dependent  ADL Position: edge of bed sitting and supine  ADL Comments: don/doff socks     Therapeutic Exercise - 10 Reps B UE AROM unsupported sitting / EOB    Vitals: Tachycardic  129 HR   Pain: 10 VAS  Location: BLE wounds   Interventions for pain: Repositioned, RN provided medication, RN notified, Increased Activity, and Therapeutic Presence  Education: Provided education on the importance of mobility in the acute care setting, Verbal/Tactile Cues, ADL training, and Transfer Training     Extended education on benefit of mobility within acute setting       Assessment: Fallon Nur presents with ADL impairments affecting function including balance, endurance / activity tolerance, grasp, pain, postural / trunk control, range of motion (ROM), sensation / sensory awareness, and strength. Demonstrated functioning below baseline abilities indicate the need for continued skilled intervention while inpatient. Pt reports decreased sensation in R hand, difficulty feeding self. Pt increased seated activity tolerance needed for ADL routine. OT continues to  "recommend SNF when medically appropriate for dc. Tolerating session today without incident. Will continue to follow and progress as tolerated.     Plan/Recommendations:   Moderate Intensity Therapy recommended post-acute care. This is recommended as therapy feels the patient would require 3-4 days per week and wouldn't tolerate \"3 hour daily\" rehab intensity. SNF would be the preferred choice. If the patient does not agree to SNF, arrange HH or OP depending on home bound status. If patient is medically complex, consider LTACH.. Pt requires no DME at discharge.     Pt desires Skilled Rehab placement at discharge. Pt cooperative; agreeable to therapeutic recommendations and plan of care.     Modified [place]: N/A = No pre-op stroke/TIA    Post-Tx Position: Supine with HOB Elevated, Alarms activated, and Call light and personal items within reach  PPE: gloves and gown    "

## 2024-10-17 NOTE — PROGRESS NOTES
[facility] Progress Note    Name: Fallon Nur ADMIT: 10/9/2024   : 1945  PCP: Francine Hampton MD    MRN: [id number] LOS: 7 days   AGE/SEX: 79 y.o. female  ROOM:    [facility]    CC: Postop; status post percutaneous mechanical thrombectomy of right popliteal, peroneal, AT arteries, balloon angioplasty of the right TPT, popliteal artery, AT, sharp excisional debridement of right leg wound on 10/14    Subjective     Patient resting in bed.  Reports continued pain to her legs.    Objective     Scheduled Medications:   atorvastatin, 40 mg, Oral, Nightly  buPROPion XL, 150 mg, Oral, Daily  cetirizine, 10 mg, Oral, Daily  fentaNYL, 1 patch, Transdermal, Q72H   And  Check Fentanyl Patch Placement, 1 each, Does not apply, Q12H  cilostazol, 100 mg, Oral, BID  citalopram, 20 mg, Oral, Daily  collagenase, 1 Application, Topical, Q24H  digoxin, 125 mcg, Oral, Daily  ferrous sulfate, 324 mg, Oral, Daily With Breakfast  furosemide, 20 mg, Oral, Daily  Estiven, 1 packet, Oral, BID  levoFLOXacin, 500 mg, Oral, Q24H  levothyroxine, 50 mcg, Oral, Q AM  metoprolol succinate XL, 12.5 mg, Oral, Q24H  multivitamin, 1 tablet, Oral, Daily  mupirocin, 1 Application, Topical, Q24H  pantoprazole, 40 mg, Oral, Daily  polyethylene glycol, 17 g, Oral, Daily  rivaroxaban, 15 mg, Oral, Daily With Dinner  sodium chloride, 500 mL, Intravenous, Once  sodium chloride, 10 mL, Intravenous, Q12H  vitamin B-12, 1,000 mcg, Oral, Daily  vitamin D, 50,000 Units, Oral, Weekly  zinc sulfate, 220 mg, Oral, Daily        Active Infusions:         As Needed Medications:    acetaminophen **OR** acetaminophen **OR** acetaminophen    Calcium Replacement - Follow Nurse / BPA Driven Protocol    influenza vaccine    Magnesium Standard Dose Replacement - Follow Nurse / BPA Driven Protocol    melatonin    Morphine    ondansetron ODT **OR** ondansetron    oxyCODONE    Phosphorus Replacement - Follow Nurse / BPA Driven  Protocol    Potassium Replacement - Follow Nurse / BPA Driven Protocol    [COMPLETED] Insert Peripheral IV **AND** sodium chloride    sodium chloride    Vital Signs  Vitals:    10/17/24 0826   BP: 107/58   Pulse: 115   Resp: 16   Temp: 98 °F (36.7 °C)   SpO2: 93%      Body mass index is 25.81 kg/m².     Physical Exam:  NAD, alert and oriented  A-fib on the monitor, heart rate 90s to 100s  Lungs clear  Abd soft, benign  Vascular: Biphasic Doppler signal to the right DP, multiphasic PT signal  Doppler signals present to the left DP/PT, right ulnar artery, unable to obtain signal to the right radial  Skin: Left groin access site is CDI and soft, no signs of hematoma; right foot is warm to touch, improved and appropriate capillary refill to the right foot: Right leg wound with healthy appearing tissue, no slough or purulence noted    Right leg wound 10/16      Results Review:     CBC    Results from last 7 days   Lab Units 10/17/24  0255 10/16/24  1443 10/16/24  0111 10/15/24  0314 10/14/24  0204 10/13/24  0203 10/12/24  0027 10/11/24  0829   WBC 10*3/mm3 11.69*  --  13.88* 10.89* 9.07 11.99* 13.71* 13.34*   HEMOGLOBIN g/dL 8.3* 9.3* 7.0* 7.5* 7.5* 8.0* 7.9* 8.2*   PLATELETS 10*3/mm3 419  --  445 433 511* 569* 515* 470*     BMP   Results from last 7 days   Lab Units 10/17/24  0255 10/16/24  0111 10/15/24  1235 10/15/24  0314 10/14/24  1208 10/14/24  0204 10/13/24  0203 10/12/24  0027 10/11/24  0829   SODIUM mmol/L 137 137  --  137  --  136 136 134* 137   POTASSIUM mmol/L 4.0 4.2 4.0 3.3* 3.8 3.6 3.7 4.0 4.1   CHLORIDE mmol/L 103 105  --  104  --  102 101 100 102   CO2 mmol/L 27.9 25.3  --  25.1  --  25.7 26.1 25.1 24.3   BUN mg/dL 17 17  --  17  --  21 19 21 21   CREATININE mg/dL 0.89 0.89  --  0.78  --  1.01* 0.99 0.95 1.00   GLUCOSE mg/dL 111* 79  --  85  --  96 113* 105* 91   MAGNESIUM mg/dL  --   --   --   --   --  2.1  --   --   --    PHOSPHORUS mg/dL  --   --   --   --  2.8 2.0*  --   --   --      HbA1C   Lab  Results   Component Value Date    HGBA1C 5.12 10/15/2024     Infection   Results from last 7 days   Lab Units 10/10/24  1429   WOUNDCX  Heavy growth (4+) Pseudomonas aeruginosa*  Heavy growth (4+) Proteus mirabilis*     Radiology(recent) No radiology results for the last day    VTE Prophylaxis:  Pharmacologic VTE prophylaxis orders are present.         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 Hazel    POD3  VSS, afebrile  ABLA, 7.0/22.9 this yesterday, asymptomatic, improved with blood transfusion 10/16/2024.  Pain is controlled, continue current regimen  Right leg wound appears healthy with healthy appearing wound base, will change our RLE dressings to silver alginate foam dressings daily  Based on IntraOp findings, patient has had a clear embolic event from her  atrial fibrillation despite appropriate anticoagulation, cardiology is following, echo is unremarkable, plans for outpatient KIM to evaluate treatment for her A-fib, possible atrial appendage closure  Medical management per primary team          Wes Bose MD  [facility]  10/17/24   O: [phone]  F: [phone]

## 2024-10-17 NOTE — PLAN OF CARE
"Goal Outcome Evaluation:  Plan of Care Reviewed With: patient      Assessment: Fallon Nur presents with functional mobility impairments which indicate the need for skilled intervention.  Limited participation in upright and mobility tasks due to pain/pain avoidance/ and fear of falling.  Plan to time therapy after pain meds tomorrow with hopes of increased mobility.  Continue to recommend SNF level of care at discharge.  Increased time for all acts and for education/encouragement regarding mobility and upright postures.  Tolerating session today without incident. Will continue to follow and progress as tolerated.     Plan/Recommendations:   If medically appropriate, Moderate Intensity Therapy recommended post-acute care. This is recommended as therapy feels the patient would require 3-4 days per week and wouldn't tolerate \"3 hour daily\" rehab intensity. SNF would be the preferred choice. If the patient does not agree to SNF, arrange HH or OP depending on home bound status. If patient is medically complex, consider LTACH. Pt requires no DME at discharge.       Anticipated Discharge Disposition (PT): skilled nursing facility                        "

## 2024-10-17 NOTE — PLAN OF CARE
Goal Outcome Evaluation:  Plan of Care Reviewed With: patient, family        Progress: no change  Outcome Evaluation: Patient alert and oriented, continues on oxy 5mg q 4 hrs, pain patch 12.5, patient refusing to be turned, billegs wrapped, patient has rested well this shift, call light within reach. VSS

## 2024-10-17 NOTE — CASE MANAGEMENT/SOCIAL WORK
"Physicians Statement of Medical Necessity for  Ambulance Transportation    GENERAL INFORMATION     Name: Fallon Nur  YOB: 1945  Medicare #: [id number]   Transport Date: 10/17/24 (Valid for round trips this date, or for scheduled repetitive trips for 60 days from the date signed below.)  Origin: [facility]  Destination: [address] [address]  Is the Patient's stay covered under Medicare Part A (PPS/DRG?)Yes  Closest appropriate facility? Yes  If this a hosp-hosp transfer? No  Is this a hospice patient? No    MEDICAL NECESSITY QUESTIONAIRE    Ambulance Transportation is medically necessary only if other means of transportation are contraindicated or would be potentially harmful to the patient.  To meet this requirement, the patient must be either \"bed confined\" or suffer from a condition such that transport by means other than an ambulance is contraindicated by the patient's condition.  The following questions must be answered by the healthcare professional signing below for this form to be valid:     1) Describe the MEDICAL CONDITION (physical and/or mental) of this patient AT THE TIME OF AMBULANCE TRANSPORT that requires the patient to be transported in an ambulance, and why transport by other means is contraindicated by the patient's condition: max assist x2 for bed mobility, unstageable RLL wound, unable to tolerate seated position for duration for transport.   Past Medical History:   Diagnosis Date    Atrial fibrillation     Blind 07/10/2023    Cellulitis of leg 09/12/2024    Duodenal ulcer, perforated 10/01/2013    [facility]    12/2021 - EGD clear      DVT of upper extremity (deep vein thrombosis) 01/01/1995    subclavian - premarin      E. 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    Histoplasmosis     Hypertension     Infestation by bed bug 10/09/2024    Legally blind     Longstanding persistent atrial fibrillation " "07/10/2023    Macular degeneration 10/15/2024    Nonrheumatic mitral valve regurgitation 10/15/2024    Open wound of both lower extremities 09/12/2024    Primary hypertension 10/15/2024    PVD (peripheral vascular disease)     SBO (small bowel obstruction) 12/31/2020      Past Surgical History:   Procedure Laterality Date    CARDIAC CATHETERIZATION Right 10/14/2024    Procedure: RIGHT LOWER EXTREMITY ARTERIOGRAM, PERCUTANEOUS THROMBECTOMY, ANGIOPLASTY,  AND WOUND DEBRIDEMENT;  Surgeon: Red Hazel II, MD;  Location: [facility] HYBRID OR;  Service: Vascular;  Laterality: Right;    WOUND DEBRIDEMENT Right 9/16/2024    Procedure: RIGHT LEG DEBRIDEMENT;  Surgeon: Red Hazel II, MD;  Location: [facility] MAIN OR;  Service: Vascular;  Laterality: Right;      2) Is this patient \"bed confined\" as defined below?Yes   To be \"bed confined\" the patient must satisfy all three of the following criteria:  (1) unable to get up from bed without assistance; AND (2) unable to ambulate;  AND (3) unable to sit in a chair or wheelchair.  3) Can this patient safely be transported by car or wheelchair van (I.e., may safely sit during transport, without an attendant or monitoring?)No   4. In addition to completing questions 1-3 above, please check any of the following conditions that apply*:          *Note: supporting documentation for any boxes checked must be maintained in the patient's medical records Unable to tolerate seated position for time needed to transport      SIGNATURE OF PHYSICIAN OR OTHER AUTHORIZED HEALTHCARE PROFESSIONAL    I certify that the above information is true and correct based on my evaluation of this patient, and represent that the patient requires transport by ambulance and that other forms of transport are contraindicated.  I understand that this information will be used by the Centers for Medicare and Medicaid Services (CMS) to support the determiniation of medical necessity for ambulance services, and I " represent that I have personal knowledge of the patient's condition at the time of transport.       If this box is checked, I also certify that the patient is physically or mentally incapable of signing the ambulance service's claim form and that the institution with which I am affiliated has furnished care, services or assistance to the patient.  My signature below is made on behalf of the patient pursuant to 42 .36(b)(4). In accordance with 42 .37, the specific reason(s) that the patient is physically or mentally incapable of signing the claim for is as follows:     Signature of Physician or Healthcare Professional  Date/Time:        (For Scheduled repetitive transport, this form is not valid for transports performed more than 60 days after this date).                                                                                                                                            --------------------------------------------------------------------------------------------  Printed Name and Credentials of Physician or Authorized Healthcare Professional     *Form must be signed by patient's attending physician for scheduled, repetitive transports,.  For non-repetitive ambulance transports, if unable to obtain the signature of the attending physician, any of the following may sign (please select below):     Physician  Clinical Nurse Specialist  Registered Nurse     Physician Assistant  Discharge Planner  Licensed Practical Nurse     Nurse Practitioner

## 2024-10-17 NOTE — CASE MANAGEMENT/SOCIAL WORK
Continued Stay Note  [facility]     Patient Name: Fallon Nur  MRN: [id number]  Today's Date: 10/17/2024    Admit Date: 10/9/2024    Plan: [facility] accepted. Precert approved (valid 10/17-10/23). PASRR approved.   Discharge Plan       Row Name 10/17/24 1616       Plan    Plan Comments CM placed patient on [facility] will call list, RN to call when d/c orders placed and patient ready.                     Expected Discharge Date and Time       Expected Discharge Date Expected Discharge Time    Oct 17, 2024           Phone communication or documentation only - no physical contact with patient or family.     KAITLIN ShellN, [place] [address]    Office: [phone]  Fax: [phone]

## 2024-10-17 NOTE — PROGRESS NOTES
Infectious Diseases Progress Note      LOS: 7 days   Patient Care Team:  Francine Hampton MD as PCP - General    Chief Complaint: Bilateral leg wounds    Subjective       The patient has been afebrile for the last 24 hours.  The patient is on room air, hemodynamically stable, and is tolerating antimicrobial therapy.  Patient is having some increased bilateral foot pain      Review of Systems:   Review of Systems   Constitutional:  Positive for fatigue.   HENT: Negative.     Eyes: Negative.    Respiratory: Negative.     Cardiovascular: Negative.    Gastrointestinal: Negative.    Endocrine: Negative.    Genitourinary: Negative.    Musculoskeletal: Negative.         Lower leg pain   Skin:  Positive for color change and wound.   Neurological: Negative.    Psychiatric/Behavioral: Negative.     All other systems reviewed and are negative.       Objective     Vital Signs  Temp:  [97.7 °F (36.5 °C)-98.7 °F (37.1 °C)] 97.8 °F (36.6 °C)  Heart Rate:  [] 102  Resp:  [11-17] 17  BP: ()/(54-62) 99/58    Physical Exam:  Physical Exam  Vitals and nursing note reviewed.   Constitutional:       General: She is not in acute distress.     Appearance: She is well-developed and normal weight. She is ill-appearing. She is not diaphoretic.   HENT:      Head: Normocephalic and atraumatic.   Eyes:      General: No scleral icterus.     Comments: Legally blind   Cardiovascular:      Rate and Rhythm: Normal rate and regular rhythm.      Heart sounds: Normal heart sounds, S1 normal and S2 normal. No murmur heard.  Pulmonary:      Effort: Pulmonary effort is normal. No respiratory distress.      Breath sounds: Normal breath sounds. No stridor. No wheezing or rales.   Chest:      Chest wall: No tenderness.   Abdominal:      General: Bowel sounds are normal. There is no distension.      Palpations: Abdomen is soft. There is no mass.      Tenderness: There is no abdominal tenderness. There is no guarding.   Musculoskeletal:          General: No swelling, tenderness or deformity. Normal range of motion.      Cervical back: Neck supple.   Skin:     General: Skin is warm and dry.      Coloration: Skin is not pale.      Findings: No bruising, erythema or rash.      Comments: Dressings on bilateral lower legs.  Feet are now warm    Right third finger is dusky   Neurological:      Mental Status: She is alert and oriented to person, place, and time.   Psychiatric:         Mood and Affect: Mood normal.          Results Review:    I have reviewed all clinical data, test, lab, and imaging results.     Radiology  No Radiology Exams Resulted Within Past 24 Hours    Cardiology    Laboratory    Results from last 7 days   Lab Units 10/17/24  0255 10/16/24  1443 10/16/24  0111 10/15/24  0314 10/14/24  0204 10/13/24  0203 10/12/24  0027 10/11/24  0829   WBC 10*3/mm3 11.69*  --  13.88* 10.89* 9.07 11.99* 13.71* 13.34*   HEMOGLOBIN g/dL 8.3* 9.3* 7.0* 7.5* 7.5* 8.0* 7.9* 8.2*   HEMATOCRIT % 26.9* 30.6* 22.9* 24.0* 24.4* 27.0* 26.9* 27.4*   PLATELETS 10*3/mm3 419  --  445 433 511* 569* 515* 470*     Results from last 7 days   Lab Units 10/17/24  0255 10/16/24  0111 10/15/24  1235 10/15/24  0314 10/14/24  1208 10/14/24  0204 10/13/24  0203 10/12/24  0027 10/11/24  0829   SODIUM mmol/L 137 137  --  137  --  136 136 134* 137   POTASSIUM mmol/L 4.0 4.2 4.0 3.3* 3.8 3.6 3.7 4.0 4.1   CHLORIDE mmol/L 103 105  --  104  --  102 101 100 102   CO2 mmol/L 27.9 25.3  --  25.1  --  25.7 26.1 25.1 24.3   BUN mg/dL 17 17  --  17  --  21 19 21 21   CREATININE mg/dL 0.89 0.89  --  0.78  --  1.01* 0.99 0.95 1.00   GLUCOSE mg/dL 111* 79  --  85  --  96 113* 105* 91   ALBUMIN g/dL  --   --   --   --   --  2.5*  --   --   --    BILIRUBIN mg/dL  --   --   --   --   --  0.2  --   --   --    ALK PHOS U/L  --   --   --   --   --  186*  --   --   --    AST (SGOT) U/L  --   --   --   --   --  47*  --   --   --    ALT (SGPT) U/L  --   --   --   --   --  14  --   --   --    CALCIUM mg/dL 8.3*  8.3*  --  8.0*  --  8.1* 8.4* 8.2* 8.1*                     Microbiology   Microbiology Results (last 10 days)       Procedure Component Value - Date/Time    Wound Culture - Wound, Leg, Right [[id number]]  (Abnormal)  (Susceptibility) Collected: 10/10/24 1429    Lab Status: Final result Specimen: Wound from Leg, Right Updated: 10/14/24 0932     Wound Culture Heavy growth (4+) Pseudomonas aeruginosa      Heavy growth (4+) Proteus mirabilis     Gram Stain Rare (1+) WBCs per low power field      Few (2+) Gram negative bacilli    Susceptibility        Pseudomonas aeruginosa      MO      Cefepime Susceptible      Ceftazidime Susceptible      Ciprofloxacin Susceptible      Levofloxacin Susceptible      Piperacillin + Tazobactam Susceptible      Tobramycin Susceptible                       Susceptibility        Proteus mirabilis      MO      Amoxicillin + Clavulanate Susceptible      Ampicillin Susceptible      Ampicillin + Sulbactam Susceptible      Cefepime Susceptible      Ceftazidime Susceptible      Ceftriaxone Susceptible      Ertapenem Susceptible      Gentamicin Susceptible      Levofloxacin Susceptible      Piperacillin + Tazobactam Susceptible      Tetracycline Resistant      Trimethoprim + Sulfamethoxazole Susceptible                       Susceptibility Comments       Pseudomonas aeruginosa    With the exception of urinary-sourced infections, aminoglycosides should not be used as monotherapy.      Proteus mirabilis    Cefazolin sensitivity will not be reported for Enterobacteriaceae in non-urine isolates. If cefazolin is preferred, please call the microbiology lab to request an E-test.  With the exception of urinary-sourced infections, aminoglycosides should not be used as monotherapy.               Anaerobic Culture - Swab, Leg, Right [[id number]]  (Normal) Collected: 10/10/24 1429    Lab Status: Final result Specimen: Swab from Leg, Right Updated: 10/15/24 0651     Anaerobic Culture No anaerobes isolated at 5  days    Blood Culture - Blood, Arm, Left [[id number]]  (Normal) Collected: 10/09/24 1440    Lab Status: Final result Specimen: Blood from Arm, Left Updated: 10/14/24 1445     Blood Culture No growth at 5 days    Blood Culture - Blood, Arm, Right [[id number]]  (Normal) Collected: 10/09/24 1440    Lab Status: Final result Specimen: Blood from Arm, Right Updated: 10/14/24 1445     Blood Culture No growth at 5 days            Medication Review:       Schedule Meds  atorvastatin, 40 mg, Oral, Nightly  buPROPion XL, 150 mg, Oral, Daily  cetirizine, 10 mg, Oral, Daily  fentaNYL, 1 patch, Transdermal, Q72H   And  Check Fentanyl Patch Placement, 1 each, Does not apply, Q12H  cilostazol, 100 mg, Oral, BID  citalopram, 20 mg, Oral, Daily  collagenase, 1 Application, Topical, Q24H  digoxin, 125 mcg, Oral, Daily  ferrous sulfate, 324 mg, Oral, Daily With Breakfast  furosemide, 20 mg, Oral, Daily  Estiven, 1 packet, Oral, BID  levoFLOXacin, 500 mg, Oral, Q24H  levothyroxine, 50 mcg, Oral, Q AM  metoprolol succinate XL, 12.5 mg, Oral, Q24H  multivitamin, 1 tablet, Oral, Daily  mupirocin, 1 Application, Topical, Q24H  pantoprazole, 40 mg, Oral, Daily  polyethylene glycol, 17 g, Oral, Daily  rivaroxaban, 15 mg, Oral, Daily With Dinner  sodium chloride, 500 mL, Intravenous, Once  sodium chloride, 10 mL, Intravenous, Q12H  vitamin B-12, 1,000 mcg, Oral, Daily  vitamin D, 50,000 Units, Oral, Weekly  zinc sulfate, 220 mg, Oral, Daily        Infusion Meds           PRN Meds    acetaminophen **OR** acetaminophen **OR** acetaminophen    Calcium Replacement - Follow Nurse / BPA Driven Protocol    influenza vaccine    Magnesium Standard Dose Replacement - Follow Nurse / BPA Driven Protocol    melatonin    Morphine    ondansetron ODT **OR** ondansetron    oxyCODONE    Phosphorus Replacement - Follow Nurse / BPA Driven Protocol    Potassium Replacement - Follow Nurse / BPA Driven Protocol    [COMPLETED] Insert Peripheral IV **AND** sodium  chloride    sodium chloride        Assessment & Plan       Antimicrobial Therapy   1.  P.o. Levaquin        2.         3.        4.        5.            Assessment     Right leg wound infection with green drainage concerning for Pseudomonas infection.  Cultures are growing Pseudomonas aeruginosa and Proteus mirabilis.  Both organisms were susceptible to quinolone     Chronic bilateral lower extremity wounds concerning for underlying peripheral vascular disease.  CTA showed bilateral popliteal artery occlusions.  Status post right lower extremity arteriogram, thrombectomy, angioplasty and wound debridement on 10/15/2024     Recent hospital admission in September 2024 for right leg wound infection s/p debridement by vascular surgery service.     Patient is legally blind     Plan     Continue p.o. levofloxacin 500 mg daily for 13 days for 14 days of treatment since debridement  QTc interval was appropriate for quinolone use  Continue supportive care  Okay to discharge from Infectious Disease standpoint  The case was discussed with the patient and her family at bedside        AZAEL Nguyen  10/17/24  13:54 EDT    Note is dictated utilizing voice recognition software/Dragon

## 2024-10-17 NOTE — PLAN OF CARE
Goal Outcome Evaluation:                 Pt to d/c to [facility]. Ok to d/c per cardiology and vascular

## 2024-10-17 NOTE — SIGNIFICANT NOTE
Case Management/Social Work    Patient Name:  Fallon Nur  YOB: 1945  MRN: [id number]  Admit Date:  10/9/2024           10/17/24 1518   Post Acute Pre-Cert Documentation   Request Submitted by Facility - Type: Hospital   Post-Acute Authorization Type Submitted: SNF   Date Post Acute Pre-Cert Inititated per Facility 10/17/24   Verification from Payer Yes   Date Post Acute Pre-Cert Completed 10/17/24   Accepting Facility [facility]   Hospital Discharge Date Requested 10/17/24   All Clinicals Submitted? Yes   Had Accepting Facility at Time of Submission Yes   Response Received from Insurance? Approval   Response Communicated to:    Authorization Number: [id number]   Post Acute Pre-Cert Initiated Comment [place] submitted SNF precert for [facility] via datango portal. Auth ID [id number] . SNF precert auto approved. Valid 10/17-10/23. Approval letter indexed to media. CM made aware.           Electronically signed by:  Rigoberto Nair CMA  10/17/24 15:23 EDT    Rigoberto Nair  Case Management Associate  [address] [address]  P: [phone]  F: [phone]

## 2024-10-18 VITALS
RESPIRATION RATE: 16 BRPM | WEIGHT: 145.06 LBS | OXYGEN SATURATION: 96 % | HEIGHT: 63 IN | SYSTOLIC BLOOD PRESSURE: 95 MMHG | HEART RATE: 104 BPM | BODY MASS INDEX: 25.7 KG/M2 | DIASTOLIC BLOOD PRESSURE: 53 MMHG | TEMPERATURE: 98.7 F

## 2024-10-18 LAB
ANION GAP SERPL CALCULATED.3IONS-SCNC: 5.6 MMOL/L (ref 5–15)
BASOPHILS # BLD AUTO: 0.04 10*3/MM3 (ref 0–0.2)
BASOPHILS NFR BLD AUTO: 0.4 % (ref 0–1.5)
BUN SERPL-MCNC: 16 MG/DL (ref 8–23)
BUN/CREAT SERPL: 17.4 (ref 7–25)
CALCIUM SPEC-SCNC: 8.1 MG/DL (ref 8.6–10.5)
CHLORIDE SERPL-SCNC: 101 MMOL/L (ref 98–107)
CO2 SERPL-SCNC: 28.4 MMOL/L (ref 22–29)
CREAT SERPL-MCNC: 0.92 MG/DL (ref 0.57–1)
DEPRECATED RDW RBC AUTO: 53.1 FL (ref 37–54)
EGFRCR SERPLBLD CKD-EPI 2021: 63.5 ML/MIN/1.73
EOSINOPHIL # BLD AUTO: 0.2 10*3/MM3 (ref 0–0.4)
EOSINOPHIL NFR BLD AUTO: 1.9 % (ref 0.3–6.2)
ERYTHROCYTE [DISTWIDTH] IN BLOOD BY AUTOMATED COUNT: 16.8 % (ref 12.3–15.4)
GLUCOSE SERPL-MCNC: 122 MG/DL (ref 65–99)
HCT VFR BLD AUTO: 26.1 % (ref 34–46.6)
HGB BLD-MCNC: 8.2 G/DL (ref 12–15.9)
IMM GRANULOCYTES # BLD AUTO: 0.21 10*3/MM3 (ref 0–0.05)
IMM GRANULOCYTES NFR BLD AUTO: 2 % (ref 0–0.5)
LYMPHOCYTES # BLD AUTO: 1.11 10*3/MM3 (ref 0.7–3.1)
LYMPHOCYTES NFR BLD AUTO: 10.5 % (ref 19.6–45.3)
MCH RBC QN AUTO: 27.7 PG (ref 26.6–33)
MCHC RBC AUTO-ENTMCNC: 31.4 G/DL (ref 31.5–35.7)
MCV RBC AUTO: 88.2 FL (ref 79–97)
MONOCYTES # BLD AUTO: 1.29 10*3/MM3 (ref 0.1–0.9)
MONOCYTES NFR BLD AUTO: 12.1 % (ref 5–12)
NEUTROPHILS NFR BLD AUTO: 7.77 10*3/MM3 (ref 1.7–7)
NEUTROPHILS NFR BLD AUTO: 73.1 % (ref 42.7–76)
NRBC BLD AUTO-RTO: 0 /100 WBC (ref 0–0.2)
PLATELET # BLD AUTO: 446 10*3/MM3 (ref 140–450)
PMV BLD AUTO: 9.5 FL (ref 6–12)
POTASSIUM SERPL-SCNC: 4.4 MMOL/L (ref 3.5–5.2)
RBC # BLD AUTO: 2.96 10*6/MM3 (ref 3.77–5.28)
SODIUM SERPL-SCNC: 135 MMOL/L (ref 136–145)
WBC NRBC COR # BLD AUTO: 10.62 10*3/MM3 (ref 3.4–10.8)

## 2024-10-18 PROCEDURE — 85025 COMPLETE CBC W/AUTO DIFF WBC: CPT | Performed by: STUDENT IN AN ORGANIZED HEALTH CARE EDUCATION/TRAINING PROGRAM

## 2024-10-18 PROCEDURE — 80048 BASIC METABOLIC PNL TOTAL CA: CPT | Performed by: STUDENT IN AN ORGANIZED HEALTH CARE EDUCATION/TRAINING PROGRAM

## 2024-10-18 RX ADMIN — Medication 220 MG: at 08:29

## 2024-10-18 RX ADMIN — BUPROPION HYDROCHLORIDE 150 MG: 150 TABLET, EXTENDED RELEASE ORAL at 08:31

## 2024-10-18 RX ADMIN — OXYCODONE 5 MG: 5 TABLET ORAL at 01:46

## 2024-10-18 RX ADMIN — METOPROLOL SUCCINATE 12.5 MG: 25 TABLET, FILM COATED, EXTENDED RELEASE ORAL at 08:29

## 2024-10-18 RX ADMIN — FERROUS SULFATE TAB EC 324 MG (65 MG FE EQUIVALENT) 324 MG: 324 (65 FE) TABLET DELAYED RESPONSE at 08:30

## 2024-10-18 RX ADMIN — FUROSEMIDE 20 MG: 20 TABLET ORAL at 08:29

## 2024-10-18 RX ADMIN — PANTOPRAZOLE SODIUM 40 MG: 40 TABLET, DELAYED RELEASE ORAL at 08:29

## 2024-10-18 RX ADMIN — CITALOPRAM HYDROBROMIDE 20 MG: 20 TABLET ORAL at 08:29

## 2024-10-18 RX ADMIN — CETIRIZINE HYDROCHLORIDE 10 MG: 10 TABLET, FILM COATED ORAL at 08:29

## 2024-10-18 RX ADMIN — MUPIROCIN 1 APPLICATION: 20 OINTMENT TOPICAL at 08:29

## 2024-10-18 RX ADMIN — CYANOCOBALAMIN TAB 500 MCG 1000 MCG: 500 TAB at 08:29

## 2024-10-18 RX ADMIN — Medication 1 PACKET: at 08:30

## 2024-10-18 RX ADMIN — LEVOTHYROXINE SODIUM 50 MCG: 0.05 TABLET ORAL at 05:15

## 2024-10-18 RX ADMIN — THERA TABS 1 TABLET: TAB at 08:29

## 2024-10-18 RX ADMIN — CILOSTAZOL 100 MG: 100 TABLET ORAL at 08:28

## 2024-10-18 RX ADMIN — Medication 10 ML: at 08:30

## 2024-10-18 RX ADMIN — COLLAGENASE SANTYL 1 APPLICATION: 250 OINTMENT TOPICAL at 08:30

## 2024-10-18 NOTE — SIGNIFICANT NOTE
10/18/24 0802   OTHER   Discipline occupational therapist   Rehab Time/Intention   Session Not Performed other (see comments)  (anticipating to dc to SNF this AM, will follow up if pt remains admitted)   Therapy Assessment/Plan (PT)   Criteria for Skilled Interventions Met (PT) yes;meets criteria;skilled treatment is necessary   Recommendation   OT - Next Appointment 10/19/24

## 2024-10-18 NOTE — CASE MANAGEMENT/SOCIAL WORK
"Continued Stay Note  [facility]     Patient Name: Fallon Nur  MRN: [id number]  Today's Date: 10/18/2024    Admit Date: 10/9/2024    Plan: [facility] accepted. Precert approved (valid 10/17-10/23). PASRR approved.   Discharge Plan       Row Name 10/18/24 0940       Plan    Plan Comments CM notified by RN that patient was not transported to SNF last night because patient said it was \"too late.\" Avoidable day placed. CM confirmed with [facility] liaison that patient can admit this morning. CM confirmed with [facility] that patient will be first run, RN notified.    Final Discharge Disposition Code 03 - skilled nursing facility (SNF)    Final Note [facility], skilled                      Expected Discharge Date and Time       Expected Discharge Date Expected Discharge Time    Oct 17, 2024           Case Management Discharge Note      Final Note: [facility], skilled    Provided Post Acute Provider List?: Yes  Post Acute Provider List: Nursing Home  Provided Post Acute Provider Quality & Resource List?: N/A  Delivered To: Support Person  Support Person: Franchesca  Method of Delivery: In person    Selected Continued Care - Discharged on 10/18/2024 Admission date: 10/9/2024 - Discharge disposition: Skilled Nursing Facility (DC - External)      Destination Coordination complete.      Service Provider Services Address Phone Fax Patient Preferred    [facility] [address] [place] [address] [phone] --                 Transportation Services  Ambulance: [facility]    Final Discharge Disposition Code: 03 - skilled nursing facility (SNF)    Phone communication or documentation only - no physical contact with patient or family.   RIMA Shell, RN    [facility]  [address] [place] [address]    Office: [phone]  Fax: [phone]       "

## 2024-10-18 NOTE — PROGRESS NOTES
[facility] Vascular Surgery Progress Note    Name: Fallon Nur ADMIT: 10/9/2024   : 1945  PCP: Francine Hampton MD    MRN: [id number] LOS: 8 days   AGE/SEX: 79 y.o. female  ROOM:    [facility]    CC: Postop; status post percutaneous mechanical thrombectomy of right popliteal, peroneal, AT arteries, balloon angioplasty of the right TPT, popliteal artery, AT, sharp excisional debridement of right leg wound on 10/14    Subjective     Patient resting in bed.  Reports continued pain to her legs.    Objective     Scheduled Medications:   atorvastatin, 40 mg, Oral, Nightly  buPROPion XL, 150 mg, Oral, Daily  cetirizine, 10 mg, Oral, Daily  fentaNYL, 1 patch, Transdermal, Q72H   And  Check Fentanyl Patch Placement, 1 each, Does not apply, Q12H  cilostazol, 100 mg, Oral, BID  citalopram, 20 mg, Oral, Daily  collagenase, 1 Application, Topical, Q24H  digoxin, 125 mcg, Oral, Daily  ferrous sulfate, 324 mg, Oral, Daily With Breakfast  furosemide, 20 mg, Oral, Daily  Estiven, 1 packet, Oral, BID  levoFLOXacin, 500 mg, Oral, Q24H  levothyroxine, 50 mcg, Oral, Q AM  metoprolol succinate XL, 12.5 mg, Oral, Q24H  multivitamin, 1 tablet, Oral, Daily  mupirocin, 1 Application, Topical, Q24H  pantoprazole, 40 mg, Oral, Daily  polyethylene glycol, 17 g, Oral, Daily  rivaroxaban, 15 mg, Oral, Daily With Dinner  sodium chloride, 500 mL, Intravenous, Once  sodium chloride, 10 mL, Intravenous, Q12H  vitamin B-12, 1,000 mcg, Oral, Daily  vitamin D, 50,000 Units, Oral, Weekly  zinc sulfate, 220 mg, Oral, Daily        Active Infusions:         As Needed Medications:    acetaminophen **OR** acetaminophen **OR** acetaminophen    Calcium Replacement - Follow Nurse / BPA Driven Protocol    influenza vaccine    Magnesium Standard Dose Replacement - Follow Nurse / BPA Driven Protocol    melatonin    Morphine    ondansetron ODT **OR** ondansetron    oxyCODONE    Phosphorus Replacement - Follow Nurse / BPA Driven  Protocol    Potassium Replacement - Follow Nurse / BPA Driven Protocol    [COMPLETED] Insert Peripheral IV **AND** sodium chloride    sodium chloride    Vital Signs  Vitals:    10/18/24 0500   BP: 95/53   Pulse: 104   Resp: 16   Temp: 98.6 °F (37 °C)   SpO2: 96%      Body mass index is 25.7 kg/m².     Physical Exam:  NAD, alert and oriented  A-fib on the monitor, heart rate 90s to 100s  Lungs clear  Abd soft, benign  Vascular: Biphasic Doppler signal to the right DP, multiphasic PT signal  Doppler signals present to the left DP/PT, right ulnar artery, unable to obtain signal to the right radial  Skin: Left groin access site is CDI and soft, no signs of hematoma; right foot is warm to touch, improved and appropriate capillary refill to the right foot: Right leg wound with healthy appearing tissue, no slough or purulence noted    Right leg wound 10/16      Results Review:     CBC    Results from last 7 days   Lab Units 10/18/24  0209 10/17/24  0255 10/16/24  1443 10/16/24  0111 10/15/24  0314 10/14/24  0204 10/13/24  0203 10/12/24  0027   WBC 10*3/mm3 10.62 11.69*  --  13.88* 10.89* 9.07 11.99* 13.71*   HEMOGLOBIN g/dL 8.2* 8.3* 9.3* 7.0* 7.5* 7.5* 8.0* 7.9*   PLATELETS 10*3/mm3 446 419  --  445 433 511* 569* 515*     BMP   Results from last 7 days   Lab Units 10/18/24  0209 10/17/24  0255 10/16/24  0111 10/15/24  1235 10/15/24  0314 10/14/24  1208 10/14/24  0204 10/13/24  0203 10/12/24  0027   SODIUM mmol/L 135* 137 137  --  137  --  136 136 134*   POTASSIUM mmol/L 4.4 4.0 4.2 4.0 3.3* 3.8 3.6 3.7 4.0   CHLORIDE mmol/L 101 103 105  --  104  --  102 101 100   CO2 mmol/L 28.4 27.9 25.3  --  25.1  --  25.7 26.1 25.1   BUN mg/dL 16 17 17  --  17  --  21 19 21   CREATININE mg/dL 0.92 0.89 0.89  --  0.78  --  1.01* 0.99 0.95   GLUCOSE mg/dL 122* 111* 79  --  85  --  96 113* 105*   MAGNESIUM mg/dL  --   --   --   --   --   --  2.1  --   --    PHOSPHORUS mg/dL  --   --   --   --   --  2.8 2.0*  --   --      HbA1C   Lab  Results   Component Value Date    HGBA1C 5.12 10/15/2024     Infection         Radiology(recent) No radiology results for the last day    VTE Prophylaxis:  Pharmacologic VTE prophylaxis orders are present.         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 Hazel    POD4  afebrile  Pain is controlled, continue current regimen  Right leg wound appears healthy with healthy appearing wound base, will change our RLE dressings to silver alginate foam dressings daily  Based on IntraOp findings, patient has had a clear embolic event from her atrial fibrillation despite appropriate anticoagulation, cardiology is following, echo is unremarkable, plans for outpatient KIM to evaluate treatment for her A-fib, possible atrial appendage closure  Medical management per primary  team    Plans for discharge to rehab today.          Wes Bose MD  [facility] Vascular Surgery  10/18/24   O: [phone]  F: [phone]

## 2024-10-18 NOTE — NURSING NOTE
and EMS and [facility] notified that patient would not be coming tonight will be there transferred in am.

## 2024-10-18 NOTE — CASE MANAGEMENT/SOCIAL WORK
"Physicians Statement of Medical Necessity for  Ambulance Transportation    GENERAL INFORMATION     Name: Fallon Nur  YOB: 1945  Medicare #: [id number]   Transport Date: 10/18/24 (Valid for round trips this date, or for scheduled repetitive trips for 60 days from the date signed below.)  Origin: [facility]  Destination: [address] [address]  Is the Patient's stay covered under Medicare Part A (PPS/DRG?)Yes  Closest appropriate facility? Yes  If this a hosp-hosp transfer? No  Is this a hospice patient? No    MEDICAL NECESSITY QUESTIONAIRE    Ambulance Transportation is medically necessary only if other means of transportation are contraindicated or would be potentially harmful to the patient.  To meet this requirement, the patient must be either \"bed confined\" or suffer from a condition such that transport by means other than an ambulance is contraindicated by the patient's condition.  The following questions must be answered by the healthcare professional signing below for this form to be valid:     1) Describe the MEDICAL CONDITION (physical and/or mental) of this patient AT THE TIME OF AMBULANCE TRANSPORT that requires the patient to be transported in an ambulance, and why transport by other means is contraindicated by the patient's condition: max assist x2 for bed mobility, unstageable RLL wound, unable to tolerate seated position for duration for transport.   Past Medical History:   Diagnosis Date    Atrial fibrillation     Blind 07/10/2023    Cellulitis of leg 09/12/2024    Duodenal ulcer, perforated 10/01/2013    [facility]    12/2021 - EGD clear      DVT of upper extremity (deep vein thrombosis) 01/01/1995    subclavian - premarin      E. 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    Histoplasmosis     Hypertension     Infestation by bed bug 10/09/2024    Legally blind     Longstanding persistent atrial fibrillation " "07/10/2023    Macular degeneration 10/15/2024    Nonrheumatic mitral valve regurgitation 10/15/2024    Open wound of both lower extremities 09/12/2024    Primary hypertension 10/15/2024    PVD (peripheral vascular disease)     SBO (small bowel obstruction) 12/31/2020      Past Surgical History:   Procedure Laterality Date    CARDIAC CATHETERIZATION Right 10/14/2024    Procedure: RIGHT LOWER EXTREMITY ARTERIOGRAM, PERCUTANEOUS THROMBECTOMY, ANGIOPLASTY,  AND WOUND DEBRIDEMENT;  Surgeon: Red Hazel II, MD;  Location: [facility] HYBRID OR;  Service: Vascular;  Laterality: Right;    WOUND DEBRIDEMENT Right 9/16/2024    Procedure: RIGHT LEG DEBRIDEMENT;  Surgeon: Red Hazel II, MD;  Location: [facility] MAIN OR;  Service: Vascular;  Laterality: Right;      2) Is this patient \"bed confined\" as defined below?Yes   To be \"bed confined\" the patient must satisfy all three of the following criteria:  (1) unable to get up from bed without assistance; AND (2) unable to ambulate;  AND (3) unable to sit in a chair or wheelchair.  3) Can this patient safely be transported by car or wheelchair van (I.e., may safely sit during transport, without an attendant or monitoring?)No   4. In addition to completing questions 1-3 above, please check any of the following conditions that apply*:          *Note: supporting documentation for any boxes checked must be maintained in the patient's medical records Unable to tolerate seated position for time needed to transport      SIGNATURE OF PHYSICIAN OR OTHER AUTHORIZED HEALTHCARE PROFESSIONAL    I certify that the above information is true and correct based on my evaluation of this patient, and represent that the patient requires transport by ambulance and that other forms of transport are contraindicated.  I understand that this information will be used by the Centers for Medicare and Medicaid Services (CMS) to support the determiniation of medical necessity for ambulance services, and I " represent that I have personal knowledge of the patient's condition at the time of transport.       If this box is checked, I also certify that the patient is physically or mentally incapable of signing the ambulance service's claim form and that the institution with which I am affiliated has furnished care, services or assistance to the patient.  My signature below is made on behalf of the patient pursuant to 42 .36(b)(4). In accordance with 42 .37, the specific reason(s) that the patient is physically or mentally incapable of signing the claim for is as follows:     Signature of Physician or Healthcare Professional  Date/Time:        (For Scheduled repetitive transport, this form is not valid for transports performed more than 60 days after this date).                                                                                                                                            --------------------------------------------------------------------------------------------  Printed Name and Credentials of Physician or Authorized Healthcare Professional     *Form must be signed by patient's attending physician for scheduled, repetitive transports,.  For non-repetitive ambulance transports, if unable to obtain the signature of the attending physician, any of the following may sign (please select below):     Physician  Clinical Nurse Specialist  Registered Nurse     Physician Assistant  Discharge Planner  Licensed Practical Nurse     Nurse Practitioner

## 2024-10-18 NOTE — NURSING NOTE
Patient discharged via EMS to [facility]. Report called by prior nurse. IV taken out and fentanyl patched removed from right deltoid and wasted in sharps bin in patients room.

## 2024-10-26 ENCOUNTER — HOSPITAL ENCOUNTER (INPATIENT)
Facility: HOSPITAL | Age: 79
LOS: 6 days | Discharge: HOME | End: 2024-11-01
Attending: EMERGENCY MEDICINE
Payer: MEDICARE

## 2024-10-26 ENCOUNTER — APPOINTMENT (OUTPATIENT)
Dept: CT IMAGING | Facility: HOSPITAL | Age: 79
End: 2024-10-26
Payer: MEDICARE

## 2024-10-26 DIAGNOSIS — L97.919 ULCERS OF BOTH LOWER LEGS: ICD-10-CM

## 2024-10-26 DIAGNOSIS — R44.3 HALLUCINATIONS: Primary | ICD-10-CM

## 2024-10-26 DIAGNOSIS — I70.242 ATHEROSCLEROSIS OF NATIVE ARTERIES OF LEFT LEG WITH ULCERATION OF CALF: ICD-10-CM

## 2024-10-26 DIAGNOSIS — L97.929 ULCERS OF BOTH LOWER LEGS: ICD-10-CM

## 2024-10-26 DIAGNOSIS — I73.9 PAD (PERIPHERAL ARTERY DISEASE): ICD-10-CM

## 2024-10-26 DIAGNOSIS — M17.9 OSTEOARTHRITIS OF KNEE, UNSPECIFIED LATERALITY, UNSPECIFIED OSTEOARTHRITIS TYPE: ICD-10-CM

## 2024-10-26 DIAGNOSIS — I70.232 ATHEROSCLEROSIS OF NATIVE ARTERIES OF RIGHT LEG WITH ULCERATION OF CALF: ICD-10-CM

## 2024-10-26 DIAGNOSIS — Z87.81 HISTORY OF COMPRESSION FRACTURE OF SPINE: ICD-10-CM

## 2024-10-26 PROBLEM — R41.82 ALTERED MENTAL STATUS: Status: ACTIVE | Noted: 2024-10-26

## 2024-10-26 LAB
ALBUMIN SERPL-MCNC: 2.4 G/DL (ref 3.5–5.2)
ALBUMIN/GLOB SERPL: 0.6 G/DL
ALP SERPL-CCNC: 197 U/L (ref 39–117)
ALT SERPL W P-5'-P-CCNC: 12 U/L (ref 1–33)
AMMONIA BLD-SCNC: 12 UMOL/L (ref 11–51)
ANION GAP SERPL CALCULATED.3IONS-SCNC: 4 MMOL/L (ref 5–15)
AST SERPL-CCNC: 48 U/L (ref 1–32)
BACTERIA UR QL AUTO: NORMAL /HPF
BASOPHILS # BLD AUTO: 0.03 10*3/MM3 (ref 0–0.2)
BASOPHILS NFR BLD AUTO: 0.4 % (ref 0–1.5)
BILIRUB SERPL-MCNC: 0.5 MG/DL (ref 0–1.2)
BILIRUB UR QL STRIP: NEGATIVE
BUN SERPL-MCNC: 16 MG/DL (ref 8–23)
BUN/CREAT SERPL: 21.3 (ref 7–25)
CALCIUM SPEC-SCNC: 8.2 MG/DL (ref 8.6–10.5)
CHLORIDE SERPL-SCNC: 103 MMOL/L (ref 98–107)
CLARITY UR: CLEAR
CO2 SERPL-SCNC: 33 MMOL/L (ref 22–29)
COLOR UR: ABNORMAL
CREAT SERPL-MCNC: 0.75 MG/DL (ref 0.57–1)
D-LACTATE SERPL-SCNC: 1.6 MMOL/L (ref 0.3–2)
DEPRECATED RDW RBC AUTO: 61 FL (ref 37–54)
DIGOXIN SERPL-MCNC: 1.12 NG/ML (ref 0.6–1.2)
EGFRCR SERPLBLD CKD-EPI 2021: 81.1 ML/MIN/1.73
EOSINOPHIL # BLD AUTO: 0.15 10*3/MM3 (ref 0–0.4)
EOSINOPHIL NFR BLD AUTO: 2 % (ref 0.3–6.2)
ERYTHROCYTE [DISTWIDTH] IN BLOOD BY AUTOMATED COUNT: 19.2 % (ref 12.3–15.4)
GLOBULIN UR ELPH-MCNC: 3.8 GM/DL
GLUCOSE BLDC GLUCOMTR-MCNC: 74 MG/DL (ref 70–105)
GLUCOSE SERPL-MCNC: 87 MG/DL (ref 65–99)
GLUCOSE UR STRIP-MCNC: NEGATIVE MG/DL
HCT VFR BLD AUTO: 31 % (ref 34–46.6)
HGB BLD-MCNC: 9.5 G/DL (ref 12–15.9)
HGB UR QL STRIP.AUTO: NEGATIVE
HYALINE CASTS UR QL AUTO: NORMAL /LPF
IMM GRANULOCYTES # BLD AUTO: 0.04 10*3/MM3 (ref 0–0.05)
IMM GRANULOCYTES NFR BLD AUTO: 0.5 % (ref 0–0.5)
KETONES UR QL STRIP: NEGATIVE
LEUKOCYTE ESTERASE UR QL STRIP.AUTO: ABNORMAL
LYMPHOCYTES # BLD AUTO: 0.59 10*3/MM3 (ref 0.7–3.1)
LYMPHOCYTES NFR BLD AUTO: 7.8 % (ref 19.6–45.3)
MAGNESIUM SERPL-MCNC: 2 MG/DL (ref 1.6–2.4)
MCH RBC QN AUTO: 27.4 PG (ref 26.6–33)
MCHC RBC AUTO-ENTMCNC: 30.6 G/DL (ref 31.5–35.7)
MCV RBC AUTO: 89.3 FL (ref 79–97)
MONOCYTES # BLD AUTO: 0.79 10*3/MM3 (ref 0.1–0.9)
MONOCYTES NFR BLD AUTO: 10.4 % (ref 5–12)
NEUTROPHILS NFR BLD AUTO: 5.97 10*3/MM3 (ref 1.7–7)
NEUTROPHILS NFR BLD AUTO: 78.9 % (ref 42.7–76)
NITRITE UR QL STRIP: NEGATIVE
NRBC BLD AUTO-RTO: 0 /100 WBC (ref 0–0.2)
PH UR STRIP.AUTO: 5.5 [PH] (ref 5–8)
PLATELET # BLD AUTO: 460 10*3/MM3 (ref 140–450)
PMV BLD AUTO: 9.2 FL (ref 6–12)
POTASSIUM SERPL-SCNC: 3.8 MMOL/L (ref 3.5–5.2)
PROT SERPL-MCNC: 6.2 G/DL (ref 6–8.5)
PROT UR QL STRIP: NEGATIVE
RBC # BLD AUTO: 3.47 10*6/MM3 (ref 3.77–5.28)
RBC # UR STRIP: NORMAL /HPF
REF LAB TEST METHOD: NORMAL
SODIUM SERPL-SCNC: 140 MMOL/L (ref 136–145)
SP GR UR STRIP: 1.02 (ref 1–1.03)
SQUAMOUS #/AREA URNS HPF: NORMAL /HPF
T4 FREE SERPL-MCNC: 1.48 NG/DL (ref 0.93–1.7)
TSH SERPL DL<=0.05 MIU/L-ACNC: 9.98 UIU/ML (ref 0.27–4.2)
UROBILINOGEN UR QL STRIP: ABNORMAL
WBC # UR STRIP: NORMAL /HPF
WBC NRBC COR # BLD AUTO: 7.57 10*3/MM3 (ref 3.4–10.8)

## 2024-10-26 PROCEDURE — 87040 BLOOD CULTURE FOR BACTERIA: CPT | Performed by: EMERGENCY MEDICINE

## 2024-10-26 PROCEDURE — 80162 ASSAY OF DIGOXIN TOTAL: CPT | Performed by: EMERGENCY MEDICINE

## 2024-10-26 PROCEDURE — 80053 COMPREHEN METABOLIC PANEL: CPT | Performed by: EMERGENCY MEDICINE

## 2024-10-26 PROCEDURE — 93005 ELECTROCARDIOGRAM TRACING: CPT | Performed by: EMERGENCY MEDICINE

## 2024-10-26 PROCEDURE — 82140 ASSAY OF AMMONIA: CPT | Performed by: EMERGENCY MEDICINE

## 2024-10-26 PROCEDURE — 81001 URINALYSIS AUTO W/SCOPE: CPT | Performed by: EMERGENCY MEDICINE

## 2024-10-26 PROCEDURE — 70450 CT HEAD/BRAIN W/O DYE: CPT

## 2024-10-26 PROCEDURE — 84443 ASSAY THYROID STIM HORMONE: CPT | Performed by: EMERGENCY MEDICINE

## 2024-10-26 PROCEDURE — 36415 COLL VENOUS BLD VENIPUNCTURE: CPT

## 2024-10-26 PROCEDURE — 84439 ASSAY OF FREE THYROXINE: CPT

## 2024-10-26 PROCEDURE — P9612 CATHETERIZE FOR URINE SPEC: HCPCS

## 2024-10-26 PROCEDURE — 85025 COMPLETE CBC W/AUTO DIFF WBC: CPT | Performed by: EMERGENCY MEDICINE

## 2024-10-26 PROCEDURE — 25010000002 PIPERACILLIN SOD-TAZOBACTAM PER 1 G

## 2024-10-26 PROCEDURE — 99285 EMERGENCY DEPT VISIT HI MDM: CPT

## 2024-10-26 PROCEDURE — 83735 ASSAY OF MAGNESIUM: CPT | Performed by: EMERGENCY MEDICINE

## 2024-10-26 PROCEDURE — 82948 REAGENT STRIP/BLOOD GLUCOSE: CPT | Performed by: EMERGENCY MEDICINE

## 2024-10-26 PROCEDURE — 83605 ASSAY OF LACTIC ACID: CPT

## 2024-10-26 RX ORDER — BISACODYL 10 MG
10 SUPPOSITORY, RECTAL RECTAL DAILY PRN
Status: DISCONTINUED | OUTPATIENT
Start: 2024-10-26 | End: 2024-11-01 | Stop reason: HOSPADM

## 2024-10-26 RX ORDER — SODIUM CHLORIDE 0.9 % (FLUSH) 0.9 %
10 SYRINGE (ML) INJECTION AS NEEDED
Status: DISCONTINUED | OUTPATIENT
Start: 2024-10-26 | End: 2024-11-01 | Stop reason: HOSPADM

## 2024-10-26 RX ORDER — ONDANSETRON 2 MG/ML
4 INJECTION INTRAMUSCULAR; INTRAVENOUS EVERY 6 HOURS PRN
Status: DISCONTINUED | OUTPATIENT
Start: 2024-10-26 | End: 2024-11-01 | Stop reason: HOSPADM

## 2024-10-26 RX ORDER — BISACODYL 5 MG/1
5 TABLET, DELAYED RELEASE ORAL DAILY PRN
Status: DISCONTINUED | OUTPATIENT
Start: 2024-10-26 | End: 2024-11-01 | Stop reason: HOSPADM

## 2024-10-26 RX ORDER — FENTANYL 12.5 UG/1
1 PATCH TRANSDERMAL
COMMUNITY
End: 2024-11-01 | Stop reason: HOSPADM

## 2024-10-26 RX ORDER — AMOXICILLIN 250 MG
2 CAPSULE ORAL 2 TIMES DAILY PRN
Status: DISCONTINUED | OUTPATIENT
Start: 2024-10-26 | End: 2024-11-01 | Stop reason: HOSPADM

## 2024-10-26 RX ORDER — NITROGLYCERIN 0.4 MG/1
0.4 TABLET SUBLINGUAL
Status: DISCONTINUED | OUTPATIENT
Start: 2024-10-26 | End: 2024-11-01 | Stop reason: HOSPADM

## 2024-10-26 RX ORDER — IBUPROFEN 200 MG
200 TABLET ORAL EVERY 4 HOURS PRN
Status: DISCONTINUED | OUTPATIENT
Start: 2024-10-26 | End: 2024-10-27

## 2024-10-26 RX ORDER — GABAPENTIN 300 MG/1
300 CAPSULE ORAL 2 TIMES DAILY
COMMUNITY

## 2024-10-26 RX ORDER — POLYETHYLENE GLYCOL 3350 17 G/17G
17 POWDER, FOR SOLUTION ORAL DAILY PRN
Status: DISCONTINUED | OUTPATIENT
Start: 2024-10-26 | End: 2024-11-01 | Stop reason: HOSPADM

## 2024-10-26 RX ORDER — ONDANSETRON 4 MG/1
4 TABLET, ORALLY DISINTEGRATING ORAL EVERY 6 HOURS PRN
Status: DISCONTINUED | OUTPATIENT
Start: 2024-10-26 | End: 2024-11-01 | Stop reason: HOSPADM

## 2024-10-26 RX ADMIN — PIPERACILLIN AND TAZOBACTAM 3.38 G: 3; .375 INJECTION, POWDER, FOR SOLUTION INTRAVENOUS at 18:24

## 2024-10-26 NOTE — ED PROVIDER NOTES
Subjective   History of Present Illness  79-year-old female presents from the extended care facility with reports of hallucinations.  Family describing patient reaching for and talking to people that are not in the room.  This has been ongoing for the last couple of days.  They also report worsening appearance of the left lower extremity with a history of chronic wounds.  Reportedly on Levaquin for Pseudomonas infection of the lower extremities based on my review of recent discharge summary.  Review of Systems    Past Medical History:   Diagnosis Date    Atrial fibrillation     Blind 07/10/2023    Cellulitis of leg 09/12/2024    Duodenal ulcer, perforated 10/01/2013    [facility]    12/2021 - EGD clear      DVT of upper extremity (deep vein thrombosis) 01/01/1995    subclavian - premarin      E. 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    Histoplasmosis     Hypertension     Infestation by bed bug 10/09/2024    Legally blind     Longstanding persistent atrial fibrillation 07/10/2023    Macular degeneration 10/15/2024    Nonrheumatic mitral valve regurgitation 10/15/2024    Open wound of both lower extremities 09/12/2024    Primary hypertension 10/15/2024    PVD (peripheral vascular disease)     SBO (small bowel obstruction) 12/31/2020       No Known Allergies    Past Surgical History:   Procedure Laterality Date    CARDIAC CATHETERIZATION Right 10/14/2024    Procedure: RIGHT LOWER EXTREMITY ARTERIOGRAM, PERCUTANEOUS THROMBECTOMY, ANGIOPLASTY,  AND WOUND DEBRIDEMENT;  Surgeon: Red Hazel II, MD;  Location: [facility] HYBRID OR;  Service: Vascular;  Laterality: Right;    WOUND DEBRIDEMENT Right 9/16/2024    Procedure: RIGHT LEG DEBRIDEMENT;  Surgeon: Red Hazel II, MD;  Location: [facility] MAIN OR;  Service: Vascular;  Laterality: Right;       Family History   Problem Relation Age of Onset    Cancer Mother     Dementia Father     Cancer Sister        Social History     Socioeconomic History     Marital status:    Tobacco Use    Smoking status: Former    Smokeless tobacco: Never   Vaping Use    Vaping status: Never Used   Substance and Sexual Activity    Alcohol use: Not Currently    Drug use: Not Currently    Sexual activity: Defer     Results for orders placed or performed during the hospital encounter of 10/26/24   ECG 12 Lead Altered Mental Status    Collection Time: 10/26/24  2:20 PM   Result Value Ref Range    QT Interval 376 ms    QTC Interval 465 ms   Comprehensive Metabolic Panel    Collection Time: 10/26/24  2:31 PM    Specimen: Arm, Left; Blood   Result Value Ref Range    Glucose 87 65 - 99 mg/dL    BUN 16 8 - 23 mg/dL    Creatinine 0.75 0.57 - 1.00 mg/dL    Sodium 140 136 - 145 mmol/L    Potassium 3.8 3.5 - 5.2 mmol/L    Chloride 103 98 - 107 mmol/L    CO2 33.0 (H) 22.0 - 29.0 mmol/L    Calcium 8.2 (L) 8.6 - 10.5 mg/dL    Total Protein 6.2 6.0 - 8.5 g/dL    Albumin 2.4 (L) 3.5 - 5.2 g/dL    ALT (SGPT) 12 1 - 33 U/L    AST (SGOT) 48 (H) 1 - 32 U/L    Alkaline Phosphatase 197 (H) 39 - 117 U/L    Total Bilirubin 0.5 0.0 - 1.2 mg/dL    Globulin 3.8 gm/dL    A/G Ratio 0.6 g/dL    BUN/Creatinine Ratio 21.3 7.0 - 25.0    Anion Gap 4.0 (L) 5.0 - 15.0 mmol/L    eGFR 81.1 >60.0 mL/min/1.73   TSH    Collection Time: 10/26/24  2:31 PM    Specimen: Arm, Left; Blood   Result Value Ref Range    TSH 9.980 (H) 0.270 - 4.200 uIU/mL   Magnesium    Collection Time: 10/26/24  2:31 PM    Specimen: Arm, Left; Blood   Result Value Ref Range    Magnesium 2.0 1.6 - 2.4 mg/dL   Ammonia    Collection Time: 10/26/24  2:31 PM    Specimen: Arm, Left; Blood   Result Value Ref Range    Ammonia 12 11 - 51 umol/L   Digoxin Level    Collection Time: 10/26/24  2:31 PM    Specimen: Arm, Left; Blood   Result Value Ref Range    Digoxin 1.12 0.60 - 1.20 ng/mL   CBC Auto Differential    Collection Time: 10/26/24  2:31 PM    Specimen: Arm, Left; Blood   Result Value Ref Range    WBC 7.57 3.40 - 10.80 10*3/mm3    RBC 3.47 (L) 3.77  - 5.28 10*6/mm3    Hemoglobin 9.5 (L) 12.0 - 15.9 g/dL    Hematocrit 31.0 (L) 34.0 - 46.6 %    MCV 89.3 79.0 - 97.0 fL    MCH 27.4 26.6 - 33.0 pg    MCHC 30.6 (L) 31.5 - 35.7 g/dL    RDW 19.2 (H) 12.3 - 15.4 %    RDW-SD 61.0 (H) 37.0 - 54.0 fl    MPV 9.2 6.0 - 12.0 fL    Platelets 460 (H) 140 - 450 10*3/mm3    Neutrophil % 78.9 (H) 42.7 - 76.0 %    Lymphocyte % 7.8 (L) 19.6 - 45.3 %    Monocyte % 10.4 5.0 - 12.0 %    Eosinophil % 2.0 0.3 - 6.2 %    Basophil % 0.4 0.0 - 1.5 %    Immature Grans % 0.5 0.0 - 0.5 %    Neutrophils, Absolute 5.97 1.70 - 7.00 10*3/mm3    Lymphocytes, Absolute 0.59 (L) 0.70 - 3.10 10*3/mm3    Monocytes, Absolute 0.79 0.10 - 0.90 10*3/mm3    Eosinophils, Absolute 0.15 0.00 - 0.40 10*3/mm3    Basophils, Absolute 0.03 0.00 - 0.20 10*3/mm3    Immature Grans, Absolute 0.04 0.00 - 0.05 10*3/mm3    nRBC 0.0 0.0 - 0.2 /100 WBC   POC Glucose Once    Collection Time: 10/26/24  2:37 PM    Specimen: Blood   Result Value Ref Range    Glucose 74 70 - 105 mg/dL   Urinalysis With Culture If Indicated - Straight Cath    Collection Time: 10/26/24  2:43 PM    Specimen: Straight Cath; Urine   Result Value Ref Range    Color, UA Dark Yellow (A) Yellow, Straw    Appearance, UA Clear Clear    pH, UA 5.5 5.0 - 8.0    Specific Gravity, UA 1.018 1.005 - 1.030    Glucose, UA Negative Negative    Ketones, UA Negative Negative    Bilirubin, UA Negative Negative    Blood, UA Negative Negative    Protein, UA Negative Negative    Leuk Esterase, UA Trace (A) Negative    Nitrite, UA Negative Negative    Urobilinogen, UA 1.0 E.U./dL 0.2 - 1.0 E.U./dL   Urinalysis, Microscopic Only - Straight Cath    Collection Time: 10/26/24  2:43 PM    Specimen: Straight Cath; Urine   Result Value Ref Range    RBC, UA 0-2 None Seen, 0-2 /HPF    WBC, UA 0-2 None Seen, 0-2 /HPF    Bacteria, UA None Seen None Seen /HPF    Squamous Epithelial Cells, UA 0-2 None Seen, 0-2 /HPF    Hyaline Casts, UA 3-6 None Seen /LPF    Methodology Automated  Microscopy    POC Lactate    Collection Time: 10/26/24  4:31 PM    Specimen: Blood   Result Value Ref Range    Lactate 1.6 0.3 - 2.0 mmol/L     CT Head Without Contrast    Result Date: 10/26/2024  Impression: 1. Mild generalized parenchymal volume loss. No acute intracranial abnormality. Electronically Signed: Lloyd Rodriguez MD  10/26/2024 2:37 PM EDT  Workstation ID: [id number]         Objective   Physical Exam  General: Thin elderly female awake, no acute distress  Eyes: Pupils midsized round and reactive, patient legally blind, sclera nonicteric  HEENT: Mucous membranes moist, no mucosal swelling  Neck: Supple, no nuchal rigidity,   Respirations: Respirations nonlabored, equal breath sounds bilaterally, clear lungs  Heart regular rate and rhythm, no murmurs rubs or gallops,   Abdomen soft nontender nondistended,   Extremities some ulcerative wounds on the bilateral lower extremities with some areas of central necrosis of the wounds tickly on the lower right chin and the posterior aspect of the left lower leg near the heel, no definite cellulitic change, diminished pulses in the bilateral lower extremities, normal sensation in the bilateral lower extremities, flexion extension of the toes and feet intact  Neuro cranial nerves grossly intact, no focal limb deficits, generally weak, having difficulty lifting her arms and legs off the bed bilaterally  Psych oriented to person place and time, cooperative  Skin no other rash  Procedures           ED Course      Results for orders placed or performed during the hospital encounter of 10/26/24   ECG 12 Lead Altered Mental Status    Collection Time: 10/26/24  2:20 PM   Result Value Ref Range    QT Interval 376 ms    QTC Interval 465 ms   Comprehensive Metabolic Panel    Collection Time: 10/26/24  2:31 PM    Specimen: Arm, Left; Blood   Result Value Ref Range    Glucose 87 65 - 99 mg/dL    BUN 16 8 - 23 mg/dL    Creatinine 0.75 0.57 - 1.00 mg/dL    Sodium 140 136 - 145 mmol/L     Potassium 3.8 3.5 - 5.2 mmol/L    Chloride 103 98 - 107 mmol/L    CO2 33.0 (H) 22.0 - 29.0 mmol/L    Calcium 8.2 (L) 8.6 - 10.5 mg/dL    Total Protein 6.2 6.0 - 8.5 g/dL    Albumin 2.4 (L) 3.5 - 5.2 g/dL    ALT (SGPT) 12 1 - 33 U/L    AST (SGOT) 48 (H) 1 - 32 U/L    Alkaline Phosphatase 197 (H) 39 - 117 U/L    Total Bilirubin 0.5 0.0 - 1.2 mg/dL    Globulin 3.8 gm/dL    A/G Ratio 0.6 g/dL    BUN/Creatinine Ratio 21.3 7.0 - 25.0    Anion Gap 4.0 (L) 5.0 - 15.0 mmol/L    eGFR 81.1 >60.0 mL/min/1.73   TSH    Collection Time: 10/26/24  2:31 PM    Specimen: Arm, Left; Blood   Result Value Ref Range    TSH 9.980 (H) 0.270 - 4.200 uIU/mL   Magnesium    Collection Time: 10/26/24  2:31 PM    Specimen: Arm, Left; Blood   Result Value Ref Range    Magnesium 2.0 1.6 - 2.4 mg/dL   Ammonia    Collection Time: 10/26/24  2:31 PM    Specimen: Arm, Left; Blood   Result Value Ref Range    Ammonia 12 11 - 51 umol/L   Digoxin Level    Collection Time: 10/26/24  2:31 PM    Specimen: Arm, Left; Blood   Result Value Ref Range    Digoxin 1.12 0.60 - 1.20 ng/mL   CBC Auto Differential    Collection Time: 10/26/24  2:31 PM    Specimen: Arm, Left; Blood   Result Value Ref Range    WBC 7.57 3.40 - 10.80 10*3/mm3    RBC 3.47 (L) 3.77 - 5.28 10*6/mm3    Hemoglobin 9.5 (L) 12.0 - 15.9 g/dL    Hematocrit 31.0 (L) 34.0 - 46.6 %    MCV 89.3 79.0 - 97.0 fL    MCH 27.4 26.6 - 33.0 pg    MCHC 30.6 (L) 31.5 - 35.7 g/dL    RDW 19.2 (H) 12.3 - 15.4 %    RDW-SD 61.0 (H) 37.0 - 54.0 fl    MPV 9.2 6.0 - 12.0 fL    Platelets 460 (H) 140 - 450 10*3/mm3    Neutrophil % 78.9 (H) 42.7 - 76.0 %    Lymphocyte % 7.8 (L) 19.6 - 45.3 %    Monocyte % 10.4 5.0 - 12.0 %    Eosinophil % 2.0 0.3 - 6.2 %    Basophil % 0.4 0.0 - 1.5 %    Immature Grans % 0.5 0.0 - 0.5 %    Neutrophils, Absolute 5.97 1.70 - 7.00 10*3/mm3    Lymphocytes, Absolute 0.59 (L) 0.70 - 3.10 10*3/mm3    Monocytes, Absolute 0.79 0.10 - 0.90 10*3/mm3    Eosinophils, Absolute 0.15 0.00 - 0.40 10*3/mm3     Basophils, Absolute 0.03 0.00 - 0.20 10*3/mm3    Immature Grans, Absolute 0.04 0.00 - 0.05 10*3/mm3    nRBC 0.0 0.0 - 0.2 /100 WBC   POC Glucose Once    Collection Time: 10/26/24  2:37 PM    Specimen: Blood   Result Value Ref Range    Glucose 74 70 - 105 mg/dL   Urinalysis With Culture If Indicated - Straight Cath    Collection Time: 10/26/24  2:43 PM    Specimen: Straight Cath; Urine   Result Value Ref Range    Color, UA Dark Yellow (A) Yellow, Straw    Appearance, UA Clear Clear    pH, UA 5.5 5.0 - 8.0    Specific Gravity, UA 1.018 1.005 - 1.030    Glucose, UA Negative Negative    Ketones, UA Negative Negative    Bilirubin, UA Negative Negative    Blood, UA Negative Negative    Protein, UA Negative Negative    Leuk Esterase, UA Trace (A) Negative    Nitrite, UA Negative Negative    Urobilinogen, UA 1.0 E.U./dL 0.2 - 1.0 E.U./dL   Urinalysis, Microscopic Only - Straight Cath    Collection Time: 10/26/24  2:43 PM    Specimen: Straight Cath; Urine   Result Value Ref Range    RBC, UA 0-2 None Seen, 0-2 /HPF    WBC, UA 0-2 None Seen, 0-2 /HPF    Bacteria, UA None Seen None Seen /HPF    Squamous Epithelial Cells, UA 0-2 None Seen, 0-2 /HPF    Hyaline Casts, UA 3-6 None Seen /LPF    Methodology Automated Microscopy    POC Lactate    Collection Time: 10/26/24  4:31 PM    Specimen: Blood   Result Value Ref Range    Lactate 1.6 0.3 - 2.0 mmol/L     CT Head Without Contrast    Result Date: 10/26/2024  Impression: 1. Mild generalized parenchymal volume loss. No acute intracranial abnormality. Electronically Signed: Lloyd Rodriguez MD  10/26/2024 2:37 PM EDT  Workstation ID: [id number]                                            Medical Decision Making  Differential diagnosis including CVA, metabolic encephalopathy, sepsis, medication reaction    Patient alexander stable throughout the emergency room course.  She does not have focal deficits to suggest CVA, she is afebrile and has no leukocytosis, sepsis felt to be unlikely at this  point.  Urinalysis was negative.  She does have some chronic wounds but no definite cellulitic change.  TSH is marginally elevated may indicate some undertreated hypothyroidism.  Patient remained stable throughout the emergency room course they were advised of findings and agreeable plan of admission.  Case discussed with Donna with the hospitalist service who is agreeable plan of admission    Problems Addressed:  Hallucinations: complicated acute illness or injury  Ulcers of both lower legs: complicated acute illness or injury    Amount and/or Complexity of Data Reviewed  External Data Reviewed: notes.     Details: Recent discharge summary report reviewed describing Levaquin ordered for the leg cellulitis due to positive cultures and Pseudomonas  Labs: ordered. Decision-making details documented in ED Course.     Details: Urinalysis negative for bacteria, CBC no leukocytosis, there is some mild anemia  Radiology: ordered and independent interpretation performed.     Details: My independent interpretation of CT head image no apparent acute hemorrhage or acute abnormality  ECG/medicine tests: ordered and independent interpretation performed.     Details: My EKG interpretation atrial fibrillation, rate of 92, review of previous EKG also showing chronic atrial fibrillation    Risk  Prescription drug management.  Decision regarding hospitalization.        Final diagnoses:   Hallucinations   Ulcers of both lower legs       ED Disposition  ED Disposition       ED Disposition   Decision to Admit    Condition   --    Comment   Level of Care: Med/Surg [1]   Diagnosis: Altered mental status [780.97.ICD-9-CM]   Admitting Physician: JAMES CARLOS [[id number]]   Certification: I Certify That Inpatient Hospital Services Are Medically Necessary For Greater Than 2 Midnights                 No follow-up provider specified.       Medication List      No changes were made to your prescriptions during this visit.            Jose  Kerwin NEVSE MD  10/26/24 8325       Kerwin Garcia MD  10/26/24 [id number]

## 2024-10-26 NOTE — H&P
[facility] Medicine Services  History & Physical    Patient Name: Fallon Nur  : 1945  MRN: [id number]  Primary Care Physician:  Francine Hampton MD  Date of admission: 10/26/2024  Date and Time of Service: 10/26/2024 at 1650    Subjective      Chief Complaint: altered mental status    History of Present Illness: Fallon Nur is a 79 y.o. female with a CMH of atrial fibrillation, blindness, HTN, PVD who presented to [facility] on 10/26/2024 with altered mental status. Patient was just recently discharged here and sent to rehab. Patient's family states she is normally alert, oriented, but over the past couple of days she has become confused. Patient is having hallucinations, and speaking/reaching for people who are there.     Patient was admitted here from 10/9/2024 -10/18/2024 for wounds, hypokalemia, hypertension, PAD, atrial fibrillation and hypothyroidism. Patient had percutaneous mechanical thrombectomy of multiple arteries, and excisional debridement of her right leg wound on 10/14. Patient was eventually sent to rehab with levuin, and was also discharged on xarelto for DVT and afib history.     On ED evaluation today, TSH is 9.980, ammonia 12, lactic 1.6, WBC 7.57, HGB 9.5, UA negative for infection. CT head showed mild generalized parenchymal volume loss, but not acute intracranial abnormality. Patient has severe BLE wounds, with necrotic tissue present, which family states are worse than previous. Podiatry, general surgery have been consulted. Hospitalist team to admit for further medical management.       Review of Systems   Unable to perform ROS: Mental status change       Personal History     Past Medical History:   Diagnosis Date    Atrial fibrillation     Blind 07/10/2023    Cellulitis of leg 2024    Duodenal ulcer, perforated 10/01/2013    [place] - Dr. Peters    2021 - EGD clear      DVT of upper extremity (deep vein thrombosis) 1995     subclavian - premarin      E. 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    Histoplasmosis     Hypertension     Infestation by bed bug 10/09/2024    Legally blind     Longstanding persistent atrial fibrillation 07/10/2023    Macular degeneration 10/15/2024    Nonrheumatic mitral valve regurgitation 10/15/2024    Open wound of both lower extremities 09/12/2024    Primary hypertension 10/15/2024    PVD (peripheral vascular disease)     SBO (small bowel obstruction) 12/31/2020       Past Surgical History:   Procedure Laterality Date    CARDIAC CATHETERIZATION Right 10/14/2024    Procedure: RIGHT LOWER EXTREMITY ARTERIOGRAM, PERCUTANEOUS THROMBECTOMY, ANGIOPLASTY,  AND WOUND DEBRIDEMENT;  Surgeon: Red Hazel II, MD;  Location: [facility] HYBRID OR;  Service: Vascular;  Laterality: Right;    WOUND DEBRIDEMENT Right 9/16/2024    Procedure: RIGHT LEG DEBRIDEMENT;  Surgeon: Red Hazel II, MD;  Location: [facility] MAIN OR;  Service: Vascular;  Laterality: Right;       Family History: family history includes Cancer in her mother and sister; Dementia in her father. Otherwise pertinent FHx was reviewed and not pertinent to current issue.    Social History:  reports that she has quit smoking. She has never used smokeless tobacco. She reports that she does not currently use alcohol. She reports that she does not currently use drugs.    Home Medications:  Prior to Admission Medications       Prescriptions Last Dose Informant Patient Reported? Taking?    alendronate (FOSAMAX) 70 MG tablet   Yes No    Take 1 tablet by mouth Every 7 (Seven) Days. Saturday    atorvastatin (LIPITOR) 40 MG tablet   No No    Take 1 tablet by mouth Every Night.    buPROPion XL (WELLBUTRIN XL) 150 MG 24 hr tablet   Yes No    Take 1 tablet by mouth Daily.    cetirizine (zyrTEC) 10 MG tablet   Yes No    Take 1 tablet by mouth Daily.    cilostazol (PLETAL) 100 MG tablet   No No    Take 1 tablet by mouth 2 (Two) Times a Day.    citalopram (CeleXA)  20 MG tablet   Yes No    Take 1 tablet by mouth Daily.    collagenase (Santyl) 250 UNIT/GM ointment   No No    Apply 1 Application topically to the appropriate area as directed Daily for 30 days.    digoxin (LANOXIN) 125 MCG tablet   Yes No    Take 1 tablet by mouth Daily.    ferrous sulfate 325 (65 FE) MG tablet   Yes No    Take 1 tablet by mouth Daily With Breakfast.    fluticasone (FLONASE) 50 MCG/ACT nasal spray   Yes No    Administer 2 sprays into the nostril(s) as directed by provider Daily As Needed for Rhinitis.    furosemide (LASIX) 20 MG tablet   No No    Take 1 tablet by mouth Daily.    levoFLOXacin (LEVAQUIN) 500 MG tablet   No No    Take 1 tablet by mouth Daily for 10 doses. Indications: Infection of the Skin and/or Soft Tissue    levothyroxine (SYNTHROID, LEVOTHROID) 50 MCG tablet   Yes No    Take 1 tablet by mouth Daily.    Melatonin 12 MG tablet   Yes No    Take 1 tablet by mouth At Night As Needed.    metoprolol succinate XL (TOPROL-XL) 25 MG 24 hr tablet   No No    Take 0.5 tablets by mouth Daily.    multivitamin (Thera) tablet tablet   No No    Take 1 tablet by mouth Daily.    mupirocin (BACTROBAN) 2 % ointment   No No    Apply 1 Application topically to the appropriate area as directed Daily for 10 days.    Nutritional Supplements (Estiven) pack   No No    Take 1 packet by mouth 2 (Two) Times a Day.    oxyCODONE (ROXICODONE) 5 MG immediate release tablet   No No    Take 1 tablet by mouth Every 6 (Six) Hours As Needed for Moderate Pain for up to 10 days.    pantoprazole (PROTONIX) 40 MG EC tablet   Yes No    Take 1 tablet by mouth Daily.    polyethylene glycol (MIRALAX) 17 g packet   No No    Take 17 g by mouth Daily for 30 days.    rivaroxaban (XARELTO) 15 MG tablet   No No    Take 1 tablet by mouth Daily With Dinner. Indications: Atrial Fibrillation    vitamin B-12 (CYANOCOBALAMIN) 1000 MCG tablet   Yes No    Take 1 tablet by mouth Daily.    vitamin D (ERGOCALCIFEROL) 1.25 MG (71025 UT) capsule  capsule   Yes No    Take 1 capsule by mouth 1 (One) Time Per Week. Saturday    zinc sulfate (ZINCATE) 220 (50 Zn) MG capsule   No No    Take 1 capsule by mouth Daily for 30 days.              Allergies:  No Known Allergies    Objective      Vitals:   Temp:  [97.8 °F (36.6 °C)] 97.8 °F (36.6 °C)  Heart Rate:  [] 89  Resp:  [17-18] 18  BP: ()/(55-98) 110/88  There is no height or weight on file to calculate BMI.  Physical Exam  Vitals and nursing note reviewed.   HENT:      Head: Normocephalic and atraumatic.      Nose: Nose normal.      Mouth/Throat:      Mouth: Mucous membranes are moist.   Eyes:      Extraocular Movements: Extraocular movements intact.      Pupils: Pupils are equal, round, and reactive to light.   Cardiovascular:      Rate and Rhythm: Normal rate and regular rhythm.      Pulses: Normal pulses.   Pulmonary:      Effort: Pulmonary effort is normal.      Breath sounds: Normal breath sounds.   Abdominal:      General: Bowel sounds are normal.      Palpations: Abdomen is soft.      Tenderness: There is no abdominal tenderness.   Musculoskeletal:      Cervical back: Normal range of motion and neck supple.   Skin:     General: Skin is warm.      Capillary Refill: Capillary refill takes less than 2 seconds.      Coloration: Skin is pale.      Comments: Please see wound images below     Neurological:      Mental Status: She is alert and oriented to person, place, and time.      Cranial Nerves: Cranial nerves 2-12 are intact.      Motor: Motor function is intact.      Comments: Patient was not confused or showing signs of delirium on my exam            Diagnostic Data:  Lab Results (last 24 hours)       Procedure Component Value Units Date/Time    Comprehensive Metabolic Panel [[id number]]  (Abnormal) Collected: 10/26/24 1431    Specimen: Blood from Arm, Left Updated: 10/26/24 1513     Glucose 87 mg/dL      BUN 16 mg/dL      Creatinine 0.75 mg/dL      Sodium 140 mmol/L      Potassium 3.8 mmol/L       Chloride 103 mmol/L      CO2 33.0 mmol/L      Calcium 8.2 mg/dL      Total Protein 6.2 g/dL      Albumin 2.4 g/dL      ALT (SGPT) 12 U/L      AST (SGOT) 48 U/L      Alkaline Phosphatase 197 U/L      Total Bilirubin 0.5 mg/dL      Globulin 3.8 gm/dL      A/G Ratio 0.6 g/dL      BUN/Creatinine Ratio 21.3     Anion Gap 4.0 mmol/L      eGFR 81.1 mL/min/1.73     Narrative:      GFR Normal >60  Chronic Kidney Disease <60  Kidney Failure <15    The GFR formula is only valid for adults with stable renal function between ages 18 and 70.    TSH [[id number]]  (Abnormal) Collected: 10/26/24 1431    Specimen: Blood from Arm, Left Updated: 10/26/24 1513     TSH 9.980 uIU/mL     Magnesium [[id number]]  (Normal) Collected: 10/26/24 1431    Specimen: Blood from Arm, Left Updated: 10/26/24 1513     Magnesium 2.0 mg/dL     Digoxin Level [[id number]]  (Normal) Collected: 10/26/24 1431    Specimen: Blood from Arm, Left Updated: 10/26/24 1513     Digoxin 1.12 ng/mL     Ammonia [[id number]]  (Normal) Collected: 10/26/24 1431    Specimen: Blood from Arm, Left Updated: 10/26/24 1503     Ammonia 12 umol/L     Urinalysis, Microscopic Only - Straight Cath [[id number]] Collected: 10/26/24 1443    Specimen: Urine from Straight Cath Updated: 10/26/24 1454     RBC, UA 0-2 /HPF      WBC, UA 0-2 /HPF      Comment: Urine culture not indicated.        Bacteria, UA None Seen /HPF      Squamous Epithelial Cells, UA 0-2 /HPF      Hyaline Casts, UA 3-6 /LPF      Methodology Automated Microscopy    Urinalysis With Culture If Indicated - Straight Cath [[id number]]  (Abnormal) Collected: 10/26/24 1443    Specimen: Urine from Straight Cath Updated: 10/26/24 1453     Color, UA Dark Yellow     Appearance, UA Clear     pH, UA 5.5     Specific Gravity, UA 1.018     Glucose, UA Negative     Ketones, UA Negative     Bilirubin, UA Negative     Blood, UA Negative     Protein, UA Negative     Leuk Esterase, UA Trace     Nitrite, UA Negative     Urobilinogen, UA 1.0  E.U./dL    Narrative:      In absence of clinical symptoms, the presence of pyuria, bacteria, and/or nitrites on the urinalysis result does not correlate with infection.    CBC & Differential [[id number]]  (Abnormal) Collected: 10/26/24 1431    Specimen: Blood from Arm, Left Updated: 10/26/24 1446    Narrative:      The following orders were created for panel order CBC & Differential.  Procedure                               Abnormality         Status                     ---------                               -----------         ------                     CBC Auto Differential[[id number]]        Abnormal            Final result                 Please view results for these tests on the individual orders.    CBC Auto Differential [[id number]]  (Abnormal) Collected: 10/26/24 1431    Specimen: Blood from Arm, Left Updated: 10/26/24 1446     WBC 7.57 10*3/mm3      RBC 3.47 10*6/mm3      Hemoglobin 9.5 g/dL      Hematocrit 31.0 %      MCV 89.3 fL      MCH 27.4 pg      MCHC 30.6 g/dL      RDW 19.2 %      RDW-SD 61.0 fl      MPV 9.2 fL      Platelets 460 10*3/mm3      Neutrophil % 78.9 %      Lymphocyte % 7.8 %      Monocyte % 10.4 %      Eosinophil % 2.0 %      Basophil % 0.4 %      Immature Grans % 0.5 %      Neutrophils, Absolute 5.97 10*3/mm3      Lymphocytes, Absolute 0.59 10*3/mm3      Monocytes, Absolute 0.79 10*3/mm3      Eosinophils, Absolute 0.15 10*3/mm3      Basophils, Absolute 0.03 10*3/mm3      Immature Grans, Absolute 0.04 10*3/mm3      nRBC 0.0 /100 WBC     POC Glucose Once [[id number]]  (Normal) Collected: 10/26/24 1437    Specimen: Blood Updated: 10/26/24 1439     Glucose 74 mg/dL      Comment: Serial Number: [id number]:  [id number]                Imaging Results (Last 24 Hours)       Procedure Component Value Units Date/Time    CT Head Without Contrast [[id number]] Collected: 10/26/24 1435     Updated: 10/26/24 1440    Narrative:      CT HEAD WO CONTRAST    Date of Exam: 10/26/2024 2:10 PM  EDT    Indication: confusion.    Comparison: 7/2/2023    Technique: Axial CT images were obtained of the head without contrast administration.  Coronal reconstructions were performed.  Automated exposure control and iterative reconstruction methods were used.      Findings:  There is mild generalized age-appropriate parenchymal volume loss. There is no evidence of acute infarct or hemorrhage. There is a partially calcified extra-axial fluid mass along the inner table of the left frontal bone favored to be due to a   meningioma. This is unchanged from prior study. No mass effect or hydrocephalus. There is no evidence of acute skull fracture.    Visualized paranasal sinuses demonstrate mild mucosal thickening predominant within the maxillary sinuses compatible changes of chronic sinusitis.    Globes and orbits are within normal limits.      Impression:      Impression:    1. Mild generalized parenchymal volume loss. No acute intracranial abnormality.      Electronically Signed: Lloyd Rodriguez MD    10/26/2024 2:37 PM EDT    Workstation ID: [id number]              Assessment & Plan        This is a 79 y.o. female with:    Active and Resolved Problems  Active Hospital Problems    Diagnosis  POA    **Altered mental status [R41.82]  Yes      Resolved Hospital Problems   No resolved problems to display.       Altered mental status  - likely 2/2 delirum r/t hospitalizations   - review medications once med rec complete for possible causes   - WBC 7.57, lactic 1.6, ammonia 12  - CT head negative for acute abnormality   - UA negative for infection       Chronic bilateral lower extremity wounds  PAD  - s/p thrombectomy and debridement with vascular surgery   - wound care nurse consult   - IV antibiotics   - vascular surgery and podiatry consulted    Atrial fibrillation/ Recent DVT  - continue xarelto in light of very recent DVT  - currently rate controlled  - continue home medications once med rec complete    Hypertension   - BP  currently stable at 110/88  - continue home medications once medication reconciliation complete     Hypothyroidism   - TSH elevated at 9.980  - T4 1.48  - likely 2/2 not receiving levothyroxine on empty stomach   - consider increasing levothyroxine dosage      VTE Prophylaxis:  No VTE prophylaxis order currently exists.        The patient desires to be as follows:    CODE STATUS:         Hilda Springer, who can be contacted at [phone], is the designated person to make medical decisions on the patient's behalf if She is incapable of doing so. This was clarified with patient and/or next of kin on 10/26/2024 during the course of this H&P.    Admission Status:  I believe this patient meets inpatient status.    Expected Length of Stay: > 48 hours    PDMP and Medication Dispenses via Sidebar reviewed and consistent with patient reported medications.    I discussed the patient's findings and my recommendations with patient and family.      Signature:     This document has been electronically signed by AZAEL Hernandez on October 26, 2024 16:23 EDT   [facility] Hospitalist Team

## 2024-10-26 NOTE — LETTER
EMS Transport Request  For use at [facility], [place], [place], [place], and [place] only   Patient Name: Fallon Nur : 1945   Weight:60.3 kg (133 lb) Pick-up Location: [facility] BLS/ALS: BLS/ALS: BLS   Insurance: [employer] MEDICARE REPLACEMENT Auth End Date: 2024   Pre-Cert #: D/C Summary complete:    Destination: [facility]   Contact Precautions: Other HX: History of ESBLE, E.Coli    Equipment (O2, Fluids, etc.): None   Arrive By Date/Time: 2024 Stretcher/WC: Stretcher   CM Requesting: Andree Morocho RN Ext: [phone]   Notes/Medical Necessity: Bed Ridden       ______________________________________________________________________    *Only 2 patient bags OR 1 carry-on size bag are permitted.  Wheelchairs and walkers CANNOT transported with the patient. Acknowledge: Yes

## 2024-10-26 NOTE — LETTER
EMS Transport Request  For use at [facility], [place], [place], [place], and [place] only   Patient Name: Fallon Nur : 1945   Weight:60.3 kg (133 lb) Pick[facility]up Location[facility] BLS/ALS: BLS/ALS: BLS   Insurance: [employer] MEDICARE REPLACEMENT Auth End Date: 10/31/2024   Pre-Cert #: D/C Summary complete:    Destination: [address] [place] [facility]    Contact Precautions: Other HX OF ESBL    Equipment (O2, Fluids, etc.): None   Arrive By Date/Time: 10/31/24  WILL CALL  Stretcher/WC: Stretcher   CM Requesting: Azalea Edward RN Ext: [phone]   Notes/Medical Necessity: UNABLE TO AMBULATE, POOR TRUNK CONTROL      ______________________________________________________________________    *Only 2 patient bags OR 1 carry-on size bag are permitted.  Wheelchairs and walkers CANNOT transported with the patient. Acknowledge: Yes

## 2024-10-26 NOTE — ED NOTES
Nursing report ED to floor  Fallon Nur  79 y.o.  female    HPI:   Chief Complaint   Patient presents with    Altered Mental Status     Pt from [facility], reportedly A+Ox4 but has been altered and hallucinating per staff since yesterday. Pt has been on fentanyl patches reportedly due to pain from wounds in legs after a mini fridge fell on her some time ago. No stroke like symptoms, + per EMS.        Admitting doctor:   Surinder Schumacher MD    Admitting diagnosis:   The primary encounter diagnosis was Hallucinations. A diagnosis of Ulcers of both lower legs was also pertinent to this visit.    Code status:   Current Code Status       Date Active Code Status Order ID Comments User Context       10/26/2024 1646 CPR (Attempt to Resuscitate) [id number]  Lilibeth Marsh APRN ED        Question Answer    Code Status (Patient has no pulse and is not breathing) CPR (Attempt to Resuscitate)    Medical Interventions (Patient has pulse or is breathing) Full Support                    Allergies:   Patient has no known allergies.    Isolation:  Contact     Fall Risk:  Fall Risk Assessment was completed, and patient is at high risk for falls.   Predictive Model Details         15 (Low) Factor Value    Calculated 10/26/2024 18:52 Age 79    Risk of Fall Model Active Peripheral IV Present     Imaging order in this encounter Present     Respiratory Rate 18     Magnesium 2 mg/dL     Albumin 2.4 g/dL     Calcium 8.2 mg/dL     Jose Scale not on file     Number of Distinct Medication Classes administered 2     Diastolic BP 76     Total Bilirubin 0.5 mg/dL     Days after Admission 0.225     Chloride 103 mmol/L     Tobacco Use Quit     ALT 12 U/L     Potassium 3.8 mmol/L     Creatinine 0.75 mg/dL         Weight:   There were no vitals filed for this visit.    Intake and Output  No intake or output data in the 24 hours ending 10/26/24 1852    Diet:   Dietary Orders (From admission, onward)       Start     Ordered    10/26/24  1644  NPO Diet NPO Type: Strict NPO  Diet Effective Now        Question:  NPO Type  Answer:  Strict NPO    10/26/24 1646                     Most recent vitals:   Vitals:    10/26/24 1835 10/26/24 1840 10/26/24 1843 10/26/24 1846   BP: 113/40   130/76   Pulse: 83  88 88   Resp:       Temp:       TempSrc:       SpO2:  98% 96%        Active LDAs/IV Access:   Lines, Drains & Airways       Active LDAs       Name Placement date Placement time Site Days    Peripheral IV 10/26/24 1432 Anterior;Left;Upper Arm 10/26/24  1432  Arm  less than 1    Peripheral IV 10/26/24 1824 Posterior;Right Hand 10/26/24  1824  Hand  less than 1                    Skin Condition:   Skin Assessments (last day)       None             Labs (abnormal labs have a star):   Labs Reviewed   COMPREHENSIVE METABOLIC PANEL - Abnormal; Notable for the following components:       Result Value    CO2 33.0 (*)     Calcium 8.2 (*)     Albumin 2.4 (*)     AST (SGOT) 48 (*)     Alkaline Phosphatase 197 (*)     Anion Gap 4.0 (*)     All other components within normal limits    Narrative:     GFR Normal >60  Chronic Kidney Disease <60  Kidney Failure <15    The GFR formula is only valid for adults with stable renal function between ages 18 and 70.   URINALYSIS W/ CULTURE IF INDICATED - Abnormal; Notable for the following components:    Color, UA Dark Yellow (*)     Leuk Esterase, UA Trace (*)     All other components within normal limits    Narrative:     In absence of clinical symptoms, the presence of pyuria, bacteria, and/or nitrites on the urinalysis result does not correlate with infection.   TSH - Abnormal; Notable for the following components:    TSH 9.980 (*)     All other components within normal limits   CBC WITH AUTO DIFFERENTIAL - Abnormal; Notable for the following components:    RBC 3.47 (*)     Hemoglobin 9.5 (*)     Hematocrit 31.0 (*)     MCHC 30.6 (*)     RDW 19.2 (*)     RDW-SD 61.0 (*)     Platelets 460 (*)     Neutrophil % 78.9 (*)      Lymphocyte % 7.8 (*)     Lymphocytes, Absolute 0.59 (*)     All other components within normal limits   MAGNESIUM - Normal   AMMONIA - Normal   DIGOXIN LEVEL - Normal   T4, FREE - Normal   POCT GLUCOSE FINGERSTICK - Normal   POC LACTATE - Normal   BLOOD CULTURE   BLOOD CULTURE   URINALYSIS, MICROSCOPIC ONLY   POC LACTATE   CBC AND DIFFERENTIAL    Narrative:     The following orders were created for panel order CBC & Differential.  Procedure                               Abnormality         Status                     ---------                               -----------         ------                     CBC Auto Differential[[id number]]        Abnormal            Final result                 Please view results for these tests on the individual orders.       LOC: Person    Telemetry:  Med/Surg    Cardiac Monitoring Ordered: yes    EKG:   ECG 12 Lead Altered Mental Status   Preliminary Result   HEART RATE=92  bpm   RR Dexmoaqa=803  ms   AR Interval=  ms   P Horizontal Axis=  deg   P Front Axis=  deg   QRSD Interval=88  ms   QT Kvlqzgbx=293  ms   UGbE=753  ms   QRS Axis=-15  deg   T Wave Axis=58  deg   - ABNORMAL ECG -   Atrial fibrillation   Inferior infarct, old   Date and Time of Study:2024-10-26 14:20:25          Medications Given in the ED:   Medications   sodium chloride 0.9 % flush 10 mL (has no administration in time range)   nitroglycerin (NITROSTAT) SL tablet 0.4 mg (has no administration in time range)   Potassium Replacement - Follow Nurse / BPA Driven Protocol (has no administration in time range)   Magnesium Standard Dose Replacement - Follow Nurse / BPA Driven Protocol (has no administration in time range)   Phosphorus Replacement - Follow Nurse / BPA Driven Protocol (has no administration in time range)   Calcium Replacement - Follow Nurse / BPA Driven Protocol (has no administration in time range)   ibuprofen (ADVIL,MOTRIN) tablet 200 mg (has no administration in time range)   sennosides-docusate  (PERICOLACE) 8.6-50 MG per tablet 2 tablet (has no administration in time range)     And   polyethylene glycol (MIRALAX) packet 17 g (has no administration in time range)     And   bisacodyl (DULCOLAX) EC tablet 5 mg (has no administration in time range)     And   bisacodyl (DULCOLAX) suppository 10 mg (has no administration in time range)   ondansetron ODT (ZOFRAN-ODT) disintegrating tablet 4 mg (has no administration in time range)     Or   ondansetron (ZOFRAN) injection 4 mg (has no administration in time range)   Pharmacy to Dose Zosyn (has no administration in time range)   piperacillin-tazobactam (ZOSYN) 3.375 g IVPB in 100 mL NS MBP (CD) (3.375 g Intravenous New Bag 10/26/24 1824)   piperacillin-tazobactam (ZOSYN) 3.375 g IVPB in 100 mL NS MBP (CD) (has no administration in time range)       Imaging results:  CT Head Without Contrast    Result Date: 10/26/2024  Impression: 1. Mild generalized parenchymal volume loss. No acute intracranial abnormality. Electronically Signed: Lloyd Rodriguez MD  10/26/2024 2:37 PM EDT  Workstation ID: [id number]     Social issues:   Social History     Socioeconomic History    Marital status:    Tobacco Use    Smoking status: Former    Smokeless tobacco: Never   Vaping Use    Vaping status: Never Used   Substance and Sexual Activity    Alcohol use: Not Currently    Drug use: Not Currently    Sexual activity: Defer       NIH Stroke Scale:  Interval: (not recorded)  1a. Level of Consciousness: (not recorded)  1b. LOC Questions: (not recorded)  1c. LOC Commands: (not recorded)  2. Best Gaze: (not recorded)  3. Visual: (not recorded)  4. Facial Palsy: (not recorded)  5a. Motor Arm, Left: (not recorded)  5b. Motor Arm, Right: (not recorded)  6a. Motor Leg, Left: (not recorded)  6b. Motor Leg, Right: (not recorded)  7. Limb Ataxia: (not recorded)  8. Sensory: (not recorded)  9. Best Language: (not recorded)  10. Dysarthria: (not recorded)  11. Extinction and Inattention (formerly  Neglect): (not recorded)    Total (NIH Stroke Scale): (not recorded)     Additional notable assessment information:     Nursing report ED to floor:  RALEIGH Henson RN   10/26/24 18:52 EDT

## 2024-10-26 NOTE — Clinical Note
Level of Care: Telemetry [5]   Admitting Physician: JAMES CARLOS [[id number]]   Attending Physician: JAMES CARLOS [[id number]]

## 2024-10-26 NOTE — ED NOTES
"Pt arrived with ace wraps to bilateral lower legs, gauze roll dressing beneath and felt-appearing dressings beneath that. Old dressing removed, serosanguineous drainage noted to right leg dressing. Felt material removed by irrigating with saline. Pt has extensive wound to right side of right lower leg, area easily palm-sized, slough tissue and pink granulation tissue noted. No active drainage at this time. Pt has ulcer between right great toe and second toe.    Pt has area of \"boggy\" pink skin beneath left knee approx 1.5x3cm, no open wound there.     Pt has approx 5cm long wound to the back of her left ankle near her achilles tendon, no active drainage however there does appear to be exposed tendon. Family at bedside reports the exposed tendon is new and the wound in this location was previously small.     Pt's wounds all irrigated with saline and cleansed with wound cleanser. Wet to dry dressings applied to bilateral lower legs and between affected right first and second toes, bordered gauze dressing applied to smaller area of skin breakdown beneath left knee. ABD pads applied above saline-moistened gauze, gauze rolls applied. Pt tolerated well, faint pedal pulses palpable bilaterally. Skin warm, pink, dry in areas that are intact.   "

## 2024-10-27 PROBLEM — I70.242: Status: ACTIVE | Noted: 2024-10-27

## 2024-10-27 LAB
ANION GAP SERPL CALCULATED.3IONS-SCNC: 8 MMOL/L (ref 5–15)
BASOPHILS # BLD AUTO: 0.07 10*3/MM3 (ref 0–0.2)
BASOPHILS NFR BLD AUTO: 0.7 % (ref 0–1.5)
BUN SERPL-MCNC: 14 MG/DL (ref 8–23)
BUN/CREAT SERPL: 20.3 (ref 7–25)
CALCIUM SPEC-SCNC: 7.9 MG/DL (ref 8.6–10.5)
CHLORIDE SERPL-SCNC: 103 MMOL/L (ref 98–107)
CO2 SERPL-SCNC: 29 MMOL/L (ref 22–29)
CREAT SERPL-MCNC: 0.69 MG/DL (ref 0.57–1)
DEPRECATED RDW RBC AUTO: 61.1 FL (ref 37–54)
EGFRCR SERPLBLD CKD-EPI 2021: 88.4 ML/MIN/1.73
EOSINOPHIL # BLD AUTO: 0.27 10*3/MM3 (ref 0–0.4)
EOSINOPHIL NFR BLD AUTO: 2.9 % (ref 0.3–6.2)
ERYTHROCYTE [DISTWIDTH] IN BLOOD BY AUTOMATED COUNT: 19 % (ref 12.3–15.4)
GLUCOSE SERPL-MCNC: 59 MG/DL (ref 65–99)
HCT VFR BLD AUTO: 29.6 % (ref 34–46.6)
HGB BLD-MCNC: 9 G/DL (ref 12–15.9)
IMM GRANULOCYTES # BLD AUTO: 0.05 10*3/MM3 (ref 0–0.05)
IMM GRANULOCYTES NFR BLD AUTO: 0.5 % (ref 0–0.5)
LYMPHOCYTES # BLD AUTO: 0.69 10*3/MM3 (ref 0.7–3.1)
LYMPHOCYTES NFR BLD AUTO: 7.3 % (ref 19.6–45.3)
MCH RBC QN AUTO: 27.1 PG (ref 26.6–33)
MCHC RBC AUTO-ENTMCNC: 30.4 G/DL (ref 31.5–35.7)
MCV RBC AUTO: 89.2 FL (ref 79–97)
MONOCYTES # BLD AUTO: 0.83 10*3/MM3 (ref 0.1–0.9)
MONOCYTES NFR BLD AUTO: 8.8 % (ref 5–12)
NEUTROPHILS NFR BLD AUTO: 7.48 10*3/MM3 (ref 1.7–7)
NEUTROPHILS NFR BLD AUTO: 79.8 % (ref 42.7–76)
NRBC BLD AUTO-RTO: 0 /100 WBC (ref 0–0.2)
PLATELET # BLD AUTO: 450 10*3/MM3 (ref 140–450)
PMV BLD AUTO: 9.3 FL (ref 6–12)
POTASSIUM SERPL-SCNC: 3.4 MMOL/L (ref 3.5–5.2)
POTASSIUM SERPL-SCNC: 3.9 MMOL/L (ref 3.5–5.2)
QT INTERVAL: 376 MS
QTC INTERVAL: 465 MS
RBC # BLD AUTO: 3.32 10*6/MM3 (ref 3.77–5.28)
SODIUM SERPL-SCNC: 140 MMOL/L (ref 136–145)
WBC NRBC COR # BLD AUTO: 9.39 10*3/MM3 (ref 3.4–10.8)

## 2024-10-27 PROCEDURE — 25010000002 PIPERACILLIN SOD-TAZOBACTAM PER 1 G

## 2024-10-27 PROCEDURE — 25010000002 KETOROLAC TROMETHAMINE PER 15 MG: Performed by: STUDENT IN AN ORGANIZED HEALTH CARE EDUCATION/TRAINING PROGRAM

## 2024-10-27 PROCEDURE — 75625 CONTRAST EXAM ABDOMINL AORTA: CPT | Performed by: STUDENT IN AN ORGANIZED HEALTH CARE EDUCATION/TRAINING PROGRAM

## 2024-10-27 PROCEDURE — 84132 ASSAY OF SERUM POTASSIUM: CPT

## 2024-10-27 PROCEDURE — 99222 1ST HOSP IP/OBS MODERATE 55: CPT | Performed by: SURGERY

## 2024-10-27 PROCEDURE — 75710 ARTERY X-RAYS ARM/LEG: CPT | Performed by: STUDENT IN AN ORGANIZED HEALTH CARE EDUCATION/TRAINING PROGRAM

## 2024-10-27 PROCEDURE — 85025 COMPLETE CBC W/AUTO DIFF WBC: CPT

## 2024-10-27 PROCEDURE — 25810000003 SODIUM CHLORIDE 0.9 % SOLUTION: Performed by: HOSPITALIST

## 2024-10-27 PROCEDURE — 99221 1ST HOSP IP/OBS SF/LOW 40: CPT | Performed by: PODIATRIST

## 2024-10-27 PROCEDURE — 80048 BASIC METABOLIC PNL TOTAL CA: CPT

## 2024-10-27 PROCEDURE — 87481 CANDIDA DNA AMP PROBE: CPT

## 2024-10-27 PROCEDURE — 37232 PR REVSC OPN/PRQ TIB/PERO W/ANGIOPLASTY UNI EA VSL: CPT | Performed by: STUDENT IN AN ORGANIZED HEALTH CARE EDUCATION/TRAINING PROGRAM

## 2024-10-27 PROCEDURE — 92610 EVALUATE SWALLOWING FUNCTION: CPT

## 2024-10-27 PROCEDURE — 25010000002 KETOROLAC TROMETHAMINE PER 15 MG

## 2024-10-27 PROCEDURE — 25010000002 POTASSIUM CHLORIDE 10 MEQ/100ML SOLUTION

## 2024-10-27 RX ORDER — ATORVASTATIN CALCIUM 40 MG/1
40 TABLET, FILM COATED ORAL NIGHTLY
Status: DISCONTINUED | OUTPATIENT
Start: 2024-10-27 | End: 2024-11-01 | Stop reason: HOSPADM

## 2024-10-27 RX ORDER — POTASSIUM CHLORIDE 7.45 MG/ML
10 INJECTION INTRAVENOUS
Status: COMPLETED | OUTPATIENT
Start: 2024-10-27 | End: 2024-10-27

## 2024-10-27 RX ORDER — DIGOXIN 125 MCG
125 TABLET ORAL
Status: DISCONTINUED | OUTPATIENT
Start: 2024-10-27 | End: 2024-11-01 | Stop reason: HOSPADM

## 2024-10-27 RX ORDER — SODIUM CHLORIDE 9 MG/ML
100 INJECTION, SOLUTION INTRAVENOUS CONTINUOUS
Status: DISPENSED | OUTPATIENT
Start: 2024-10-27 | End: 2024-10-28

## 2024-10-27 RX ORDER — KETOROLAC TROMETHAMINE 30 MG/ML
15 INJECTION, SOLUTION INTRAMUSCULAR; INTRAVENOUS EVERY 6 HOURS PRN
Status: DISCONTINUED | OUTPATIENT
Start: 2024-10-27 | End: 2024-10-27

## 2024-10-27 RX ORDER — ASPIRIN 81 MG/1
81 TABLET, CHEWABLE ORAL DAILY
Status: DISCONTINUED | OUTPATIENT
Start: 2024-10-27 | End: 2024-11-01 | Stop reason: HOSPADM

## 2024-10-27 RX ORDER — KETOROLAC TROMETHAMINE 30 MG/ML
15 INJECTION, SOLUTION INTRAMUSCULAR; INTRAVENOUS EVERY 6 HOURS PRN
Status: DISPENSED | OUTPATIENT
Start: 2024-10-27 | End: 2024-10-31

## 2024-10-27 RX ADMIN — PIPERACILLIN AND TAZOBACTAM 3.38 G: 3; .375 INJECTION, POWDER, FOR SOLUTION INTRAVENOUS at 08:37

## 2024-10-27 RX ADMIN — ATORVASTATIN CALCIUM 40 MG: 40 TABLET, FILM COATED ORAL at 20:41

## 2024-10-27 RX ADMIN — PIPERACILLIN AND TAZOBACTAM 3.38 G: 3; .375 INJECTION, POWDER, FOR SOLUTION INTRAVENOUS at 02:33

## 2024-10-27 RX ADMIN — POTASSIUM CHLORIDE 10 MEQ: 10 INJECTION, SOLUTION INTRAVENOUS at 03:18

## 2024-10-27 RX ADMIN — SODIUM CHLORIDE 100 ML/HR: 9 INJECTION, SOLUTION INTRAVENOUS at 17:29

## 2024-10-27 RX ADMIN — Medication 10 ML: at 20:42

## 2024-10-27 RX ADMIN — PIPERACILLIN AND TAZOBACTAM 3.38 G: 3; .375 INJECTION, POWDER, FOR SOLUTION INTRAVENOUS at 17:33

## 2024-10-27 RX ADMIN — KETOROLAC TROMETHAMINE 15 MG: 30 INJECTION, SOLUTION INTRAMUSCULAR at 02:33

## 2024-10-27 RX ADMIN — POTASSIUM CHLORIDE 10 MEQ: 10 INJECTION, SOLUTION INTRAVENOUS at 05:16

## 2024-10-27 RX ADMIN — POTASSIUM CHLORIDE 10 MEQ: 10 INJECTION, SOLUTION INTRAVENOUS at 06:15

## 2024-10-27 RX ADMIN — KETOROLAC TROMETHAMINE 15 MG: 30 INJECTION, SOLUTION INTRAMUSCULAR; INTRAVENOUS at 23:42

## 2024-10-27 RX ADMIN — POTASSIUM CHLORIDE 10 MEQ: 10 INJECTION, SOLUTION INTRAVENOUS at 04:21

## 2024-10-27 NOTE — PLAN OF CARE
Problem: Adult Inpatient Plan of Care  Goal: Plan of Care Review  Outcome: Progressing  Flowsheets (Taken 10/27/2024 1012)  Plan of Care Reviewed With:   patient   family  Goal: Patient-Specific Goal (Individualized)  Outcome: Progressing  Goal: Absence of Hospital-Acquired Illness or Injury  Outcome: Progressing  Intervention: Identify and Manage Fall Risk  Recent Flowsheet Documentation  Taken 10/27/2024 1000 by Ene Daniels RN  Safety Promotion/Fall Prevention:   activity supervised   assistive device/personal items within reach   clutter free environment maintained   fall prevention program maintained   nonskid shoes/slippers when out of bed   room organization consistent   safety round/check completed  Taken 10/27/2024 0743 by Ene Daniels RN  Safety Promotion/Fall Prevention:   activity supervised   assistive device/personal items within reach   clutter free environment maintained   fall prevention program maintained   nonskid shoes/slippers when out of bed   room organization consistent   safety round/check completed  Intervention: Prevent Skin Injury  Recent Flowsheet Documentation  Taken 10/27/2024 1000 by Ene Daniels RN  Body Position:   turned   left   supine   weight shifting  Taken 10/27/2024 0743 by Ene Daniels RN  Body Position:   turned   right   supine   weight shifting  Intervention: Prevent Infection  Recent Flowsheet Documentation  Taken 10/27/2024 1000 by Ene Daniels RN  Infection Prevention:   environmental surveillance performed   equipment surfaces disinfected   hand hygiene promoted  Taken 10/27/2024 0743 by Ene Daniels RN  Infection Prevention:   hand hygiene promoted   equipment surfaces disinfected   environmental surveillance performed  Goal: Optimal Comfort and Wellbeing  Outcome: Progressing  Intervention: Provide Person-Centered Care  Recent Flowsheet Documentation  Taken 10/27/2024 0743 by Ene Daniels RN  Trust Relationship/Rapport:   care explained    choices provided   emotional support provided   empathic listening provided   questions answered   questions encouraged   reassurance provided   thoughts/feelings acknowledged  Goal: Readiness for Transition of Care  Outcome: Progressing     Problem: Pain Acute  Goal: Optimal Pain Control and Function  Outcome: Progressing  Intervention: Optimize Psychosocial Wellbeing  Recent Flowsheet Documentation  Taken 10/27/2024 0743 by Ene Daniels RN  Supportive Measures:   active listening utilized   verbalization of feelings encouraged  Diversional Activities: television  Intervention: Prevent or Manage Pain  Recent Flowsheet Documentation  Taken 10/27/2024 1000 by Ene Daniels RN  Medication Review/Management: medications reviewed  Taken 10/27/2024 0743 by Ene Daniels RN  Sensory Stimulation Regulation: care clustered  Medication Review/Management: medications reviewed     Problem: Wound  Goal: Optimal Coping  Outcome: Progressing  Intervention: Support Patient and Family Response  Recent Flowsheet Documentation  Taken 10/27/2024 0743 by Ene Daniels RN  Supportive Measures:   active listening utilized   verbalization of feelings encouraged  Family/Support System Care:   self-care encouraged   support provided  Goal: Optimal Functional Ability  Outcome: Progressing  Intervention: Optimize Functional Ability  Recent Flowsheet Documentation  Taken 10/27/2024 1000 by Ene Daniels RN  Activity Management: activity encouraged  Activity Assistance Provided: assistance, 2 people  Taken 10/27/2024 0743 by Ene Daniels RN  Activity Management: activity encouraged  Activity Assistance Provided: assistance, 2 people  Goal: Absence of Infection Signs and Symptoms  Outcome: Progressing  Intervention: Prevent or Manage Infection  Recent Flowsheet Documentation  Taken 10/27/2024 1000 by Ene Daniels RN  Isolation Precautions:   precautions maintained   contact  Taken 10/27/2024 0743 by Ene Daniels RN  Isolation  Precautions:   precautions discontinued   contact  Goal: Improved Oral Intake  Outcome: Progressing  Goal: Optimal Pain Control and Function  Outcome: Progressing  Goal: Skin Health and Integrity  Outcome: Progressing  Intervention: Optimize Skin Protection  Recent Flowsheet Documentation  Taken 10/27/2024 1000 by Ene Daniels RN  Activity Management: activity encouraged  Head of Bed (HOB) Positioning: HOB elevated  Taken 10/27/2024 0743 by Ene Daniels RN  Activity Management: activity encouraged  Pressure Reduction Techniques:   frequent weight shift encouraged   weight shift assistance provided  Head of Bed (HOB) Positioning: HOB elevated  Pressure Reduction Devices: pressure-redistributing mattress utilized  Goal: Optimal Wound Healing  Outcome: Progressing     Problem: Confusion Acute  Goal: Optimal Cognitive Function  Outcome: Progressing  Intervention: Minimize Contributing Factors  Recent Flowsheet Documentation  Taken 10/27/2024 0743 by Ene Daniels RN  Sensory Stimulation Regulation: care clustered  Communication Support Strategies: active listening utilized     Problem: Skin Injury Risk Increased  Goal: Skin Health and Integrity  Outcome: Progressing  Intervention: Optimize Skin Protection  Recent Flowsheet Documentation  Taken 10/27/2024 1000 by Ene Daniels RN  Activity Management: activity encouraged  Head of Bed (HOB) Positioning: HOB elevated  Taken 10/27/2024 0743 by Ene Daniels RN  Activity Management: activity encouraged  Pressure Reduction Techniques:   frequent weight shift encouraged   weight shift assistance provided  Head of Bed (HOB) Positioning: HOB elevated  Pressure Reduction Devices: pressure-redistributing mattress utilized     Problem: Fall Injury Risk  Goal: Absence of Fall and Fall-Related Injury  Outcome: Progressing  Intervention: Identify and Manage Contributors  Recent Flowsheet Documentation  Taken 10/27/2024 1000 by Ene Daniels RN  Medication  Review/Management: medications reviewed  Self-Care Promotion:   independence encouraged   BADL personal objects within reach  Taken 10/27/2024 0743 by Ene Daniels RN  Medication Review/Management: medications reviewed  Self-Care Promotion:   independence encouraged   BADL personal objects within reach  Intervention: Promote Injury-Free Environment  Recent Flowsheet Documentation  Taken 10/27/2024 1000 by Ene Daniels RN  Safety Promotion/Fall Prevention:   activity supervised   assistive device/personal items within reach   clutter free environment maintained   fall prevention program maintained   nonskid shoes/slippers when out of bed   room organization consistent   safety round/check completed  Taken 10/27/2024 0743 by Ene Daniels RN  Safety Promotion/Fall Prevention:   activity supervised   assistive device/personal items within reach   clutter free environment maintained   fall prevention program maintained   nonskid shoes/slippers when out of bed   room organization consistent   safety round/check completed     Problem: Comorbidity Management  Goal: Blood Pressure in Desired Range  Outcome: Progressing  Intervention: Maintain Blood Pressure Management  Recent Flowsheet Documentation  Taken 10/27/2024 1000 by Ene Daniels RN  Medication Review/Management: medications reviewed  Taken 10/27/2024 0743 by Ene Daniels RN  Medication Review/Management: medications reviewed   Goal Outcome Evaluation:  Plan of Care Reviewed With: patient, family            Patient alert/self, with period sof confusion, from [facility] for possible associated with overdose from transdermal fentanyl patch r/t increased lethargy. BLIND, NPO, staff turning q 2 hours and as needed, family at bedside, reviewed plan of care with family and patient, clean and dry, call light in reach. From [facility].   Remains high falls precautions. Continues on IV antibiotics  New order to offload heels and distal calves by  placing pillow(s) beneath distal thighs, popliteal areas and proximal calves   Patient will need an outpatient appointment with [facility] 1-2 weeks after d/c.   Per vascular wound care can dictate what needs to be done with wound care.      Speech to eval and clear from NPO status

## 2024-10-27 NOTE — THERAPY EVALUATION
Acute Care - Speech Language Pathology   Swallow Initial Evaluation ALEA Arita     Patient Name: Fallon Nur  : 1945  MRN: [id number]  Today's Date: 10/27/2024               Admit Date: 10/26/2024    Visit Dx:     ICD-10-CM ICD-9-CM   1. Hallucinations  R44.3 780.1   2. Ulcers of both lower legs  L97.919 707.10    L97.929      Patient Active Problem List   Diagnosis    H/O gastric bypass    Hypothyroidism    Chronic back pain    Ventral hernia    GERD (gastroesophageal reflux disease)    Blind    Longstanding persistent atrial fibrillation    Open wound of both lower extremities    Acute hypokalemia    Normocytic anemia    Anxiety and depression    Wound of lower extremity    Infected wound    PAD (peripheral artery disease)    Atherosclerosis of native arteries of right leg with ulceration of calf    Nonrheumatic mitral valve regurgitation    Primary hypertension    B12 deficiency    Chronic foot pain    Compression fracture of cervical spine    Histoplasmosis    History of compression fracture of spine    Insomnia    Knee osteoarthritis    Macular degeneration    Osteoporosis    Vitamin D deficiency    Altered mental status    Atherosclerosis of native arteries of left leg with ulceration of calf     Past Medical History:   Diagnosis Date    Atrial fibrillation     Blind 07/10/2023    Cellulitis of leg 2024    Duodenal ulcer, perforated 10/01/2013    Juan J Peters    2021 - EGD clear      DVT of upper extremity (deep vein thrombosis) 1995    subclavian - premarin      E. coli UTI 2020    H/O gastric bypass 2020    Histoplasmosis     Hypertension     Infestation by bed bug 10/09/2024    Legally blind     Longstanding persistent atrial fibrillation 07/10/2023    Macular degeneration 10/15/2024    Nonrheumatic mitral valve regurgitation 10/15/2024    Open wound of both lower extremities 2024    Primary hypertension 10/15/2024    PVD (peripheral vascular disease)     SBO  (small bowel obstruction) 12/31/2020     Past Surgical History:   Procedure Laterality Date    CARDIAC CATHETERIZATION Right 10/14/2024    Procedure: RIGHT LOWER EXTREMITY ARTERIOGRAM, PERCUTANEOUS THROMBECTOMY, ANGIOPLASTY,  AND WOUND DEBRIDEMENT;  Surgeon: Red Hazel II, MD;  Location: [facility] HYBRID OR;  Service: Vascular;  Laterality: Right;    WOUND DEBRIDEMENT Right 9/16/2024    Procedure: RIGHT LEG DEBRIDEMENT;  Surgeon: Red Hazel II, MD;  Location: [facility] MAIN OR;  Service: Vascular;  Laterality: Right;       SLP Recommendation and Plan  SLP Swallowing Diagnosis: functional oral phase, functional pharyngeal phase (10/27/24 1800)  SLP Diet Recommendation: regular textures, thin liquids (10/27/24 1800)  Recommended Precautions and Strategies: upright posture during/after eating, small bites of food and sips of liquid, alternate between small bites of food and sips of liquid, general aspiration precautions, reflux precautions (10/27/24 1800)  SLP Rec. for Method of Medication Administration: meds whole, with thin liquids, as tolerated (10/27/24 1800)     Monitor for Signs of Aspiration: yes, notify SLP if any concerns, cough, gurgly voice, throat clearing (10/27/24 1800)     Swallow Criteria for Skilled Therapeutic Interventions Met: baseline status, no problems identified which require skilled intervention (10/27/24 1800)  Anticipated Discharge Disposition (SLP): inpatient rehabilitation facility (10/27/24 1800)  Rehab Potential/Prognosis, Swallowing: good, to achieve stated therapy goals (10/27/24 1800)  Therapy Frequency (Swallow): evaluation only (10/27/24 1800)     Oral Care Recommendations: Oral Care BID/PRN (10/27/24 1800)                                               SWALLOW EVALUATION (Last 72 Hours)       SLP Adult Swallow Evaluation       Row Name 10/27/24 1800       Rehab Evaluation    Document Type evaluation  -CP    Subjective Information no complaints  -CP    Patient Observations  alert;cooperative  -CP    Patient/Family/Caregiver Comments/Observations Many family members in the room.  -CP    Patient Effort good  -CP    Comment Pt had been obtunded and minimally responsive but awakened right before swallow evaluation and was at baseline.  -CP       General Information    Patient Profile Reviewed yes  -CP    Pertinent History Of Current Problem Faloln Nur is a 79 y.o. female with a CMH of atrial fibrillation, blindness, HTN, PVD who presented to [facility] on 10/26/2024 with altered mental status. Patient was just recently discharged here and sent to rehab. Patient's family states she is normally alert, oriented, but over the past couple of days she has become confused. Patient is having hallucinations, and speaking/reaching for people who are there.      Patient was admitted here from 10/9/2024 -10/18/2024 for wounds, hypokalemia, hypertension, PAD, atrial fibrillation and hypothyroidism. Patient had percutaneous mechanical thrombectomy of multiple arteries, and excisional debridement of her right leg wound on 10/14. Patient was eventually sent to rehab with levaquin, and was also discharged on xarelto for DVT and afib history.  Pt had been minimally responsive upon admission and failed the swlalwo screen. Pt awakened and was at baseline right before swallow evaluation.  -CP    Current Method of Nutrition NPO  -CP    Prior Level of Function-Swallowing no diet consistency restrictions;regular textures;thin liquids  -CP    Plans/Goals Discussed with patient;family  -CP    Barriers to Rehab none identified  -CP       Pain    Pretreatment Pain Rating 0/10 - no pain  -CP    Posttreatment Pain Rating 0/10 - no pain  -CP       Oral Motor Structure and Function    Dentition Assessment missing teeth;other (see comments)  Pt has one tooth. She did previously have dentures but they were lost at her last hospitalization and pt has had no diff with any consistency without her dentures per  her daughter and her report.  -CP    Secretion Management WNL/WFL  -CP    Mucosal Quality dry  -CP       Oral Musculature and Cranial Nerve Assessment    Oral Motor General Assessment WFL  -CP       General Eating/Swallowing Observations    Respiratory Support Currently in Use room air  -CP    Eating/Swallowing Skills self-fed;appropriate self-feeding skills observed  -CP    Positioning During Eating upright in bed  -CP    Utensils Used spoon;straw  -CP    Consistencies Trialed thin liquids;pureed;regular textures  -CP       Clinical Swallow Eval    Clinical Swallow Evaluation Summary Pt seen for clinical swallow eval, upright in bed. Pt was alert and responsive and able to answer all questions appropriately. Pt was at baseline per family report. Pt was given trials of water by spoon and straw, applesauce and a tulio cracker. Pt is edentulous but mastication was functional and efficient. Oral transit timely. No buccal pocketing or oral residual occurred.  Digital palpation suggests timely swallow initiation. No cough or other overt s/s of aspiration occurred throughout assessment.  It is rec that pt initiate a regular consistency diet with thin liquids as tolerated. She should be upright at 90 degrees for all PO and eat at a slow rate.  Education provided to pt on safe swallow strategies. She verbalized understanding. Due to pt tolerating a regular diet and being at baseline mental status, pt will be DCed from  caseload. Please re-consult if any further needs arise.  -CP       SLP Evaluation Clinical Impression    SLP Swallowing Diagnosis functional oral phase;functional pharyngeal phase  -CP    Functional Impact no impact on function  -CP    Rehab Potential/Prognosis, Swallowing good, to achieve stated therapy goals  -CP    Swallow Criteria for Skilled Therapeutic Interventions Met baseline status;no problems identified which require skilled intervention  -CP       SLP Treatment Clinical Impressions    Care Plan  Review evaluation/treatment results reviewed;care plan/treatment goals reviewed  -CP    Care Plan Review, Other Participant(s) family  -CP       Recommendations    Therapy Frequency (Swallow) evaluation only  -CP    SLP Diet Recommendation regular textures;thin liquids  -CP    Recommended Precautions and Strategies upright posture during/after eating;small bites of food and sips of liquid;alternate between small bites of food and sips of liquid;general aspiration precautions;reflux precautions  -CP    Oral Care Recommendations Oral Care BID/PRN  -CP    SLP Rec. for Method of Medication Administration meds whole;with thin liquids;as tolerated  -CP    Monitor for Signs of Aspiration yes;notify SLP if any concerns;cough;gurgly voice;throat clearing  -CP    Anticipated Discharge Disposition (SLP) inpatient rehabilitation facility  -CP              User Key  (r) = Recorded By, (t) = Taken By, (c) = Cosigned By      Initials Name Effective Dates    CP Tara aGrcia SLP 06/16/21 -                     EDUCATION  The patient has been educated in the following areas:   Dysphagia (Swallowing Impairment) Oral Care/Hydration.                Time Calculation:                ROBBY Hidalgo  10/27/2024

## 2024-10-27 NOTE — PLAN OF CARE
Goal Outcome Evaluation:      Patient alert oriented x 4 at times then other times confused, and talking to people who are not there. Patient blind. Continue on IV ABT. K+ replaced this shift. Pain controlled with Toradol. Bed in low position, call light in reach, room near nursing station. Bed alarm set. Wound care provided this shift.

## 2024-10-27 NOTE — PROGRESS NOTES
[facility] MEDICINE SERVICE  DAILY PROGRESS NOTE    NAME: Fallon Nur  : 1945  MRN: [id number]      LOS: 1 day     PROVIDER OF SERVICE: Michel Ayers MD    Chief Complaint: Altered mental status    Subjective:     Interval History:  History taken from: patient chart family RN    Frail elderly female arousable family at bedside        Review of Systems:   Review of Systems  All negative except as above  Objective:     Vital Signs  Temp:  [97.3 °F (36.3 °C)-98.2 °F (36.8 °C)] 98.2 °F (36.8 °C)  Heart Rate:  [] 97  Resp:  [16-22] 22  BP: ()/(40-99) 108/41   Body mass index is 23.56 kg/m².    Physical Exam  Physical Exam  Frail elderly female NAD  RRR S1-S2 audible  Lungs with fair air entry  Abdomen soft nontender nondistended  Bilateral lower extremity wrapped in dressing     Diagnostic Data    Results from last 7 days   Lab Units 10/27/24  1037 10/27/24  0109 10/27/24  0055 10/26/24  1431   WBC 10*3/mm3  --  9.39  --  7.57   HEMOGLOBIN g/dL  --  9.0*  --  9.5*   HEMATOCRIT %  --  29.6*  --  31.0*   PLATELETS 10*3/mm3  --  450  --  460*   GLUCOSE mg/dL  --   --  59* 87   CREATININE mg/dL  --   --  0.69 0.75   BUN mg/dL  --   --  14 16   SODIUM mmol/L  --   --  140 140   POTASSIUM mmol/L 3.9  --  3.4* 3.8   AST (SGOT) U/L  --   --   --  48*   ALT (SGPT) U/L  --   --   --  12   ALK PHOS U/L  --   --   --  197*   BILIRUBIN mg/dL  --   --   --  0.5   ANION GAP mmol/L  --   --  8.0 4.0*       CT Head Without Contrast    Result Date: 10/26/2024  Impression: 1. Mild generalized parenchymal volume loss. No acute intracranial abnormality. Electronically Signed: Lloyd Rodriguez MD  10/26/2024 2:37 PM EDT  Workstation ID: [id number]       I reviewed the patient's new clinical results.  I reviewed the patient's new imaging results and agree with the interpretation.    Assessment/Plan:     Active and Resolved Problems  Active Hospital Problems    Diagnosis  POA    **Altered mental status  [R41.82]  Yes    Atherosclerosis of native arteries of left leg with ulceration of calf [I70.242]  Yes    Atherosclerosis of native arteries of right leg with ulceration of calf [I70.232]  Yes      Resolved Hospital Problems   No resolved problems to display.       #79-year-old female with history of atrial fibrillation, hypertension, DVT, PAD lower extremity wounds status post thrombectomy admitted to [facility], operated by [facility] with altered sensorium   off note she was admitted at [place] from 10/9 - 10/18 for lower extremity wounds PAD underwent percutaneous mechanical thrombectomy of multiple arteries and excisional debridement of her right leg wound on 10/14 subsequently discharged to rehab on Levaquin    #Altered sensorium  No leukocytosis.  UA not suggestive of UTI low suspicion for underlying infection  CT head on admission no acute findings  Suspect etiology may be secondary to medications.  Patient was recently discharged on fentanyl patch which has been discontinued since 10/26  CTM  Cautions with pain medications.       #PAD  #Bilateral lower extremity wounds  Vascular surgery following contemplating left lower extremity arteriogram  Anticoagulation currently on hold  Podiatry following appreciate recommendations  Currently on IV Zosyn will defer need for antibiotics per vascular surgery       #History of DVT  #Paroxysmal atrial fibrillation  Continue digoxin.  Levels within normal limit  Xarelto currently on hold resume once cleared from vascular surgery standpoint       #Hypertension  BP currently soft  Hold metoprolol.  Hold Lasix for now gentle IV fluids while n.p.o. awaiting SLP     #History of hypothyroidism  - TSH elevated at 9.980  - T4 1.48  -Continue current dose of levothyroxine      VTE Prophylaxis:  Mechanical VTE prophylaxis orders are present.             Disposition Planning:     Barriers to Discharge: Medical workup  Anticipated Date of Discharge: TBD  Place of Discharge: Rehab      Time: 45 minutes      Code Status and Medical Interventions: CPR (Attempt to Resuscitate); Full Support   Ordered at: 10/26/24 1646     Code Status (Patient has no pulse and is not breathing):    CPR (Attempt to Resuscitate)     Medical Interventions (Patient has pulse or is breathing):    Full Support       Signature: Electronically signed by Michel Ayers MD, 10/27/24, 13:30 EDT.  [facility]

## 2024-10-27 NOTE — CONSULTS
10/27/24   Foot and Ankle Surgery - Consult  Provider: Dr. Justen Jj, DPM  Location: [facility]    Subjective:  Fallon Nur is a 79 y.o. female.     Chief Complaint   Patient presents with    Altered Mental Status     Pt from [facility], reportedly A+Ox4 but has been altered and hallucinating per staff since yesterday. Pt has been on fentanyl patches reportedly due to pain from wounds in legs after a mini fridge fell on her some time ago. No stroke like symptoms, + per EMS.        Consulting Physician: Primary service    Reason for Consult: Bilateral lower extremity wounds    HPI: Patient is a 79-year-old female with past medical history significant for atrial fibrillation, blindness, hypertension, and lower extremity injury with peripheral vascular disease s/p percutaneous mechanical thrombectomy and balloon angioplasty of multiple vessels involving the right lower extremity.  Patient has been recently admitted for worsening wounds involving the lower extremities as well as altered mental status.  Patient is resting comfortably during evaluation and the history is provided by the patient's daughter and chart review.  Issues started last month when she had a refrigerator fall on her.  Most recently, she was discharged after vascular intervention and has subsequently noticed altered mental status bring her back to the hospital.  Patient's daughter feels that the right lower extremity wound has improved.  The left lower extremity wound remains relatively unchanged.    No Known Allergies    Past Medical History:   Diagnosis Date    Atrial fibrillation     Blind 07/10/2023    Cellulitis of leg 09/12/2024    Duodenal ulcer, perforated 10/01/2013    Juan J Peters    12/2021 - EGD clear      DVT of upper extremity (deep vein thrombosis) 01/01/1995    subclavian - premarin      E. 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    Histoplasmosis     Hypertension     Infestation by  bed bug 10/09/2024    Legally blind     Longstanding persistent atrial fibrillation 07/10/2023    Macular degeneration 10/15/2024    Nonrheumatic mitral valve regurgitation 10/15/2024    Open wound of both lower extremities 09/12/2024    Primary hypertension 10/15/2024    PVD (peripheral vascular disease)     SBO (small bowel obstruction) 12/31/2020       Past Surgical History:   Procedure Laterality Date    CARDIAC CATHETERIZATION Right 10/14/2024    Procedure: RIGHT LOWER EXTREMITY ARTERIOGRAM, PERCUTANEOUS THROMBECTOMY, ANGIOPLASTY,  AND WOUND DEBRIDEMENT;  Surgeon: Red Hazel II, MD;  Location: [facility] HYBRID OR;  Service: Vascular;  Laterality: Right;    WOUND DEBRIDEMENT Right 9/16/2024    Procedure: RIGHT LEG DEBRIDEMENT;  Surgeon: Red Hazel II, MD;  Location: [facility] MAIN OR;  Service: Vascular;  Laterality: Right;       Family History   Problem Relation Age of Onset    Cancer Mother     Dementia Father     Cancer Sister        Social History     Socioeconomic History    Marital status:    Tobacco Use    Smoking status: Former    Smokeless tobacco: Never   Vaping Use    Vaping status: Never Used   Substance and Sexual Activity    Alcohol use: Not Currently    Drug use: Not Currently    Sexual activity: Defer          Current Facility-Administered Medications:     aspirin chewable tablet 81 mg, 81 mg, Oral, Daily, Boni Pagan MD    atorvastatin (LIPITOR) tablet 40 mg, 40 mg, Oral, Nightly, Boni Pagan MD    sennosides-docusate (PERICOLACE) 8.6-50 MG per tablet 2 tablet, 2 tablet, Oral, BID PRN **AND** polyethylene glycol (MIRALAX) packet 17 g, 17 g, Oral, Daily PRN **AND** bisacodyl (DULCOLAX) EC tablet 5 mg, 5 mg, Oral, Daily PRN **AND** bisacodyl (DULCOLAX) suppository 10 mg, 10 mg, Rectal, Daily PRN, Lilibeth Marsh APRN    Calcium Replacement - Follow Nurse / BPA Driven Protocol, , Does not apply, PRN, Lilibeth Marsh APRN    digoxin (LANOXIN) tablet 125 mcg, 125 mcg, Oral,  Daily, Boni Pagan MD    ketorolac (TORADOL) injection 15 mg, 15 mg, Intravenous, Q6H PRN, Llanes Alvarez, Carlos, MD, 15 mg at 10/27/24 0233    Magnesium Standard Dose Replacement - Follow Nurse / BPA Driven Protocol, , Does not apply, PRN, Lilibeth Marsh APRN    nitroglycerin (NITROSTAT) SL tablet 0.4 mg, 0.4 mg, Sublingual, Q5 Min PRN, Lilibeth Marsh APRN    ondansetron ODT (ZOFRAN-ODT) disintegrating tablet 4 mg, 4 mg, Oral, Q6H PRN **OR** ondansetron (ZOFRAN) injection 4 mg, 4 mg, Intravenous, Q6H PRN, Lilibeth Marsh APRN    Pharmacy to Dose Zosyn, , Does not apply, Continuous PRN, Lilibeth Marsh APRN    Phosphorus Replacement - Follow Nurse / BPA Driven Protocol, , Does not apply, PRN, Lilibeth Marsh APRN    piperacillin-tazobactam (ZOSYN) 3.375 g IVPB in 100 mL NS MBP (CD), 3.375 g, Intravenous, Q8H, Surinder Schumacher MD, 3.375 g at 10/27/24 0837    Potassium Replacement - Follow Nurse / BPA Driven Protocol, , Does not apply, PRN, Lilibeth Marsh APRN    [COMPLETED] Insert Peripheral IV, , , Once **AND** sodium chloride 0.9 % flush 10 mL, 10 mL, Intravenous, PRN, Kerwin Garcia MD    Review of Systems:  General: Denies fever, chills, fatigue, and weakness.  Eyes: Denies vision loss, blurry vision, and excessive redness.  ENT: Denies hearing issues and difficulty swallowing.  Cardiovascular: Denies palpitations, chest pain, or syncopal episodes.  Respiratory: Denies shortness of breath, wheezing, and coughing.  GI: Denies abdominal pain, nausea, and vomiting.   : Denies frequency, hematuria, and urgency.  Musculoskeletal: Denies muscle cramps, joint pains, and stiffness.  Derm: + Bilateral lower extremity wounds  Neuro: Denies headaches, numbness, loss of coordination, and tremors.  Psych: Denies anxiety and depression.  Endocrine: Denies temperature intolerance and changes in appetite.  Heme: Denies bleeding disorders or abnormal bruising.     Objective   BP (!) 86/65 (BP Location: Right arm, Patient Position:  "Lying)   Pulse 94   Temp 97.3 °F (36.3 °C) (Oral)   Resp 20   Ht 160 cm (63\")   Wt 60.3 kg (133 lb)   SpO2 92%   BMI 23.56 kg/m²     Foot/Ankle Exam    GENERAL  Appearance:  elderly  Orientation:  unable to assess    VASCULAR     Right Foot Vascularity   Dorsalis pedis:  1+  Posterior tibial:  1+  Skin temperature:  warm  Pedal hair growth:  Absent     Left Foot Vascularity   Dorsalis pedis:  1+  Posterior tibial:  1+  Skin temperature:  warm  Edema grading:  None  Pedal hair growth:  Absent     NEUROLOGIC     Right Foot Neurologic   Achilles reflex:  1+     Left Foot Neurologic   Achilles reflex:  1+    MUSCULOSKELETAL     Right Foot Musculoskeletal   Ecchymosis:  none  Tenderness:  (Discomfort with palpation involving the right lower extremity)     Left Foot Musculoskeletal   Ecchymosis:  none    DERMATOLOGIC      Right Foot Dermatologic   Skin  Right foot skin is not intact. Positive for maceration, skin changes, ulcer and atrophy. Negative for cellulitis, drainage, erythema and gangrene.      Left Foot Dermatologic   Skin  Left foot skin is not intact. Positive for dryness, ulcer and atrophy. Negative for cellulitis, drainage, erythema and maceration.      Right foot additional comments: Large irregular ulceration involving the pretibial region of the right lower extremity with granulation tissue.  Mild maceration.  No keturah malodor or drainage present.  Small superficial wounds noted to the right foot.     Left foot additional comments: Large irregular ulceration involving the posterior aspect of the left ankle with exposed Achilles tendon.  No drainage, maceration, or malodor.  Early skin necrosis involving the wound periphery with desiccation of the tendon.      Culture results from last 30 days   Lab 10/10/24  1429   WOUNDCX Heavy growth (4+) Pseudomonas aeruginosa*  Heavy growth (4+) Proteus mirabilis*   ANACX No anaerobes isolated at 5 days       Results from last 7 days   Lab Units 10/27/24  0109 "   WBC 10*3/mm3 9.39   HEMOGLOBIN g/dL 9.0*   HEMATOCRIT % 29.6*   PLATELETS 10*3/mm3 450       Assessment & Plan     Altered mental status    Atherosclerosis of native arteries of right leg with ulceration of calf    Atherosclerosis of native arteries of left leg with ulceration of calf  Chronic ulcer with muscle necrosis, left lower extremity  Chronic ulcer to fat layer, right lower extremity    Patient has been admitted for altered mental status.  Case was discussed with patient's daughter at length.  She does feel that the right lower extremity wound is slowly improving.  Today after evaluation, the right leg does appear to be stable and granular.  I do feel that local wound care would be appropriate for the right lower extremity at this time.  As for her left, daughter does feel that the wound has progressed since previous admission.  Percutaneous vascular intervention was previously performed on the right lower extremity and needed on the left.  Vascular recommendations noted.  I do agree that intervention would be necessary for the left lower extremity for any potential improvement involving the ankle wound.  Further vascular planning pending cognitive improvement.  Dressing care per wound ostomy nurse at this time.  No operative plans from my standpoint at this time.  Will proceed with outpatient wound care with my nurse practitioner.  Case discussed with patient's nurse.  Will review with vascular surgery    Thank you for the consultation and allowing me to participate in this patient's care. Please call with any additional questions or concerns.     Note is dictated utilizing voice recognition software. Unfortunately this leads to occasional typographical errors. I apologize in advance if the situation occurs. If questions occur please do not hesitate to call our office.

## 2024-10-27 NOTE — CONSULTS
Name: Fallon Nur ADMIT: 10/26/2024   : 1945  PCP: Francine Hampton MD    MRN: [id number] LOS: 1 days   AGE/SEX: 79 y.o. female  ROOM: [address] [place] [address]    Patient Care Team:  Francine Hampton MD as PCP - General    CC: Bilateral lower extremity wounds    History of Present Illness  History from patient's daughter at bedside.  79-year-old woman brought in from chronic care facility because of mental status changes, likely associated with overdose from transdermal fentanyl patch.  Has history of right lower extremity wounds which began following a fall in August.  Underwent suture repair but wound deteriorated and became infected.  Underwent open surgical debridement by Dr. Hazel 2024, and percutaneous revascularization 10/14/2024.  At that time, she underwent percutaneous mechanical thrombectomy of right popliteal, anterior tibial and peroneal arteries, with balloon angioplasty of popliteal and tibioperoneal trunk and of anterior tibial artery.  Was dismissed to chronic care facility with fentanyl transdermal patch in place with plans for wound care.  Returned to hospital because of lethargy.  On arrival, the patient had a large right lower extremity wound, and new wounds on the left, and so vascular surgery consultation was requested.  Has history of atrial fibrillation, maintained on lower dose rivaroxaban.  Takes cilostazol and atorvastatin.  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  Previously patient was lucid and ambulatory, but has suffered frequent falls since August.    Past Medical History:   Diagnosis Date    Atrial fibrillation     Blind 07/10/2023    Cellulitis of leg 2024    Duodenal ulcer, perforated 10/01/2013    [facility] - Dr. Peters    2021 - EGD clear      DVT of upper extremity (deep vein thrombosis) 1995    subclavian - premarin      E. coli UTI 2020    H/O gastric bypass 2020    Histoplasmosis     Hypertension      Infestation by bed bug 10/09/2024    Legally blind     Longstanding persistent atrial fibrillation 07/10/2023    Macular degeneration 10/15/2024    Nonrheumatic mitral valve regurgitation 10/15/2024    Open wound of both lower extremities 09/12/2024    Primary hypertension 10/15/2024    PVD (peripheral vascular disease)     SBO (small bowel obstruction) 12/31/2020     Past Surgical History:   Procedure Laterality Date    CARDIAC CATHETERIZATION Right 10/14/2024    Procedure: RIGHT LOWER EXTREMITY ARTERIOGRAM, PERCUTANEOUS THROMBECTOMY, ANGIOPLASTY,  AND WOUND DEBRIDEMENT;  Surgeon: Red Hazel II, MD;  Location: [facility] HYBRID OR;  Service: Vascular;  Laterality: Right;    WOUND DEBRIDEMENT Right 9/16/2024    Procedure: RIGHT LEG DEBRIDEMENT;  Surgeon: Red Hazel II, MD;  Location: [facility] MAIN OR;  Service: Vascular;  Laterality: Right;     Family History   Problem Relation Age of Onset    Cancer Mother     Dementia Father     Cancer Sister      Social History     Tobacco Use    Smoking status: Former    Smokeless tobacco: Never   Vaping Use    Vaping status: Never Used   Substance Use Topics    Alcohol use: Not Currently    Drug use: Not Currently     Medications Prior to Admission   Medication Sig Dispense Refill Last Dose/Taking    atorvastatin (LIPITOR) 40 MG tablet Take 1 tablet by mouth Every Night. 90 tablet 0 10/25/2024    buPROPion XL (WELLBUTRIN XL) 150 MG 24 hr tablet Take 1 tablet by mouth Daily.   10/26/2024    cetirizine (zyrTEC) 10 MG tablet Take 1 tablet by mouth Daily.   10/26/2024    cilostazol (PLETAL) 100 MG tablet Take 1 tablet by mouth 2 (Two) Times a Day. 60 tablet 0 10/26/2024    citalopram (CeleXA) 20 MG tablet Take 1 tablet by mouth Daily.   10/26/2024    digoxin (LANOXIN) 125 MCG tablet Take 1 tablet by mouth Daily.   10/25/2024    ferrous sulfate 325 (65 FE) MG tablet Take 1 tablet by mouth Daily With Breakfast.   10/26/2024    furosemide (LASIX) 20 MG tablet Take 1 tablet by  mouth Daily. 30 tablet 0 10/26/2024    gabapentin (NEURONTIN) 300 MG capsule Take 1 capsule by mouth 2 (Two) Times a Day.   10/26/2024    levoFLOXacin (LEVAQUIN) 500 MG tablet Take 1 tablet by mouth Daily for 10 doses. Indications: Infection of the Skin and/or Soft Tissue 10 tablet 0 10/26/2024    levothyroxine (SYNTHROID, LEVOTHROID) 50 MCG tablet Take 1 tablet by mouth Daily.   10/26/2024    metoprolol succinate XL (TOPROL-XL) 25 MG 24 hr tablet Take 0.5 tablets by mouth Daily. 15 tablet 0 10/26/2024    multivitamin (Thera) tablet tablet Take 1 tablet by mouth Daily. 30 tablet 0 10/26/2024    oxyCODONE (ROXICODONE) 5 MG immediate release tablet Take 1 tablet by mouth Every 6 (Six) Hours As Needed for Moderate Pain for up to 10 days. 40 tablet 0 10/26/2024 Morning    pantoprazole (PROTONIX) 40 MG EC tablet Take 1 tablet by mouth Daily.   10/26/2024    polyethylene glycol (MIRALAX) 17 g packet Take 17 g by mouth Daily for 30 days. 30 packet 0 10/25/2024    rivaroxaban (XARELTO) 15 MG tablet Take 1 tablet by mouth Daily With Dinner. Indications: Atrial Fibrillation 42 tablet 0 10/25/2024    vitamin B-12 (CYANOCOBALAMIN) 1000 MCG tablet Take 1 tablet by mouth Daily.   10/26/2024    vitamin D (ERGOCALCIFEROL) 1.25 MG (21859 UT) capsule capsule Take 1 capsule by mouth 1 (One) Time Per Week. Saturday   10/26/2024    zinc sulfate (ZINCATE) 220 (50 Zn) MG capsule Take 1 capsule by mouth Daily for 30 days. 30 capsule 0 10/26/2024    alendronate (FOSAMAX) 70 MG tablet Take 1 tablet by mouth Every 7 (Seven) Days. Saturday   10/19/2024    fentaNYL (DURAGESIC) 12 MCG/HR Place 1 patch on the skin as directed by provider Every 72 (Seventy-Two) Hours.       fluticasone (FLONASE) 50 MCG/ACT nasal spray Administer 2 sprays into the nostril(s) as directed by provider Daily As Needed for Rhinitis.       Melatonin 12 MG tablet Take 1 tablet by mouth At Night As Needed.       Nutritional Supplements (Estiven) pack Take 1 packet by mouth 2  "(Two) Times a Day. 60 packet 0      piperacillin-tazobactam, 3.375 g, Intravenous, Q8H    Pharmacy to Dose Zosyn,     Patient has no known allergies.    Vital Signs and Labs:  Vital Signs Patient Vitals for the past 24 hrs:   BP Temp Temp src Pulse Resp SpO2 Height Weight   10/27/24 0819 (!) 86/65 97.3 °F (36.3 °C) Oral 94 20 92 % -- --   10/27/24 0413 106/63 97.7 °F (36.5 °C) Oral 96 22 94 % -- --   10/27/24 0109 100/49 97.8 °F (36.6 °C) Oral 101 20 -- -- --   10/1945 96/67 97.5 °F (36.4 °C) Oral 83 16 100 % 160 cm (63\") 60.3 kg (133 lb)   10/26/24 1906 -- -- -- -- -- 100 % -- --   10/26/24 1902 114/92 -- -- 95 -- -- -- --   10/26/24 1846 130/76 -- -- 88 -- -- -- --   10/26/24 1843 -- -- -- 88 -- 96 % -- --   10/26/24 1840 -- -- -- -- -- 98 % -- --   10/26/24 1835 113/40 -- -- 83 -- -- -- --   10/26/24 1817 120/99 -- -- 89 -- 100 % -- --   10/26/24 1803 114/68 -- -- 85 -- 100 % -- --   10/26/24 1717 98/64 -- -- 109 -- 100 % -- --   10/26/24 1704 106/84 -- -- 88 -- 100 % -- --   10/26/24 1622 -- -- -- -- -- -- -- 62 kg (136 lb 11 oz)   10/26/24 1553 -- -- -- 89 -- 95 % -- --   10/26/24 1544 110/88 -- -- 93 -- 93 % -- --   10/26/24 1534 114/98 -- -- 110 -- -- -- --   10/26/24 1529 -- -- -- -- -- 94 % -- --   10/26/24 1519 106/59 -- -- 92 18 92 % -- --   10/26/24 1502 94/58 -- -- 93 -- 94 % -- --   10/26/24 1443 118/86 -- -- 93 -- 96 % -- --   10/26/24 1403 104/55 -- -- 91 -- 95 % -- --   10/26/24 1344 -- 97.8 °F (36.6 °C) Oral -- -- -- -- --   10/26/24 1337 114/62 -- -- 99 17 93 % -- --     BMI:  Body mass index is 23.56 kg/m².    Physical Exam:  Elderly, and firm, minimally responsive woman, no distress.  Femoral pulses palpated bilaterally.  No hematoma or pseudoaneurysm suspected on the left.  Pedal artery pulses not palpated on either side.  Large full-thickness wound anterolateral mid to distal right calf, to the muscular fascia, clean and granulating.  Ischemic changes within the toes.  Dry flaking skin " and blood on skin, cleansed with soap and water.  Has superficial erosion anterolateral proximal left calf.  There is a full-thickness pressure ulcer involving the Achilles region in the posterior distal left calf, with desiccated tendon of uncertain depth.  There is erythema over the left first MTP joint medially suggesting pressure injury, but no open wound.  There are other superficial excoriations in both calfs.  There is no odor, purulence or cellulitis.    CBC    Results from last 7 days   Lab Units 10/27/24  0109 10/26/24  1431   WBC 10*3/mm3 9.39 7.57   HEMOGLOBIN g/dL 9.0* 9.5*   PLATELETS 10*3/mm3 450 460*     BMP   Results from last 7 days   Lab Units 10/27/24  0055 10/26/24  1431   SODIUM mmol/L 140 140   POTASSIUM mmol/L 3.4* 3.8   CHLORIDE mmol/L 103 103   CO2 mmol/L 29.0 33.0*   BUN mg/dL 14 16   CREATININE mg/dL 0.69 0.75   GLUCOSE mg/dL 59* 87   MAGNESIUM mg/dL  --  2.0     Cr Clearance Estimated Creatinine Clearance: 62.9 mL/min (by C-G formula based on SCr of 0.69 mg/dL).  Infection     Twelve-lead EKG today with atrial fibrillation and old MI, nothing acute.    Ankle and digital waveforms 10/15/2024, following right lower extremity thrombectomy and angioplasty the previous day:        Active Hospital Problems    Diagnosis  POA    **Altered mental status [R41.82]  Yes    Atherosclerosis of native arteries of left leg with ulceration of calf [I70.242]  Yes    Atherosclerosis of native arteries of right leg with ulceration of calf [I70.232]  Yes      Resolved Hospital Problems   No resolved problems to display.     Assessment & Plan       Altered mental status    Atherosclerosis of native arteries of right leg with ulceration of calf    Atherosclerosis of native arteries of left leg with ulceration of calf    79 y.o. female brought to the hospital because of mental status changes which may relate to oversedation from fentanyl transdermal patch.  She has history of posttraumatic right calf ulceration  with infection after a fall in August, but has undergone excisional debridement and percutaneous revascularization with good result.  Attempts to quantify post thrombectomy/angioplasty result noninvasively have been unsuccessful due to patient intolerance in the presence of wounds about the ankles.  Her right wounds appear to be healing appropriately, but she has new wounds on the contralateral left side.  Specifically she has exposure and desiccation of at least a moderate amount of the left Achilles tendon which will be difficult to heal.  She may benefit from right groin approach, left lower extremity arteriogram and lower extremity revascularization.  Initiate wound care and offloading for now.  Hoping mental status will improve before making decision to move forward with intervention.  Will begin to hold anticoagulation.      I discussed my findings and recommendations with family.    Boni Pagan MD  10/27/24, 10:33 EDT    Office contact: [phone]

## 2024-10-27 NOTE — PLAN OF CARE
Goal Outcome Evaluation:   Pt seen for clinical swallow eval, upright in bed. Pt was alert and responsive and able to answer all questions appropriately. Pt was at baseline per family report. Pt was given trials of water by spoon and straw, applesauce and a tulio cracker. Pt is edentulous but mastication was functional and efficient. Oral transit timely. No buccal pocketing or oral residual occurred.  Digital palpation suggests timely swallow initiation. No cough or other overt s/s of aspiration occurred throughout assessment.      It is rec that pt initiate a regular consistency diet with thin liquids as tolerated. She should be upright at 90 degrees for all PO and eat at a slow rate.  Education provided to pt on safe swallow strategies. She verbalized understanding. Due to pt tolerating a regular diet and being at baseline mental status, pt will be DCed from  caseload. Please re-consult if any further needs arise.                  Anticipated Discharge Disposition (SLP): inpatient rehabilitation facility          SLP Swallowing Diagnosis: functional oral phase, functional pharyngeal phase (10/27/24 1800)

## 2024-10-28 ENCOUNTER — APPOINTMENT (OUTPATIENT)
Dept: GENERAL RADIOLOGY | Facility: HOSPITAL | Age: 79
End: 2024-10-28
Payer: MEDICARE

## 2024-10-28 LAB
ANION GAP SERPL CALCULATED.3IONS-SCNC: 8 MMOL/L (ref 5–15)
BASOPHILS # BLD AUTO: 0.05 10*3/MM3 (ref 0–0.2)
BASOPHILS NFR BLD AUTO: 0.6 % (ref 0–1.5)
BUN SERPL-MCNC: 15 MG/DL (ref 8–23)
BUN/CREAT SERPL: 22.7 (ref 7–25)
C AURIS DNA SPEC QL NAA+NON-PROBE: NOT DETECTED
CALCIUM SPEC-SCNC: 7.5 MG/DL (ref 8.6–10.5)
CHLORIDE SERPL-SCNC: 104 MMOL/L (ref 98–107)
CO2 SERPL-SCNC: 26 MMOL/L (ref 22–29)
CREAT SERPL-MCNC: 0.66 MG/DL (ref 0.57–1)
DEPRECATED RDW RBC AUTO: 61.2 FL (ref 37–54)
EGFRCR SERPLBLD CKD-EPI 2021: 89.4 ML/MIN/1.73
EOSINOPHIL # BLD AUTO: 0.19 10*3/MM3 (ref 0–0.4)
EOSINOPHIL NFR BLD AUTO: 2.2 % (ref 0.3–6.2)
ERYTHROCYTE [DISTWIDTH] IN BLOOD BY AUTOMATED COUNT: 19.5 % (ref 12.3–15.4)
GLUCOSE BLDC GLUCOMTR-MCNC: 100 MG/DL (ref 70–105)
GLUCOSE BLDC GLUCOMTR-MCNC: 105 MG/DL (ref 70–105)
GLUCOSE BLDC GLUCOMTR-MCNC: 115 MG/DL (ref 70–105)
GLUCOSE BLDC GLUCOMTR-MCNC: 116 MG/DL (ref 70–105)
GLUCOSE BLDC GLUCOMTR-MCNC: 64 MG/DL (ref 70–105)
GLUCOSE BLDC GLUCOMTR-MCNC: 94 MG/DL (ref 70–105)
GLUCOSE SERPL-MCNC: 58 MG/DL (ref 65–99)
HCT VFR BLD AUTO: 27.8 % (ref 34–46.6)
HGB BLD-MCNC: 8.7 G/DL (ref 12–15.9)
IMM GRANULOCYTES # BLD AUTO: 0.03 10*3/MM3 (ref 0–0.05)
IMM GRANULOCYTES NFR BLD AUTO: 0.3 % (ref 0–0.5)
LYMPHOCYTES # BLD AUTO: 0.82 10*3/MM3 (ref 0.7–3.1)
LYMPHOCYTES NFR BLD AUTO: 9.4 % (ref 19.6–45.3)
MCH RBC QN AUTO: 27.4 PG (ref 26.6–33)
MCHC RBC AUTO-ENTMCNC: 31.3 G/DL (ref 31.5–35.7)
MCV RBC AUTO: 87.7 FL (ref 79–97)
MONOCYTES # BLD AUTO: 0.74 10*3/MM3 (ref 0.1–0.9)
MONOCYTES NFR BLD AUTO: 8.4 % (ref 5–12)
NEUTROPHILS NFR BLD AUTO: 6.93 10*3/MM3 (ref 1.7–7)
NEUTROPHILS NFR BLD AUTO: 79.1 % (ref 42.7–76)
NRBC BLD AUTO-RTO: 0 /100 WBC (ref 0–0.2)
PLATELET # BLD AUTO: 419 10*3/MM3 (ref 140–450)
PMV BLD AUTO: 8.9 FL (ref 6–12)
POTASSIUM SERPL-SCNC: 3.5 MMOL/L (ref 3.5–5.2)
POTASSIUM SERPL-SCNC: 4 MMOL/L (ref 3.5–5.2)
RBC # BLD AUTO: 3.17 10*6/MM3 (ref 3.77–5.28)
SODIUM SERPL-SCNC: 138 MMOL/L (ref 136–145)
WBC NRBC COR # BLD AUTO: 8.76 10*3/MM3 (ref 3.4–10.8)

## 2024-10-28 PROCEDURE — 82948 REAGENT STRIP/BLOOD GLUCOSE: CPT

## 2024-10-28 PROCEDURE — 71045 X-RAY EXAM CHEST 1 VIEW: CPT

## 2024-10-28 PROCEDURE — 25010000002 PIPERACILLIN SOD-TAZOBACTAM PER 1 G

## 2024-10-28 PROCEDURE — 80048 BASIC METABOLIC PNL TOTAL CA: CPT

## 2024-10-28 PROCEDURE — 99232 SBSQ HOSP IP/OBS MODERATE 35: CPT | Performed by: NURSE PRACTITIONER

## 2024-10-28 PROCEDURE — 85025 COMPLETE CBC W/AUTO DIFF WBC: CPT

## 2024-10-28 PROCEDURE — 84132 ASSAY OF SERUM POTASSIUM: CPT | Performed by: HOSPITALIST

## 2024-10-28 PROCEDURE — 25010000002 KETOROLAC TROMETHAMINE PER 15 MG

## 2024-10-28 PROCEDURE — 82948 REAGENT STRIP/BLOOD GLUCOSE: CPT | Performed by: HOSPITALIST

## 2024-10-28 PROCEDURE — 99232 SBSQ HOSP IP/OBS MODERATE 35: CPT | Performed by: PODIATRIST

## 2024-10-28 RX ORDER — LEVOTHYROXINE SODIUM 50 UG/1
50 TABLET ORAL DAILY
Status: DISCONTINUED | OUTPATIENT
Start: 2024-10-28 | End: 2024-11-01 | Stop reason: HOSPADM

## 2024-10-28 RX ORDER — POTASSIUM CHLORIDE 1.5 G/1.58G
40 POWDER, FOR SOLUTION ORAL EVERY 4 HOURS
Status: DISPENSED | OUTPATIENT
Start: 2024-10-28 | End: 2024-10-28

## 2024-10-28 RX ORDER — FERROUS SULFATE 324(65)MG
324 TABLET, DELAYED RELEASE (ENTERIC COATED) ORAL
Status: DISCONTINUED | OUTPATIENT
Start: 2024-10-29 | End: 2024-11-01 | Stop reason: HOSPADM

## 2024-10-28 RX ORDER — DEXTROSE MONOHYDRATE 25 G/50ML
25 INJECTION, SOLUTION INTRAVENOUS
Status: DISCONTINUED | OUTPATIENT
Start: 2024-10-28 | End: 2024-11-01 | Stop reason: HOSPADM

## 2024-10-28 RX ORDER — BUPROPION HYDROCHLORIDE 150 MG/1
150 TABLET ORAL DAILY
Status: DISCONTINUED | OUTPATIENT
Start: 2024-10-28 | End: 2024-11-01 | Stop reason: HOSPADM

## 2024-10-28 RX ORDER — NICOTINE POLACRILEX 4 MG
15 LOZENGE BUCCAL
Status: DISCONTINUED | OUTPATIENT
Start: 2024-10-28 | End: 2024-11-01 | Stop reason: HOSPADM

## 2024-10-28 RX ORDER — CITALOPRAM HYDROBROMIDE 20 MG/1
20 TABLET ORAL DAILY
Status: DISCONTINUED | OUTPATIENT
Start: 2024-10-28 | End: 2024-11-01 | Stop reason: HOSPADM

## 2024-10-28 RX ORDER — IBUPROFEN 600 MG/1
1 TABLET ORAL
Status: DISCONTINUED | OUTPATIENT
Start: 2024-10-28 | End: 2024-11-01 | Stop reason: HOSPADM

## 2024-10-28 RX ORDER — TRAMADOL HYDROCHLORIDE 50 MG/1
25 TABLET ORAL ONCE AS NEEDED
Status: COMPLETED | OUTPATIENT
Start: 2024-10-28 | End: 2024-10-28

## 2024-10-28 RX ORDER — DEXTROSE MONOHYDRATE AND SODIUM CHLORIDE 5; .45 G/100ML; G/100ML
100 INJECTION, SOLUTION INTRAVENOUS CONTINUOUS
Status: DISCONTINUED | OUTPATIENT
Start: 2024-10-28 | End: 2024-10-28

## 2024-10-28 RX ADMIN — KETOROLAC TROMETHAMINE 15 MG: 30 INJECTION, SOLUTION INTRAMUSCULAR; INTRAVENOUS at 20:00

## 2024-10-28 RX ADMIN — KETOROLAC TROMETHAMINE 15 MG: 30 INJECTION, SOLUTION INTRAMUSCULAR; INTRAVENOUS at 10:26

## 2024-10-28 RX ADMIN — CITALOPRAM HYDROBROMIDE 20 MG: 20 TABLET ORAL at 17:52

## 2024-10-28 RX ADMIN — BUPROPION HYDROCHLORIDE 150 MG: 150 TABLET, EXTENDED RELEASE ORAL at 17:52

## 2024-10-28 RX ADMIN — ATORVASTATIN CALCIUM 40 MG: 40 TABLET, FILM COATED ORAL at 20:00

## 2024-10-28 RX ADMIN — PIPERACILLIN AND TAZOBACTAM 3.38 G: 3; .375 INJECTION, POWDER, FOR SOLUTION INTRAVENOUS at 10:26

## 2024-10-28 RX ADMIN — PIPERACILLIN AND TAZOBACTAM 3.38 G: 3; .375 INJECTION, POWDER, FOR SOLUTION INTRAVENOUS at 17:52

## 2024-10-28 RX ADMIN — DEXTROSE 15 G: 15 GEL ORAL at 01:59

## 2024-10-28 RX ADMIN — TRAMADOL HYDROCHLORIDE 25 MG: 50 TABLET ORAL at 23:53

## 2024-10-28 RX ADMIN — PIPERACILLIN AND TAZOBACTAM 3.38 G: 3; .375 INJECTION, POWDER, FOR SOLUTION INTRAVENOUS at 01:13

## 2024-10-28 RX ADMIN — POTASSIUM CHLORIDE 40 MEQ: 1.5 POWDER, FOR SOLUTION ORAL at 07:22

## 2024-10-28 RX ADMIN — DEXTROSE AND SODIUM CHLORIDE 100 ML/HR: 5; 450 INJECTION, SOLUTION INTRAVENOUS at 02:57

## 2024-10-28 RX ADMIN — DIGOXIN 125 MCG: 125 TABLET ORAL at 12:31

## 2024-10-28 NOTE — PLAN OF CARE
Goal Outcome Evaluation:            Pt able to make needs known. Pain treated per MAR. Pt Q2 turn, positioned off wounds. Pt NPO at midnight for L leg arteriogram with poss intervention. Safety precautions in place. Call light within reach, family at bedside. Plan of care ongoing.

## 2024-10-28 NOTE — PROGRESS NOTES
[facility] Vascular Surgery Progress Note    Name: Fallon Nur ADMIT: 10/26/2024   : 1945  PCP: Francine Hampton MD    MRN: [id number] LOS: 2 days   AGE/SEX: 79 y.o. female  ROOM: [address] [place] [address]    CC: PAD with wounds    Subjective     Understands plan for left leg arteriogram tomorrow    Objective     Scheduled Medications:   aspirin, 81 mg, Oral, Daily  atorvastatin, 40 mg, Oral, Nightly  digoxin, 125 mcg, Oral, Daily  piperacillin-tazobactam, 3.375 g, Intravenous, Q8H  potassium chloride, 40 mEq, Oral, Q4H        Active Infusions:  dextrose 5 % and sodium chloride 0.45 %, 100 mL/hr, Last Rate: 100 mL/hr (10/28/24 0257)  Pharmacy to Dose Zosyn,         As Needed Medications:    senna-docusate sodium **AND** polyethylene glycol **AND** bisacodyl **AND** bisacodyl    Calcium Replacement - Follow Nurse / BPA Driven Protocol    dextrose    dextrose    glucagon (human recombinant)    ketorolac    Magnesium Standard Dose Replacement - Follow Nurse / BPA Driven Protocol    nitroglycerin    ondansetron ODT **OR** ondansetron    Pharmacy to Dose Zosyn    Phosphorus Replacement - Follow Nurse / BPA Driven Protocol    Potassium Replacement - Follow Nurse / BPA Driven Protocol    [COMPLETED] Insert Peripheral IV **AND** sodium chloride    Vital Signs  Vitals:    10/28/24 0513   BP: 108/76   Pulse: 90   Resp: 16   Temp: 98.4 °F (36.9 °C)   SpO2: 96%      Body mass index is 23.56 kg/m².     Physical Exam:  NAD, alert and oriented  RRR  Lungs clear  Abd soft, benign  Vascular: Palpable bilateral femoral pulses, doppler signals to bilateral DP/PT  Skin: Large full-thickness wound anterolateral mid to distal right calf, to the muscular fascia, clean and granulating.  Ischemic changes within the toes.  Dry flaking skin and blood on skin, cleansed with soap and water.  Has superficial erosion anterolateral proximal left calf.  There is a full-thickness pressure ulcer involving the Achilles region in  the posterior distal left calf, with desiccated tendon of uncertain depth.  There is erythema over the left first MTP joint medially suggesting pressure injury, but no open wound.  There are other superficial excoriations in both calfs.    Results Review:     CBC    Results from last 7 days   Lab Units 10/28/24  0107 10/27/24  0109 10/26/24  1431   WBC 10*3/mm3 8.76 9.39 7.57   HEMOGLOBIN g/dL 8.7* 9.0* 9.5*   PLATELETS 10*3/mm3 419 450 460*     BMP   Results from last 7 days   Lab Units 10/28/24  0107 10/27/24  1037 10/27/24  0055 10/26/24  1431   SODIUM mmol/L 138  --  140 140   POTASSIUM mmol/L 3.5 3.9 3.4* 3.8   CHLORIDE mmol/L 104  --  103 103   CO2 mmol/L 26.0  --  29.0 33.0*   BUN mg/dL 15  --  14 16   CREATININE mg/dL 0.66  --  0.69 0.75   GLUCOSE mg/dL 58*  --  59* 87   MAGNESIUM mg/dL  --   --   --  2.0     HbA1C   Lab Results   Component Value Date    HGBA1C 5.12 10/15/2024     Infection   Results from last 7 days   Lab Units 10/26/24  1621 10/26/24  1620   BLOODCX  No growth at 24 hours No growth at 24 hours     Radiology(recent) CT Head Without Contrast    Result Date: 10/26/2024  Impression: 1. Mild generalized parenchymal volume loss. No acute intracranial abnormality. Electronically Signed: Lloyd Rodriguez MD  10/26/2024 2:37 PM EDT  Workstation ID: [id number]     VTE Prophylaxis:  Mechanical VTE prophylaxis orders are present.         Problems:    Active Hospital Problems:  Active Hospital Problems    Diagnosis  POA    **Altered mental status [R41.82]  Yes    Atherosclerosis of native arteries of left leg with ulceration of calf [I70.242]  Yes    Atherosclerosis of native arteries of right leg with ulceration of calf [I70.232]  Yes      Resolved Hospital Problems   No resolved problems to display.        Assessment & Plan   Assessment / Plan     Altered mental status    Atherosclerosis of native arteries of right leg with ulceration of calf    Atherosclerosis of native arteries of left leg with  ulceration of calf      79 y.o. female who presents with AMS  S/p right percutaneous mechanical thrombectomy of right popliteal, peroneal, AT arteries, balloon angioplasty of the right TPT, popliteal artery, AT, sharp excisional debridement of right leg wound on 10/14   Patient unable to tolerate ABIs  Right lower extremity wounds appear to be healing appropriately  She has new wounds to the left side with exposure and desiccation of fat of the left Achilles tendon  Plan for left leg arteriogram with possible intervention, will try to add on for tomorrow  Nursing to obtain informed consent  N.p.o. after midnight  Continue wound care and offloading for now  Continue holding           AZAEL Castañeda  [facility] Vascular Surgery  10/28/24   O: [phone]  F: [phone]

## 2024-10-28 NOTE — NURSING NOTE
79-year-old female patient was recently admitted for worsening wounds to the lower extremities.  Patient has had the wounds for greater than a month.  They began when she fell on a refrigerator fell onto her.  Podiatry services and vascular services following wound care was consulted for dressing change recommendations.                      Patient is known to service from previous admission.  Patient has vascular following with a planned risky procedures being scheduled for tomorrow.  The right lower extremity shin has an open full-thickness wound.  Small amount of serous exudate is noted a palpable pedal pulses noted to the foot.  The base of the wound does appear to be somewhat friable but it is pink and appears to be granulating.  There are no overt symptoms of infection noted.  I applied a Vashe soaked gauze wrapped her in Curlex ABD pads and a very light Ace wrap.  I would recommend to continue the Vashe soaked gauze dressings daily.  There are areas of ecchymosis noted to the medial and lateral aspect of the foot as well as to the heels.    The left lower extremity.  The posterior calf area has tendon exposed.  It is a full-thickness wound.  Again no overt symptoms of infection.  Overall the area does appear to be stable.  Would recommend to continue with Vashe soaked gauze.    The patient is compliant with wearing a offloading boot to the right leg but she states that it is uncomfortable to do so on the left but she is agreeable to pillows and she verbalizes the understanding of not allowing pressure on the heel.  Patient also currently is not on the agility offloading surface.  Continue with pressure injury prevention strategies

## 2024-10-28 NOTE — DISCHARGE PLACEMENT REQUEST
"Urszula Serrano (79 y.o. Female)       Date of Birth   1945    Social Security Number       Address   [address] [place] [address] [place] [address]    Home Phone   [phone]    MRN   [id number]       Christian   Judaism    Marital Status                               Admission Date   10/26/24    Admission Type   Emergency    Admitting Provider   Surinder Schumacher MD    Attending Provider   Gonzalo Castillo MD    Department, Room/Bed   [facility] SURGICAL INPATIENT, 4125/1       Discharge Date       Discharge Disposition       Discharge Destination                                 Attending Provider: Gonzalo Castillo MD    Allergies: No Known Allergies    Isolation: Contact   Infection: ESBL E coli (07/09/23), Candida Auris (rule out) (10/26/24)   Code Status: CPR    Ht: 160 cm (63\")   Wt: 60.3 kg (133 lb)    Admission Cmt: None   Principal Problem: Altered mental status [R41.82]                   Active Insurance as of 10/26/2024       Primary Coverage       Payor Plan Insurance Group Employer/Plan Group    ANTHEM MEDICARE REPLACEMENT ANTHEM MEDICARE ADVANTAGE [id number]       Payor Plan Address Payor Plan Phone Number Payor Plan Fax Number Effective Dates    [address] [phone]  1/1/2022 - None Entered    [facility] [address]         Subscriber Name Subscriber Birth Date Member ID       URSZULA SERRANO 1945 [id number]                     Emergency Contacts        (Rel.) Home Phone Work Phone Mobile Phone    CARLTON REN (Daughter) -- -- [phone]    JOSE ABREU (Daughter) -- -- [phone]            "

## 2024-10-28 NOTE — PLAN OF CARE
Goal Outcome Evaluation:   Patient alert x 4 but confused at times. On room air. Turned and reposition this shift. On Agiliti bed, heel bow in place. Continues on IV ABT. Family at bedside. Wound dressing dry intact. Bed in low position, call light in reach, room near nursing station, family at bedside.

## 2024-10-28 NOTE — DISCHARGE PLACEMENT REQUEST
"Urszula Serrano (79 y.o. Female)       Date of Birth   1945    Social Security Number       Address   [address] [place] [address] [place] [address]    Home Phone   [phone]    MRN   [id number]       Buddhism   Hindu    Marital Status                               Admission Date   10/26/24    Admission Type   Emergency    Admitting Provider   Surinder Schumacher MD    Attending Provider   Gonzalo Castillo MD    Department, Room/Bed   [facility] SURGICAL INPATIENT, 4125/1       Discharge Date       Discharge Disposition       Discharge Destination                                 Attending Provider: Gonzalo Castillo MD    Allergies: No Known Allergies    Isolation: Contact   Infection: ESBL E coli (07/09/23), Candida Auris (rule out) (10/26/24)   Code Status: CPR    Ht: 160 cm (63\")   Wt: 60.3 kg (133 lb)    Admission Cmt: None   Principal Problem: Altered mental status [R41.82]                   Active Insurance as of 10/26/2024       Primary Coverage       Payor Plan Insurance Group Employer/Plan Group    ANTHEM MEDICARE REPLACEMENT ANTHEM MEDICARE ADVANTAGE [id number]       Payor Plan Address Payor Plan Phone Number Payor Plan Fax Number Effective Dates    [address] [phone]  1/1/2022 - None Entered    [facility] [address]         Subscriber Name Subscriber Birth Date Member ID       URSZULA SERRANO 1945 [id number]                     Emergency Contacts        (Rel.) Home Phone Work Phone Mobile Phone    CARLTON REN (Daughter) -- -- [phone]    JOSE ABREU (Daughter) -- -- [phone]              " Interpolation Flap Text: A decision was made to reconstruct the defect utilizing an interpolation axial flap and a staged reconstruction.  A telfa template was made of the defect.  This telfa template was then used to outline the interpolation flap.  The donor area for the pedicle flap was then injected with anesthesia.  The flap was excised through the skin and subcutaneous tissue down to the layer of the underlying musculature.  The interpolation flap was carefully excised within this deep plane to maintain its blood supply.  The edges of the donor site were undermined.   The donor site was closed in a primary fashion.  The pedicle was then rotated into position and sutured.  Once the tube was sutured into place, adequate blood supply was confirmed with blanching and refill.  The pedicle was then wrapped with xeroform gauze and dressed appropriately with a telfa and gauze bandage to ensure continued blood supply and protect the attached pedicle.

## 2024-10-28 NOTE — PROGRESS NOTES
[facility] MEDICINE SERVICE  DAILY PROGRESS NOTE    NAME: Fallon Nur  : 1945  MRN: [id number]      LOS: 2 days     PROVIDER OF SERVICE: Gonzalo Castillo MD    Chief Complaint: Altered mental status    Subjective:     Interval History:  History taken from: patient chart family RN    Very awake and talkative today.        Review of Systems:   Review of Systems  All negative except as above  Objective:     Vital Signs  Temp:  [98.1 °F (36.7 °C)-98.9 °F (37.2 °C)] 98.4 °F (36.9 °C)  Heart Rate:  [] 90  Resp:  [16-22] 16  BP: ()/(41-88) 108/76   Body mass index is 23.56 kg/m².    Physical Exam  Physical Exam  Frail elderly female NAD  RRR S1-S2 audible  Lungs with fair air entry  Abdomen soft nontender nondistended  Bilateral lower extremity wrapped in dressing     Diagnostic Data    Results from last 7 days   Lab Units 10/28/24  0107 10/27/24  0055 10/26/24  1431   WBC 10*3/mm3 8.76   < > 7.57   HEMOGLOBIN g/dL 8.7*   < > 9.5*   HEMATOCRIT % 27.8*   < > 31.0*   PLATELETS 10*3/mm3 419   < > 460*   GLUCOSE mg/dL 58*   < > 87   CREATININE mg/dL 0.66   < > 0.75   BUN mg/dL 15   < > 16   SODIUM mmol/L 138   < > 140   POTASSIUM mmol/L 3.5   < > 3.8   AST (SGOT) U/L  --   --  48*   ALT (SGPT) U/L  --   --  12   ALK PHOS U/L  --   --  197*   BILIRUBIN mg/dL  --   --  0.5   ANION GAP mmol/L 8.0   < > 4.0*    < > = values in this interval not displayed.       CT Head Without Contrast    Result Date: 10/26/2024  Impression: 1. Mild generalized parenchymal volume loss. No acute intracranial abnormality. Electronically Signed: Lloyd Rodriguez MD  10/26/2024 2:37 PM EDT  Workstation ID: [id number]       I reviewed the patient's new clinical results.  I reviewed the patient's new imaging results and agree with the interpretation.    Assessment/Plan:     Active and Resolved Problems  Active Hospital Problems    Diagnosis  POA    **Altered mental status [R41.82]  Yes    Atherosclerosis of native arteries  of left leg with ulceration of calf [I70.242]  Yes    Atherosclerosis of native arteries of right leg with ulceration of calf [I70.232]  Yes      Resolved Hospital Problems   No resolved problems to display.       #79-year-old female with history of atrial fibrillation, hypertension, DVT, PAD lower extremity wounds status post thrombectomy admitted to [facility] with altered sensorium   off note she was admitted at [facility] from 10/9 - 10/18 for lower extremity wounds PAD underwent percutaneous mechanical thrombectomy of multiple arteries and excisional debridement of her right leg wound on 10/14 subsequently discharged to rehab on Levaquin    #Altered sensorium secondary to polypharmacy-improved  No leukocytosis.  UA not suggestive of UTI low suspicion for underlying infection  CT head on admission no acute findings  Suspect etiology may be secondary to medications.  Patient was recently discharged on fentanyl patch which has been discontinued since 10/26  Cautions with pain medications.       #PAD  #Bilateral lower extremity wounds  Vascular surgery following pursuing left lower extremity arteriogram  Anticoagulation currently on hold, follow vascular surgery guidance regarding this.  Currently on IV Zosyn will defer need for antibiotics per vascular surgery       #History of DVT  #Paroxysmal atrial fibrillation  Continue digoxin.  Levels within normal limit  Xarelto currently on hold resume once cleared from vascular surgery standpoint       # Hypotension  BP were soft initially now improved  Hold metoprolol.  Gentle IVF currently in anticipation for contrast load tomorrow.       #History of hypothyroidism  - TSH elevated at 9.980  - T4 1.48  -Continue current dose of levothyroxine      VTE Prophylaxis:  Mechanical VTE prophylaxis orders are present.             Disposition Planning:     Barriers to Discharge: Medical workup, intervention tomorrow  Anticipated Date of Discharge: TBD  Place of Discharge:  Rehab      Time: 45 minutes     Code Status and Medical Interventions: CPR (Attempt to Resuscitate); Full Support   Ordered at: 10/26/24 1646     Code Status (Patient has no pulse and is not breathing):    CPR (Attempt to Resuscitate)     Medical Interventions (Patient has pulse or is breathing):    Full Support       Signature: Electronically signed by Gonzalo Castillo MD, 10/28/24, 11:22 EDT.  [facility] Hospitalist Team

## 2024-10-28 NOTE — CASE MANAGEMENT/SOCIAL WORK
Case Management Readmission Assessment Note    Case Management Readmission Assessment (all recorded)       Readmission Interview       Row Name 10/28/24 1207             Readmission Indications    Is the patient and/or family able to complete the readmission assessment questions? Yes      Is this hospitalization related to the prior hospital diagnosis? No        Row Name 10/28/24 1207             Recommendation for rehospitalization    Did you speak with your physician prior to coming to the hospital Yes      If yes, what physician did you speak with? Facility Provider        Row Name 10/28/24 1207             Follow-up Appointments    Do you have a PCP? Yes  [facility]      Did you have an appointment with PCP after your hospitalization? No      Did you have an appointment with a Specialist? Yes      When was your appointment scheduled? 10/29/24  Dr. Hazel at 10am      Did you go to appointment? No  NA      Are you current with the Pulmonary Clinic? No      Are you current with the CHF Clinic? No        Row Name 10/28/24 1207             Medications    Did you have newly prescribed medications at discharge? Yes  Lipitor, Levaquin, Mirilax      Did you understand the reasons for your medications at discharge and how to take them? Yes      Did you understand the side effects of your medications? Yes      Are you taking all of you prescribed medications? Yes      What pharmacy was used to fill prescription(s)? Pharmacy per facility        Row Name 10/28/24 1207             Discharge Instructions    Did you understand your discharge instructions? Yes      Did your family/caregiver hear your instructions? Yes      Were you told to eat a special diet? No      Did you adhere to the diet? No      Were you given a number of someone to call if you had questions or concerns? Yes        Row Name 10/28/24 1207             Index discharge location/services    Where did you go upon discharge? Skilled Nursing Facility      Do you have  supportive family or friends in the home? Yes      Was the provider seen at the facility? Yes      What actions were taken to avoid a readmit? NA      Which Skilled Nursing Facility were your admitted from? [facility]        Row Name 10/28/24 1207             Discharge Readiness    On a scale of 1-5 (5 being well prepared), how ready were you for discharge 3      Recommendation based on interview Education on diagnosis/self management        Row Name 10/28/24 1207             Palliative Care/Hospice    Are you current with Palliative Care? No      Are you current with Hospice Care? No        Row Name 10/28/24 1207 10/26/24 2015          Advance Directives (For Healthcare)    Pre-existing AND/MOST/POLST Order No No     Advance Directive Status Patient does not have advance directive Patient does not have advance directive     Have you reviewed your Advance Directive and is it valid for this stay? No No     Literature Provided on Advance Directives No No     Patient Requests Assistance on Advance Directives Patient Declined Patient Declined       Row Name 10/28/24 1207             Readmission Assessment Final Comments    Final Comments HX: A.fib history of DVT on Xarelto.   PREVIOUS ADMISSION: 10/9- ED with BLE wounds. Vascular consulted, Debridment. ID consulted. IV abx. 10/18- Discharged to [facility].      CURRENT ADMISSION: 10/26- ED with altered mental status. UA negative.CT head negative Lactic 1.6. Vascular and ID consulted. Patient and family do not want to patient to return to [facility]. Referrals made to 1. [facility] 2.[facility].

## 2024-10-28 NOTE — PROGRESS NOTES
10/28/24   Foot and Ankle Surgery - Inpatient Follow-up  Provider: Dr. Justen Jj, WILIAN  Location: [facility]    Chief Complaint: Bilateral lower extremity wounds    Subjective:  Fallon Nur is a 79 y.o. female.     Patient resting comfortably during evaluation.  Daughter is at bedside and states that her mental status is much improved today.    No Known Allergies    No current facility-administered medications on file prior to encounter.     Current Outpatient Medications on File Prior to Encounter   Medication Sig Dispense Refill    atorvastatin (LIPITOR) 40 MG tablet Take 1 tablet by mouth Every Night. 90 tablet 0    buPROPion XL (WELLBUTRIN XL) 150 MG 24 hr tablet Take 1 tablet by mouth Daily.      cetirizine (zyrTEC) 10 MG tablet Take 1 tablet by mouth Daily.      cilostazol (PLETAL) 100 MG tablet Take 1 tablet by mouth 2 (Two) Times a Day. 60 tablet 0    citalopram (CeleXA) 20 MG tablet Take 1 tablet by mouth Daily.      digoxin (LANOXIN) 125 MCG tablet Take 1 tablet by mouth Daily.      ferrous sulfate 325 (65 FE) MG tablet Take 1 tablet by mouth Daily With Breakfast.      furosemide (LASIX) 20 MG tablet Take 1 tablet by mouth Daily. 30 tablet 0    gabapentin (NEURONTIN) 300 MG capsule Take 1 capsule by mouth 2 (Two) Times a Day.      levoFLOXacin (LEVAQUIN) 500 MG tablet Take 1 tablet by mouth Daily for 10 doses. Indications: Infection of the Skin and/or Soft Tissue 10 tablet 0    levothyroxine (SYNTHROID, LEVOTHROID) 50 MCG tablet Take 1 tablet by mouth Daily.      metoprolol succinate XL (TOPROL-XL) 25 MG 24 hr tablet Take 0.5 tablets by mouth Daily. 15 tablet 0    multivitamin (Thera) tablet tablet Take 1 tablet by mouth Daily. 30 tablet 0    [] oxyCODONE (ROXICODONE) 5 MG immediate release tablet Take 1 tablet by mouth Every 6 (Six) Hours As Needed for Moderate Pain for up to 10 days. 40 tablet 0    pantoprazole (PROTONIX) 40 MG EC tablet Take 1 tablet by mouth Daily.       "polyethylene glycol (MIRALAX) 17 g packet Take 17 g by mouth Daily for 30 days. 30 packet 0    rivaroxaban (XARELTO) 15 MG tablet Take 1 tablet by mouth Daily With Dinner. Indications: Atrial Fibrillation 42 tablet 0    vitamin B-12 (CYANOCOBALAMIN) 1000 MCG tablet Take 1 tablet by mouth Daily.      vitamin D (ERGOCALCIFEROL) 1.25 MG (08463 UT) capsule capsule Take 1 capsule by mouth 1 (One) Time Per Week. Saturday      zinc sulfate (ZINCATE) 220 (50 Zn) MG capsule Take 1 capsule by mouth Daily for 30 days. 30 capsule 0    alendronate (FOSAMAX) 70 MG tablet Take 1 tablet by mouth Every 7 (Seven) Days. Saturday      fentaNYL (DURAGESIC) 12 MCG/HR Place 1 patch on the skin as directed by provider Every 72 (Seventy-Two) Hours.      fluticasone (FLONASE) 50 MCG/ACT nasal spray Administer 2 sprays into the nostril(s) as directed by provider Daily As Needed for Rhinitis.      Melatonin 12 MG tablet Take 1 tablet by mouth At Night As Needed.      Nutritional Supplements (Estiven) pack Take 1 packet by mouth 2 (Two) Times a Day. 60 packet 0       Objective   /76 (BP Location: Right arm, Patient Position: Lying)   Pulse 90   Temp 98.4 °F (36.9 °C) (Oral)   Resp 16   Ht 160 cm (63\")   Wt 60.3 kg (133 lb)   SpO2 96%   BMI 23.56 kg/m²     GENERAL  Appearance:  elderly  Orientation:  unable to assess     VASCULAR      Right Foot Vascularity   Dorsalis pedis:  1+  Posterior tibial:  1+  Skin temperature:  warm  Pedal hair growth:  Absent      Left Foot Vascularity   Dorsalis pedis:  1+  Posterior tibial:  1+  Skin temperature:  warm  Edema grading:  None  Pedal hair growth:  Absent     NEUROLOGIC      Right Foot Neurologic   Achilles reflex:  1+      Left Foot Neurologic   Achilles reflex:  1+     MUSCULOSKELETAL      Right Foot Musculoskeletal   Ecchymosis:  none  Tenderness:  (Discomfort with palpation involving the right lower extremity)      Left Foot Musculoskeletal   Ecchymosis:  none     DERMATOLOGIC       Right " Foot Dermatologic   Skin  Right foot skin is not intact. Positive for maceration, skin changes, ulcer and atrophy. Negative for cellulitis, drainage, erythema and gangrene.       Left Foot Dermatologic   Skin  Left foot skin is not intact. Positive for dryness, ulcer and atrophy. Negative for cellulitis, drainage, erythema and maceration.      Right foot additional comments: Large irregular ulceration involving the pretibial region of the right lower extremity with granulation tissue.  Mild maceration.  No keturah malodor or drainage present.  Small superficial wounds noted to the right foot.     Left foot additional comments: Large irregular ulceration involving the posterior aspect of the left ankle with exposed Achilles tendon.  No drainage, maceration, or malodor.  Early skin necrosis involving the wound periphery with desiccation of the tendon.      Results from last 7 days   Lab Units 10/28/24  0107   WBC 10*3/mm3 8.76   HEMOGLOBIN g/dL 8.7*   HEMATOCRIT % 27.8*   PLATELETS 10*3/mm3 419       Assessment & Plan       Altered mental status    Atherosclerosis of native arteries of right leg with ulceration of calf    Atherosclerosis of native arteries of left leg with ulceration of calf      Patient's mental status is apparently improved after discussion with patient's daughter.  Vascular planning to proceed with arteriogram with possible intervention tomorrow.  No local signs of sepsis noted to the right pretibial wound or left Achilles wound.  Plan will be to proceed with local wound care from my standpoint.  Patient requires follow-up with me in 1 to 2 weeks after discharge for further planning.  I did discussed further treatment options with patient's daughter at length.  She agrees with observation and wound care at this time.  Will sign off at this time.    Note is dictated utilizing voice recognition software. Unfortunately this leads to occasional typographical errors. I apologize in advance if the  situation occurs. If questions occur please do not hesitate to call our office.

## 2024-10-29 ENCOUNTER — ANESTHESIA (OUTPATIENT)
Dept: PERIOP | Facility: HOSPITAL | Age: 79
End: 2024-10-29
Payer: MEDICARE

## 2024-10-29 ENCOUNTER — ANCILLARY PROCEDURE (OUTPATIENT)
Dept: PERIOP | Facility: HOSPITAL | Age: 79
End: 2024-10-29
Payer: MEDICARE

## 2024-10-29 ENCOUNTER — ANESTHESIA EVENT (OUTPATIENT)
Dept: PERIOP | Facility: HOSPITAL | Age: 79
End: 2024-10-29
Payer: MEDICARE

## 2024-10-29 LAB
ABO GROUP BLD: NORMAL
ANION GAP SERPL CALCULATED.3IONS-SCNC: 4.9 MMOL/L (ref 5–15)
ANION GAP SERPL CALCULATED.3IONS-SCNC: 6.8 MMOL/L (ref 5–15)
APTT PPP: 22.1 SECONDS (ref 61–76.5)
APTT PPP: 24.1 SECONDS (ref 24–31)
BASOPHILS # BLD AUTO: 0.04 10*3/MM3 (ref 0–0.2)
BASOPHILS # BLD AUTO: 0.05 10*3/MM3 (ref 0–0.2)
BASOPHILS NFR BLD AUTO: 0.6 % (ref 0–1.5)
BASOPHILS NFR BLD AUTO: 0.7 % (ref 0–1.5)
BLD GP AB SCN SERPL QL: NEGATIVE
BUN SERPL-MCNC: 10 MG/DL (ref 8–23)
BUN SERPL-MCNC: 14 MG/DL (ref 8–23)
BUN/CREAT SERPL: 14.5 (ref 7–25)
BUN/CREAT SERPL: 19.2 (ref 7–25)
CALCIUM SPEC-SCNC: 7.4 MG/DL (ref 8.6–10.5)
CALCIUM SPEC-SCNC: 7.8 MG/DL (ref 8.6–10.5)
CHLORIDE SERPL-SCNC: 105 MMOL/L (ref 98–107)
CHLORIDE SERPL-SCNC: 107 MMOL/L (ref 98–107)
CO2 SERPL-SCNC: 27.1 MMOL/L (ref 22–29)
CO2 SERPL-SCNC: 27.2 MMOL/L (ref 22–29)
CREAT SERPL-MCNC: 0.69 MG/DL (ref 0.57–1)
CREAT SERPL-MCNC: 0.73 MG/DL (ref 0.57–1)
DEPRECATED RDW RBC AUTO: 61.2 FL (ref 37–54)
DEPRECATED RDW RBC AUTO: 63.5 FL (ref 37–54)
EGFRCR SERPLBLD CKD-EPI 2021: 83.8 ML/MIN/1.73
EGFRCR SERPLBLD CKD-EPI 2021: 88.4 ML/MIN/1.73
EOSINOPHIL # BLD AUTO: 0.24 10*3/MM3 (ref 0–0.4)
EOSINOPHIL # BLD AUTO: 0.26 10*3/MM3 (ref 0–0.4)
EOSINOPHIL NFR BLD AUTO: 3.3 % (ref 0.3–6.2)
EOSINOPHIL NFR BLD AUTO: 3.6 % (ref 0.3–6.2)
ERYTHROCYTE [DISTWIDTH] IN BLOOD BY AUTOMATED COUNT: 19.5 % (ref 12.3–15.4)
ERYTHROCYTE [DISTWIDTH] IN BLOOD BY AUTOMATED COUNT: 19.7 % (ref 12.3–15.4)
GLUCOSE BLDC GLUCOMTR-MCNC: 73 MG/DL (ref 70–105)
GLUCOSE BLDC GLUCOMTR-MCNC: 78 MG/DL (ref 70–105)
GLUCOSE BLDC GLUCOMTR-MCNC: 85 MG/DL (ref 70–105)
GLUCOSE BLDC GLUCOMTR-MCNC: 85 MG/DL (ref 70–105)
GLUCOSE SERPL-MCNC: 73 MG/DL (ref 65–99)
GLUCOSE SERPL-MCNC: 83 MG/DL (ref 65–99)
HCT VFR BLD AUTO: 27.9 % (ref 34–46.6)
HCT VFR BLD AUTO: 33.5 % (ref 34–46.6)
HGB BLD-MCNC: 10.2 G/DL (ref 12–15.9)
HGB BLD-MCNC: 8.8 G/DL (ref 12–15.9)
IMM GRANULOCYTES # BLD AUTO: 0.04 10*3/MM3 (ref 0–0.05)
IMM GRANULOCYTES # BLD AUTO: 0.04 10*3/MM3 (ref 0–0.05)
IMM GRANULOCYTES NFR BLD AUTO: 0.5 % (ref 0–0.5)
IMM GRANULOCYTES NFR BLD AUTO: 0.6 % (ref 0–0.5)
INR PPP: 1.12 (ref 0.93–1.1)
LYMPHOCYTES # BLD AUTO: 0.9 10*3/MM3 (ref 0.7–3.1)
LYMPHOCYTES # BLD AUTO: 1.2 10*3/MM3 (ref 0.7–3.1)
LYMPHOCYTES NFR BLD AUTO: 12.3 % (ref 19.6–45.3)
LYMPHOCYTES NFR BLD AUTO: 16.6 % (ref 19.6–45.3)
MCH RBC QN AUTO: 27.3 PG (ref 26.6–33)
MCH RBC QN AUTO: 27.6 PG (ref 26.6–33)
MCHC RBC AUTO-ENTMCNC: 30.4 G/DL (ref 31.5–35.7)
MCHC RBC AUTO-ENTMCNC: 31.5 G/DL (ref 31.5–35.7)
MCV RBC AUTO: 87.5 FL (ref 79–97)
MCV RBC AUTO: 89.6 FL (ref 79–97)
MONOCYTES # BLD AUTO: 0.67 10*3/MM3 (ref 0.1–0.9)
MONOCYTES # BLD AUTO: 0.71 10*3/MM3 (ref 0.1–0.9)
MONOCYTES NFR BLD AUTO: 9.2 % (ref 5–12)
MONOCYTES NFR BLD AUTO: 9.8 % (ref 5–12)
NEUTROPHILS NFR BLD AUTO: 4.97 10*3/MM3 (ref 1.7–7)
NEUTROPHILS NFR BLD AUTO: 5.4 10*3/MM3 (ref 1.7–7)
NEUTROPHILS NFR BLD AUTO: 68.8 % (ref 42.7–76)
NEUTROPHILS NFR BLD AUTO: 74 % (ref 42.7–76)
NRBC BLD AUTO-RTO: 0 /100 WBC (ref 0–0.2)
NRBC BLD AUTO-RTO: 0 /100 WBC (ref 0–0.2)
PLATELET # BLD AUTO: 389 10*3/MM3 (ref 140–450)
PLATELET # BLD AUTO: 403 10*3/MM3 (ref 140–450)
PMV BLD AUTO: 9 FL (ref 6–12)
PMV BLD AUTO: 9.1 FL (ref 6–12)
POTASSIUM SERPL-SCNC: 3.7 MMOL/L (ref 3.5–5.2)
POTASSIUM SERPL-SCNC: 3.9 MMOL/L (ref 3.5–5.2)
PROTHROMBIN TIME: 12.1 SECONDS (ref 9.6–11.7)
RBC # BLD AUTO: 3.19 10*6/MM3 (ref 3.77–5.28)
RBC # BLD AUTO: 3.74 10*6/MM3 (ref 3.77–5.28)
RH BLD: POSITIVE
SODIUM SERPL-SCNC: 139 MMOL/L (ref 136–145)
SODIUM SERPL-SCNC: 139 MMOL/L (ref 136–145)
T&S EXPIRATION DATE: NORMAL
WBC NRBC COR # BLD AUTO: 7.22 10*3/MM3 (ref 3.4–10.8)
WBC NRBC COR # BLD AUTO: 7.3 10*3/MM3 (ref 3.4–10.8)

## 2024-10-29 PROCEDURE — C1769 GUIDE WIRE: HCPCS | Performed by: STUDENT IN AN ORGANIZED HEALTH CARE EDUCATION/TRAINING PROGRAM

## 2024-10-29 PROCEDURE — 25010000002 FENTANYL CITRATE (PF) 50 MCG/ML SOLUTION: Performed by: ANESTHESIOLOGIST ASSISTANT

## 2024-10-29 PROCEDURE — C1894 INTRO/SHEATH, NON-LASER: HCPCS | Performed by: STUDENT IN AN ORGANIZED HEALTH CARE EDUCATION/TRAINING PROGRAM

## 2024-10-29 PROCEDURE — 82948 REAGENT STRIP/BLOOD GLUCOSE: CPT | Performed by: STUDENT IN AN ORGANIZED HEALTH CARE EDUCATION/TRAINING PROGRAM

## 2024-10-29 PROCEDURE — B4101ZZ FLUOROSCOPY OF ABDOMINAL AORTA USING LOW OSMOLAR CONTRAST: ICD-10-PCS | Performed by: STUDENT IN AN ORGANIZED HEALTH CARE EDUCATION/TRAINING PROGRAM

## 2024-10-29 PROCEDURE — P9041 ALBUMIN (HUMAN),5%, 50ML: HCPCS | Performed by: ANESTHESIOLOGIST ASSISTANT

## 2024-10-29 PROCEDURE — 86901 BLOOD TYPING SEROLOGIC RH(D): CPT | Performed by: STUDENT IN AN ORGANIZED HEALTH CARE EDUCATION/TRAINING PROGRAM

## 2024-10-29 PROCEDURE — 25510000001 IOPAMIDOL PER 1 ML: Performed by: STUDENT IN AN ORGANIZED HEALTH CARE EDUCATION/TRAINING PROGRAM

## 2024-10-29 PROCEDURE — 93010 ELECTROCARDIOGRAM REPORT: CPT | Performed by: INTERNAL MEDICINE

## 2024-10-29 PROCEDURE — 93005 ELECTROCARDIOGRAM TRACING: CPT | Performed by: STUDENT IN AN ORGANIZED HEALTH CARE EDUCATION/TRAINING PROGRAM

## 2024-10-29 PROCEDURE — 25010000002 PIPERACILLIN SOD-TAZOBACTAM PER 1 G: Performed by: STUDENT IN AN ORGANIZED HEALTH CARE EDUCATION/TRAINING PROGRAM

## 2024-10-29 PROCEDURE — 25010000002 LIDOCAINE 1 % SOLUTION 20 ML VIAL: Performed by: STUDENT IN AN ORGANIZED HEALTH CARE EDUCATION/TRAINING PROGRAM

## 2024-10-29 PROCEDURE — 82948 REAGENT STRIP/BLOOD GLUCOSE: CPT | Performed by: HOSPITALIST

## 2024-10-29 PROCEDURE — 86850 RBC ANTIBODY SCREEN: CPT | Performed by: STUDENT IN AN ORGANIZED HEALTH CARE EDUCATION/TRAINING PROGRAM

## 2024-10-29 PROCEDURE — 25810000003 LACTATED RINGERS PER 1000 ML: Performed by: ANESTHESIOLOGIST ASSISTANT

## 2024-10-29 PROCEDURE — C1725 CATH, TRANSLUMIN NON-LASER: HCPCS | Performed by: STUDENT IN AN ORGANIZED HEALTH CARE EDUCATION/TRAINING PROGRAM

## 2024-10-29 PROCEDURE — 047N3ZZ DILATION OF LEFT POPLITEAL ARTERY, PERCUTANEOUS APPROACH: ICD-10-PCS | Performed by: STUDENT IN AN ORGANIZED HEALTH CARE EDUCATION/TRAINING PROGRAM

## 2024-10-29 PROCEDURE — 85025 COMPLETE CBC W/AUTO DIFF WBC: CPT | Performed by: STUDENT IN AN ORGANIZED HEALTH CARE EDUCATION/TRAINING PROGRAM

## 2024-10-29 PROCEDURE — 25010000002 KETOROLAC TROMETHAMINE PER 15 MG

## 2024-10-29 PROCEDURE — 25010000002 HEPARIN (PORCINE) PER 1000 UNITS: Performed by: STUDENT IN AN ORGANIZED HEALTH CARE EDUCATION/TRAINING PROGRAM

## 2024-10-29 PROCEDURE — 047Q3ZZ DILATION OF LEFT ANTERIOR TIBIAL ARTERY, PERCUTANEOUS APPROACH: ICD-10-PCS | Performed by: STUDENT IN AN ORGANIZED HEALTH CARE EDUCATION/TRAINING PROGRAM

## 2024-10-29 PROCEDURE — 80048 BASIC METABOLIC PNL TOTAL CA: CPT | Performed by: STUDENT IN AN ORGANIZED HEALTH CARE EDUCATION/TRAINING PROGRAM

## 2024-10-29 PROCEDURE — 25010000002 BUPIVACAINE (PF) 0.25 % SOLUTION 30 ML VIAL: Performed by: STUDENT IN AN ORGANIZED HEALTH CARE EDUCATION/TRAINING PROGRAM

## 2024-10-29 PROCEDURE — C1884 EMBOLIZATION PROTECT SYST: HCPCS | Performed by: STUDENT IN AN ORGANIZED HEALTH CARE EDUCATION/TRAINING PROGRAM

## 2024-10-29 PROCEDURE — 25010000002 HEPARIN (PORCINE) 25000-0.45 UT/250ML-% SOLUTION: Performed by: STUDENT IN AN ORGANIZED HEALTH CARE EDUCATION/TRAINING PROGRAM

## 2024-10-29 PROCEDURE — 37228 PR REVSC OPN/PRQ TIB/PERO W/ANGIOPLASTY UNI: CPT | Performed by: STUDENT IN AN ORGANIZED HEALTH CARE EDUCATION/TRAINING PROGRAM

## 2024-10-29 PROCEDURE — 86900 BLOOD TYPING SEROLOGIC ABO: CPT | Performed by: STUDENT IN AN ORGANIZED HEALTH CARE EDUCATION/TRAINING PROGRAM

## 2024-10-29 PROCEDURE — 85025 COMPLETE CBC W/AUTO DIFF WBC: CPT

## 2024-10-29 PROCEDURE — 25010000002 PHENYLEPHRINE 10 MG/ML SOLUTION 5 ML VIAL: Performed by: ANESTHESIOLOGIST ASSISTANT

## 2024-10-29 PROCEDURE — 85730 THROMBOPLASTIN TIME PARTIAL: CPT | Performed by: STUDENT IN AN ORGANIZED HEALTH CARE EDUCATION/TRAINING PROGRAM

## 2024-10-29 PROCEDURE — 85730 THROMBOPLASTIN TIME PARTIAL: CPT

## 2024-10-29 PROCEDURE — 85610 PROTHROMBIN TIME: CPT | Performed by: STUDENT IN AN ORGANIZED HEALTH CARE EDUCATION/TRAINING PROGRAM

## 2024-10-29 PROCEDURE — 25010000002 ALBUMIN HUMAN 5% PER 50 ML: Performed by: ANESTHESIOLOGIST ASSISTANT

## 2024-10-29 PROCEDURE — 75625 CONTRAST EXAM ABDOMINL AORTA: CPT | Performed by: STUDENT IN AN ORGANIZED HEALTH CARE EDUCATION/TRAINING PROGRAM

## 2024-10-29 PROCEDURE — C1760 CLOSURE DEV, VASC: HCPCS | Performed by: STUDENT IN AN ORGANIZED HEALTH CARE EDUCATION/TRAINING PROGRAM

## 2024-10-29 PROCEDURE — 75710 ARTERY X-RAYS ARM/LEG: CPT | Performed by: STUDENT IN AN ORGANIZED HEALTH CARE EDUCATION/TRAINING PROGRAM

## 2024-10-29 PROCEDURE — 80048 BASIC METABOLIC PNL TOTAL CA: CPT

## 2024-10-29 PROCEDURE — 25010000002 PIPERACILLIN SOD-TAZOBACTAM PER 1 G

## 2024-10-29 PROCEDURE — 82948 REAGENT STRIP/BLOOD GLUCOSE: CPT

## 2024-10-29 PROCEDURE — 25810000003 SODIUM CHLORIDE 0.9 % SOLUTION 250 ML FLEX CONT: Performed by: ANESTHESIOLOGIST ASSISTANT

## 2024-10-29 PROCEDURE — 25010000002 PROPOFOL 10 MG/ML EMULSION: Performed by: ANESTHESIOLOGIST ASSISTANT

## 2024-10-29 PROCEDURE — C1887 CATHETER, GUIDING: HCPCS | Performed by: STUDENT IN AN ORGANIZED HEALTH CARE EDUCATION/TRAINING PROGRAM

## 2024-10-29 PROCEDURE — 25010000002 HEPARIN (PORCINE) PER 1000 UNITS: Performed by: ANESTHESIOLOGIST ASSISTANT

## 2024-10-29 PROCEDURE — B41G1ZZ FLUOROSCOPY OF LEFT LOWER EXTREMITY ARTERIES USING LOW OSMOLAR CONTRAST: ICD-10-PCS | Performed by: STUDENT IN AN ORGANIZED HEALTH CARE EDUCATION/TRAINING PROGRAM

## 2024-10-29 RX ORDER — ACETAMINOPHEN 325 MG/1
650 TABLET ORAL ONCE AS NEEDED
Status: CANCELLED | OUTPATIENT
Start: 2024-10-29

## 2024-10-29 RX ORDER — DIPHENHYDRAMINE HCL 25 MG
25 CAPSULE ORAL
Status: DISCONTINUED | OUTPATIENT
Start: 2024-10-29 | End: 2024-10-29 | Stop reason: HOSPADM

## 2024-10-29 RX ORDER — PROMETHAZINE HYDROCHLORIDE 25 MG/1
25 SUPPOSITORY RECTAL ONCE AS NEEDED
Status: DISCONTINUED | OUTPATIENT
Start: 2024-10-29 | End: 2024-10-29 | Stop reason: HOSPADM

## 2024-10-29 RX ORDER — SODIUM CHLORIDE 0.9 % (FLUSH) 0.9 %
10 SYRINGE (ML) INJECTION EVERY 12 HOURS SCHEDULED
Status: DISCONTINUED | OUTPATIENT
Start: 2024-10-29 | End: 2024-10-29 | Stop reason: HOSPADM

## 2024-10-29 RX ORDER — FENTANYL CITRATE 50 UG/ML
INJECTION, SOLUTION INTRAMUSCULAR; INTRAVENOUS AS NEEDED
Status: DISCONTINUED | OUTPATIENT
Start: 2024-10-29 | End: 2024-10-29 | Stop reason: SURG

## 2024-10-29 RX ORDER — PROCHLORPERAZINE EDISYLATE 5 MG/ML
10 INJECTION INTRAMUSCULAR; INTRAVENOUS ONCE AS NEEDED
Status: DISCONTINUED | OUTPATIENT
Start: 2024-10-29 | End: 2024-10-29 | Stop reason: HOSPADM

## 2024-10-29 RX ORDER — FENTANYL CITRATE 50 UG/ML
100 INJECTION, SOLUTION INTRAMUSCULAR; INTRAVENOUS
Status: DISCONTINUED | OUTPATIENT
Start: 2024-10-29 | End: 2024-10-29 | Stop reason: HOSPADM

## 2024-10-29 RX ORDER — IPRATROPIUM BROMIDE AND ALBUTEROL SULFATE 2.5; .5 MG/3ML; MG/3ML
3 SOLUTION RESPIRATORY (INHALATION) ONCE AS NEEDED
Status: DISCONTINUED | OUTPATIENT
Start: 2024-10-29 | End: 2024-10-29 | Stop reason: HOSPADM

## 2024-10-29 RX ORDER — ONDANSETRON 2 MG/ML
4 INJECTION INTRAMUSCULAR; INTRAVENOUS ONCE AS NEEDED
Status: DISCONTINUED | OUTPATIENT
Start: 2024-10-29 | End: 2024-10-29 | Stop reason: HOSPADM

## 2024-10-29 RX ORDER — HEPARIN SODIUM 10000 [USP'U]/100ML
18 INJECTION, SOLUTION INTRAVENOUS
Status: DISCONTINUED | OUTPATIENT
Start: 2024-10-29 | End: 2024-10-30

## 2024-10-29 RX ORDER — SODIUM CHLORIDE 0.9 % (FLUSH) 0.9 %
10 SYRINGE (ML) INJECTION AS NEEDED
Status: DISCONTINUED | OUTPATIENT
Start: 2024-10-29 | End: 2024-10-29 | Stop reason: HOSPADM

## 2024-10-29 RX ORDER — HEPARIN SODIUM 1000 [USP'U]/ML
INJECTION, SOLUTION INTRAVENOUS; SUBCUTANEOUS AS NEEDED
Status: DISCONTINUED | OUTPATIENT
Start: 2024-10-29 | End: 2024-10-29 | Stop reason: SURG

## 2024-10-29 RX ORDER — IBUPROFEN 600 MG/1
600 TABLET, FILM COATED ORAL ONCE AS NEEDED
Status: DISCONTINUED | OUTPATIENT
Start: 2024-10-29 | End: 2024-10-29 | Stop reason: HOSPADM

## 2024-10-29 RX ORDER — HYDRALAZINE HYDROCHLORIDE 20 MG/ML
5 INJECTION INTRAMUSCULAR; INTRAVENOUS
Status: DISCONTINUED | OUTPATIENT
Start: 2024-10-29 | End: 2024-10-29 | Stop reason: HOSPADM

## 2024-10-29 RX ORDER — SODIUM CHLORIDE, SODIUM LACTATE, POTASSIUM CHLORIDE, CALCIUM CHLORIDE 600; 310; 30; 20 MG/100ML; MG/100ML; MG/100ML; MG/100ML
INJECTION, SOLUTION INTRAVENOUS CONTINUOUS PRN
Status: DISCONTINUED | OUTPATIENT
Start: 2024-10-29 | End: 2024-10-29 | Stop reason: SURG

## 2024-10-29 RX ORDER — OXYCODONE HYDROCHLORIDE 5 MG/1
10 TABLET ORAL ONCE AS NEEDED
Status: COMPLETED | OUTPATIENT
Start: 2024-10-29 | End: 2024-10-29

## 2024-10-29 RX ORDER — DEXAMETHASONE SODIUM PHOSPHATE 4 MG/ML
8 INJECTION, SOLUTION INTRA-ARTICULAR; INTRALESIONAL; INTRAMUSCULAR; INTRAVENOUS; SOFT TISSUE ONCE AS NEEDED
Status: DISCONTINUED | OUTPATIENT
Start: 2024-10-29 | End: 2024-10-29 | Stop reason: HOSPADM

## 2024-10-29 RX ORDER — IOPAMIDOL 755 MG/ML
INJECTION, SOLUTION INTRAVASCULAR AS NEEDED
Status: DISCONTINUED | OUTPATIENT
Start: 2024-10-29 | End: 2024-10-29 | Stop reason: HOSPADM

## 2024-10-29 RX ORDER — DIPHENHYDRAMINE HYDROCHLORIDE 50 MG/ML
12.5 INJECTION INTRAMUSCULAR; INTRAVENOUS
Status: DISCONTINUED | OUTPATIENT
Start: 2024-10-29 | End: 2024-10-29 | Stop reason: HOSPADM

## 2024-10-29 RX ORDER — ALBUMIN, HUMAN INJ 5% 5 %
SOLUTION INTRAVENOUS CONTINUOUS PRN
Status: DISCONTINUED | OUTPATIENT
Start: 2024-10-29 | End: 2024-10-29 | Stop reason: SURG

## 2024-10-29 RX ORDER — FLUMAZENIL 0.1 MG/ML
0.5 INJECTION INTRAVENOUS AS NEEDED
Status: DISCONTINUED | OUTPATIENT
Start: 2024-10-29 | End: 2024-10-29 | Stop reason: HOSPADM

## 2024-10-29 RX ORDER — ACETAMINOPHEN 650 MG/1
650 SUPPOSITORY RECTAL EVERY 4 HOURS PRN
Status: CANCELLED | OUTPATIENT
Start: 2024-10-29

## 2024-10-29 RX ORDER — METOCLOPRAMIDE HYDROCHLORIDE 5 MG/ML
10 INJECTION INTRAMUSCULAR; INTRAVENOUS ONCE AS NEEDED
Status: DISCONTINUED | OUTPATIENT
Start: 2024-10-29 | End: 2024-10-29 | Stop reason: HOSPADM

## 2024-10-29 RX ORDER — MIDAZOLAM HYDROCHLORIDE 1 MG/ML
1 INJECTION, SOLUTION INTRAMUSCULAR; INTRAVENOUS
Status: DISCONTINUED | OUTPATIENT
Start: 2024-10-29 | End: 2024-10-29 | Stop reason: HOSPADM

## 2024-10-29 RX ORDER — PROMETHAZINE HYDROCHLORIDE 25 MG/1
25 TABLET ORAL ONCE AS NEEDED
Status: DISCONTINUED | OUTPATIENT
Start: 2024-10-29 | End: 2024-10-29 | Stop reason: HOSPADM

## 2024-10-29 RX ORDER — LABETALOL HYDROCHLORIDE 5 MG/ML
5 INJECTION, SOLUTION INTRAVENOUS
Status: DISCONTINUED | OUTPATIENT
Start: 2024-10-29 | End: 2024-10-29 | Stop reason: HOSPADM

## 2024-10-29 RX ORDER — DIPHENHYDRAMINE HYDROCHLORIDE 50 MG/ML
12.5 INJECTION INTRAMUSCULAR; INTRAVENOUS ONCE AS NEEDED
Status: DISCONTINUED | OUTPATIENT
Start: 2024-10-29 | End: 2024-10-29 | Stop reason: HOSPADM

## 2024-10-29 RX ORDER — EPHEDRINE SULFATE 5 MG/ML
5 INJECTION INTRAVENOUS ONCE AS NEEDED
Status: DISCONTINUED | OUTPATIENT
Start: 2024-10-29 | End: 2024-10-29 | Stop reason: HOSPADM

## 2024-10-29 RX ORDER — NALOXONE HCL 0.4 MG/ML
0.4 VIAL (ML) INJECTION AS NEEDED
Status: DISCONTINUED | OUTPATIENT
Start: 2024-10-29 | End: 2024-10-29 | Stop reason: HOSPADM

## 2024-10-29 RX ADMIN — FENTANYL CITRATE 100 MCG: 50 INJECTION, SOLUTION INTRAMUSCULAR; INTRAVENOUS at 14:03

## 2024-10-29 RX ADMIN — OXYCODONE 10 MG: 5 TABLET ORAL at 17:07

## 2024-10-29 RX ADMIN — HEPARIN SODIUM 7000 UNITS: 1000 INJECTION INTRAVENOUS; SUBCUTANEOUS at 14:34

## 2024-10-29 RX ADMIN — PIPERACILLIN AND TAZOBACTAM 3.38 G: 3; .375 INJECTION, POWDER, FOR SOLUTION INTRAVENOUS at 02:48

## 2024-10-29 RX ADMIN — SODIUM CHLORIDE, SODIUM LACTATE, POTASSIUM CHLORIDE, AND CALCIUM CHLORIDE: .6; .31; .03; .02 INJECTION, SOLUTION INTRAVENOUS at 14:00

## 2024-10-29 RX ADMIN — PIPERACILLIN AND TAZOBACTAM 3.38 G: 3; .375 INJECTION, POWDER, FOR SOLUTION INTRAVENOUS at 19:04

## 2024-10-29 RX ADMIN — ATORVASTATIN CALCIUM 40 MG: 40 TABLET, FILM COATED ORAL at 20:36

## 2024-10-29 RX ADMIN — KETOROLAC TROMETHAMINE 15 MG: 30 INJECTION, SOLUTION INTRAMUSCULAR; INTRAVENOUS at 10:03

## 2024-10-29 RX ADMIN — DIGOXIN 125 MCG: 125 TABLET ORAL at 12:50

## 2024-10-29 RX ADMIN — PROPOFOL 100 MCG/KG/MIN: 10 INJECTION, EMULSION INTRAVENOUS at 14:00

## 2024-10-29 RX ADMIN — KETOROLAC TROMETHAMINE 15 MG: 30 INJECTION, SOLUTION INTRAMUSCULAR; INTRAVENOUS at 02:49

## 2024-10-29 RX ADMIN — PIPERACILLIN AND TAZOBACTAM 3.38 G: 3; .375 INJECTION, POWDER, FOR SOLUTION INTRAVENOUS at 10:03

## 2024-10-29 RX ADMIN — PHENYLEPHRINE HYDROCHLORIDE 0.5 MCG/KG/MIN: 10 INJECTION INTRAVENOUS at 14:06

## 2024-10-29 RX ADMIN — HEPARIN SODIUM 18 UNITS/KG/HR: 10000 INJECTION, SOLUTION INTRAVENOUS at 23:20

## 2024-10-29 RX ADMIN — ALBUMIN (HUMAN): 12.5 INJECTION, SOLUTION INTRAVENOUS at 14:01

## 2024-10-29 NOTE — PLAN OF CARE
Goal Outcome Evaluation:                            Pt able to make needs known. Dressing on groin, dry and intact, no drainage at incision site. Pain treated per MAR. Safety precautions in place. Call light within reach. Plan of care ongoing.

## 2024-10-29 NOTE — OP NOTE
Date of Admission:  10/26/2024  10/29/24  Red Hazel II, MD  [facility]    Preoperative Diagnosis:   Peripheral arterial disease with major tissue loss    Postoperative Diagnosis:   Same    Procedure Performed:   Ultrasound-guided access right common femoral artery  Abdominal aortogram  Left leg arteriogram  Selective catheter placement right common femoral artery the left anterior tibial artery  Balloon angioplasty left anterior tibial artery with 3mm angioplasty baloon  Balloon angioplasty left popliteal artery with 3mm followed by 4mm angioplasty balloon.   Proglide closure right common femoral arteriotomy            Surgeon:   Red Hazel II, MD    Assistant:    Sumaya Kingsley RN, Provided critical assistance in exposure, retraction, and suction that overall decrease blood loss and operative time.    Anesthesia:   MAC with local    Estimated Blood Loss:   Minimal    Findings:    Abdominal aortogram: Patent renal arteries bilaterally, left renal artery widely patent, origin of right renal artery not well-visualized, SMA patent, infrarenal abdominal aorta widely patent without evidence of aneurysm or stenosis. Mild atherosclerotic plaque at aortic bifurcation. Common, internal, and external iliac arteries widely patent bilaterally.    Left leg arteriogram: Common femoral artery widely patent, profundofemoral artery widely patent, superficial femoral artery widely patent, popliteal artery occludes behind the knee with reconstitution of anterior tibial artery and tibioperoneal trunk distally.  Anterior tibial artery provides single-vessel runoff to the foot, posterior tibial and peroneal arteries patent proximally but appeared occluded in the mid lower leg.  The occlusion was treated with 3 mm balloon angioplasty in the anterior tibial artery and 3 mm followed by 4 mm angioplasty in the popliteal artery.  This restored inline flow to the foot via anterior tibial artery with improved collaterals to the posterior  aspect of the lower leg with some angiographic evidence of hyperemia around her wound.    Common femoral artery access site was closed with a ProGlide Perclose device without complication.  Pressure held until hemostasis obtained.    Patient had palpable right DP pulse and left pedal signals at conclusion of procedure    Implants:    Nothing was implanted during the procedure    Specimen:   none    Complications:   none    Access:  6 Czech retrograde access right common femoral artery      Closure:  Perclose closure device    Dispo:  To PACU    Indication for procedure:   79 y.o. 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  I have been following her for lower extremity wounds which on the left leg initially healed well but had worsening wounds on her right leg for which she underwent right leg arteriogram with percutaneous thrombectomy recently.  The patient was discharged to rehab and then developed a pressure ulcer on the posterior aspect of her left foot.  ABIs 0.5 and previous toe pressures were 0 ABIs performed in September and so plans made for left leg arteriogram with possible intervention.  Details of this procedure including risk benefits and alternatives discussed with patient and family and they verbalized understanding and agreed to proceed.    Description of procedure:   Patient taken to the hybrid room placed supine on the table.  Sedation was initiated by the anesthesia service.  Once the patient was asleep her groins were prepped with ChloraPrep bilaterally and she was draped in sterile fashion.  A timeout was performed.  I began procedure by marking site of right femoral head under fluoroscopy.  I then used ultrasound evaluate the right common femoral artery.  Local anesthetic was infiltrated over the artery under ultrasound guidance and then under ultrasound  guidance I accessed the right common femoral artery with a microneedle.  Microwire was inserted and placement over the femoral head was confirmed.  I then exchanged microneedle for microsheath and performed an angiogram of the access site.  This looked appropriate.  I then advanced a 0.035 Glidewire through the micro sheath into the infrarenal aorta and microsheath was exchanged for a 10 cm 5 Thai sheath.  I then advanced an Omni Flush catheter over the wire and performed an abdominal aortogram with the above listed findings.  I then used the Omni Flush catheter to select the left iliac system and was able to advance the wire and catheter into the distal left external iliac artery.  I then performed a left leg arteriogram via sequential contrast injections from this position.  I then obtained a 260 cm 0.035 Glidewire advantage and this is advanced through the Omni Flush catheter.  The patient was given 7000 units of heparin.  Wire was advanced into the superficial femoral artery and then the Omni Flush catheter and the 5 Thai sheath were removed and the 45 cm 6 Thai destination sheath was advanced over the wire.  Angiography confirmed placement of the superficial femoral artery.  I then used an angled Espinal cross catheter to advance through the superficial femoral artery and into the popliteal artery.  I then performed an angiogram to ken the site of the occlusion as well as distal target.  Using angled Espinal cross catheter and the Glidewire advantage is able to cross the occlusion and get into the anterior tibial artery distal to the occlusion.  Angiogram was performed distal to this through the angled Espinal cross to confirm intraluminal placement.  I then advanced a 5 mm spider wire through the angled Espinal cross catheter and into the anterior tibial artery.  Angled Espinal cross was removed and a 3 x 80 mm angioplasty balloon was obtained and balloon angioplasty was performed of the anterior tibial artery and the  popliteal artery.  Completion angiography showed some lumen irregularity in the popliteal artery and so 4 mm angioplasty balloon was obtained and balloon angioplasty was performed of the popliteal artery with a 2-minute inflation time.  Completion angiography showed improved flow but with some irregularity more distally in the popliteal artery and so I did reinsert the 3 mm angioplasty balloon and performed balloon angioplasty again this time with a 1 minute inflation time.  Completion angiography showed improved flow with inline flow to the foot via the anterior tibial artery.  I did perform angiography of the foot distally which showed no significant embolic problems and better collateralization to the area of the patient's wound on the posterior aspect of her left leg.  I then used the spider wire capture device to capture the filter.  The sheath was retracted into the right common iliac artery and a 0.035 Glidewire was advanced through the sheath.  We then removed the sheath and closed the right common femoral arteriotomy with a ProGlide device without complication.  Pressure was held till hemostasis was obtained.  I confirmed the patient still had a palpable right DP pulse and DP and PT signals in the left foot.  Decision was made not to reverse heparin due to patient's atrial fibrillation and previous embolic issues.  The patient was awakened from sedation and transported to PACU in good condition.  The patient tolerated procedure well and there were no operative complications and all wires catheters sheaths and other devices were removed and found to be whole and intact prior to the conclusion of the procedure.  I discussed the outcome of the procedure with the patient's daughters.    Red Hazel II, MD  10/29/24    Active Hospital Problems    Diagnosis  POA    **Altered mental status [R41.82]  Yes    Atherosclerosis of native arteries of left leg with ulceration of calf [I70.242]  Yes    Atherosclerosis  of native arteries of right leg with ulceration of calf [I70.232]  Yes      Resolved Hospital Problems   No resolved problems to display.

## 2024-10-29 NOTE — PLAN OF CARE
Goal Outcome Evaluation:    Plan for Left leg arteriogram with possible intervention  POSSIBLE LOWER EXTREMITY THROMBECTOMY/EMBOLECTOMY tomorrow at 1400 with Dr Hazel

## 2024-10-29 NOTE — SIGNIFICANT NOTE
Nurse for final check of site prior to transport, discovered fully saturated gauze, pressure was held, Dr. Hazel was contacted and is en route.

## 2024-10-29 NOTE — ANESTHESIA PREPROCEDURE EVALUATION
Anesthesia Evaluation     Patient summary reviewed and Nursing notes reviewed   no history of anesthetic complications:   NPO Solid Status: > 8 hours  NPO Liquid Status: > 2 hours           Airway   Mallampati: II  TM distance: >3 FB  Neck ROM: full  No difficulty expected  Dental - normal exam   (+) edentulous and poor dentition        Pulmonary - normal exam   (+) a smoker Former,  Cardiovascular - normal exam    ECG reviewed  PT is on anticoagulation therapy  Patient on routine beta blocker    (+) hypertension, dysrhythmias Atrial Fib, PVD      Neuro/Psych  (+) psychiatric history Anxiety    ROS Comment: SOME CONFUSION BUT COHERENT MOST TIMES  GI/Hepatic/Renal/Endo    (+) GERD, renal disease- ARF, thyroid problem hypothyroidism    Musculoskeletal     Abdominal  - normal exam    Bowel sounds: normal.   Substance History      OB/GYN          Other   blood dyscrasia anemia,     ROS/Med Hx Other: Additional History:  Afib/RVR, allergies, abd pain, h/o SBO, ventral hernia, cellulitis    PSH:  Gastric bypass      Phys Exam Other: No loose teeth per pt , broken on bottom                Anesthesia Plan    ASA 3     MAC   total IV anesthesia  (Patient identified; pre-operative vital signs, all relevant labs/studies, complete medical/surgical/anesthetic history, full medication list, full allergy list, and NPO status obtained/reviewed; physical assessment performed; anesthetic options, side effects, potential complications, risks, and benefits discussed; questions answered; written anesthesia consent obtained; patient cleared for procedure; anesthesia machine and equipment checked and functioning)  intravenous induction     Anesthetic plan, risks, benefits, and alternatives have been provided, discussed and informed consent has been obtained with: patient.  Pre-procedure education provided  Plan discussed with CRNA and CAA.      CODE STATUS:    Code Status (Patient has no pulse and is not breathing): CPR (Attempt to  Resuscitate)  Medical Interventions (Patient has pulse or is breathing): Full Support

## 2024-10-29 NOTE — CASE MANAGEMENT/SOCIAL WORK
Continued Stay Note   Juan J     Patient Name: Fallon Nur  MRN: [id number]  Today's Date: 10/29/2024    Admit Date: 10/26/2024    Plan: DC PLAN: [place] referral, awaiting response. ([facility] and [facility]- Denied) Does not wan to return to [facility]. Will need Precert/PASRR.       Discharge Plan       Row Name 10/29/24 1139       Plan    Plan DC PLAN: [place] referral, awaiting response. ([facility] and [facility]- Denied) Does not wan to return to [facility]. Will need Precert/PASRR.        Patient/Family in Agreement with Plan yes    Plan Comments Recieved message, 3rd choice of [place]. DCP report sent, message sent to liasion. awaiting response. Vascular consult, Angiogram. Podiatry consult.                    Expected Discharge Date and Time       Expected Discharge Date Expected Discharge Time    Oct 29, 2024           Andree Morocho RN   Case Management  [phone]

## 2024-10-29 NOTE — ANESTHESIA POSTPROCEDURE EVALUATION
Patient: Fallon Nur    Procedure Summary       Date: 10/29/24 Room / Location: [facility] OR  / [facility] HYBRID OR    Anesthesia Start: 1400 Anesthesia Stop: 1527    Procedure: Left leg arteriogram with angioplasty (Left) Diagnosis:       Atherosclerosis of native arteries of left leg with ulceration of calf      (Atherosclerosis of native arteries of left leg with ulceration of calf [I70.242])    Surgeons: Red Hazel II, MD Provider: Berhane Ortega MD    Anesthesia Type: MAC ASA Status: 3            Anesthesia Type: MAC    Vitals  Vitals Value Taken Time   /72 10/29/24 1614   Temp 97.6 °F (36.4 °C) 10/29/24 1530   Pulse 83 10/29/24 1617   Resp 12 10/29/24 1600   SpO2 96 % 10/29/24 1617   Vitals shown include unfiled device data.        Post Anesthesia Care and Evaluation    Patient location during evaluation: PACU  Patient participation: complete - patient participated  Level of consciousness: awake  Pain scale: See nurse's notes for pain score.  Pain management: adequate    Airway patency: patent  Anesthetic complications: No anesthetic complications  PONV Status: none  Cardiovascular status: acceptable  Respiratory status: acceptable and spontaneous ventilation  Hydration status: acceptable    Comments: Patient seen and examined postoperatively; vital signs stable; SpO2 greater than or equal to 90%; cardiopulmonary status stable; nausea/vomiting adequately controlled; pain adequately controlled; no apparent anesthesia complications; patient discharged from anesthesia care when discharge criteria were met

## 2024-10-29 NOTE — PROGRESS NOTES
[facility] MEDICINE SERVICE  DAILY PROGRESS NOTE    NAME: Fallon Nur  : 1945  MRN: [id number]      LOS: 3 days     PROVIDER OF SERVICE: Gonzalo Castillo MD    Chief Complaint: Altered mental status    Subjective:     Interval History:  History taken from: patient chart family RN    Entire family present. Discussed case with family.        Review of Systems:   Review of Systems  All negative except as above  Objective:     Vital Signs  Temp:  [97.8 °F (36.6 °C)-98.3 °F (36.8 °C)] 98.1 °F (36.7 °C)  Heart Rate:  [] 105  Resp:  [17-21] 19  BP: ()/(63-75) 106/75   Body mass index is 23.56 kg/m².    Physical Exam  Physical Exam  Frail elderly female NAD  RRR S1-S2 audible  Lungs with fair air entry  Abdomen soft nontender nondistended  Bilateral lower extremity wrapped in dressing     Diagnostic Data    Results from last 7 days   Lab Units 10/29/24  0306 10/27/24  0055 10/26/24  1431   WBC 10*3/mm3 7.22   < > 7.57   HEMOGLOBIN g/dL 8.8*   < > 9.5*   HEMATOCRIT % 27.9*   < > 31.0*   PLATELETS 10*3/mm3 403   < > 460*   GLUCOSE mg/dL 83   < > 87   CREATININE mg/dL 0.73   < > 0.75   BUN mg/dL 14   < > 16   SODIUM mmol/L 139   < > 140   POTASSIUM mmol/L 3.7   < > 3.8   AST (SGOT) U/L  --   --  48*   ALT (SGPT) U/L  --   --  12   ALK PHOS U/L  --   --  197*   BILIRUBIN mg/dL  --   --  0.5   ANION GAP mmol/L 4.9*   < > 4.0*    < > = values in this interval not displayed.       XR Chest 1 View    Result Date: 10/28/2024  Impression: No acute cardiopulmonary findings. Electronically Signed: Osvaldo Ferrara MD  10/28/2024 9:45 PM EDT  Workstation ID: [id number]       I reviewed the patient's new clinical results.  I reviewed the patient's new imaging results and agree with the interpretation.    Assessment/Plan:     Active and Resolved Problems  Active Hospital Problems    Diagnosis  POA    **Altered mental status [R41.82]  Yes    Atherosclerosis of native arteries of left leg with ulceration of  calf [I70.242]  Yes    Atherosclerosis of native arteries of right leg with ulceration of calf [I70.232]  Yes      Resolved Hospital Problems   No resolved problems to display.       #79-year-old female with history of atrial fibrillation, hypertension, DVT, PAD lower extremity wounds status post thrombectomy admitted to [facility] with altered sensorium   off note she was admitted at [place] from 10/9 - 10/18 for lower extremity wounds PAD underwent percutaneous mechanical thrombectomy of multiple arteries and excisional debridement of her right leg wound on 10/14 subsequently discharged to rehab on Levaquin      #PAD  #Bilateral lower extremity wounds  Vascular surgery following pursuing left lower extremity arteriogram on 10/29/2024  Anticoagulation currently on hold, follow vascular surgery guidance regarding this.  Currently on IV Zosyn will defer need for antibiotics per vascular surgery      #Altered sensorium secondary to polypharmacy-improved  No leukocytosis.  UA not suggestive of UTI low suspicion for underlying infection  CT head on admission no acute findings  Suspect etiology may be secondary to medications.  Patient was recently discharged on fentanyl patch which has been discontinued since 10/26  Cautions with pain medications.       #History of DVT  #Paroxysmal atrial fibrillation  Continue digoxin.  Levels within normal limit  Xarelto currently on hold resume once cleared from vascular surgery standpoint       # Hypotension  BP were soft initially now improved  Hold metoprolol.  Gentle IVF currently in anticipation for contrast load tomorrow.       #History of hypothyroidism  - TSH elevated at 9.980  - T4 1.48  -Continue current dose of levothyroxine      VTE Prophylaxis:  Mechanical VTE prophylaxis orders are present.       Disposition Planning:     Barriers to Discharge: Intervention today  Anticipated Date of Discharge: TBD  Place of Discharge: Rehab      Time: 45 minutes     Code Status and  Medical Interventions: CPR (Attempt to Resuscitate); Full Support   Ordered at: 10/26/24 1646     Code Status (Patient has no pulse and is not breathing):    CPR (Attempt to Resuscitate)     Medical Interventions (Patient has pulse or is breathing):    Full Support       Signature: Electronically signed by Gonzalo Castillo MD, 10/29/24, 10:33 EDT.  [facility]

## 2024-10-30 LAB
APTT PPP: 51.1 SECONDS (ref 61–76.5)
GLUCOSE BLDC GLUCOMTR-MCNC: 101 MG/DL (ref 70–105)
GLUCOSE BLDC GLUCOMTR-MCNC: 65 MG/DL (ref 70–105)
GLUCOSE BLDC GLUCOMTR-MCNC: 71 MG/DL (ref 70–105)
GLUCOSE BLDC GLUCOMTR-MCNC: 94 MG/DL (ref 70–105)
GLUCOSE BLDC GLUCOMTR-MCNC: 98 MG/DL (ref 70–105)

## 2024-10-30 PROCEDURE — 82948 REAGENT STRIP/BLOOD GLUCOSE: CPT | Performed by: STUDENT IN AN ORGANIZED HEALTH CARE EDUCATION/TRAINING PROGRAM

## 2024-10-30 PROCEDURE — 82948 REAGENT STRIP/BLOOD GLUCOSE: CPT

## 2024-10-30 PROCEDURE — 97165 OT EVAL LOW COMPLEX 30 MIN: CPT

## 2024-10-30 PROCEDURE — 25010000002 PIPERACILLIN SOD-TAZOBACTAM PER 1 G: Performed by: STUDENT IN AN ORGANIZED HEALTH CARE EDUCATION/TRAINING PROGRAM

## 2024-10-30 PROCEDURE — 25010000002 MORPHINE PER 10 MG

## 2024-10-30 PROCEDURE — 99232 SBSQ HOSP IP/OBS MODERATE 35: CPT | Performed by: NURSE PRACTITIONER

## 2024-10-30 PROCEDURE — 85730 THROMBOPLASTIN TIME PARTIAL: CPT | Performed by: STUDENT IN AN ORGANIZED HEALTH CARE EDUCATION/TRAINING PROGRAM

## 2024-10-30 PROCEDURE — 97162 PT EVAL MOD COMPLEX 30 MIN: CPT

## 2024-10-30 RX ADMIN — CITALOPRAM HYDROBROMIDE 20 MG: 20 TABLET ORAL at 09:20

## 2024-10-30 RX ADMIN — ATORVASTATIN CALCIUM 40 MG: 40 TABLET, FILM COATED ORAL at 20:18

## 2024-10-30 RX ADMIN — DIGOXIN 125 MCG: 125 TABLET ORAL at 13:02

## 2024-10-30 RX ADMIN — ASPIRIN 81 MG CHEWABLE TABLET 81 MG: 81 TABLET CHEWABLE at 09:20

## 2024-10-30 RX ADMIN — RIVAROXABAN 20 MG: 20 TABLET, FILM COATED ORAL at 18:22

## 2024-10-30 RX ADMIN — FERROUS SULFATE TAB EC 324 MG (65 MG FE EQUIVALENT) 324 MG: 324 (65 FE) TABLET DELAYED RESPONSE at 09:20

## 2024-10-30 RX ADMIN — PIPERACILLIN AND TAZOBACTAM 3.38 G: 3; .375 INJECTION, POWDER, FOR SOLUTION INTRAVENOUS at 02:08

## 2024-10-30 RX ADMIN — LEVOTHYROXINE SODIUM 50 MCG: 0.05 TABLET ORAL at 05:37

## 2024-10-30 RX ADMIN — PIPERACILLIN AND TAZOBACTAM 3.38 G: 3; .375 INJECTION, POWDER, FOR SOLUTION INTRAVENOUS at 10:20

## 2024-10-30 RX ADMIN — Medication 10 ML: at 20:19

## 2024-10-30 RX ADMIN — MORPHINE SULFATE 4 MG: 4 INJECTION, SOLUTION INTRAMUSCULAR; INTRAVENOUS at 05:13

## 2024-10-30 RX ADMIN — BUPROPION HYDROCHLORIDE 150 MG: 150 TABLET, EXTENDED RELEASE ORAL at 09:20

## 2024-10-30 RX ADMIN — PIPERACILLIN AND TAZOBACTAM 3.38 G: 3; .375 INJECTION, POWDER, FOR SOLUTION INTRAVENOUS at 18:22

## 2024-10-30 NOTE — PLAN OF CARE
Goal Outcome Evaluation:  Plan of Care Reviewed With: patient           Outcome Evaluation: 78 yo female adm 10/26/24 from SNF for hallucinations, altered mental status. Pt had gone to SNF following recent admission to [facility] due to fall at home and decreased mobility, as well as pain in BLEs. Pt underwent RLE vascular sx in mid-Oct for thrombectomy and balloon angioplasty. Pt now POD 1 s/p LLE thrombectomy involving popliteal, peroneal and anterior tib aa, as well as balloon angioplasty.  PMH: pad w/ major tissue loss. Pt had fallen sometime in mid-september at home, and had been using w/c since that time. By the time she presents to [facility] in mid-Oct, she had not ambulated in over a month. Pt has been working w/ PT at SNF, but it is unclear how much progress she has made. Pt oriented today, but she is a poor historian. Pt comes to sit at eob w/ mod assist of 2. Attempted twice to come to standing at rw, but pt in severe pain w/ LLE and unable to wb on this LE. Requires mod assist of 2 to return to supine position. Pt not safe for home at this time. Will need SNF at d/c to work toward more independence w/ mobility. Will follow.

## 2024-10-30 NOTE — PLAN OF CARE
Goal Outcome Evaluation:   Patient alert and oriented x 4. Able to make needs known. Q2 turns, tolerates well. Family at bedside. Personal items and call light in reach. Plan of care is ongoing.

## 2024-10-30 NOTE — PLAN OF CARE
Goal Outcome Evaluation:            Pt A&O x4. Pt able to make needs known. Pt Q2 turn. Safety precautions in place. Call light within reach. Plan of care ongoing.                                  Yes

## 2024-10-30 NOTE — THERAPY EVALUATION
Patient Name: Fallon Nur  : 1945    MRN: [id number]                              Today's Date: 10/30/2024       Admit Date: 10/26/2024    Visit Dx:     ICD-10-CM ICD-9-CM   1. Hallucinations  R44.3 780.1   2. Ulcers of both lower legs  L97.919 707.10    L97.929    3. Atherosclerosis of native arteries of left leg with ulceration of calf  I70.242 440.23     707.12     Patient Active Problem List   Diagnosis    H/O gastric bypass    Hypothyroidism    Chronic back pain    Ventral hernia    GERD (gastroesophageal reflux disease)    Blind    Longstanding persistent atrial fibrillation    Open wound of both lower extremities    Acute hypokalemia    Normocytic anemia    Anxiety and depression    Wound of lower extremity    Infected wound    PAD (peripheral artery disease)    Atherosclerosis of native arteries of right leg with ulceration of calf    Nonrheumatic mitral valve regurgitation    Primary hypertension    B12 deficiency    Chronic foot pain    Compression fracture of cervical spine    Histoplasmosis    History of compression fracture of spine    Insomnia    Knee osteoarthritis    Macular degeneration    Osteoporosis    Vitamin D deficiency    Altered mental status    Atherosclerosis of native arteries of left leg with ulceration of calf     Past Medical History:   Diagnosis Date    Atrial fibrillation     Blind 07/10/2023    Cellulitis of leg 2024    Duodenal ulcer, perforated 10/01/2013    [facility]    2021 - EGD clear      DVT of upper extremity (deep vein thrombosis) 1995    subclavian - premarin      E. coli UTI 2020    H/O gastric bypass 2020    Histoplasmosis     Hypertension     Infestation by bed bug 10/09/2024    Legally blind     Longstanding persistent atrial fibrillation 07/10/2023    Macular degeneration 10/15/2024    Nonrheumatic mitral valve regurgitation 10/15/2024    Open wound of both lower extremities 2024    Primary hypertension 10/15/2024     PVD (peripheral vascular disease)     SBO (small bowel obstruction) 12/31/2020     Past Surgical History:   Procedure Laterality Date    ARTERIOGRAM Left 10/29/2024    Procedure: Left leg arteriogram with angioplasty;  Surgeon: Red Hazel II, MD;  Location: [facility] HYBRID OR;  Service: Vascular;  Laterality: Left;    CARDIAC CATHETERIZATION Right 10/14/2024    Procedure: RIGHT LOWER EXTREMITY ARTERIOGRAM, PERCUTANEOUS THROMBECTOMY, ANGIOPLASTY,  AND WOUND DEBRIDEMENT;  Surgeon: Red Hazel II, MD;  Location: [facility] HYBRID OR;  Service: Vascular;  Laterality: Right;    WOUND DEBRIDEMENT Right 9/16/2024    Procedure: RIGHT LEG DEBRIDEMENT;  Surgeon: Red Hazel II, MD;  Location: [facility] MAIN OR;  Service: Vascular;  Laterality: Right;      General Information       Row Name 10/30/24 1621          OT Time and Intention    Document Type evaluation  -MP     Mode of Treatment occupational therapy  -MP     Patient Effort good  -MP       Row Name 10/30/24 1621          General Information    Patient Profile Reviewed yes  -MP     Existing Precautions/Restrictions fall  -MP       Row Name 10/30/24 1621          Living Environment    People in Home child(elgin), adult  -MP       Row Name 10/30/24 1621          Cognition    Orientation Status (Cognition) oriented x 4  -MP       Row Name 10/30/24 1621          Safety Issues/Impairments Affecting Functional Mobility    Impairments Affecting Function (Mobility) endurance/activity tolerance;strength;range of motion (ROM);pain;other (see comments)  -MP               User Key  (r) = Recorded By, (t) = Taken By, (c) = Cosigned By      Initials Name Provider Type    MP Pierre Jason OT Occupational Therapist                     Mobility/ADL's       Row Name 10/30/24 1621          Bed Mobility    Bed Mobility supine-sit;sit-supine  -MP     Supine-Sit [place] (Bed Mobility) moderate assist (50% patient effort);2 person assist;minimum assist (75% patient effort)   -MP     Sit-Supine [place] (Bed Mobility) moderate assist (50% patient effort);2 person assist  -MP     Assistive Device (Bed Mobility) bed rails;head of bed elevated;leg   -MP       Row Name 10/30/24 1621          Bed-Chair Transfer    Bed-Chair [place] (Transfers) unable to assess  -MP       Row Name 10/30/24 1621          Sit-Stand Transfer    Sit-Stand [place] (Transfers) unable to assess  -MP       Row Name 10/30/24 1621          Activities of Daily Living    BADL Assessment/Intervention lower body dressing  -MP       Row Name 10/30/24 1621          Lower Body Dressing Assessment/Training    [place] Level (Lower Body Dressing) lower body dressing skills;maximum assist (25% patient effort)  -MP               User Key  (r) = Recorded By, (t) = Taken By, (c) = Cosigned By      Initials Name Provider Type    Pierre Ordonez OT Occupational Therapist                   Obj/Interventions       Row Name 10/30/24 1621          Range of Motion Comprehensive    Comment, General Range of Motion BUE WFL  -MP       Row Name 10/30/24 1621          Strength Comprehensive (MMT)    Comment, General Manual Muscle Testing (MMT) Assessment [facility] 4-/5  -MP       Row Name 10/30/24 1621          Balance    Balance Interventions sitting;standing;sit to stand;supported;static;dynamic  -MP               User Key  (r) = Recorded By, (t) = Taken By, (c) = Cosigned By      Initials Name Provider Type    Pierre Ordonez OT Occupational Therapist                   Goals/Plan       Row Name 10/30/24 1624          Bed Mobility Goal 1 (OT)    Activity/Assistive Device (Bed Mobility Goal 1, OT) bed mobility activities, all  -MP     [place] Level/Cues Needed (Bed Mobility Goal 1, OT) minimum assist (75% or more patient effort)  -MP     Time Frame (Bed Mobility Goal 1, OT) long term goal (LTG);2 weeks  -MP       Row Name 10/30/24 1624          Transfer Goal 1 (OT)    Activity/Assistive Device (Transfer  Goal 1, OT) sit-to-stand/stand-to-sit;toilet  -MP     [place] Level/Cues Needed (Transfer Goal 1, OT) minimum assist (75% or more patient effort)  -MP     Time Frame (Transfer Goal 1, OT) long term goal (LTG);2 weeks  -MP       Row Name 10/30/24 1624          Dressing Goal 1 (OT)    Activity/Device (Dressing Goal 1, OT) lower body dressing  -MP     [place]/Cues Needed (Dressing Goal 1, OT) minimum assist (75% or more patient effort)  -MP     Time Frame (Dressing Goal 1, OT) long term goal (LTG);2 weeks  -MP               User Key  (r) = Recorded By, (t) = Taken By, (c) = Cosigned By      Initials Name Provider Type    Pierre Ordonez OT Occupational Therapist                   Clinical Impression       Row Name 10/30/24 1622          Pain Assessment    Pretreatment Pain Rating 8/10  -MP     Posttreatment Pain Rating 2/10  -MP     Pain Side/Orientation left;lower  -MP       Row Name 10/30/24 0662          Plan of Care Review    Plan of Care Reviewed With patient  -MP     Progress no change  -MP     Outcome Evaluation 80 yo female adm 10/26/24 from SNF for hallucinations, altered mental status. Pt had gone to SNF following recent admission to [facility] due to fall at home and decreased mobility, as well as pain in BLEs. Pt underwent RLE vascular sx in mid-Oct for thrombectomy and balloon angioplasty. Pt now POD 1 s/p LLE thrombectomy involving popliteal, peroneal and anterior tib aa, as well as balloon angioplasty. PMH: pad w/ major tissue loss. Pt. utilizing w/c for mobility in the last month ,was walking at home and taking care of daughter, maintained ADL independence at baseline. Pt. requires mod A x 2 for bed mobility and sit to stand transfer this date, patient unable to maintain standing w/ severe posterior LOB. Max A provided for all LB ADls. Pt. not safe to d/c home at this time and will require SNF Placement at d/c.  -MP       Row Name 10/30/24 6832          Therapy Assessment/Plan (OT)    Rehab  Potential (OT) good  -MP     Criteria for Skilled Therapeutic Interventions Met (OT) yes;meets criteria;skilled treatment is necessary  -MP     Therapy Frequency (OT) 3 times/wk  -MP       Row Name 10/30/24 1622          Therapy Plan Review/Discharge Plan (OT)    Anticipated Discharge Disposition (OT) skilled nursing facility  -MP       Row Name 10/30/24 1622          Vital Signs    Pre Patient Position Supine  -MP     Intra Patient Position Standing  -MP     Post Patient Position Supine  -MP       Row Name 10/30/24 1622          Positioning and Restraints    Pre-Treatment Position in bed  -MP     Post Treatment Position bed  -MP     In Bed supine;call light within reach;encouraged to call for assist;exit alarm on  -MP               User Key  (r) = Recorded By, (t) = Taken By, (c) = Cosigned By      Initials Name Provider Type    Pierre Ordonez OT Occupational Therapist                   Outcome Measures       Row Name 10/30/24 1306 10/30/24 0920       How much help from another person do you currently need...    Turning from your back to your side while in flat bed without using bedrails? 3  -CM 2  -LP    Moving from lying on back to sitting on the side of a flat bed without bedrails? 3  -CM 2  -LP    Moving to and from a bed to a chair (including a wheelchair)? 1  -CM 1  -LP    Standing up from a chair using your arms (e.g., wheelchair, bedside chair)? 1  -CM 1  -LP    Climbing 3-5 steps with a railing? 1  -CM 1  -LP    To walk in hospital room? 1  -CM 1  -LP    AM-PAC 6 Clicks Score (PT) 10  -CM 8  -LP              User Key  (r) = Recorded By, (t) = Taken By, (c) = Cosigned By      Initials Name Provider Type    Mabel Contreras, PT Physical Therapist    Kanika Jasso LPN Licensed Nurse                    Occupational Therapy Education       Title: PT OT SLP Therapies (In Progress)       Topic: Occupational Therapy (Not Started)       Point: ADL training (Not Started)       Description:    Instruct learner(s) on proper safety adaptation and remediation techniques during self care or transfers.   Instruct in proper use of assistive devices.                  Learner Progress:  Not documented in this visit.              Point: Home exercise program (Not Started)       Description:   Instruct learner(s) on appropriate technique for monitoring, assisting and/or progressing therapeutic exercises/activities.                  Learner Progress:  Not documented in this visit.              Point: Precautions (Not Started)       Description:   Instruct learner(s) on prescribed precautions during self-care and functional transfers.                  Learner Progress:  Not documented in this visit.              Point: Body mechanics (Not Started)       Description:   Instruct learner(s) on proper positioning and spine alignment during self-care, functional mobility activities and/or exercises.                  Learner Progress:  Not documented in this visit.                                  OT Recommendation and Plan  Therapy Frequency (OT): 3 times/wk  Plan of Care Review  Plan of Care Reviewed With: patient  Progress: no change  Outcome Evaluation: 80 yo female adm 10/26/24 from SNF for hallucinations, altered mental status. Pt had gone to SNF following recent admission to [facility] due to fall at home and decreased mobility, as well as pain in BLEs. Pt underwent RLE vascular sx in mid-Oct for thrombectomy and balloon angioplasty. Pt now POD 1 s/p LLE thrombectomy involving popliteal, peroneal and anterior tib aa, as well as balloon angioplasty. PMH: pad w/ major tissue loss. Pt. utilizing w/c for mobility in the last month ,was walking at home and taking care of daughter, maintained ADL independence at baseline. Pt. requires mod A x 2 for bed mobility and sit to stand transfer this date, patient unable to maintain standing w/ severe posterior LOB. Max A provided for all LB ADls. Pt. not safe to d/c home at this time  and will require SNF Placement at d/c.     Time Calculation:         Time Calculation- OT       Row Name 10/30/24 1625             Time Calculation- OT    OT Start Time 1035  -MP      OT Stop Time 1055  -      OT Time Calculation (min) 20 min  -      Total Timed Code Minutes- OT 0 minute(s)  -      OT Received On 10/30/24  -      OT - Next Appointment 11/01/24  -      OT Goal Re-Cert Due Date 11/13/24  -                User Key  (r) = Recorded By, (t) = Taken By, (c) = Cosigned By      Initials Name Provider Type    Pierre Ordonez OT Occupational Therapist                  Therapy Charges for Today       Code Description Service Date Service Provider Modifiers Qty    [id number] HC OT EVAL LOW COMPLEXITY 4 10/30/2024 Pierre Jason OT GO 1                 Pierre Jason OT  10/30/2024

## 2024-10-30 NOTE — PLAN OF CARE
Goal Outcome Evaluation:  Plan of Care Reviewed With: patient        Progress: no change  Outcome Evaluation: 78 yo female adm 10/26/24 from SNF for hallucinations, altered mental status. Pt had gone to SNF following recent admission to [facility] due to fall at home and decreased mobility, as well as pain in BLEs. Pt underwent RLE vascular sx in mid-Oct for thrombectomy and balloon angioplasty. Pt now POD 1 s/p LLE thrombectomy involving popliteal, peroneal and anterior tib aa, as well as balloon angioplasty. PMH: pad w/ major tissue loss. Pt. utilizing w/c for mobility in the last month ,was walking at home and taking care of daughter, maintained ADL independence at baseline. Pt. requires mod A x 2 for bed mobility and sit to stand transfer this date, patient unable to maintain standing w/ severe posterior LOB. Max A provided for all LB ADls. Pt. not safe to d/c home at this time and will require SNF Placement at d/c.    Anticipated Discharge Disposition (OT): skilled nursing facility

## 2024-10-30 NOTE — PROGRESS NOTES
[facility] MEDICINE SERVICE  DAILY PROGRESS NOTE    NAME: Fallon Nur  : 1945  MRN: [id number]      LOS: 4 days     PROVIDER OF SERVICE: Gonzalo Castillo MD    Chief Complaint: Altered mental status    Subjective:     Interval History:  History taken from: patient chart family RN    Doing very well postop.  Some soreness but feeling well.        Review of Systems:   Review of Systems  All negative except as above  Objective:     Vital Signs  Temp:  [97.4 °F (36.3 °C)-98 °F (36.7 °C)] 97.7 °F (36.5 °C)  Heart Rate:  [] 95  Resp:  [12-20] 18  BP: (100-159)/(57-95) 128/71   Body mass index is 23.56 kg/m².    Physical Exam  Physical Exam  Frail elderly female NAD  RRR S1-S2 audible  Lungs with fair air entry  Abdomen soft nontender nondistended  Bilateral lower extremity wrapped in dressing     Diagnostic Data    Results from last 7 days   Lab Units 10/29/24  2247 10/27/24  0055 10/26/24  1431   WBC 10*3/mm3 7.30   < > 7.57   HEMOGLOBIN g/dL 10.2*   < > 9.5*   HEMATOCRIT % 33.5*   < > 31.0*   PLATELETS 10*3/mm3 389   < > 460*   GLUCOSE mg/dL 73   < > 87   CREATININE mg/dL 0.69   < > 0.75   BUN mg/dL 10   < > 16   SODIUM mmol/L 139   < > 140   POTASSIUM mmol/L 3.9   < > 3.8   AST (SGOT) U/L  --   --  48*   ALT (SGPT) U/L  --   --  12   ALK PHOS U/L  --   --  197*   BILIRUBIN mg/dL  --   --  0.5   ANION GAP mmol/L 6.8   < > 4.0*    < > = values in this interval not displayed.       XR Chest 1 View    Result Date: 10/28/2024  Impression: No acute cardiopulmonary findings. Electronically Signed: Osvaldo Ferrara MD  10/28/2024 9:45 PM EDT  Workstation ID: [id number]       I reviewed the patient's new clinical results.  I reviewed the patient's new imaging results and agree with the interpretation.    Assessment/Plan:     Active and Resolved Problems  Active Hospital Problems    Diagnosis  POA    **Altered mental status [R41.82]  Yes    Atherosclerosis of native arteries of left leg with ulceration of  calf [I70.242]  Yes    Atherosclerosis of native arteries of right leg with ulceration of calf [I70.232]  Yes      Resolved Hospital Problems   No resolved problems to display.       #79-year-old female with history of atrial fibrillation, hypertension, DVT, PAD lower extremity wounds status post thrombectomy admitted to [facility] with altered sensorium   off note she was admitted at [facility] from 10/9 - 10/18 for lower extremity wounds PAD underwent percutaneous mechanical thrombectomy of multiple arteries and excisional debridement of her right leg wound on 10/14 subsequently discharged to rehab on Levaquin      #PAD  #Bilateral lower extremity wounds  Vascular surgery following pursuing left lower extremity arteriogram on 10/29/2024  Resume AC per vascular guidance.  IV antibiotics to fall off after completion of treatment.      #Altered sensorium secondary to polypharmacy-improved  No leukocytosis.  UA not suggestive of UTI low suspicion for underlying infection  CT head on admission no acute findings  Suspect etiology may be secondary to medications.  Patient was recently discharged on fentanyl patch which has been discontinued since 10/26  Cautions with pain medications.       #History of DVT  #Paroxysmal atrial fibrillation  Continue digoxin.  Levels within normal limit  Xarelto to be continued       # Hypotension  BP were soft initially now improved  Hold metoprolol.  Gentle IVF currently in anticipation for contrast load tomorrow.       #History of hypothyroidism  - TSH elevated at 9.980  - T4 1.48  -Continue current dose of levothyroxine      VTE Prophylaxis:  Pharmacologic & mechanical VTE prophylaxis orders are present.       Disposition Planning:     Barriers to Discharge: PT eval, safe discharge plan  Anticipated Date of Discharge: 10/31/2024  Place of Discharge: Rehab      Time: 45 minutes     Code Status and Medical Interventions: CPR (Attempt to Resuscitate); Full Support   Ordered at: 10/26/24  1646     Code Status (Patient has no pulse and is not breathing):    CPR (Attempt to Resuscitate)     Medical Interventions (Patient has pulse or is breathing):    Full Support       Signature: Electronically signed by Gonzalo Castillo MD, 10/30/24, 11:09 EDT.  [facility] Hospitalist Team

## 2024-10-30 NOTE — THERAPY EVALUATION
Patient Name: Fallon Nur  : 1945    MRN: [id number]                              Today's Date: 10/30/2024       Admit Date: 10/26/2024    Visit Dx:     ICD-10-CM ICD-9-CM   1. Hallucinations  R44.3 780.1   2. Ulcers of both lower legs  L97.919 707.10    L97.929    3. Atherosclerosis of native arteries of left leg with ulceration of calf  I70.242 440.23     707.12     Patient Active Problem List   Diagnosis    H/O gastric bypass    Hypothyroidism    Chronic back pain    Ventral hernia    GERD (gastroesophageal reflux disease)    Blind    Longstanding persistent atrial fibrillation    Open wound of both lower extremities    Acute hypokalemia    Normocytic anemia    Anxiety and depression    Wound of lower extremity    Infected wound    PAD (peripheral artery disease)    Atherosclerosis of native arteries of right leg with ulceration of calf    Nonrheumatic mitral valve regurgitation    Primary hypertension    B12 deficiency    Chronic foot pain    Compression fracture of cervical spine    Histoplasmosis    History of compression fracture of spine    Insomnia    Knee osteoarthritis    Macular degeneration    Osteoporosis    Vitamin D deficiency    Altered mental status    Atherosclerosis of native arteries of left leg with ulceration of calf     Past Medical History:   Diagnosis Date    Atrial fibrillation     Blind 07/10/2023    Cellulitis of leg 2024    Duodenal ulcer, perforated 10/01/2013    [facility]    2021 - EGD clear      DVT of upper extremity (deep vein thrombosis) 1995    subclavian - premarin      E. coli UTI 2020    H/O gastric bypass 2020    Histoplasmosis     Hypertension     Infestation by bed bug 10/09/2024    Legally blind     Longstanding persistent atrial fibrillation 07/10/2023    Macular degeneration 10/15/2024    Nonrheumatic mitral valve regurgitation 10/15/2024    Open wound of both lower extremities 2024    Primary hypertension 10/15/2024     PVD (peripheral vascular disease)     SBO (small bowel obstruction) 12/31/2020     Past Surgical History:   Procedure Laterality Date    CARDIAC CATHETERIZATION Right 10/14/2024    Procedure: RIGHT LOWER EXTREMITY ARTERIOGRAM, PERCUTANEOUS THROMBECTOMY, ANGIOPLASTY,  AND WOUND DEBRIDEMENT;  Surgeon: Red Hazel II, MD;  Location: [facility] HYBRID OR;  Service: Vascular;  Laterality: Right;    WOUND DEBRIDEMENT Right 9/16/2024    Procedure: RIGHT LEG DEBRIDEMENT;  Surgeon: Red Hazel II, MD;  Location: [facility] MAIN OR;  Service: Vascular;  Laterality: Right;      General Information       Row Name 10/30/24 1252          Physical Therapy Time and Intention    Document Type evaluation  -CM     Mode of Treatment physical therapy  -CM       Row Name 10/30/24 1252          General Information    Patient Profile Reviewed yes  -CM     Prior Level of Function independent:;transfer;w/c or scooter  prior to mid-Sept, 2024, pt was ambulating w/o assistive device around home. Had fall, and has been primarily using w/c since that time. Has been at SNF since mid-Oct due to decreased mobility and pain in BLEs.  -CM     Existing Precautions/Restrictions fall  -CM     Barriers to Rehab medically complex;previous functional deficit;visual deficit  macular degeneration  -CM       Row Name 10/30/24 1252          Living Environment    People in Home child(elgin), adult;facility resident;other (see comments)  normally lives w/ dtr (who has Cerebral palsy) and gdtr; from SNF this admission  -CM       Row Name 10/30/24 1252          Home Main Entrance    Number of Stairs, Main Entrance none  -CM       Row Name 10/30/24 1252          Stairs Within Home, Primary    Number of Stairs, Within Home, Primary two  -CM       Row Name 10/30/24 1252          Cognition    Orientation Status (Cognition) oriented x 4  -CM       Row Name 10/30/24 1252          Safety Issues/Impairments Affecting Functional Mobility    Impairments Affecting Function  (Mobility) endurance/activity tolerance;strength;range of motion (ROM);pain;other (see comments)  skin integrity  -CM               User Key  (r) = Recorded By, (t) = Taken By, (c) = Cosigned By      Initials Name Provider Type    Mabel Contreras, PT Physical Therapist                   Mobility       Row Name 10/30/24 1257          Bed Mobility    Bed Mobility supine-sit;sit-supine  -CM     Supine-Sit [place] (Bed Mobility) moderate assist (50% patient effort);2 person assist;minimum assist (75% patient effort)  -CM     Sit-Supine [place] (Bed Mobility) moderate assist (50% patient effort);2 person assist  -CM     Assistive Device (Bed Mobility) bed rails;head of bed elevated;leg   -CM       Row Name 10/30/24 1257          Bed-Chair Transfer    Bed-Chair [place] (Transfers) unable to assess  -CM     Comment, (Bed-Chair Transfer) too painful to stand on LLE w/ attempts to stand.  -CM       Row Name 10/30/24 1257          Sit-Stand Transfer    Sit-Stand [place] (Transfers) unable to assess  -CM     Comment, (Sit-Stand Transfer) too painful on attempts to stand; could not reach standing position  -CM       Row Name 10/30/24 1257          Gait/Stairs (Locomotion)    [place] Level (Gait) unable to assess  -CM     Patient was able to Ambulate no, other medical factors prevent ambulation  -CM     Reason Patient was unable to Ambulate Uncontrolled Pain;Excessive Weakness  -CM               User Key  (r) = Recorded By, (t) = Taken By, (c) = Cosigned By      Initials Name Provider Type    Mabel Contreras, PT Physical Therapist                   Obj/Interventions       Row Name 10/30/24 1258          Range of Motion Comprehensive    General Range of Motion bilateral lower extremity ROM WFL  -CM       Row Name 10/30/24 1258          Strength Comprehensive (MMT)    General Manual Muscle Testing (MMT) Assessment lower extremity strength deficits identified  -CM     Comment, General Manual  Muscle Testing (MMT) Assessment BLEs 3-/5 at hips, 3+/5 other LE mm groups  -CM       Row Name 10/30/24 1258          Balance    Balance Assessment sitting static balance;standing dynamic balance;sitting dynamic balance;standing static balance  -CM     Static Sitting Balance supervision  -CM     Dynamic Sitting Balance supervision  -CM     Position, Sitting Balance supported;sitting edge of bed  -CM     Static Standing Balance unable to assess  -CM     Dynamic Standing Balance unable to assess  -CM       Row Name 10/30/24 1258          Sensory Assessment (Somatosensory)    Sensory Assessment (Somatosensory) sensation intact  -CM               User Key  (r) = Recorded By, (t) = Taken By, (c) = Cosigned By      Initials Name Provider Type    Mabel Contreras, PT Physical Therapist                   Goals/Plan       Row Name 10/30/24 1305          Bed Mobility Goal 1 (PT)    Activity/Assistive Device (Bed Mobility Goal 1, PT) bed mobility activities, all  -CM     [place] Level/Cues Needed (Bed Mobility Goal 1, PT) supervision required  -CM     Time Frame (Bed Mobility Goal 1, PT) 2 weeks  -CM       Row Name 10/30/24 1304          Transfer Goal 1 (PT)    Activity/Assistive Device (Transfer Goal 1, PT) transfers, all;walker, rolling  -CM     [place] Level/Cues Needed (Transfer Goal 1, PT) minimum assist (75% or more patient effort)  -CM       Row Name 10/30/24 1308          Gait Training Goal 1 (PT)    Activity/Assistive Device (Gait Training Goal 1, PT) gait (walking locomotion);walker, rolling  -CM     [place] Level (Gait Training Goal 1, PT) moderate assist (50-74% patient effort)  -CM     Distance (Gait Training Goal 1, PT) 25 ft  -CM     Time Frame (Gait Training Goal 1, PT) 2 weeks  -CM       Row Name 10/30/24 1305          Therapy Assessment/Plan (PT)    Planned Therapy Interventions (PT) balance training;bed mobility training;gait training;home exercise program;neuromuscular re-education;motor  coordination training;patient/family education;postural re-education;ROM (range of motion);strengthening;transfer training  -CM               User Key  (r) = Recorded By, (t) = Taken By, (c) = Cosigned By      Initials Name Provider Type    CM Mabel Jorge, PT Physical Therapist                   Clinical Impression       Row Name 10/30/24 1252          Pain    Pretreatment Pain Rating 8/10  w/ attempts to stand  -CM     Posttreatment Pain Rating 2/10  when in nwb position after eval  -CM     Pain Location extremity  -CM     Pain Side/Orientation left;lower  -CM     Pain Management Interventions activity modification encouraged;exercise or physical activity utilized;nursing notified;breathing exercises utilized  -CM     Response to Pain Interventions intervention effective per patient report  -CM       Row Name 10/30/24 7881          Plan of Care Review    Plan of Care Reviewed With patient  -CM     Outcome Evaluation 80 yo female adm 10/26/24 from SNF for hallucinations, altered mental status. Pt had gone to SNF following recent admission to [facility] due to fall at home and decreased mobility, as well as pain in BLEs. Pt underwent RLE vascular sx in mid-Oct for thrombectomy and balloon angioplasty. Pt now POD 1 s/p LLE thrombectomy involving popliteal, peroneal and anterior tib aa, as well as balloon angioplasty.  PMH: pad w/ major tissue loss. Pt had fallen sometime in mid-september at home, and had been using w/c since that time. By the time she presents to [facility] in mid-Oct, she had not ambulated in over a month. Pt has been working w/ PT at SNF, but it is unclear how much progress she has made. Pt oriented today, but she is a poor historian. Pt comes to sit at eob w/ mod assist of 2. Attempted twice to come to standing at rw, but pt in severe pain w/ LLE and unable to wb on this LE. Requires mod assist of 2 to return to supine position. Pt not safe for home at this time. Will need SNF at d/c to work toward more  independence w/ mobility. Will follow.  -CM       Row Name 10/30/24 1304          Therapy Assessment/Plan (PT)    Rehab Potential (PT) good  -CM     Criteria for Skilled Interventions Met (PT) yes;meets criteria;skilled treatment is necessary  -CM     Therapy Frequency (PT) 5 times/wk  -CM     Predicted Duration of Therapy Intervention (PT) until d/c  -CM       Row Name 10/30/24 1304          Vital Signs    Pretreatment Heart Rate (beats/min) 111  -CM     Intratreatment Heart Rate (beats/min) 126  -CM     Posttreatment Heart Rate (beats/min) 112  -CM     O2 Delivery Pre Treatment room air  -CM     O2 Delivery Intra Treatment room air  -CM     O2 Delivery Post Treatment room air  -CM       Row Name 10/30/24 1304          Positioning and Restraints    Pre-Treatment Position in bed  -CM     Post Treatment Position bed  -CM     In Bed notified nsg;fowlers;call light within reach;encouraged to call for assist;exit alarm on;side rails up x3;heels elevated;R waffle boot  -CM               User Key  (r) = Recorded By, (t) = Taken By, (c) = Cosigned By      Initials Name Provider Type    Mabel Contreras, PT Physical Therapist                   Outcome Measures       Row Name 10/30/24 1306          How much help from another person do you currently need...    Turning from your back to your side while in flat bed without using bedrails? 3  -CM     Moving from lying on back to sitting on the side of a flat bed without bedrails? 3  -CM     Moving to and from a bed to a chair (including a wheelchair)? 1  -CM     Standing up from a chair using your arms (e.g., wheelchair, bedside chair)? 1  -CM     Climbing 3-5 steps with a railing? 1  -CM     To walk in hospital room? 1  -CM     AM-PAC 6 Clicks Score (PT) 10  -CM               User Key  (r) = Recorded By, (t) = Taken By, (c) = Cosigned By      Initials Name Provider Type    Mabel Contreras, PT Physical Therapist                                 Physical Therapy Education        Title: PT OT SLP Therapies (Done)       Topic: Physical Therapy (Done)       Point: Mobility training (Done)       Learning Progress Summary            Patient Acceptance, E,TB, VU,NR by CM at 10/30/2024 1307                      Point: Home exercise program (Done)       Learning Progress Summary            Patient Acceptance, E,TB, VU,NR by CM at 10/30/2024 1307                      Point: Body mechanics (Done)       Learning Progress Summary            Patient Acceptance, E,TB, VU,NR by CM at 10/30/2024 1307                      Point: Precautions (Done)       Learning Progress Summary            Patient Acceptance, E,TB, VU,NR by CM at 10/30/2024 1307                                      User Key       Initials Effective Dates Name Provider Type Discipline    AS[facility]I 06/16/21 -  Mabel Jorge, PT Physical Therapist PT                  PT Recommendation and Plan  Planned Therapy Interventions (PT): balance training, bed mobility training, gait training, home exercise program, neuromuscular re-education, motor coordination training, patient/family education, postural re-education, ROM (range of motion), strengthening, transfer training  Outcome Evaluation: 78 yo female adm 10/26/24 from SNF for hallucinations, altered mental status. Pt had gone to SNF following recent admission to [facility] due to fall at home and decreased mobility, as well as pain in BLEs. Pt underwent RLE vascular sx in mid-Oct for thrombectomy and balloon angioplasty. Pt now POD 1 s/p LLE thrombectomy involving popliteal, peroneal and anterior tib aa, as well as balloon angioplasty.  PMH: pad w/ major tissue loss. Pt had fallen sometime in mid-september at home, and had been using w/c since that time. By the time she presents to [facility] in mid-Oct, she had not ambulated in over a month. Pt has been working w/ PT at [facility], but it is unclear how much progress she has made. Pt oriented today, but she is a poor historian. Pt comes to sit at eob w/ mod  assist of 2. Attempted twice to come to standing at rw, but pt in severe pain w/ LLE and unable to wb on this LE. Requires mod assist of 2 to return to supine position. Pt not safe for home at this time. Will need SNF at d/c to work toward more independence w/ mobility. Will follow.     Time Calculation:   PT Evaluation Complexity  History, PT Evaluation Complexity: 3 or more personal factors and/or comorbidities  Examination of Body Systems (PT Eval Complexity): total of 4 or more elements  Clinical Presentation (PT Evaluation Complexity): evolving  Clinical Decision Making (PT Evaluation Complexity): moderate complexity  Overall Complexity (PT Evaluation Complexity): moderate complexity     PT Charges       Row Name 10/30/24 1307             Time Calculation    Start Time 1034  -CM      Stop Time 1055  -CM      Time Calculation (min) 21 min  -CM      PT Received On 10/30/24  -CM      PT - Next Appointment 10/31/24  -CM      PT Goal Re-Cert Due Date 11/13/24  -CM         Time Calculation- PT    Total Timed Code Minutes- PT 0 minute(s)  -CM                User Key  (r) = Recorded By, (t) = Taken By, (c) = Cosigned By      Initials Name Provider Type    Mabel Contreras, PT Physical Therapist                  Therapy Charges for Today       Code Description Service Date Service Provider Modifiers Qty    [id number] HC PT EVAL MOD COMPLEXITY 4 10/30/2024 Mabel Jorge, PT GP 1            PT G-Codes  AM-PAC 6 Clicks Score (PT): 10  PT Discharge Summary  Anticipated Discharge Disposition (PT): skilled nursing facility    Mabel Jorge, THIAGO  10/30/2024

## 2024-10-30 NOTE — PROGRESS NOTES
[facility] Progress Note    Name: Fallon Nur ADMIT: 10/26/2024   : 1945  PCP: Francine Hampton MD    MRN: [id number] LOS: 4 days   AGE/SEX: 79 y.o. female  ROOM: [address] [place] [address]    CC: Post op; s/p left leg arteriogram, balloon angioplasty of left AT and left popliteal arteries on 10/29    Subjective     Patient in bed.  No further issues from her access site.    Objective     Scheduled Medications:   aspirin, 81 mg, Oral, Daily  atorvastatin, 40 mg, Oral, Nightly  buPROPion XL, 150 mg, Oral, Daily  citalopram, 20 mg, Oral, Daily  digoxin, 125 mcg, Oral, Daily  ferrous sulfate, 324 mg, Oral, Daily With Breakfast  levothyroxine, 50 mcg, Oral, Daily  piperacillin-tazobactam, 3.375 g, Intravenous, Q8H        Active Infusions:  Pharmacy to Dose Zosyn,         As Needed Medications:    senna-docusate sodium **AND** polyethylene glycol **AND** bisacodyl **AND** bisacodyl    Calcium Replacement - Follow Nurse / BPA Driven Protocol    dextrose    dextrose    glucagon (human recombinant)    ketorolac    Magnesium Standard Dose Replacement - Follow Nurse / BPA Driven Protocol    Morphine    nitroglycerin    ondansetron ODT **OR** ondansetron    Pharmacy to Dose Zosyn    Phosphorus Replacement - Follow Nurse / BPA Driven Protocol    Potassium Replacement - Follow Nurse / BPA Driven Protocol    [COMPLETED] Insert Peripheral IV **AND** sodium chloride    Vital Signs  Vitals:    10/30/24 0529   BP: 136/62   Pulse: 100   Resp: 16   Temp: 98 °F (36.7 °C)   SpO2: 94%      Body mass index is 23.56 kg/m².     Physical Exam:  NAD, alert and oriented  RRR  Lungs clear  Abd soft, benign  Vascular: Palpable bilateral femoral and pedal pulses   Skin: Bilateral legs wrapped  Right groin access site is CDI and soft    Results Review:     CBC    Results from last 7 days   Lab Units 10/29/24  2247 10/29/24  0306 10/28/24  0107 10/27/24  0109 10/26/24  1431   WBC 10*3/mm3 7.30 7.22 8.76 9.39 7.57    HEMOGLOBIN g/dL 10.2* 8.8* 8.7* 9.0* 9.5*   PLATELETS 10*3/mm3 389 403 419 450 460*     BMP   Results from last 7 days   Lab Units 10/29/24  2247 10/29/24  0306 10/28/24  1204 10/28/24  0107 10/27/24  1037 10/27/24  0055 10/26/24  1431   SODIUM mmol/L 139 139  --  138  --  140 140   POTASSIUM mmol/L 3.9 3.7 4.0 3.5 3.9 3.4* 3.8   CHLORIDE mmol/L 105 107  --  104  --  103 103   CO2 mmol/L 27.2 27.1  --  26.0  --  29.0 33.0*   BUN mg/dL 10 14  --  15  --  14 16   CREATININE mg/dL 0.69 0.73  --  0.66  --  0.69 0.75   GLUCOSE mg/dL 73 83  --  58*  --  59* 87   MAGNESIUM mg/dL  --   --   --   --   --   --  2.0     HbA1C   Lab Results   Component Value Date    HGBA1C 5.12 10/15/2024     Infection   Results from last 7 days   Lab Units 10/26/24  1621 10/26/24  1620   BLOODCX  No growth at 3 days No growth at 3 days     Radiology(recent) XR Chest 1 View    Result Date: 10/28/2024  Impression: No acute cardiopulmonary findings. Electronically Signed: Osvaldo Ferrara MD  10/28/2024 9:45 PM EDT  Workstation ID: [id number]     VTE Prophylaxis:  Mechanical VTE prophylaxis orders are present.         Problems:    Active Hospital Problems:  Active Hospital Problems    Diagnosis  POA    **Altered mental status [R41.82]  Yes    Atherosclerosis of native arteries of left leg with ulceration of calf [I70.242]  Yes    Atherosclerosis of native arteries of right leg with ulceration of calf [I70.232]  Yes      Resolved Hospital Problems   No resolved problems to display.        Assessment & Plan   Assessment / Plan     Altered mental status    Atherosclerosis of native arteries of right leg with ulceration of calf    Atherosclerosis of native arteries of left leg with ulceration of calf    10/29/2024 - left leg arteriogram, balloon angioplasty of left AT and left popliteal arteries by Dr. Hazel    POD1  VSS, labs stable  Right groin access site is CDI and without signs of hematoma  Now with palpable bilateral pedal pulses  Bilateral legs  wrapped, defer wound care recommendations to the wound care nurses  Continue offloading  Continue aspirin and statin  Okay to restart home Xarelto  Follow-up in the office in 2 weeks      AZAEL Castañeda  [facility] [place] [facility]  10/30/24   O: [phone]  F: [phone]

## 2024-10-30 NOTE — DISCHARGE PLACEMENT REQUEST
"Urszula Serrano (79 y.o. Female)       Date of Birth   1945    Social Security Number       Address   [address] [place] [address] [place] [address]    Home Phone   [phone]    MRN   [id number]       Quaker   Hinduism    Marital Status                               Admission Date   10/26/24    Admission Type   Emergency    Admitting Provider   Surinder Schumacher MD    Attending Provider   Gonzalo Castillo MD    Department, Room/Bed   [facility] SURGICAL INPATIENT,        Discharge Date       Discharge Disposition       Discharge Destination                                 Attending Provider: Gonzalo Castillo MD    Allergies: No Known Allergies    Isolation: Contact   Infection: ESBL E coli (23)   Code Status: CPR    Ht: 160 cm (63\")   Wt: 60.3 kg (133 lb)    Admission Cmt: None   Principal Problem: Altered mental status [R41.82]                   Active Insurance as of 10/26/2024       Primary Coverage       Payor Plan Insurance Group Employer/Plan Group    ANTHEM MEDICARE REPLACEMENT ANTHEM MEDICARE ADVANTAGE [id number]       Payor Plan Address Payor Plan Phone Number Payor Plan Fax Number Effective Dates    [address] [phone]  2022 - None Entered    [facility], [place] [address]         Subscriber Name Subscriber Birth Date Member ID       URSZULA SERRANO 1945 [id number]                     Emergency Contacts        (Rel.) Home Phone Work Phone Mobile Phone    CARLTON REN (Daughter) -- -- [phone]    JOSE ABREU (Daughter) -- -- [phone]                 History & Physical        Lilibeth Marsh APRN at 10/26/24 1623       Attestation signed by Surinder Schumacher MD at 10/27/24 0851    I have reviewed this documentation and agree.                      [facility] Medicine Services  History & Physical    Patient Name: Urszula Serrano  : 1945  MRN: [id number]  Primary Care Physician:  Francine Hampton MD  Date of admission: " 10/26/2024  Date and Time of Service: 10/26/2024 at 1650    Subjective      Chief Complaint: altered mental status    History of Present Illness: Fallon Nur is a 79 y.o. female with a CMH of atrial fibrillation, blindness, HTN, PVD who presented to [facility] on 10/26/2024 with altered mental status. Patient was just recently discharged here and sent to rehab. Patient's family states she is normally alert, oriented, but over the past couple of days she has become confused. Patient is having hallucinations, and speaking/reaching for people who are there.     Patient was admitted here from 10/9/2024 -10/18/2024 for wounds, hypokalemia, hypertension, PAD, atrial fibrillation and hypothyroidism. Patient had percutaneous mechanical thrombectomy of multiple arteries, and excisional debridement of her right leg wound on 10/14. Patient was eventually sent to rehab with levaquin, and was also discharged on xarelto for DVT and afib history.     On ED evaluation today, TSH is 9.980, ammonia 12, lactic 1.6, WBC 7.57, HGB 9.5, UA negative for infection. CT head showed mild generalized parenchymal volume loss, but not acute intracranial abnormality. Patient has severe BLE wounds, with necrotic tissue present, which family states are worse than previous. Podiatry, general surgery have been consulted. Hospitalist team to admit for further medical management.       Review of Systems   Unable to perform ROS: Mental status change       Personal History     Past Medical History:   Diagnosis Date    Atrial fibrillation     Blind 07/10/2023    Cellulitis of leg 09/12/2024    Duodenal ulcer, perforated 10/01/2013    [place] [facility]    12/2021 - EGD clear      DVT of upper extremity (deep vein thrombosis) 01/01/1995    subclavian - premarin      E. 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    Histoplasmosis     Hypertension     Infestation by bed bug 10/09/2024    Legally blind     Longstanding persistent  atrial fibrillation 07/10/2023    Macular degeneration 10/15/2024    Nonrheumatic mitral valve regurgitation 10/15/2024    Open wound of both lower extremities 09/12/2024    Primary hypertension 10/15/2024    PVD (peripheral vascular disease)     SBO (small bowel obstruction) 12/31/2020       Past Surgical History:   Procedure Laterality Date    CARDIAC CATHETERIZATION Right 10/14/2024    Procedure: RIGHT LOWER EXTREMITY ARTERIOGRAM, PERCUTANEOUS THROMBECTOMY, ANGIOPLASTY,  AND WOUND DEBRIDEMENT;  Surgeon: Red Hazel II, MD;  Location: [facility] HYBRID OR;  Service: Vascular;  Laterality: Right;    WOUND DEBRIDEMENT Right 9/16/2024    Procedure: RIGHT LEG DEBRIDEMENT;  Surgeon: Red Hazel II, MD;  Location: [facility] MAIN OR;  Service: Vascular;  Laterality: Right;       Family History: family history includes Cancer in her mother and sister; Dementia in her father. Otherwise pertinent FHx was reviewed and not pertinent to current issue.    Social History:  reports that she has quit smoking. She has never used smokeless tobacco. She reports that she does not currently use alcohol. She reports that she does not currently use drugs.    Home Medications:  Prior to Admission Medications       Prescriptions Last Dose Informant Patient Reported? Taking?    alendronate (FOSAMAX) 70 MG tablet   Yes No    Take 1 tablet by mouth Every 7 (Seven) Days. Saturday    atorvastatin (LIPITOR) 40 MG tablet   No No    Take 1 tablet by mouth Every Night.    buPROPion XL (WELLBUTRIN XL) 150 MG 24 hr tablet   Yes No    Take 1 tablet by mouth Daily.    cetirizine (zyrTEC) 10 MG tablet   Yes No    Take 1 tablet by mouth Daily.    cilostazol (PLETAL) 100 MG tablet   No No    Take 1 tablet by mouth 2 (Two) Times a Day.    citalopram (CeleXA) 20 MG tablet   Yes No    Take 1 tablet by mouth Daily.    collagenase (Santyl) 250 UNIT/GM ointment   No No    Apply 1 Application topically to the appropriate area as directed Daily for 30 days.     digoxin (LANOXIN) 125 MCG tablet   Yes No    Take 1 tablet by mouth Daily.    ferrous sulfate 325 (65 FE) MG tablet   Yes No    Take 1 tablet by mouth Daily With Breakfast.    fluticasone (FLONASE) 50 MCG/ACT nasal spray   Yes No    Administer 2 sprays into the nostril(s) as directed by provider Daily As Needed for Rhinitis.    furosemide (LASIX) 20 MG tablet   No No    Take 1 tablet by mouth Daily.    levoFLOXacin (LEVAQUIN) 500 MG tablet   No No    Take 1 tablet by mouth Daily for 10 doses. Indications: Infection of the Skin and/or Soft Tissue    levothyroxine (SYNTHROID, LEVOTHROID) 50 MCG tablet   Yes No    Take 1 tablet by mouth Daily.    Melatonin 12 MG tablet   Yes No    Take 1 tablet by mouth At Night As Needed.    metoprolol succinate XL (TOPROL-XL) 25 MG 24 hr tablet   No No    Take 0.5 tablets by mouth Daily.    multivitamin (Thera) tablet tablet   No No    Take 1 tablet by mouth Daily.    mupirocin (BACTROBAN) 2 % ointment   No No    Apply 1 Application topically to the appropriate area as directed Daily for 10 days.    Nutritional Supplements (Estiven) pack   No No    Take 1 packet by mouth 2 (Two) Times a Day.    oxyCODONE (ROXICODONE) 5 MG immediate release tablet   No No    Take 1 tablet by mouth Every 6 (Six) Hours As Needed for Moderate Pain for up to 10 days.    pantoprazole (PROTONIX) 40 MG EC tablet   Yes No    Take 1 tablet by mouth Daily.    polyethylene glycol (MIRALAX) 17 g packet   No No    Take 17 g by mouth Daily for 30 days.    rivaroxaban (XARELTO) 15 MG tablet   No No    Take 1 tablet by mouth Daily With Dinner. Indications: Atrial Fibrillation    vitamin B-12 (CYANOCOBALAMIN) 1000 MCG tablet   Yes No    Take 1 tablet by mouth Daily.    vitamin D (ERGOCALCIFEROL) 1.25 MG (96154 UT) capsule capsule   Yes No    Take 1 capsule by mouth 1 (One) Time Per Week. Saturday    zinc sulfate (ZINCATE) 220 (50 Zn) MG capsule   No No    Take 1 capsule by mouth Daily for 30 days.               Allergies:  No Known Allergies    Objective      Vitals:   Temp:  [97.8 °F (36.6 °C)] 97.8 °F (36.6 °C)  Heart Rate:  [] 89  Resp:  [17-18] 18  BP: ()/(55-98) 110/88  There is no height or weight on file to calculate BMI.  Physical Exam  Vitals and nursing note reviewed.   HENT:      Head: Normocephalic and atraumatic.      Nose: Nose normal.      Mouth/Throat:      Mouth: Mucous membranes are moist.   Eyes:      Extraocular Movements: Extraocular movements intact.      Pupils: Pupils are equal, round, and reactive to light.   Cardiovascular:      Rate and Rhythm: Normal rate and regular rhythm.      Pulses: Normal pulses.   Pulmonary:      Effort: Pulmonary effort is normal.      Breath sounds: Normal breath sounds.   Abdominal:      General: Bowel sounds are normal.      Palpations: Abdomen is soft.      Tenderness: There is no abdominal tenderness.   Musculoskeletal:      Cervical back: Normal range of motion and neck supple.   Skin:     General: Skin is warm.      Capillary Refill: Capillary refill takes less than 2 seconds.      Coloration: Skin is pale.      Comments: Please see wound images below     Neurological:      Mental Status: She is alert and oriented to person, place, and time.      Cranial Nerves: Cranial nerves 2-12 are intact.      Motor: Motor function is intact.      Comments: Patient was not confused or showing signs of delirium on my exam            Diagnostic Data:  Lab Results (last 24 hours)       Procedure Component Value Units Date/Time    Comprehensive Metabolic Panel [[id number]]  (Abnormal) Collected: 10/26/24 1431    Specimen: Blood from Arm, Left Updated: 10/26/24 1513     Glucose 87 mg/dL      BUN 16 mg/dL      Creatinine 0.75 mg/dL      Sodium 140 mmol/L      Potassium 3.8 mmol/L      Chloride 103 mmol/L      CO2 33.0 mmol/L      Calcium 8.2 mg/dL      Total Protein 6.2 g/dL      Albumin 2.4 g/dL      ALT (SGPT) 12 U/L      AST (SGOT) 48 U/L      Alkaline  Phosphatase 197 U/L      Total Bilirubin 0.5 mg/dL      Globulin 3.8 gm/dL      A/G Ratio 0.6 g/dL      BUN/Creatinine Ratio 21.3     Anion Gap 4.0 mmol/L      eGFR 81.1 mL/min/1.73     Narrative:      GFR Normal >60  Chronic Kidney Disease <60  Kidney Failure <15    The GFR formula is only valid for adults with stable renal function between ages 18 and 70.    TSH [[id number]]  (Abnormal) Collected: 10/26/24 1431    Specimen: Blood from Arm, Left Updated: 10/26/24 1513     TSH 9.980 uIU/mL     Magnesium [[id number]]  (Normal) Collected: 10/26/24 1431    Specimen: Blood from Arm, Left Updated: 10/26/24 1513     Magnesium 2.0 mg/dL     Digoxin Level [[id number]]  (Normal) Collected: 10/26/24 1431    Specimen: Blood from Arm, Left Updated: 10/26/24 1513     Digoxin 1.12 ng/mL     Ammonia [[id number]]  (Normal) Collected: 10/26/24 1431    Specimen: Blood from Arm, Left Updated: 10/26/24 1503     Ammonia 12 umol/L     Urinalysis, Microscopic Only - Straight Cath [[id number]] Collected: 10/26/24 1443    Specimen: Urine from Straight Cath Updated: 10/26/24 1454     RBC, UA 0-2 /HPF      WBC, UA 0-2 /HPF      Comment: Urine culture not indicated.        Bacteria, UA None Seen /HPF      Squamous Epithelial Cells, UA 0-2 /HPF      Hyaline Casts, UA 3-6 /LPF      Methodology Automated Microscopy    Urinalysis With Culture If Indicated - Straight Cath [[id number]]  (Abnormal) Collected: 10/26/24 1443    Specimen: Urine from Straight Cath Updated: 10/26/24 1453     Color, UA Dark Yellow     Appearance, UA Clear     pH, UA 5.5     Specific Gravity, UA 1.018     Glucose, UA Negative     Ketones, UA Negative     Bilirubin, UA Negative     Blood, UA Negative     Protein, UA Negative     Leuk Esterase, UA Trace     Nitrite, UA Negative     Urobilinogen, UA 1.0 E.U./dL    Narrative:      In absence of clinical symptoms, the presence of pyuria, bacteria, and/or nitrites on the urinalysis result does not correlate with infection.    CBC &  Differential [[id number]]  (Abnormal) Collected: 10/26/24 1431    Specimen: Blood from Arm, Left Updated: 10/26/24 1446    Narrative:      The following orders were created for panel order CBC & Differential.  Procedure                               Abnormality         Status                     ---------                               -----------         ------                     CBC Auto Differential[[id number]]        Abnormal            Final result                 Please view results for these tests on the individual orders.    CBC Auto Differential [[id number]]  (Abnormal) Collected: 10/26/24 1431    Specimen: Blood from Arm, Left Updated: 10/26/24 1446     WBC 7.57 10*3/mm3      RBC 3.47 10*6/mm3      Hemoglobin 9.5 g/dL      Hematocrit 31.0 %      MCV 89.3 fL      MCH 27.4 pg      MCHC 30.6 g/dL      RDW 19.2 %      RDW-SD 61.0 fl      MPV 9.2 fL      Platelets 460 10*3/mm3      Neutrophil % 78.9 %      Lymphocyte % 7.8 %      Monocyte % 10.4 %      Eosinophil % 2.0 %      Basophil % 0.4 %      Immature Grans % 0.5 %      Neutrophils, Absolute 5.97 10*3/mm3      Lymphocytes, Absolute 0.59 10*3/mm3      Monocytes, Absolute 0.79 10*3/mm3      Eosinophils, Absolute 0.15 10*3/mm3      Basophils, Absolute 0.03 10*3/mm3      Immature Grans, Absolute 0.04 10*3/mm3      nRBC 0.0 /100 WBC     POC Glucose Once [[id number]]  (Normal) Collected: 10/26/24 1437    Specimen: Blood Updated: 10/26/24 1439     Glucose 74 mg/dL      Comment: Serial Number: [id number]:  [id number]                Imaging Results (Last 24 Hours)       Procedure Component Value Units Date/Time    CT Head Without Contrast [[id number]] Collected: 10/26/24 1435     Updated: 10/26/24 1440    Narrative:      CT HEAD WO CONTRAST    Date of Exam: 10/26/2024 2:10 PM EDT    Indication: confusion.    Comparison: 7/2/2023    Technique: Axial CT images were obtained of the head without contrast administration.  Coronal reconstructions were performed.   Automated exposure control and iterative reconstruction methods were used.      Findings:  There is mild generalized age-appropriate parenchymal volume loss. There is no evidence of acute infarct or hemorrhage. There is a partially calcified extra-axial fluid mass along the inner table of the left frontal bone favored to be due to a   meningioma. This is unchanged from prior study. No mass effect or hydrocephalus. There is no evidence of acute skull fracture.    Visualized paranasal sinuses demonstrate mild mucosal thickening predominant within the maxillary sinuses compatible changes of chronic sinusitis.    Globes and orbits are within normal limits.      Impression:      Impression:    1. Mild generalized parenchymal volume loss. No acute intracranial abnormality.      Electronically Signed: Lloyd Rodriguez MD    10/26/2024 2:37 PM EDT    Workstation ID: [id number]              Assessment & Plan        This is a 79 y.o. female with:    Active and Resolved Problems  Active Hospital Problems    Diagnosis  POA    **Altered mental status [R41.82]  Yes      Resolved Hospital Problems   No resolved problems to display.       Altered mental status  - likely 2/2 delirum r/t hospitalizations   - review medications once med rec complete for possible causes   - WBC 7.57, lactic 1.6, ammonia 12  - CT head negative for acute abnormality   - UA negative for infection       Chronic bilateral lower extremity wounds  PAD  - s/p thrombectomy and debridement with vascular surgery   - wound care nurse consult   - IV antibiotics   - vascular surgery and podiatry consulted    Atrial fibrillation/ Recent DVT  - continue xarelto in light of very recent DVT  - currently rate controlled  - continue home medications once med rec complete    Hypertension   - BP currently stable at 110/88  - continue home medications once medication reconciliation complete     Hypothyroidism   - TSH elevated at 9.980  - T4 1.48  - likely 2/2 not receiving  levothyroxine on empty stomach   - consider increasing levothyroxine dosage      VTE Prophylaxis:  No VTE prophylaxis order currently exists.        The patient desires to be as follows:    CODE STATUS:         Hilda Springer, who can be contacted at [phone], is the designated person to make medical decisions on the patient's behalf if She is incapable of doing so. This was clarified with patient and/or next of kin on 10/26/2024 during the course of this H&P.    Admission Status:  I believe this patient meets inpatient status.    Expected Length of Stay: > 48 hours    PDMP and Medication Dispenses via Sidebar reviewed and consistent with patient reported medications.    I discussed the patient's findings and my recommendations with patient and family.      Signature:     This document has been electronically signed by AZAEL Hernandez on October 26, 2024 16:23 EDT   [facility] Hospitalist Team      Electronically signed by Surinder Schumacher MD at 10/27/24 0851          Operative/Procedure Notes (all)        Red Hazel II, MD at 10/29/24 1426  Version 1 of 1         Date of Admission:  10/26/2024  10/29/24  Red Hazel II, MD  [facility]    Preoperative Diagnosis:   Peripheral arterial disease with major tissue loss    Postoperative Diagnosis:   Same    Procedure Performed:   Ultrasound-guided access right common femoral artery  Abdominal aortogram  Left leg arteriogram  Selective catheter placement right common femoral artery the left anterior tibial artery  Balloon angioplasty left anterior tibial artery with 3mm angioplasty baloon  Balloon angioplasty left popliteal artery with 3mm followed by 4mm angioplasty balloon.   Proglide closure right common femoral arteriotomy            Surgeon:   Red Hazel II, MD    Assistant:    Sumaya Kingsley RN, Provided critical assistance in exposure, retraction, and suction that overall decrease blood loss and operative time.    Anesthesia:   MAC with  local    Estimated Blood Loss:   Minimal    Findings:    Abdominal aortogram: Patent renal arteries bilaterally, left renal artery widely patent, origin of right renal artery not well-visualized, SMA patent, infrarenal abdominal aorta widely patent without evidence of aneurysm or stenosis. Mild atherosclerotic plaque at aortic bifurcation. Common, internal, and external iliac arteries widely patent bilaterally.    Left leg arteriogram: Common femoral artery widely patent, profundofemoral artery widely patent, superficial femoral artery widely patent, popliteal artery occludes behind the knee with reconstitution of anterior tibial artery and tibioperoneal trunk distally.  Anterior tibial artery provides single-vessel runoff to the foot, posterior tibial and peroneal arteries patent proximally but appeared occluded in the mid lower leg.  The occlusion was treated with 3 mm balloon angioplasty in the anterior tibial artery and 3 mm followed by 4 mm angioplasty in the popliteal artery.  This restored inline flow to the foot via anterior tibial artery with improved collaterals to the posterior aspect of the lower leg with some angiographic evidence of hyperemia around her wound.    Common femoral artery access site was closed with a ProGlide Perclose device without complication.  Pressure held until hemostasis obtained.    Patient had palpable right DP pulse and left pedal signals at conclusion of procedure    Implants:    Nothing was implanted during the procedure    Specimen:   none    Complications:   none    Access:  6 Vietnamese retrograde access right common femoral artery      Closure:  Perclose closure device    Dispo:  To PACU    Indication for procedure:   79 y.o. 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  I have been following her for lower extremity wounds which on the left  leg initially healed well but had worsening wounds on her right leg for which she underwent right leg arteriogram with percutaneous thrombectomy recently.  The patient was discharged to rehab and then developed a pressure ulcer on the posterior aspect of her left foot.  ABIs 0.5 and previous toe pressures were 0 ABIs performed in September and so plans made for left leg arteriogram with possible intervention.  Details of this procedure including risk benefits and alternatives discussed with patient and family and they verbalized understanding and agreed to proceed.    Description of procedure:   Patient taken to the hybrid room placed supine on the table.  Sedation was initiated by the anesthesia service.  Once the patient was asleep her groins were prepped with ChloraPrep bilaterally and she was draped in sterile fashion.  A timeout was performed.  I began procedure by marking site of right femoral head under fluoroscopy.  I then used ultrasound evaluate the right common femoral artery.  Local anesthetic was infiltrated over the artery under ultrasound guidance and then under ultrasound guidance I accessed the right common femoral artery with a microneedle.  Microwire was inserted and placement over the femoral head was confirmed.  I then exchanged microneedle for microsheath and performed an angiogram of the access site.  This looked appropriate.  I then advanced a 0.035 Glidewire through the micro sheath into the infrarenal aorta and microsheath was exchanged for a 10 cm 5 Portuguese sheath.  I then advanced an Omni Flush catheter over the wire and performed an abdominal aortogram with the above listed findings.  I then used the Omni Flush catheter to select the left iliac system and was able to advance the wire and catheter into the distal left external iliac artery.  I then performed a left leg arteriogram via sequential contrast injections from this position.  I then obtained a 260 cm 0.035 Glidewire advantage and  this is advanced through the Omni Flush catheter.  The patient was given 7000 units of heparin.  Wire was advanced into the superficial femoral artery and then the Omni Flush catheter and the 5 Serbian sheath were removed and the 45 cm 6 Serbian destination sheath was advanced over the wire.  Angiography confirmed placement of the superficial femoral artery.  I then used an angled Espinal cross catheter to advance through the superficial femoral artery and into the popliteal artery.  I then performed an angiogram to ken the site of the occlusion as well as distal target.  Using angled Espinal cross catheter and the Glidewire advantage is able to cross the occlusion and get into the anterior tibial artery distal to the occlusion.  Angiogram was performed distal to this through the angled Espinal cross to confirm intraluminal placement.  I then advanced a 5 mm spider wire through the angled Espinal cross catheter and into the anterior tibial artery.  Angled Espinal cross was removed and a 3 x 80 mm angioplasty balloon was obtained and balloon angioplasty was performed of the anterior tibial artery and the popliteal artery.  Completion angiography showed some lumen irregularity in the popliteal artery and so 4 mm angioplasty balloon was obtained and balloon angioplasty was performed of the popliteal artery with a 2-minute inflation time.  Completion angiography showed improved flow but with some irregularity more distally in the popliteal artery and so I did reinsert the 3 mm angioplasty balloon and performed balloon angioplasty again this time with a 1 minute inflation time.  Completion angiography showed improved flow with inline flow to the foot via the anterior tibial artery.  I did perform angiography of the foot distally which showed no significant embolic problems and better collateralization to the area of the patient's wound on the posterior aspect of her left leg.  I then used the spider wire capture device to capture the  filter.  The sheath was retracted into the right common iliac artery and a 0.035 Glidewire was advanced through the sheath.  We then removed the sheath and closed the right common femoral arteriotomy with a ProGlide device without complication.  Pressure was held till hemostasis was obtained.  I confirmed the patient still had a palpable right DP pulse and DP and PT signals in the left foot.  Decision was made not to reverse heparin due to patient's atrial fibrillation and previous embolic issues.  The patient was awakened from sedation and transported to PACU in good condition.  The patient tolerated procedure well and there were no operative complications and all wires catheters sheaths and other devices were removed and found to be whole and intact prior to the conclusion of the procedure.  I discussed the outcome of the procedure with the patient's daughters.    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             Electronically signed by Red Hazel II, MD at 10/29/24 1540          Physician Progress Notes (last 48 hours)        Gonzalo Castillo MD at 10/30/24 1109              [facility] MEDICINE SERVICE  DAILY PROGRESS NOTE    NAME: Fallon Nur  : 1945  MRN: [id number]      LOS: 4 days     PROVIDER OF SERVICE: Gonzalo Castillo MD    Chief Complaint: Altered mental status    Subjective:     Interval History:  History taken from: patient chart family RN    Doing very well postop.  Some soreness but feeling well.        Review of Systems:   Review of Systems  All negative except as above  Objective:     Vital Signs  Temp:  [97.4 °F (36.3 °C)-98 °F (36.7 °C)] 97.7 °F (36.5  °C)  Heart Rate:  [] 95  Resp:  [12-20] 18  BP: (100-159)/(57-95) 128/71   Body mass index is 23.56 kg/m².    Physical Exam  Physical Exam  Frail elderly female NAD  RRR S1-S2 audible  Lungs with fair air entry  Abdomen soft nontender nondistended  Bilateral lower extremity wrapped in dressing     Diagnostic Data    Results from last 7 days   Lab Units 10/29/24  2247 10/27/24  0055 10/26/24  1431   WBC 10*3/mm3 7.30   < > 7.57   HEMOGLOBIN g/dL 10.2*   < > 9.5*   HEMATOCRIT % 33.5*   < > 31.0*   PLATELETS 10*3/mm3 389   < > 460*   GLUCOSE mg/dL 73   < > 87   CREATININE mg/dL 0.69   < > 0.75   BUN mg/dL 10   < > 16   SODIUM mmol/L 139   < > 140   POTASSIUM mmol/L 3.9   < > 3.8   AST (SGOT) U/L  --   --  48*   ALT (SGPT) U/L  --   --  12   ALK PHOS U/L  --   --  197*   BILIRUBIN mg/dL  --   --  0.5   ANION GAP mmol/L 6.8   < > 4.0*    < > = values in this interval not displayed.       XR Chest 1 View    Result Date: 10/28/2024  Impression: No acute cardiopulmonary findings. Electronically Signed: Osvaldo Ferrara MD  10/28/2024 9:45 PM EDT  Workstation ID: [id number]       I reviewed the patient's new clinical results.  I reviewed the patient's new imaging results and agree with the interpretation.    Assessment/Plan:     Active and Resolved Problems  Active Hospital Problems    Diagnosis  POA    **Altered mental status [R41.82]  Yes    Atherosclerosis of native arteries of left leg with ulceration of calf [I70.242]  Yes    Atherosclerosis of native arteries of right leg with ulceration of calf [I70.232]  Yes      Resolved Hospital Problems   No resolved problems to display.       #79-year-old female with history of atrial fibrillation, hypertension, DVT, PAD lower extremity wounds status post thrombectomy admitted to [facility] with altered sensorium   off note she was admitted at [facility] from 10/9 - 10/18 for lower extremity wounds PAD underwent percutaneous mechanical thrombectomy of multiple arteries and  excisional debridement of her right leg wound on 10/14 subsequently discharged to rehab on Levaquin      #PAD  #Bilateral lower extremity wounds  Vascular surgery following pursuing left lower extremity arteriogram on 10/29/2024  Resume AC per vascular guidance.  IV antibiotics to fall off after completion of treatment.      #Altered sensorium secondary to polypharmacy-improved  No leukocytosis.  UA not suggestive of UTI low suspicion for underlying infection  CT head on admission no acute findings  Suspect etiology may be secondary to medications.  Patient was recently discharged on fentanyl patch which has been discontinued since 10/26  Cautions with pain medications.       #History of DVT  #Paroxysmal atrial fibrillation  Continue digoxin.  Levels within normal limit  Xarelto to be continued       # Hypotension  BP were soft initially now improved  Hold metoprolol.  Gentle IVF currently in anticipation for contrast load tomorrow.       #History of hypothyroidism  - TSH elevated at 9.980  - T4 1.48  -Continue current dose of levothyroxine      VTE Prophylaxis:  Pharmacologic & mechanical VTE prophylaxis orders are present.       Disposition Planning:     Barriers to Discharge: PT eval, safe discharge plan  Anticipated Date of Discharge: 10/31/2024  Place of Discharge: Rehab      Time: 45 minutes     Code Status and Medical Interventions: CPR (Attempt to Resuscitate); Full Support   Ordered at: 10/26/24 1646     Code Status (Patient has no pulse and is not breathing):    CPR (Attempt to Resuscitate)     Medical Interventions (Patient has pulse or is breathing):    Full Support       Signature: Electronically signed by Gonzalo Castillo MD, 10/30/24, 11:09 EDT.  [facility] Hospitalist Team      Electronically signed by Gonzalo Castillo MD at 10/30/24 1110       Phyllis Anguiano APRN at 10/30/24 0805          [facility] Vascular Surgery Progress Note    Name: Fallon Nur ADMIT: 10/26/2024   :  1945  PCP: Francine Hampton MD    MRN: [id number] LOS: 4 days   AGE/SEX: 79 y.o. female  ROOM: [address] [place] [address] Juan J    CC: Post op; s/p left leg arteriogram, balloon angioplasty of left AT and left popliteal arteries on 10/29    Subjective     Patient in bed.  No further issues from her access site.    Objective     Scheduled Medications:   aspirin, 81 mg, Oral, Daily  atorvastatin, 40 mg, Oral, Nightly  buPROPion XL, 150 mg, Oral, Daily  citalopram, 20 mg, Oral, Daily  digoxin, 125 mcg, Oral, Daily  ferrous sulfate, 324 mg, Oral, Daily With Breakfast  levothyroxine, 50 mcg, Oral, Daily  piperacillin-tazobactam, 3.375 g, Intravenous, Q8H        Active Infusions:  Pharmacy to Dose Zosyn,         As Needed Medications:    senna-docusate sodium **AND** polyethylene glycol **AND** bisacodyl **AND** bisacodyl    Calcium Replacement - Follow Nurse / BPA Driven Protocol    dextrose    dextrose    glucagon (human recombinant)    ketorolac    Magnesium Standard Dose Replacement - Follow Nurse / BPA Driven Protocol    Morphine    nitroglycerin    ondansetron ODT **OR** ondansetron    Pharmacy to Dose Zosyn    Phosphorus Replacement - Follow Nurse / BPA Driven Protocol    Potassium Replacement - Follow Nurse / BPA Driven Protocol    [COMPLETED] Insert Peripheral IV **AND** sodium chloride    Vital Signs  Vitals:    10/30/24 0529   BP: 136/62   Pulse: 100   Resp: 16   Temp: 98 °F (36.7 °C)   SpO2: 94%      Body mass index is 23.56 kg/m².     Physical Exam:  NAD, alert and oriented  RRR  Lungs clear  Abd soft, benign  Vascular: Palpable bilateral femoral and pedal pulses   Skin: Bilateral legs wrapped  Right groin access site is CDI and soft    Results Review:     CBC    Results from last 7 days   Lab Units 10/29/24  2247 10/29/24  0306 10/28/24  0107 10/27/24  0109 10/26/24  1431   WBC 10*3/mm3 7.30 7.22 8.76 9.39 7.57   HEMOGLOBIN g/dL 10.2* 8.8* 8.7* 9.0* 9.5*   PLATELETS 10*3/mm3 389 403 419 450 460*     BMP    Results from last 7 days   Lab Units 10/29/24  2247 10/29/24  0306 10/28/24  1204 10/28/24  0107 10/27/24  1037 10/27/24  0055 10/26/24  1431   SODIUM mmol/L 139 139  --  138  --  140 140   POTASSIUM mmol/L 3.9 3.7 4.0 3.5 3.9 3.4* 3.8   CHLORIDE mmol/L 105 107  --  104  --  103 103   CO2 mmol/L 27.2 27.1  --  26.0  --  29.0 33.0*   BUN mg/dL 10 14  --  15  --  14 16   CREATININE mg/dL 0.69 0.73  --  0.66  --  0.69 0.75   GLUCOSE mg/dL 73 83  --  58*  --  59* 87   MAGNESIUM mg/dL  --   --   --   --   --   --  2.0     HbA1C   Lab Results   Component Value Date    HGBA1C 5.12 10/15/2024     Infection   Results from last 7 days   Lab Units 10/26/24  1621 10/26/24  1620   BLOODCX  No growth at 3 days No growth at 3 days     Radiology(recent) XR Chest 1 View    Result Date: 10/28/2024  Impression: No acute cardiopulmonary findings. Electronically Signed: Osvaldo Ferrara MD  10/28/2024 9:45 PM EDT  Workstation ID: [id number]     VTE Prophylaxis:  Mechanical VTE prophylaxis orders are present.         Problems:    Active Hospital Problems:  Active Hospital Problems    Diagnosis  POA    **Altered mental status [R41.82]  Yes    Atherosclerosis of native arteries of left leg with ulceration of calf [I70.242]  Yes    Atherosclerosis of native arteries of right leg with ulceration of calf [I70.232]  Yes      Resolved Hospital Problems   No resolved problems to display.        Assessment & Plan   Assessment / Plan     Altered mental status    Atherosclerosis of native arteries of right leg with ulceration of calf    Atherosclerosis of native arteries of left leg with ulceration of calf    10/29/2024 - left leg arteriogram, balloon angioplasty of left AT and left popliteal arteries by Dr. Hazel    POD1  VSS, labs stable  Right groin access site is CDI and without signs of hematoma  Now with palpable bilateral pedal pulses  Bilateral legs wrapped, defer wound care recommendations to the wound care nurses  Continue  offloading  Continue aspirin and statin  Okay to restart home Xarelto  Follow-up in the office in 2 weeks     AZAEL Castañeda  [facility]ascular Surgery  10/30/24   O: [phone]  F: [phone]    Electronically signed by Phyllis Anguiano APRN at 10/30/24 0812       Gonzalo Castillo MD at 10/29/24 1033              [facility] MEDICINE SERVICE  DAILY PROGRESS NOTE    NAME: Fallon Nur  : 1945  MRN: [id number]      LOS: 3 days     PROVIDER OF SERVICE: Gonzalo Castillo MD    Chief Complaint: Altered mental status    Subjective:     Interval History:  History taken from: patient chart family RN    Entire family present. Discussed case with family.        Review of Systems:   Review of Systems  All negative except as above  Objective:     Vital Signs  Temp:  [97.8 °F (36.6 °C)-98.3 °F (36.8 °C)] 98.1 °F (36.7 °C)  Heart Rate:  [] 105  Resp:  [17-21] 19  BP: ()/(63-75) 106/75   Body mass index is 23.56 kg/m².    Physical Exam  Physical Exam  Frail elderly female NAD  RRR S1-S2 audible  Lungs with fair air entry  Abdomen soft nontender nondistended  Bilateral lower extremity wrapped in dressing     Diagnostic Data    Results from last 7 days   Lab Units 10/29/24  0306 10/27/24  0055 10/26/24  1431   WBC 10*3/mm3 7.22   < > 7.57   HEMOGLOBIN g/dL 8.8*   < > 9.5*   HEMATOCRIT % 27.9*   < > 31.0*   PLATELETS 10*3/mm3 403   < > 460*   GLUCOSE mg/dL 83   < > 87   CREATININE mg/dL 0.73   < > 0.75   BUN mg/dL 14   < > 16   SODIUM mmol/L 139   < > 140   POTASSIUM mmol/L 3.7   < > 3.8   AST (SGOT) U/L  --   --  48*   ALT (SGPT) U/L  --   --  12   ALK PHOS U/L  --   --  197*   BILIRUBIN mg/dL  --   --  0.5   ANION GAP mmol/L 4.9*   < > 4.0*    < > = values in this interval not displayed.       XR Chest 1 View    Result Date: 10/28/2024  Impression: No acute cardiopulmonary findings. Electronically Signed: Osvaldo Ferrara MD  10/28/2024 9:45 PM EDT  Workstation ID: [id number]       I reviewed  the patient's new clinical results.  I reviewed the patient's new imaging results and agree with the interpretation.    Assessment/Plan:     Active and Resolved Problems  Active Hospital Problems    Diagnosis  POA    **Altered mental status [R41.82]  Yes    Atherosclerosis of native arteries of left leg with ulceration of calf [I70.242]  Yes    Atherosclerosis of native arteries of right leg with ulceration of calf [I70.232]  Yes      Resolved Hospital Problems   No resolved problems to display.       #79-year-old female with history of atrial fibrillation, hypertension, DVT, PAD lower extremity wounds status post thrombectomy admitted to [facility] with altered sensorium   off note she was admitted at [place] from 10/9 - 10/18 for lower extremity wounds PAD underwent percutaneous mechanical thrombectomy of multiple arteries and excisional debridement of her right leg wound on 10/14 subsequently discharged to rehab on Levaquin      #PAD  #Bilateral lower extremity wounds  Vascular surgery following pursuing left lower extremity arteriogram on 10/29/2024  Anticoagulation currently on hold, follow vascular surgery guidance regarding this.  Currently on IV Zosyn will defer need for antibiotics per vascular surgery      #Altered sensorium secondary to polypharmacy-improved  No leukocytosis.  UA not suggestive of UTI low suspicion for underlying infection  CT head on admission no acute findings  Suspect etiology may be secondary to medications.  Patient was recently discharged on fentanyl patch which has been discontinued since 10/26  Cautions with pain medications.       #History of DVT  #Paroxysmal atrial fibrillation  Continue digoxin.  Levels within normal limit  Xarelto currently on hold resume once cleared from vascular surgery standpoint       # Hypotension  BP were soft initially now improved  Hold metoprolol.  Gentle IVF currently in anticipation for contrast load tomorrow.       #History of hypothyroidism  -  TSH elevated at 9.980  - T4 1.48  -Continue current dose of levothyroxine      VTE Prophylaxis:  Mechanical VTE prophylaxis orders are present.       Disposition Planning:     Barriers to Discharge: Intervention today  Anticipated Date of Discharge: TBD  Place of Discharge: Rehab      Time: 45 minutes     Code Status and Medical Interventions: CPR (Attempt to Resuscitate); Full Support   Ordered at: 10/26/24 1646     Code Status (Patient has no pulse and is not breathing):    CPR (Attempt to Resuscitate)     Medical Interventions (Patient has pulse or is breathing):    Full Support       Signature: Electronically signed by Gonzalo Castillo MD, 10/29/24, 10:33 EDT.  [facility] Hospitalist Team      Electronically signed by Gonzalo Castillo MD at 10/29/24 1035       Consult Notes (last 48 hours)  Notes from 10/28/24 1609 through 10/30/24 1609   No notes of this type exist for this encounter.          Physical Therapy Notes (all)        Mabel Jorge PT at 10/30/24 1304  Version 1 of 1         Goal Outcome Evaluation:  Plan of Care Reviewed With: patient           Outcome Evaluation: 80 yo female adm 10/26/24 from SNF for hallucinations, altered mental status. Pt had gone to SNF following recent admission to [facility] due to fall at home and decreased mobility, as well as pain in BLEs. Pt underwent RLE vascular sx in mid-Oct for thrombectomy and balloon angioplasty. Pt now POD 1 s/p LLE thrombectomy involving popliteal, peroneal and anterior tib aa, as well as balloon angioplasty.  PMH: pad w/ major tissue loss. Pt had fallen sometime in mid-september at home, and had been using w/c since that time. By the time she presents to [facility] in mid-Oct, she had not ambulated in over a month. Pt has been working w/ PT at [facility], but it is unclear how much progress she has made. Pt oriented today, but she is a poor historian. Pt comes to sit at eob w/ mod assist of 2. Attempted twice to come to standing at rw, but pt in severe pain w/  LLE and unable to wb on this LE. Requires mod assist of 2 to return to supine position. Pt not safe for home at this time. Will need SNF at d/c to work toward more independence w/ mobility. Will follow.                               Electronically signed by Mabel Jorge, PT at 10/30/24 1304       Mabel Jorge, PT at 10/30/24 1308  Version 1 of 1         Patient Name: Fallon Nur  : 1945    MRN: [id number]                              Today's Date: 10/30/2024       Admit Date: 10/26/2024    Visit Dx:     ICD-10-CM ICD-9-CM   1. Hallucinations  R44.3 780.1   2. Ulcers of both lower legs  L97.919 707.10    L97.929    3. Atherosclerosis of native arteries of left leg with ulceration of calf  I70.242 440.23     707.12     Patient Active Problem List   Diagnosis    H/O gastric bypass    Hypothyroidism    Chronic back pain    Ventral hernia    GERD (gastroesophageal reflux disease)    Blind    Longstanding persistent atrial fibrillation    Open wound of both lower extremities    Acute hypokalemia    Normocytic anemia    Anxiety and depression    Wound of lower extremity    Infected wound    PAD (peripheral artery disease)    Atherosclerosis of native arteries of right leg with ulceration of calf    Nonrheumatic mitral valve regurgitation    Primary hypertension    B12 deficiency    Chronic foot pain    Compression fracture of cervical spine    Histoplasmosis    History of compression fracture of spine    Insomnia    Knee osteoarthritis    Macular degeneration    Osteoporosis    Vitamin D deficiency    Altered mental status    Atherosclerosis of native arteries of left leg with ulceration of calf     Past Medical History:   Diagnosis Date    Atrial fibrillation     Blind 07/10/2023    Cellulitis of leg 2024    Duodenal ulcer, perforated 10/01/2013    [facility]    2021 - EGD clear      DVT of upper extremity (deep vein thrombosis) 1995    subclavian - premarin      E. coli UTI  12/31/2020    H/O gastric bypass 12/31/2020    Histoplasmosis     Hypertension     Infestation by bed bug 10/09/2024    Legally blind     Longstanding persistent atrial fibrillation 07/10/2023    Macular degeneration 10/15/2024    Nonrheumatic mitral valve regurgitation 10/15/2024    Open wound of both lower extremities 09/12/2024    Primary hypertension 10/15/2024    PVD (peripheral vascular disease)     SBO (small bowel obstruction) 12/31/2020     Past Surgical History:   Procedure Laterality Date    CARDIAC CATHETERIZATION Right 10/14/2024    Procedure: RIGHT LOWER EXTREMITY ARTERIOGRAM, PERCUTANEOUS THROMBECTOMY, ANGIOPLASTY,  AND WOUND DEBRIDEMENT;  Surgeon: Red Hazel II, MD;  Location: [facility] HYBRID OR;  Service: Vascular;  Laterality: Right;    WOUND DEBRIDEMENT Right 9/16/2024    Procedure: RIGHT LEG DEBRIDEMENT;  Surgeon: Red Hazel II, MD;  Location: [facility] MAIN OR;  Service: Vascular;  Laterality: Right;      General Information       Row Name 10/30/24 1252          Physical Therapy Time and Intention    Document Type evaluation  -CM     Mode of Treatment physical therapy  -CM       Row Name 10/30/24 1252          General Information    Patient Profile Reviewed yes  -CM     Prior Level of Function independent:;transfer;w/c or scooter  prior to mid-Sept, 2024, pt was ambulating w/o assistive device around home. Had fall, and has been primarily using w/c since that time. Has been at SNF since mid-Oct due to decreased mobility and pain in BLEs.  -CM     Existing Precautions/Restrictions fall  -CM     Barriers to Rehab medically complex;previous functional deficit;visual deficit  macular degeneration  -CM       Row Name 10/30/24 1252          Living Environment    People in Home child(elgin), adult;facility resident;other (see comments)  normally lives w/ dtr (who has Cerebral palsy) and gdtr; from SNF this admission  -CM       Row Name 10/30/24 1252          Home Main Entrance    Number of Stairs,  Main Entrance none  -CM       Row Name 10/30/24 1252          Stairs Within Home, Primary    Number of Stairs, Within Home, Primary two  -CM       Row Name 10/30/24 1252          Cognition    Orientation Status (Cognition) oriented x 4  -CM       Row Name 10/30/24 1252          Safety Issues/Impairments Affecting Functional Mobility    Impairments Affecting Function (Mobility) endurance/activity tolerance;strength;range of motion (ROM);pain;other (see comments)  skin integrity  -CM               User Key  (r) = Recorded By, (t) = Taken By, (c) = Cosigned By      Initials Name Provider Type    Mabel Contreras, PT Physical Therapist                   Mobility       Row Name 10/30/24 1257          Bed Mobility    Bed Mobility supine-sit;sit-supine  -CM     Supine-Sit [place] (Bed Mobility) moderate assist (50% patient effort);2 person assist;minimum assist (75% patient effort)  -CM     Sit-Supine [place] (Bed Mobility) moderate assist (50% patient effort);2 person assist  -CM     Assistive Device (Bed Mobility) bed rails;head of bed elevated;leg   -CM       Row Name 10/30/24 1257          Bed-Chair Transfer    Bed-Chair [place] (Transfers) unable to assess  -CM     Comment, (Bed-Chair Transfer) too painful to stand on LLE w/ attempts to stand.  -CM       Row Name 10/30/24 1257          Sit-Stand Transfer    Sit-Stand [place] (Transfers) unable to assess  -CM     Comment, (Sit-Stand Transfer) too painful on attempts to stand; could not reach standing position  -CM       Row Name 10/30/24 1257          Gait/Stairs (Locomotion)    [place] Level (Gait) unable to assess  -CM     Patient was able to Ambulate no, other medical factors prevent ambulation  -CM     Reason Patient was unable to Ambulate Uncontrolled Pain;Excessive Weakness  -CM               User Key  (r) = Recorded By, (t) = Taken By, (c) = Cosigned By      Initials Name Provider Type    Mabel Contreras, PT Physical  Therapist                   Obj/Interventions       Row Name 10/30/24 1258          Range of Motion Comprehensive    General Range of Motion bilateral lower extremity ROM WFL  -CM       Row Name 10/30/24 1258          Strength Comprehensive (MMT)    General Manual Muscle Testing (MMT) Assessment lower extremity strength deficits identified  -CM     Comment, General Manual Muscle Testing (MMT) Assessment BLEs 3-/5 at hips, 3+/5 other LE mm groups  -CM       Row Name 10/30/24 1258          Balance    Balance Assessment sitting static balance;standing dynamic balance;sitting dynamic balance;standing static balance  -CM     Static Sitting Balance supervision  -CM     Dynamic Sitting Balance supervision  -CM     Position, Sitting Balance supported;sitting edge of bed  -CM     Static Standing Balance unable to assess  -CM     Dynamic Standing Balance unable to assess  -CM       Row Name 10/30/24 1258          Sensory Assessment (Somatosensory)    Sensory Assessment (Somatosensory) sensation intact  -CM               User Key  (r) = Recorded By, (t) = Taken By, (c) = Cosigned By      Initials Name Provider Type    CM Mabel Jorge, PT Physical Therapist                   Goals/Plan       Row Name 10/30/24 1305          Bed Mobility Goal 1 (PT)    Activity/Assistive Device (Bed Mobility Goal 1, PT) bed mobility activities, all  -CM     [place] Level/Cues Needed (Bed Mobility Goal 1, PT) supervision required  -CM     Time Frame (Bed Mobility Goal 1, PT) 2 weeks  -CM       Row Name 10/30/24 1309          Transfer Goal 1 (PT)    Activity/Assistive Device (Transfer Goal 1, PT) transfers, all;walker, rolling  -CM     [place] Level/Cues Needed (Transfer Goal 1, PT) minimum assist (75% or more patient effort)  -CM       Row Name 10/30/24 1306          Gait Training Goal 1 (PT)    Activity/Assistive Device (Gait Training Goal 1, PT) gait (walking locomotion);walker, rolling  -CM     [place] Level (Gait Training  Goal 1, PT) moderate assist (50-74% patient effort)  -CM     Distance (Gait Training Goal 1, PT) 25 ft  -CM     Time Frame (Gait Training Goal 1, PT) 2 weeks  -CM       Row Name 10/30/24 1305          Therapy Assessment/Plan (PT)    Planned Therapy Interventions (PT) balance training;bed mobility training;gait training;home exercise program;neuromuscular re-education;motor coordination training;patient/family education;postural re-education;ROM (range of motion);strengthening;transfer training  -CM               User Key  (r) = Recorded By, (t) = Taken By, (c) = Cosigned By      Initials Name Provider Type    CM Mabel Jorge, PT Physical Therapist                   Clinical Impression       Row Name 10/30/24 1259          Pain    Pretreatment Pain Rating 8/10  w/ attempts to stand  -CM     Posttreatment Pain Rating 2/10  when in nwb position after eval  -CM     Pain Location extremity  -CM     Pain Side/Orientation left;lower  -CM     Pain Management Interventions activity modification encouraged;exercise or physical activity utilized;nursing notified;breathing exercises utilized  -CM     Response to Pain Interventions intervention effective per patient report  -CM       Row Name 10/30/24 1258          Plan of Care Review    Plan of Care Reviewed With patient  -CM     Outcome Evaluation 80 yo female adm 10/26/24 from SNF for hallucinations, altered mental status. Pt had gone to SNF following recent admission to [facility] due to fall at home and decreased mobility, as well as pain in BLEs. Pt underwent RLE vascular sx in mid-Oct for thrombectomy and balloon angioplasty. Pt now POD 1 s/p LLE thrombectomy involving popliteal, peroneal and anterior tib aa, as well as balloon angioplasty.  PMH: pad w/ major tissue loss. Pt had fallen sometime in mid-september at home, and had been using w/c since that time. By the time she presents to [facility] in mid-Oct, she had not ambulated in over a month. Pt has been working w/ PT at [facility],  but it is unclear how much progress she has made. Pt oriented today, but she is a poor historian. Pt comes to sit at eob w/ mod assist of 2. Attempted twice to come to standing at rw, but pt in severe pain w/ LLE and unable to wb on this LE. Requires mod assist of 2 to return to supine position. Pt not safe for home at this time. Will need SNF at d/c to work toward more independence w/ mobility. Will follow.  -CM       Row Name 10/30/24 1305          Therapy Assessment/Plan (PT)    Rehab Potential (PT) good  -CM     Criteria for Skilled Interventions Met (PT) yes;meets criteria;skilled treatment is necessary  -CM     Therapy Frequency (PT) 5 times/wk  -CM     Predicted Duration of Therapy Intervention (PT) until d/c  -CM       Row Name 10/30/24 1304          Vital Signs    Pretreatment Heart Rate (beats/min) 111  -CM     Intratreatment Heart Rate (beats/min) 126  -CM     Posttreatment Heart Rate (beats/min) 112  -CM     O2 Delivery Pre Treatment room air  -CM     O2 Delivery Intra Treatment room air  -CM     O2 Delivery Post Treatment room air  -CM       Row Name 10/30/24 1307          Positioning and Restraints    Pre-Treatment Position in bed  -CM     Post Treatment Position bed  -CM     In Bed notified nsg;fowlers;call light within reach;encouraged to call for assist;exit alarm on;side rails up x3;heels elevated;R waffle boot  -CM               User Key  (r) = Recorded By, (t) = Taken By, (c) = Cosigned By      Initials Name Provider Type    Mabel Contreras, PT Physical Therapist                   Outcome Measures       Row Name 10/30/24 1305          How much help from another person do you currently need...    Turning from your back to your side while in flat bed without using bedrails? 3  -CM     Moving from lying on back to sitting on the side of a flat bed without bedrails? 3  -CM     Moving to and from a bed to a chair (including a wheelchair)? 1  -CM     Standing up from a chair using your arms (e.g.,  wheelchair, bedside chair)? 1  -CM     Climbing 3-5 steps with a railing? 1  -CM     To walk in hospital room? 1  -CM     AM-PAC 6 Clicks Score (PT) 10  -CM               User Key  (r) = Recorded By, (t) = Taken By, (c) = Cosigned By      Initials Name Provider Type    Mabel Contreras, PT Physical Therapist                                 Physical Therapy Education       Title: PT OT SLP Therapies (Done)       Topic: Physical Therapy (Done)       Point: Mobility training (Done)       Learning Progress Summary            Patient Acceptance, E,TB, VU,NR by  at 10/30/2024 1307                      Point: Home exercise program (Done)       Learning Progress Summary            Patient Acceptance, E,TB, VU,NR by  at 10/30/2024 1307                      Point: Body mechanics (Done)       Learning Progress Summary            Patient Acceptance, E,TB, VU,NR by  at 10/30/2024 1307                      Point: Precautions (Done)       Learning Progress Summary            Patient Acceptance, E,TB, VU,NR by  at 10/30/2024 1307                                      User Key       Initials Effective Dates Name Provider Type Discipline     06/16/21 -  Mabel Jorge, PT Physical Therapist PT                  PT Recommendation and Plan  Planned Therapy Interventions (PT): balance training, bed mobility training, gait training, home exercise program, neuromuscular re-education, motor coordination training, patient/family education, postural re-education, ROM (range of motion), strengthening, transfer training  Outcome Evaluation: 78 yo female adm 10/26/24 from SNF for hallucinations, altered mental status. Pt had gone to SNF following recent admission to [facility] due to fall at home and decreased mobility, as well as pain in BLEs. Pt underwent RLE vascular sx in mid-Oct for thrombectomy and balloon angioplasty. Pt now POD 1 s/p LLE thrombectomy involving popliteal, peroneal and anterior tib aa, as well as balloon  angioplasty.  PMH: pad w/ major tissue loss. Pt had fallen sometime in mid-september at home, and had been using w/c since that time. By the time she presents to [facility] in mid-Oct, she had not ambulated in over a month. Pt has been working w/ PT at SNF, but it is unclear how much progress she has made. Pt oriented today, but she is a poor historian. Pt comes to sit at eob w/ mod assist of 2. Attempted twice to come to standing at rw, but pt in severe pain w/ LLE and unable to wb on this LE. Requires mod assist of 2 to return to supine position. Pt not safe for home at this time. Will need SNF at d/c to work toward more independence w/ mobility. Will follow.     Time Calculation:   PT Evaluation Complexity  History, PT Evaluation Complexity: 3 or more personal factors and/or comorbidities  Examination of Body Systems (PT Eval Complexity): total of 4 or more elements  Clinical Presentation (PT Evaluation Complexity): evolving  Clinical Decision Making (PT Evaluation Complexity): moderate complexity  Overall Complexity (PT Evaluation Complexity): moderate complexity     PT Charges       Row Name 10/30/24 1307             Time Calculation    Start Time 1034  -CM      Stop Time 1055  -CM      Time Calculation (min) 21 min  -CM      PT Received On 10/30/24  -CM      PT - Next Appointment 10/31/24  -CM      PT Goal Re-Cert Due Date 11/13/24  -CM         Time Calculation- PT    Total Timed Code Minutes- PT 0 minute(s)  -CM                User Key  (r) = Recorded By, (t) = Taken By, (c) = Cosigned By      Initials Name Provider Type     Mabel Jorge, PT Physical Therapist                  Therapy Charges for Today       Code Description Service Date Service Provider Modifiers Qty    16205433433  PT EVAL MOD COMPLEXITY 4 10/30/2024 Mabel Jorge, PT GP 1            PT G-Codes  AM-PAC 6 Clicks Score (PT): 10  PT Discharge Summary  Anticipated Discharge Disposition (PT): skilled nursing facility    Mabel Jorge  PT  10/30/2024      Electronically signed by Mabel Jorge, PT at 10/30/24 1308       Occupational Therapy Notes (all)    No notes exist for this encounter.

## 2024-10-30 NOTE — CASE MANAGEMENT/SOCIAL WORK
Continued Stay Note  [facility]     Patient Name: Fallon Nur  MRN: [id number]  Today's Date: 10/30/2024    Admit Date: 10/26/2024    Plan: DC PLAN: [facility] accepted, Pending Precert (Started 10/30) Does not want to return to [facility].       Discharge Plan       Row Name 10/30/24 1604       Plan    Plan DC PLAN: [facility] accepted, Pending Precert (Started 10/30) Does not want to return to [facility].    Patient/Family in Agreement with Plan yes    Plan Comments [facility]- Denied. [facility]- Denied.  Receieved message that [facility] accepted. Will need precert, reached out to [facility] and requested Precert to be started. Awaiting Vascular surgery to sign off.                 Expected Discharge Date and Time       Expected Discharge Date Expected Discharge Time    Oct 31, 2024             Andree Morocho RN   Case Management  [phone]

## 2024-10-30 NOTE — DISCHARGE PLACEMENT REQUEST
"Urszula Serrano (79 y.o. Female)       Date of Birth   1945    Social Security Number       Address   [address] [place] [address] [place] [address]    Home Phone   [phone]    MRN   [id number]       [employer]   [employer]    Marital Status                               Admission Date   10/26/24    Admission Type   Emergency    Admitting Provider   Surinder Schumacher MD    Attending Provider   Gonzalo Castillo MD    Department, Room/Bed   [facility],        Discharge Date       Discharge Disposition       Discharge Destination                                 Attending Provider: Gonzalo Castillo MD    Allergies: No Known Allergies    Isolation: Contact   Infection: ESBL E coli (23)   Code Status: CPR    Ht: 160 cm (63\")   Wt: 60.3 kg (133 lb)    Admission Cmt: None   Principal Problem: Altered mental status [R41.82]                   Active Insurance as of 10/26/2024       Primary Coverage       Payor Plan Insurance Group Employer/Plan Group    ANTHEM MEDICARE REPLACEMENT ANTHEM MEDICARE ADVANTAGE [id number]       Payor Plan Address Payor Plan Phone Number Payor Plan Fax Number Effective Dates    [address] [phone]  2022 - None Entered    [facility] [address]         Subscriber Name Subscriber Birth Date Member ID       URSZULA SERRANO 1945 [id number]                     Emergency Contacts        (Rel.) Home Phone Work Phone Mobile Phone    CARLTON REN (Daughter) -- -- [phone]    JOSE ABREU (Daughter) -- -- [phone]                 Occupational Therapy Notes (most recent note)        Pierre Jason, OT at 10/30/24 1626          Patient Name: Urszula Serrano  : 1945    MRN: [id number]                              Today's Date: 10/30/2024       Admit Date: 10/26/2024    Visit Dx:     ICD-10-CM ICD-9-CM   1. Hallucinations  R44.3 780.1   2. Ulcers of both lower legs  L97.919 707.10    L97.929    3. " Atherosclerosis of native arteries of left leg with ulceration of calf  I70.242 440.23     707.12     Patient Active Problem List   Diagnosis    H/O gastric bypass    Hypothyroidism    Chronic back pain    Ventral hernia    GERD (gastroesophageal reflux disease)    Blind    Longstanding persistent atrial fibrillation    Open wound of both lower extremities    Acute hypokalemia    Normocytic anemia    Anxiety and depression    Wound of lower extremity    Infected wound    PAD (peripheral artery disease)    Atherosclerosis of native arteries of right leg with ulceration of calf    Nonrheumatic mitral valve regurgitation    Primary hypertension    B12 deficiency    Chronic foot pain    Compression fracture of cervical spine    Histoplasmosis    History of compression fracture of spine    Insomnia    Knee osteoarthritis    Macular degeneration    Osteoporosis    Vitamin D deficiency    Altered mental status    Atherosclerosis of native arteries of left leg with ulceration of calf     Past Medical History:   Diagnosis Date    Atrial fibrillation     Blind 07/10/2023    Cellulitis of leg 09/12/2024    Duodenal ulcer, perforated 10/01/2013    [facility]    12/2021 - EGD clear      DVT of upper extremity (deep vein thrombosis) 01/01/1995    subclavian - premarin      E. 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    Histoplasmosis     Hypertension     Infestation by bed bug 10/09/2024    Legally blind     Longstanding persistent atrial fibrillation 07/10/2023    Macular degeneration 10/15/2024    Nonrheumatic mitral valve regurgitation 10/15/2024    Open wound of both lower extremities 09/12/2024    Primary hypertension 10/15/2024    PVD (peripheral vascular disease)     SBO (small bowel obstruction) 12/31/2020     Past Surgical History:   Procedure Laterality Date    ARTERIOGRAM Left 10/29/2024    Procedure: Left leg arteriogram with angioplasty;  Surgeon: Red Hazel II, MD;  Location: [facility];   Service: Vascular;  Laterality: Left;    CARDIAC CATHETERIZATION Right 10/14/2024    Procedure: RIGHT LOWER EXTREMITY ARTERIOGRAM, PERCUTANEOUS THROMBECTOMY, ANGIOPLASTY,  AND WOUND DEBRIDEMENT;  Surgeon: Red Hazel II, MD;  Location: [facility] HYBRID OR;  Service: Vascular;  Laterality: Right;    WOUND DEBRIDEMENT Right 9/16/2024    Procedure: RIGHT LEG DEBRIDEMENT;  Surgeon: Red Hazel II, MD;  Location: [facility] MAIN OR;  Service: Vascular;  Laterality: Right;      General Information       Row Name 10/30/24 1621          OT Time and Intention    Document Type evaluation  -MP     Mode of Treatment occupational therapy  -MP     Patient Effort good  -MP       Row Name 10/30/24 1621          General Information    Patient Profile Reviewed yes  -MP     Existing Precautions/Restrictions fall  -MP       Row Name 10/30/24 1621          Living Environment    People in Home child(elgin), adult  -MP       Row Name 10/30/24 1621          Cognition    Orientation Status (Cognition) oriented x 4  -MP       Row Name 10/30/24 1621          Safety Issues/Impairments Affecting Functional Mobility    Impairments Affecting Function (Mobility) endurance/activity tolerance;strength;range of motion (ROM);pain;other (see comments)  -MP               User Key  (r) = Recorded By, (t) = Taken By, (c) = Cosigned By      Initials Name Provider Type    MP Pierre Jason OT Occupational Therapist                     Mobility/ADL's       Row Name 10/30/24 1621          Bed Mobility    Bed Mobility supine-sit;sit-supine  -MP     Supine-Sit [place] (Bed Mobility) moderate assist (50% patient effort);2 person assist;minimum assist (75% patient effort)  -MP     Sit-Supine [place] (Bed Mobility) moderate assist (50% patient effort);2 person assist  -MP     Assistive Device (Bed Mobility) bed rails;head of bed elevated;leg   -MP       Row Name 10/30/24 1621          Bed-Chair Transfer    Bed-Chair [place] (Transfers)  unable to assess  -MP       Row Name 10/30/24 1621          Sit-Stand Transfer    Sit-Stand [place] (Transfers) unable to assess  -MP       Row Name 10/30/24 1621          Activities of Daily Living    BADL Assessment/Intervention lower body dressing  -MP       Row Name 10/30/24 1621          Lower Body Dressing Assessment/Training    [place] Level (Lower Body Dressing) lower body dressing skills;maximum assist (25% patient effort)  -MP               User Key  (r) = Recorded By, (t) = Taken By, (c) = Cosigned By      Initials Name Provider Type    Pierre Ordonez OT Occupational Therapist                   Obj/Interventions       Row Name 10/30/24 1621          Range of Motion Comprehensive    Comment, General Range of Motion BUE WFL  -MP       Row Name 10/30/24 1621          Strength Comprehensive (MMT)    Comment, General Manual Muscle Testing (MMT) Assessment BUE 4-/5  -MP       Row Name 10/30/24 1621          Balance    Balance Interventions sitting;standing;sit to stand;supported;static;dynamic  -MP               User Key  (r) = Recorded By, (t) = Taken By, (c) = Cosigned By      Initials Name Provider Type    Pierre Ordonez OT Occupational Therapist                   Goals/Plan       Row Name 10/30/24 1624          Bed Mobility Goal 1 (OT)    Activity/Assistive Device (Bed Mobility Goal 1, OT) bed mobility activities, all  -MP     [place] Level/Cues Needed (Bed Mobility Goal 1, OT) minimum assist (75% or more patient effort)  -MP     Time Frame (Bed Mobility Goal 1, OT) long term goal (LTG);2 weeks  -MP       Row Name 10/30/24 1624          Transfer Goal 1 (OT)    Activity/Assistive Device (Transfer Goal 1, OT) sit-to-stand/stand-to-sit;toilet  -MP     [place] Level/Cues Needed (Transfer Goal 1, OT) minimum assist (75% or more patient effort)  -MP     Time Frame (Transfer Goal 1, OT) long term goal (LTG);2 weeks  -MP       Row Name 10/30/24 1624          Dressing Goal 1 (OT)     Activity/Device (Dressing Goal 1, OT) lower body dressing  -MP     [place]/Cues Needed (Dressing Goal 1, OT) minimum assist (75% or more patient effort)  -MP     Time Frame (Dressing Goal 1, OT) long term goal (LTG);2 weeks  -MP               User Key  (r) = Recorded By, (t) = Taken By, (c) = Cosigned By      Initials Name Provider Type    MP Pierre Jason OT Occupational Therapist                   Clinical Impression       Row Name 10/30/24 1622          Pain Assessment    Pretreatment Pain Rating 8/10  -MP     Posttreatment Pain Rating 2/10  -MP     Pain Side/Orientation left;lower  -MP       Row Name 10/30/24 1622          Plan of Care Review    Plan of Care Reviewed With patient  -MP     Progress no change  -MP     Outcome Evaluation 80 yo female adm 10/26/24 from SNF for hallucinations, altered mental status. Pt had gone to SNF following recent admission to [facility] due to fall at home and decreased mobility, as well as pain in BLEs. Pt underwent RLE vascular sx in mid-Oct for thrombectomy and balloon angioplasty. Pt now POD 1 s/p LLE thrombectomy involving popliteal, peroneal and anterior tib aa, as well as balloon angioplasty. PMH: pad w/ major tissue loss. Pt. utilizing w/c for mobility in the last month ,was walking at home and taking care of daughter, maintained ADL independence at baseline. Pt. requires mod A x 2 for bed mobility and sit to stand transfer this date, patient unable to maintain standing w/ severe posterior LOB. Max A provided for all LB ADls. Pt. not safe to d/c home at this time and will require SNF Placement at d/c.  -MP       Row Name 10/30/24 1622          Therapy Assessment/Plan (OT)    Rehab Potential (OT) good  -MP     Criteria for Skilled Therapeutic Interventions Met (OT) yes;meets criteria;skilled treatment is necessary  -MP     Therapy Frequency (OT) 3 times/wk  -MP       Row Name 10/30/24 1622          Therapy Plan Review/Discharge Plan (OT)    Anticipated Discharge  Disposition (OT) skilled nursing facility  -       Row Name 10/30/24 1622          Vital Signs    Pre Patient Position Supine  -MP     Intra Patient Position Standing  -MP     Post Patient Position Supine  -MP       Row Name 10/30/24 1622          Positioning and Restraints    Pre-Treatment Position in bed  -MP     Post Treatment Position bed  -MP     In Bed supine;call light within reach;encouraged to call for assist;exit alarm on  -MP               User Key  (r) = Recorded By, (t) = Taken By, (c) = Cosigned By      Initials Name Provider Type    Pierre Ordonez OT Occupational Therapist                   Outcome Measures       Row Name 10/30/24 1306 10/30/24 0920       How much help from another person do you currently need...    Turning from your back to your side while in flat bed without using bedrails? 3  -CM 2  -LP    Moving from lying on back to sitting on the side of a flat bed without bedrails? 3  -CM 2  -LP    Moving to and from a bed to a chair (including a wheelchair)? 1  -CM 1  -LP    Standing up from a chair using your arms (e.g., wheelchair, bedside chair)? 1  -CM 1  -LP    Climbing 3-5 steps with a railing? 1  -CM 1  -LP    To walk in hospital room? 1  -CM 1  -LP    AM-PAC 6 Clicks Score (PT) 10  -CM 8  -LP              User Key  (r) = Recorded By, (t) = Taken By, (c) = Cosigned By      Initials Name Provider Type    Mabel Contreras, PT Physical Therapist    Kanika Jasso LPN Licensed Nurse                    Occupational Therapy Education       Title: PT OT SLP Therapies (In Progress)       Topic: Occupational Therapy (Not Started)       Point: ADL training (Not Started)       Description:   Instruct learner(s) on proper safety adaptation and remediation techniques during self care or transfers.   Instruct in proper use of assistive devices.                  Learner Progress:  Not documented in this visit.              Point: Home exercise program (Not Started)        Description:   Instruct learner(s) on appropriate technique for monitoring, assisting and/or progressing therapeutic exercises/activities.                  Learner Progress:  Not documented in this visit.              Point: Precautions (Not Started)       Description:   Instruct learner(s) on prescribed precautions during self-care and functional transfers.                  Learner Progress:  Not documented in this visit.              Point: Body mechanics (Not Started)       Description:   Instruct learner(s) on proper positioning and spine alignment during self-care, functional mobility activities and/or exercises.                  Learner Progress:  Not documented in this visit.                                  OT Recommendation and Plan  Therapy Frequency (OT): 3 times/wk  Plan of Care Review  Plan of Care Reviewed With: patient  Progress: no change  Outcome Evaluation: 78 yo female adm 10/26/24 from SNF for hallucinations, altered mental status. Pt had gone to SNF following recent admission to [facility] due to fall at home and decreased mobility, as well as pain in BLEs. Pt underwent RLE vascular sx in mid-Oct for thrombectomy and balloon angioplasty. Pt now POD 1 s/p LLE thrombectomy involving popliteal, peroneal and anterior tib aa, as well as balloon angioplasty. PMH: pad w/ major tissue loss. Pt. utilizing w/c for mobility in the last month ,was walking at home and taking care of daughter, maintained ADL independence at baseline. Pt. requires mod A x 2 for bed mobility and sit to stand transfer this date, patient unable to maintain standing w/ severe posterior LOB. Max A provided for all LB ADls. Pt. not safe to d/c home at this time and will require SNF Placement at d/c.     Time Calculation:         Time Calculation- OT       Row Name 10/30/24 1625             Time Calculation- OT    OT Start Time 1035  -MP      OT Stop Time 1055  -MP      OT Time Calculation (min) 20 min  -MP      Total Timed Code Minutes- OT  0 minute(s)  -MP      OT Received On 10/30/24  -NICOL      OT - Next Appointment 11/01/24  -NICOL      OT Goal Re-Cert Due Date 11/13/24  -NICOL                User Key  (r) = Recorded By, (t) = Taken By, (c) = Cosigned By      Initials Name Provider Type    Pierre Ordonez OT Occupational Therapist                  Therapy Charges for Today       Code Description Service Date Service Provider Modifiers Qty    [id number] HC OT EVAL LOW COMPLEXITY 4 10/30/2024 Pierre Jason OT GO 1                 Pierre Jason OT  10/30/2024    Electronically signed by Pierre Jason OT at 10/30/24 2165

## 2024-10-31 LAB
BACTERIA SPEC AEROBE CULT: NORMAL
BACTERIA SPEC AEROBE CULT: NORMAL
GLUCOSE BLDC GLUCOMTR-MCNC: 103 MG/DL (ref 70–105)
GLUCOSE BLDC GLUCOMTR-MCNC: 74 MG/DL (ref 70–105)
GLUCOSE BLDC GLUCOMTR-MCNC: 78 MG/DL (ref 70–105)

## 2024-10-31 PROCEDURE — 25010000002 PIPERACILLIN SOD-TAZOBACTAM PER 1 G: Performed by: STUDENT IN AN ORGANIZED HEALTH CARE EDUCATION/TRAINING PROGRAM

## 2024-10-31 PROCEDURE — 82948 REAGENT STRIP/BLOOD GLUCOSE: CPT | Performed by: STUDENT IN AN ORGANIZED HEALTH CARE EDUCATION/TRAINING PROGRAM

## 2024-10-31 PROCEDURE — 25010000002 KETOROLAC TROMETHAMINE PER 15 MG: Performed by: STUDENT IN AN ORGANIZED HEALTH CARE EDUCATION/TRAINING PROGRAM

## 2024-10-31 PROCEDURE — 82948 REAGENT STRIP/BLOOD GLUCOSE: CPT

## 2024-10-31 RX ORDER — ASPIRIN 81 MG/1
81 TABLET, CHEWABLE ORAL DAILY
Qty: 90 TABLET | Refills: 0 | Status: SHIPPED | OUTPATIENT
Start: 2024-10-31

## 2024-10-31 RX ORDER — HYDROCODONE BITARTRATE AND ACETAMINOPHEN 5; 325 MG/1; MG/1
1 TABLET ORAL EVERY 6 HOURS PRN
Qty: 10 TABLET | Refills: 0 | Status: SHIPPED | OUTPATIENT
Start: 2024-10-31

## 2024-10-31 RX ADMIN — ASPIRIN 81 MG CHEWABLE TABLET 81 MG: 81 TABLET CHEWABLE at 12:31

## 2024-10-31 RX ADMIN — PIPERACILLIN AND TAZOBACTAM 3.38 G: 3; .375 INJECTION, POWDER, FOR SOLUTION INTRAVENOUS at 12:31

## 2024-10-31 RX ADMIN — ATORVASTATIN CALCIUM 40 MG: 40 TABLET, FILM COATED ORAL at 20:48

## 2024-10-31 RX ADMIN — BUPROPION HYDROCHLORIDE 150 MG: 150 TABLET, EXTENDED RELEASE ORAL at 12:32

## 2024-10-31 RX ADMIN — PIPERACILLIN AND TAZOBACTAM 3.38 G: 3; .375 INJECTION, POWDER, FOR SOLUTION INTRAVENOUS at 02:16

## 2024-10-31 RX ADMIN — RIVAROXABAN 20 MG: 20 TABLET, FILM COATED ORAL at 18:58

## 2024-10-31 RX ADMIN — KETOROLAC TROMETHAMINE 15 MG: 30 INJECTION, SOLUTION INTRAMUSCULAR; INTRAVENOUS at 04:02

## 2024-10-31 RX ADMIN — CITALOPRAM HYDROBROMIDE 20 MG: 20 TABLET ORAL at 12:32

## 2024-10-31 RX ADMIN — LEVOTHYROXINE SODIUM 50 MCG: 0.05 TABLET ORAL at 05:59

## 2024-10-31 RX ADMIN — DIGOXIN 125 MCG: 125 TABLET ORAL at 12:32

## 2024-10-31 RX ADMIN — PIPERACILLIN AND TAZOBACTAM 3.38 G: 3; .375 INJECTION, POWDER, FOR SOLUTION INTRAVENOUS at 18:58

## 2024-10-31 NOTE — DISCHARGE SUMMARY
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         Hospital Course     HPI:    \"Fallon Nur is a 79 y.o. female with a CMH of atrial fibrillation, blindness, HTN, PVD who presented to [facility] on 10/26/2024 with altered mental status. Patient was just recently discharged here and sent to rehab. Patient's family states she is normally alert, oriented, but over the past couple of days she has become confused. Patient is having hallucinations, and speaking/reaching for people who are there.      Patient was admitted here from 10/9/2024 -10/18/2024 for wounds, hypokalemia, hypertension, PAD, atrial fibrillation and hypothyroidism. Patient had percutaneous mechanical thrombectomy of multiple arteries, and excisional debridement of her right leg wound on 10/14. Patient was eventually sent to rehab with levaquin, and was also discharged on xarelto for DVT and afib history.      On ED evaluation today, TSH is 9.980, ammonia 12, lactic 1.6, WBC 7.57, HGB 9.5, UA negative for infection. CT head showed mild generalized parenchymal volume loss, but not acute intracranial abnormality. Patient has severe BLE wounds, with necrotic tissue present, which " "family states are worse than previous. Podiatry, general surgery have been consulted. Hospitalist team to admit for further medical management.\"    Hospital Course:    Patient was admitted for altered mental status in the setting of new pain medication added recently as well as concern regarding wounds on her legs.  Mental status improved swiftly with discontinuation of the fentanyl patch and she returned to her baseline mentation quite quickly after this.  The wounds on her legs were evaluated by wound care and then for vascular surgery because of concern for peripheral arterial disease.  She underwent a left leg arteriogram with balloon angioplasty of the left AT and left popliteal arteries by Dr. Hazel on 10/29/2024.  She followed a usual and uneventful postoperative course with resumption of her home Xarelto aspirin and statin afterwards.  She has to follow-up with vascular surgery in their office 2 weeks following her discharge regarding this.        DISCHARGE Follow Up Recommendations for labs and diagnostics:     Vascular surgery  Routine wound care  Primary care medicine        Day of Discharge     Vital Signs:  Temp:  [97.8 °F (36.6 °C)-98.1 °F (36.7 °C)] 97.9 °F (36.6 °C)  Heart Rate:  [] 86  Resp:  [11-16] 15  BP: (141-185)/(71-94) 163/82    Physical Exam:  Physical Exam  Constitutional:       Appearance: Normal appearance.   Cardiovascular:      Rate and Rhythm: Normal rate and regular rhythm.      Pulses: Normal pulses.      Heart sounds: Normal heart sounds.   Pulmonary:      Effort: Pulmonary effort is normal.      Breath sounds: Normal breath sounds.   Abdominal:      General: Abdomen is flat.      Palpations: Abdomen is soft.   Neurological:      General: No focal deficit present.      Mental Status: She is alert.            Pertinent  and/or Most Recent Results     LAB RESULTS:      Lab 10/30/24  0623 10/29/24  2247 10/29/24  2245 10/29/24  0306 10/28/24  0107 10/27/24  0109 10/26/24  1631 " 10/26/24  1431   WBC  --  7.30  --  7.22 8.76 9.39  --  7.57   HEMOGLOBIN  --  10.2*  --  8.8* 8.7* 9.0*  --  9.5*   HEMATOCRIT  --  33.5*  --  27.9* 27.8* 29.6*  --  31.0*   PLATELETS  --  389  --  403 419 450  --  460*   NEUTROS ABS  --  5.40  --  4.97 6.93 7.48*  --  5.97   IMMATURE GRANS (ABS)  --  0.04  --  0.04 0.03 0.05  --  0.04   LYMPHS ABS  --  0.90  --  1.20 0.82 0.69*  --  0.59*   MONOS ABS  --  0.67  --  0.71 0.74 0.83  --  0.79   EOS ABS  --  0.24  --  0.26 0.19 0.27  --  0.15   MCV  --  89.6  --  87.5 87.7 89.2  --  89.3   LACTATE  --   --   --   --   --   --  1.6  --    PROTIME  --   --   --  12.1*  --   --   --   --    APTT 51.1*  --  22.1* 24.1  --   --   --   --          Lab 10/29/24  2247 10/29/24  0306 10/28/24  1204 10/28/24  0107 10/27/24  1037 10/27/24  0055 10/26/24  1431   SODIUM 139 139  --  138  --  140 140   POTASSIUM 3.9 3.7 4.0 3.5 3.9 3.4* 3.8   CHLORIDE 105 107  --  104  --  103 103   CO2 27.2 27.1  --  26.0  --  29.0 33.0*   ANION GAP 6.8 4.9*  --  8.0  --  8.0 4.0*   BUN 10 14  --  15  --  14 16   CREATININE 0.69 0.73  --  0.66  --  0.69 0.75   EGFR 88.4 83.8  --  89.4  --  88.4 81.1   GLUCOSE 73 83  --  58*  --  59* 87   CALCIUM 7.8* 7.4*  --  7.5*  --  7.9* 8.2*   MAGNESIUM  --   --   --   --   --   --  2.0   TSH  --   --   --   --   --   --  9.980*         Lab 10/26/24  1431   TOTAL PROTEIN 6.2   ALBUMIN 2.4*   GLOBULIN 3.8   ALT (SGPT) 12   AST (SGOT) 48*   BILIRUBIN 0.5   ALK PHOS 197*         Lab 10/29/24  0306   PROTIME 12.1*   INR 1.12*             Lab 10/29/24  0306   ABO TYPING B   RH TYPING Positive   ANTIBODY SCREEN Negative         Brief Urine Lab Results  (Last result in the past 365 days)        Color   Clarity   Blood   Leuk Est   Nitrite   Protein   CREAT   Urine HCG        10/26/24 1443 Dark Yellow   Clear   Negative   Trace   Negative   Negative                 Microbiology Results (last 10 days)       Procedure Component Value - Date/Time    CANDIDA AURIS PCR -  Swab, Axilla Right, Axilla Left and Groin [[id number]]  (Normal) Collected: 10/27/24 0037    Lab Status: Final result Specimen: Swab from Axilla Right, Axilla Left and Groin Updated: 10/28/24 0849     CANDIDA AURIS PCR Not Detected    Blood Culture - Blood, Hand, Right [[id number]]  (Normal) Collected: 10/26/24 1621    Lab Status: Preliminary result Specimen: Blood from Hand, Right Updated: 10/30/24 1645     Blood Culture No growth at 4 days    Narrative:      Less than seven (7) mL's of blood was collected.  Insufficient quantity may yield false negative results.    Blood Culture - Blood, Arm, Left [[id number]]  (Normal) Collected: 10/26/24 1620    Lab Status: Preliminary result Specimen: Blood from Arm, Left Updated: 10/30/24 1645     Blood Culture No growth at 4 days    Narrative:      Less than seven (7) mL's of blood was collected.  Insufficient quantity may yield false negative results.            XR Chest 1 View    Result Date: 10/28/2024  Impression: Impression: No acute cardiopulmonary findings. Electronically Signed: Osvaldo Ferrara MD  10/28/2024 9:45 PM EDT  Workstation ID: [id number]    CT Head Without Contrast    Result Date: 10/26/2024  Impression: Impression: 1. Mild generalized parenchymal volume loss. No acute intracranial abnormality. Electronically Signed: Lloyd Rodriguez MD  10/26/2024 2:37 PM EDT  Workstation ID: [id number]    CT Angio Abdominal Aorta Bilateral Iliofem Runoff    Result Date: 10/10/2024  Impression: Impression: 1. No evidence of abdominal aortic aneurysm. No evidence of high-grade stenosis in the abdominal aorta or iliac vessels. 2. 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 There is poor distal runoff in the lower extremities primarily through the posterior tibial arteries. Electronically Signed: Emeka Jewell MD  10/10/2024 10:36 PM EDT  Workstation ID: [id number]    XR Tibia Fibula 2 View Right    Result Date: 10/9/2024  Impression: Impression: Soft tissue defect again identified. No  underlying bony abnormality. Electronically Signed: Jazlyn Ramon MD  10/9/2024 1:45 PM EDT  Workstation ID: [id number]     Results for orders placed during the hospital encounter of 10/09/24    Doppler Ankle Brachial Index Single Level CAR    Interpretation Summary    Right Conclusion: The right FRANSISCO is unable to be assessed due to patient not tolerating compression. Severe digital insufficiency.    Left Conclusion: The left FRANSISCO is unable to be assessed due to patient not tolerating compression. Severe digital insufficiency.      Results for orders placed during the hospital encounter of 10/09/24    Doppler Ankle Brachial Index Single Level CAR    Interpretation Summary    Right Conclusion: The right FRANSISCO is unable to be assessed due to patient not tolerating compression. Severe digital insufficiency.    Left Conclusion: The left FRANSISCO is unable to be assessed due to patient not tolerating compression. Severe digital insufficiency.      Results for orders placed during the hospital encounter of 10/09/24    Adult Transthoracic Echo Complete W/ Cont if Necessary Per Protocol    Interpretation Summary    Left ventricular systolic function is normal. Calculated left ventricular EF = 54% Left ventricular ejection fraction appears to be 51 - 55%.    Left ventricular diastolic function is consistent with age.    The left atrial cavity is dilated.    Saline test results are negative for right to left atrial level shunt.    Moderate aortic valve stenosis is present. Mean/peak gradient of 12/20 mmHg.  Peak velocity 222 cm/s    Estimated right ventricular systolic pressure from tricuspid regurgitation is normal (<35 mmHg).    No evidence of intracardiac thrombus.  No wall motion abnormalities.      Labs Pending at Discharge:  Pending Labs       Order Current Status    Blood Culture - Blood, Arm, Left Preliminary result    Blood Culture - Blood, Hand, Right Preliminary result            Procedures Performed  Procedure(s):  Left  leg arteriogram with angioplasty         Consults:   Consults       Date and Time Order Name Status Description    10/26/2024  5:05 PM Inpatient Vascular Surgery Consult Completed     10/26/2024  4:46 PM Inpatient Podiatry Consult Completed     10/26/2024  3:58 PM Hospitalist (on-call MD unless specified)      10/14/2024  5:14 PM Inpatient Cardiology Consult Completed     10/10/2024  9:30 AM Inpatient Infectious Diseases Consult Completed     10/9/2024  5:52 PM Inpatient Vascular Surgery Consult Completed               Discharge Details        Discharge Medications        New Medications        Instructions Start Date   aspirin 81 MG chewable tablet   81 mg, Oral, Daily      HYDROcodone-acetaminophen 5-325 MG per tablet  Commonly known as: Norco   1 tablet, Oral, Every 6 Hours PRN      naloxone 4 MG/0.1ML nasal spray  Commonly known as: NARCAN   Call 911. Don't prime. [place] in 1 nostril for overdose. Repeat in 2-3 minutes in other nostril if no or minimal breathing/responsiveness.             Changes to Medications        Instructions Start Date   rivaroxaban 20 MG tablet  Commonly known as: XARELTO  What changed:   medication strength  how much to take   20 mg, Oral, Daily With Dinner             Continue These Medications        Instructions Start Date   alendronate 70 MG tablet  Commonly known as: FOSAMAX   70 mg, Oral, Every 7 Days, Saturday       atorvastatin 40 MG tablet  Commonly known as: LIPITOR   40 mg, Oral, Nightly      buPROPion  MG 24 hr tablet  Commonly known as: WELLBUTRIN XL   150 mg, Daily      cetirizine 10 MG tablet  Commonly known as: zyrTEC   10 mg, Daily      cilostazol 100 MG tablet  Commonly known as: PLETAL   100 mg, Oral, 2 Times Daily      citalopram 20 MG tablet  Commonly known as: CeleXA   20 mg, Daily      digoxin 125 MCG tablet  Commonly known as: LANOXIN   125 mcg, Daily Digoxin      ferrous sulfate 325 (65 FE) MG tablet   325 mg, Daily With Breakfast      fluticasone 50  MCG/ACT nasal spray  Commonly known as: FLONASE   2 sprays, Nasal, Daily PRN      furosemide 20 MG tablet  Commonly known as: LASIX   20 mg, Oral, Daily      gabapentin 300 MG capsule  Commonly known as: NEURONTIN   300 mg, 2 Times Daily      Estiven pack   1 packet, Oral, 2 Times Daily      levothyroxine 50 MCG tablet  Commonly known as: SYNTHROID, LEVOTHROID   50 mcg, Daily      Melatonin 12 MG tablet   1 tablet, Oral, Nightly PRN      metoprolol succinate XL 25 MG 24 hr tablet  Commonly known as: TOPROL-XL   12.5 mg, Oral, Every 24 Hours Scheduled      multivitamin tablet tablet   1 tablet, Oral, Daily      pantoprazole 40 MG EC tablet  Commonly known as: PROTONIX   40 mg, Daily      polyethylene glycol 17 g packet  Commonly known as: MIRALAX   17 g, Oral, Daily      vitamin B-12 1000 MCG tablet  Commonly known as: CYANOCOBALAMIN   1,000 mcg, Daily      vitamin D 1.25 MG (27310 UT) capsule capsule  Commonly known as: ERGOCALCIFEROL   50,000 Units, Weekly      zinc sulfate 220 (50 Zn) MG capsule  Commonly known as: ZINCATE   220 mg, Oral, Daily             Stop These Medications      fentaNYL 12 MCG/HR  Commonly known as: DURAGESIC     levoFLOXacin 500 MG tablet  Commonly known as: LEVAQUIN     oxyCODONE 5 MG immediate release tablet  Commonly known as: ROXICODONE              No Known Allergies      Discharge Disposition:   Home or Self Care    Diet:  Hospital:  Diet Order   Procedures    Diet: Regular/House; Fluid Consistency: Thin (IDDSI 0)         Discharge Activity:   Activity Instructions    Increase Activity as tolerated.              CODE STATUS:  Code Status and Medical Interventions: CPR (Attempt to Resuscitate); Full Support   Ordered at: 10/26/24 1646     Code Status (Patient has no pulse and is not breathing):    CPR (Attempt to Resuscitate)     Medical Interventions (Patient has pulse or is breathing):    Full Support         Future Appointments   Date Time Provider Department Center   11/14/2024  9:30  Red Hernandez II, MD [facility]           Time spent on Discharge including face to face service: 50 minutes    Signature: Electronically signed by Gonzalo Castillo MD, 10/31/24, 14:55 EDT.  [facility] Hospitalist Team

## 2024-10-31 NOTE — CASE MANAGEMENT/SOCIAL WORK
Continued Stay Note  [facility]     Patient Name: Fallon Nur  MRN: [id number]  Today's Date: 10/31/2024    Admit Date: 10/26/2024    Plan: [facility] accepted.  Pending precert. started 10/30.  No new PASRR required as pt is return to a SNF.   Discharge Plan       Row Name 10/31/24 0837       Plan    Plan [facility] accepted.  Pending precert. started 10/30.  No new PASRR required as pt is return to a SNF.      Row Name 10/31/24 0836       Plan    Plan Comments precert remains pending at this time.                      Expected Discharge Date and Time       Expected Discharge Date Expected Discharge Time    Oct 31, 2024               RENETTA Castillo RN  Case Management  [phone] office  [phone] fax

## 2024-10-31 NOTE — PLAN OF CARE
Goal Outcome Evaluation:      Patient alert and oriented x 4. Able to make needs known. Family at bedside. Q2 turns. Tolerates well. Personal items and call light in reach. Plan of care is ongoing.

## 2024-10-31 NOTE — CASE MANAGEMENT/SOCIAL WORK
Continued Stay Note  [facility]     Patient Name: Fallon Nur  MRN: [id number]  Today's Date: 10/31/2024    Admit Date: 10/26/2024    Plan: [facility].  Precert approved. 10/31-11/6. No new PASRR required.  Bed ready   Discharge Plan       Row Name 10/31/24 1043       Plan    Plan [facility] accepted.  Precert approved. 10/31-11/6. No new PASRR required.  Bed ready    Plan Comments  received message from Xibernardo corteson that precert approved and insurance called the facility.  Auth # [id number]  10/31-11-6  NRD 11/5.  Liasion verified that bed is ready.  secure chat sent to nursing and md.      Row Name 10/31/24 0837       Plan    Plan [facility] accepted.  Pending precert. started 10/30.  No new PASRR required as pt is return to a SNF.      Row Name 10/31/24 0836       Plan    Plan Comments precert remains pending at this time.                   Discharge Codes    No documentation.                 Expected Discharge Date and Time       Expected Discharge Date Expected Discharge Time    Oct 31, 2024               Lakshmi Cortez RN

## 2024-10-31 NOTE — SIGNIFICANT NOTE
10/31/24 1115   OTHER   Discipline physical therapist   Rehab Time/Intention   Session Not Performed other (see comments)  (hospital d/c pending; bed ready and precert approved)   Recommendation   PT - Next Appointment 11/01/24

## 2024-10-31 NOTE — PLAN OF CARE
Goal Outcome Evaluation:      Patient resting most of day, no complaints of pain, to dc to [facility] [place], report called

## 2024-10-31 NOTE — CASE MANAGEMENT/SOCIAL WORK
"Physicians Statement of Medical Necessity for  Ambulance Transportation    GENERAL INFORMATION     Name: Fallon Nur  YOB: 1945  Medicare #:     [id number]     Transport Date: 10/31/2024 (Valid for round trips this date, or for scheduled repetitive trips for 60 days from the date signed below.)  Origin: Psychiatric   Destination: [address] [place] [facility]  Is the Patient's stay covered under Medicare Part A (PPS/DRG?)Yes  Closest appropriate facility? Yes  If this a hosp-hosp transfer? No  Is this a hospice patient? No    MEDICAL NECESSITY QUESTIONAIRE    Ambulance Transportation is medically necessary only if other means of transportation are contraindicated or would be potentially harmful to the patient.  To meet this requirement, the patient must be either \"bed confined\" or suffer from a condition such that transport by means other than an ambulance is contraindicated by the patient's condition.  The following questions must be answered by the healthcare professional signing below for this form to be valid:     1) Describe the MEDICAL CONDITION (physical and/or mental) of this patient AT THE TIME OF AMBULANCE TRANSPORT that requires the patient to be transported in an ambulance, and why transport by other means is contraindicated by the patient's condition: UNABLE TO AMBULATE, POOR TRUNK CONTROL, CHRIS LIFT  UNABLE  TO STAND   Past Medical History:   Diagnosis Date    Atrial fibrillation     Blind 07/10/2023    Cellulitis of leg 09/12/2024    Duodenal ulcer, perforated 10/01/2013    [facility]    12/2021 - EGD clear      DVT of upper extremity (deep vein thrombosis) 01/01/1995    subclavian - premarin      E. 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    Histoplasmosis     Hypertension     Infestation by bed bug 10/09/2024    Legally blind     Longstanding persistent atrial fibrillation 07/10/2023    Macular degeneration 10/15/2024    " "Nonrheumatic mitral valve regurgitation 10/15/2024    Open wound of both lower extremities 09/12/2024    Primary hypertension 10/15/2024    PVD (peripheral vascular disease)     SBO (small bowel obstruction) 12/31/2020      Past Surgical History:   Procedure Laterality Date    ARTERIOGRAM Left 10/29/2024    Procedure: Left leg arteriogram with angioplasty;  Surgeon: Red Hazel II, MD;  Location: [facility] HYBRID OR;  Service: Vascular;  Laterality: Left;    CARDIAC CATHETERIZATION Right 10/14/2024    Procedure: RIGHT LOWER EXTREMITY ARTERIOGRAM, PERCUTANEOUS THROMBECTOMY, ANGIOPLASTY,  AND WOUND DEBRIDEMENT;  Surgeon: Red Hazel II, MD;  Location: [facility] HYBRID OR;  Service: Vascular;  Laterality: Right;    WOUND DEBRIDEMENT Right 9/16/2024    Procedure: RIGHT LEG DEBRIDEMENT;  Surgeon: Red Hazel II, MD;  Location: [facility] MAIN OR;  Service: Vascular;  Laterality: Right;      2) Is this patient \"bed confined\" as defined below?Yes   To be \"bed confined\" the patient must satisfy all three of the following criteria:  (1) unable to get up from bed without assistance; AND (2) unable to ambulate;  AND (3) unable to sit in a chair or wheelchair.  3) Can this patient safely be transported by car or wheelchair van (I.e., may safely sit during transport, without an attendant or monitoring?)No   4. In addition to completing questions 1-3 above, please check any of the following conditions that apply*:          *Note: supporting documentation for any boxes checked must be maintained in the patient's medical records UNABLE TO AMBULATE, POOR TRUNK CONTROL, CHRIS LIFT  UNALBLE TO STAND       SIGNATURE OF PHYSICIAN OR OTHER AUTHORIZED HEALTHCARE PROFESSIONAL    I certify that the above information is true and correct based on my evaluation of this patient, and represent that the patient requires transport by ambulance and that other forms of transport are contraindicated.  I understand that this information will be used " by the Centers for Medicare and Medicaid Services (CMS) to support the determiniation of medical necessity for ambulance services, and I represent that I have personal knowledge of the patient's condition at the time of transport.    BB   If this box is checked, I also certify that the patient is physically or mentally incapable of signing the ambulance service's claim form and that the institution with which I am affiliated has furnished care, services or assistance to the patient.  My signature below is made on behalf of the patient pursuant to 42 .36(b)(4). In accordance with 42 .37, the specific reason(s) that the patient is physically or mentally incapable of signing the claim for is as follows: NA    Signature of Physician or Healthcare Professional   IZZY ROACH MD/DEMARCUS VILLEGAS RN CASE MANAGER Date/Time:   10/31/2024 1200     (For Scheduled repetitive transport, this form is not valid for transports performed more than 60 days after this date).                                                                                                                                            --------------------------------------------------------------------------------------------  Printed Name and Credentials of Physician or Authorized Healthcare Professional     *Form must be signed by patient's attending physician for scheduled, repetitive transports,.  For non-repetitive ambulance transports, if unable to obtain the signature of the attending physician, any of the following may sign (please select below):     Physician  Clinical Nurse Specialist  Registered Nurse     Physician Assistant  Discharge Planner  Licensed Practical Nurse     Nurse Practitioner X

## 2024-11-01 ENCOUNTER — READMISSION MANAGEMENT (OUTPATIENT)
Dept: CALL CENTER | Facility: HOSPITAL | Age: 79
End: 2024-11-01
Payer: MEDICARE

## 2024-11-01 VITALS
BODY MASS INDEX: 23.57 KG/M2 | RESPIRATION RATE: 16 BRPM | HEIGHT: 63 IN | WEIGHT: 133 LBS | OXYGEN SATURATION: 97 % | HEART RATE: 94 BPM | SYSTOLIC BLOOD PRESSURE: 163 MMHG | DIASTOLIC BLOOD PRESSURE: 97 MMHG | TEMPERATURE: 97.7 F

## 2024-11-01 LAB
QT INTERVAL: 320 MS
QTC INTERVAL: 426 MS

## 2024-11-01 PROCEDURE — 25010000002 KETOROLAC TROMETHAMINE PER 15 MG

## 2024-11-01 RX ORDER — KETOROLAC TROMETHAMINE 30 MG/ML
15 INJECTION, SOLUTION INTRAMUSCULAR; INTRAVENOUS ONCE
Status: COMPLETED | OUTPATIENT
Start: 2024-11-01 | End: 2024-11-01

## 2024-11-01 RX ADMIN — LEVOTHYROXINE SODIUM 50 MCG: 0.05 TABLET ORAL at 05:44

## 2024-11-01 RX ADMIN — ASPIRIN 81 MG CHEWABLE TABLET 81 MG: 81 TABLET CHEWABLE at 11:30

## 2024-11-01 RX ADMIN — BUPROPION HYDROCHLORIDE 150 MG: 150 TABLET, EXTENDED RELEASE ORAL at 11:31

## 2024-11-01 RX ADMIN — DIGOXIN 125 MCG: 125 TABLET ORAL at 11:31

## 2024-11-01 RX ADMIN — KETOROLAC TROMETHAMINE 15 MG: 30 INJECTION, SOLUTION INTRAMUSCULAR at 05:58

## 2024-11-01 RX ADMIN — CITALOPRAM HYDROBROMIDE 20 MG: 20 TABLET ORAL at 11:31

## 2024-11-01 RX ADMIN — FERROUS SULFATE TAB EC 324 MG (65 MG FE EQUIVALENT) 324 MG: 324 (65 FE) TABLET DELAYED RESPONSE at 11:31

## 2024-11-01 NOTE — DISCHARGE SUMMARY
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         Hospital Course     HPI:    \"Fallon Nur is a 79 y.o. female with a CMH of atrial fibrillation, blindness, HTN, PVD who presented to [facility] on 10/26/2024 with altered mental status. Patient was just recently discharged here and sent to rehab. Patient's family states she is normally alert, oriented, but over the past couple of days she has become confused. Patient is having hallucinations, and speaking/reaching for people who are there.      Patient was admitted here from 10/9/2024 -10/18/2024 for wounds, hypokalemia, hypertension, PAD, atrial fibrillation and hypothyroidism. Patient had percutaneous mechanical thrombectomy of multiple arteries, and excisional debridement of her right leg wound on 10/14. Patient was eventually sent to rehab with levaquin, and was also discharged on xarelto for DVT and afib history.      On ED evaluation today, TSH is 9.980, ammonia 12, lactic 1.6, WBC 7.57, HGB 9.5, UA negative for infection. CT head showed mild generalized parenchymal volume loss, but not acute intracranial abnormality. Patient has severe BLE wounds, with necrotic tissue present, which " "family states are worse than previous. Podiatry, general surgery have been consulted. Hospitalist team to admit for further medical management.\"    Hospital Course:    Patient was admitted for altered mental status in the setting of new pain medication added recently as well as concern regarding wounds on her legs.  Mental status improved swiftly with discontinuation of the fentanyl patch and she returned to her baseline mentation quite quickly after this.  The wounds on her legs were evaluated by wound care and then for vascular surgery because of concern for peripheral arterial disease.  She underwent a left leg arteriogram with balloon angioplasty of the left AT and left popliteal arteries by Dr. Hazel on 10/29/2024.  She followed a usual and uneventful postoperative course with resumption of her home Xarelto aspirin and statin afterwards.  She has to follow-up with vascular surgery in their office 2 weeks following her discharge regarding this.        DISCHARGE Follow Up Recommendations for labs and diagnostics:     Vascular surgery  Routine wound care  Primary care medicine        Day of Discharge     Vital Signs:  Temp:  [97.7 °F (36.5 °C)-98.1 °F (36.7 °C)] 97.7 °F (36.5 °C)  Heart Rate:  [] 94  Resp:  [16-18] 16  BP: (139-163)/(82-97) 163/97    Physical Exam:  Physical Exam  Constitutional:       Appearance: Normal appearance.   Cardiovascular:      Rate and Rhythm: Normal rate and regular rhythm.      Pulses: Normal pulses.      Heart sounds: Normal heart sounds.   Pulmonary:      Effort: Pulmonary effort is normal.      Breath sounds: Normal breath sounds.   Abdominal:      General: Abdomen is flat.      Palpations: Abdomen is soft.   Neurological:      General: No focal deficit present.      Mental Status: She is alert.            Pertinent  and/or Most Recent Results     LAB RESULTS:      Lab 10/30/24  0623 10/29/24  2247 10/29/24  2245 10/29/24  0306 10/28/24  0107 10/27/24  0109 10/26/24  1631 " 10/26/24  1431   WBC  --  7.30  --  7.22 8.76 9.39  --  7.57   HEMOGLOBIN  --  10.2*  --  8.8* 8.7* 9.0*  --  9.5*   HEMATOCRIT  --  33.5*  --  27.9* 27.8* 29.6*  --  31.0*   PLATELETS  --  389  --  403 419 450  --  460*   NEUTROS ABS  --  5.40  --  4.97 6.93 7.48*  --  5.97   IMMATURE GRANS (ABS)  --  0.04  --  0.04 0.03 0.05  --  0.04   LYMPHS ABS  --  0.90  --  1.20 0.82 0.69*  --  0.59*   MONOS ABS  --  0.67  --  0.71 0.74 0.83  --  0.79   EOS ABS  --  0.24  --  0.26 0.19 0.27  --  0.15   MCV  --  89.6  --  87.5 87.7 89.2  --  89.3   LACTATE  --   --   --   --   --   --  1.6  --    PROTIME  --   --   --  12.1*  --   --   --   --    APTT 51.1*  --  22.1* 24.1  --   --   --   --          Lab 10/29/24  2247 10/29/24  0306 10/28/24  1204 10/28/24  0107 10/27/24  1037 10/27/24  0055 10/26/24  1431   SODIUM 139 139  --  138  --  140 140   POTASSIUM 3.9 3.7 4.0 3.5 3.9 3.4* 3.8   CHLORIDE 105 107  --  104  --  103 103   CO2 27.2 27.1  --  26.0  --  29.0 33.0*   ANION GAP 6.8 4.9*  --  8.0  --  8.0 4.0*   BUN 10 14  --  15  --  14 16   CREATININE 0.69 0.73  --  0.66  --  0.69 0.75   EGFR 88.4 83.8  --  89.4  --  88.4 81.1   GLUCOSE 73 83  --  58*  --  59* 87   CALCIUM 7.8* 7.4*  --  7.5*  --  7.9* 8.2*   MAGNESIUM  --   --   --   --   --   --  2.0   TSH  --   --   --   --   --   --  9.980*         Lab 10/26/24  1431   TOTAL PROTEIN 6.2   ALBUMIN 2.4*   GLOBULIN 3.8   ALT (SGPT) 12   AST (SGOT) 48*   BILIRUBIN 0.5   ALK PHOS 197*         Lab 10/29/24  0306   PROTIME 12.1*   INR 1.12*             Lab 10/29/24  0306   ABO TYPING B   RH TYPING Positive   ANTIBODY SCREEN Negative         Brief Urine Lab Results  (Last result in the past 365 days)        Color   Clarity   Blood   Leuk Est   Nitrite   Protein   CREAT   Urine HCG        10/26/24 1443 Dark Yellow   Clear   Negative   Trace   Negative   Negative                 Microbiology Results (last 10 days)       Procedure Component Value - Date/Time    CANDIDA AURIS PCR -  Swab, Axilla Right, Axilla Left and Groin [[id number]]  (Normal) Collected: 10/27/24 0037    Lab Status: Final result Specimen: Swab from Axilla Right, Axilla Left and Groin Updated: 10/28/24 0849     CANDIDA AURIS PCR Not Detected    Blood Culture - Blood, Hand, Right [[id number]]  (Normal) Collected: 10/26/24 1621    Lab Status: Final result Specimen: Blood from Hand, Right Updated: 10/31/24 1645     Blood Culture No growth at 5 days    Narrative:      Less than seven (7) mL's of blood was collected.  Insufficient quantity may yield false negative results.    Blood Culture - Blood, Arm, Left [[id number]]  (Normal) Collected: 10/26/24 1620    Lab Status: Final result Specimen: Blood from Arm, Left Updated: 10/31/24 1645     Blood Culture No growth at 5 days    Narrative:      Less than seven (7) mL's of blood was collected.  Insufficient quantity may yield false negative results.            XR Chest 1 View    Result Date: 10/28/2024  Impression: Impression: No acute cardiopulmonary findings. Electronically Signed: Osvaldo Ferrara MD  10/28/2024 9:45 PM EDT  Workstation ID: [id number]    CT Head Without Contrast    Result Date: 10/26/2024  Impression: Impression: 1. Mild generalized parenchymal volume loss. No acute intracranial abnormality. Electronically Signed: Lloyd Rodriguez MD  10/26/2024 2:37 PM EDT  Workstation ID: [id number]    CT Angio Abdominal Aorta Bilateral Iliofem Runoff    Result Date: 10/10/2024  Impression: Impression: 1. No evidence of abdominal aortic aneurysm. No evidence of high-grade stenosis in the abdominal aorta or iliac vessels. 2. 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 There is poor distal runoff in the lower extremities primarily through the posterior tibial arteries. Electronically Signed: Emeka Jewell MD  10/10/2024 10:36 PM EDT  Workstation ID: [id number]    XR Tibia Fibula 2 View Right    Result Date: 10/9/2024  Impression: Impression: Soft tissue defect again identified. No underlying bony  abnormality. Electronically Signed: Jazlyn Ramon MD  10/9/2024 1:45 PM EDT  Workstation ID: [id number]     Results for orders placed during the hospital encounter of 10/09/24    Doppler Ankle Brachial Index Single Level CAR    Interpretation Summary    Right Conclusion: The right FRANSISCO is unable to be assessed due to patient not tolerating compression. Severe digital insufficiency.    Left Conclusion: The left FRANSISCO is unable to be assessed due to patient not tolerating compression. Severe digital insufficiency.      Results for orders placed during the hospital encounter of 10/09/24    Doppler Ankle Brachial Index Single Level CAR    Interpretation Summary    Right Conclusion: The right FRANSISCO is unable to be assessed due to patient not tolerating compression. Severe digital insufficiency.    Left Conclusion: The left FRANSISCO is unable to be assessed due to patient not tolerating compression. Severe digital insufficiency.      Results for orders placed during the hospital encounter of 10/09/24    Adult Transthoracic Echo Complete W/ Cont if Necessary Per Protocol    Interpretation Summary    Left ventricular systolic function is normal. Calculated left ventricular EF = 54% Left ventricular ejection fraction appears to be 51 - 55%.    Left ventricular diastolic function is consistent with age.    The left atrial cavity is dilated.    Saline test results are negative for right to left atrial level shunt.    Moderate aortic valve stenosis is present. Mean/peak gradient of 12/20 mmHg.  Peak velocity 222 cm/s    Estimated right ventricular systolic pressure from tricuspid regurgitation is normal (<35 mmHg).    No evidence of intracardiac thrombus.  No wall motion abnormalities.      Labs Pending at Discharge:        Procedures Performed  Procedure(s):  Left leg arteriogram with angioplasty         Consults:   Consults       Date and Time Order Name Status Description    10/26/2024  5:05 PM Inpatient Vascular Surgery Consult  Completed     10/26/2024  4:46 PM Inpatient Podiatry Consult Completed     10/26/2024  3:58 PM Hospitalist (on-call MD unless specified)      10/14/2024  5:14 PM Inpatient Cardiology Consult Completed     10/10/2024  9:30 AM Inpatient Infectious Diseases Consult Completed     10/9/2024  5:52 PM Inpatient Vascular Surgery Consult Completed               Discharge Details        Discharge Medications        New Medications        Instructions Start Date   aspirin 81 MG chewable tablet   81 mg, Oral, Daily      HYDROcodone-acetaminophen 5-325 MG per tablet  Commonly known as: Norco   1 tablet, Oral, Every 6 Hours PRN      naloxone 4 MG/0.1ML nasal spray  Commonly known as: NARCAN   Call 911. Don't prime. [place] in 1 nostril for overdose. Repeat in 2-3 minutes in other nostril if no or minimal breathing/responsiveness.             Changes to Medications        Instructions Start Date   rivaroxaban 20 MG tablet  Commonly known as: XARELTO  What changed:   medication strength  how much to take   20 mg, Oral, Daily With Dinner             Continue These Medications        Instructions Start Date   alendronate 70 MG tablet  Commonly known as: FOSAMAX   70 mg, Oral, Every 7 Days, Saturday       atorvastatin 40 MG tablet  Commonly known as: LIPITOR   40 mg, Oral, Nightly      buPROPion  MG 24 hr tablet  Commonly known as: WELLBUTRIN XL   150 mg, Daily      cetirizine 10 MG tablet  Commonly known as: zyrTEC   10 mg, Daily      cilostazol 100 MG tablet  Commonly known as: PLETAL   100 mg, Oral, 2 Times Daily      citalopram 20 MG tablet  Commonly known as: CeleXA   20 mg, Daily      digoxin 125 MCG tablet  Commonly known as: LANOXIN   125 mcg, Daily Digoxin      ferrous sulfate 325 (65 FE) MG tablet   325 mg, Daily With Breakfast      fluticasone 50 MCG/ACT nasal spray  Commonly known as: FLONASE   2 sprays, Nasal, Daily PRN      furosemide 20 MG tablet  Commonly known as: LASIX   20 mg, Oral, Daily      gabapentin 300 MG  capsule  Commonly known as: NEURONTIN   300 mg, 2 Times Daily      Estiven pack   1 packet, Oral, 2 Times Daily      levothyroxine 50 MCG tablet  Commonly known as: SYNTHROID, LEVOTHROID   50 mcg, Daily      Melatonin 12 MG tablet   1 tablet, Oral, Nightly PRN      metoprolol succinate XL 25 MG 24 hr tablet  Commonly known as: TOPROL-XL   12.5 mg, Oral, Every 24 Hours Scheduled      multivitamin tablet tablet   1 tablet, Oral, Daily      pantoprazole 40 MG EC tablet  Commonly known as: PROTONIX   40 mg, Daily      polyethylene glycol 17 g packet  Commonly known as: MIRALAX   17 g, Oral, Daily      vitamin B-12 1000 MCG tablet  Commonly known as: CYANOCOBALAMIN   1,000 mcg, Daily      vitamin D 1.25 MG (30268 UT) capsule capsule  Commonly known as: ERGOCALCIFEROL   50,000 Units, Weekly      zinc sulfate 220 (50 Zn) MG capsule  Commonly known as: ZINCATE   220 mg, Oral, Daily             Stop These Medications      fentaNYL 12 MCG/HR  Commonly known as: DURAGESIC     levoFLOXacin 500 MG tablet  Commonly known as: LEVAQUIN     oxyCODONE 5 MG immediate release tablet  Commonly known as: ROXICODONE              No Known Allergies      Discharge Disposition:   Home or Self Care    Diet:  Hospital:  Diet Order   Procedures    Diet: Regular/House; Fluid Consistency: Thin (IDDSI 0)         Discharge Activity:   Activity Instructions    Increase Activity as tolerated.              CODE STATUS:  Code Status and Medical Interventions: CPR (Attempt to Resuscitate); Full Support   Ordered at: 10/26/24 1646     Code Status (Patient has no pulse and is not breathing):    CPR (Attempt to Resuscitate)     Medical Interventions (Patient has pulse or is breathing):    Full Support         Future Appointments   Date Time Provider Department Center   11/14/2024  9:30 AM Red Hazel II, MD [facility]           Time spent on Discharge including face to face service: 50 minutes    Signature: Electronically signed by Gonzalo Castillo MD,  11/01/24, 14:42 EDT.  [facility]ist Team

## 2024-11-01 NOTE — OUTREACH NOTE
Prep Survey      Flowsheet Row Responses   [facility] discharged from? [facility]   Is LACE score < 7 ? No   Eligibility Not Eligible   What are the reasons patient is not eligible? [facility]  [[facility]]   Does the patient have one of the following disease processes/diagnoses(primary or secondary)? Other   Prep survey completed? Yes            Mary NATH - Registered Nurse

## 2024-11-01 NOTE — CASE MANAGEMENT/SOCIAL WORK
"Physicians Statement of Medical Necessity for  Ambulance Transportation    GENERAL INFORMATION     Name: Fallon Nur  YOB: 1945  :   Subscriber ID: [id number]     Transport Date: 11/01/2024 (Valid for round trips this date, or for scheduled repetitive trips for 60 days from the date signed below.)  Origin: [facility]  Destination: [place]  Is the Patient's stay covered under Medicare Part A (PPS/DRG?)Yes  Closest appropriate facility? Yes  If this a hosp-hosp transfer? No  Is this a hospice patient? No    MEDICAL NECESSITY QUESTIONAIRE    Ambulance Transportation is medically necessary only if other means of transportation are contraindicated or would be potentially harmful to the patient.  To meet this requirement, the patient must be either \"bed confined\" or suffer from a condition such that transport by means other than an ambulance is contraindicated by the patient's condition.  The following questions must be answered by the healthcare professional signing below for this form to be valid:     1) Describe the MEDICAL CONDITION (physical and/or mental) of this patient AT THE TIME OF AMBULANCE TRANSPORT that requires the patient to be transported in an ambulance, and why transport by other means is contraindicated by the patient's condition: BedRidden      Past Medical History:   Diagnosis Date    Atrial fibrillation     Blind 07/10/2023    Cellulitis of leg 09/12/2024    Duodenal ulcer, perforated 10/01/2013    [facility]    12/2021 - EGD clear      DVT of upper extremity (deep vein thrombosis) 01/01/1995    subclavian - premarin      E. 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    Histoplasmosis     Hypertension     Infestation by bed bug 10/09/2024    Legally blind     Longstanding persistent atrial fibrillation 07/10/2023    Macular degeneration 10/15/2024    Nonrheumatic mitral valve regurgitation 10/15/2024    Open wound of both lower extremities 09/12/2024    " "Primary hypertension 10/15/2024    PVD (peripheral vascular disease)     SBO (small bowel obstruction) 12/31/2020      Past Surgical History:   Procedure Laterality Date    ARTERIOGRAM Left 10/29/2024    Procedure: Left leg arteriogram with angioplasty;  Surgeon: Red Hazel II, MD;  Location: [facility] HYBRID OR;  Service: Vascular;  Laterality: Left;    CARDIAC CATHETERIZATION Right 10/14/2024    Procedure: RIGHT LOWER EXTREMITY ARTERIOGRAM, PERCUTANEOUS THROMBECTOMY, ANGIOPLASTY,  AND WOUND DEBRIDEMENT;  Surgeon: Red Hazel II, MD;  Location: [facility] HYBRID OR;  Service: Vascular;  Laterality: Right;    WOUND DEBRIDEMENT Right 9/16/2024    Procedure: RIGHT LEG DEBRIDEMENT;  Surgeon: Red Hazel II, MD;  Location: [facility] MAIN OR;  Service: Vascular;  Laterality: Right;      2) Is this patient \"bed confined\" as defined below?Yes   To be \"bed confined\" the patient must satisfy all three of the following criteria:  (1) unable to get up from bed without assistance; AND (2) unable to ambulate;  AND (3) unable to sit in a chair or wheelchair.  3) Can this patient safely be transported by car or wheelchair van (I.e., may safely sit during transport, without an attendant or monitoring?)No   4. In addition to completing questions 1-3 above, please check any of the following conditions that apply*:          *Note: supporting documentation for any boxes checked must be maintained in the patient's medical records Moderate/severe pain on movement and Unable to tolerate seated position for time needed to transport      SIGNATURE OF PHYSICIAN OR OTHER AUTHORIZED HEALTHCARE PROFESSIONAL    I certify that the above information is true and correct based on my evaluation of this patient, and represent that the patient requires transport by ambulance and that other forms of transport are contraindicated.  I understand that this information will be used by the Centers for Medicare and Medicaid Services (CMS) to support the " determiniation of medical necessity for ambulance services, and I represent that I have personal knowledge of the patient's condition at the time of transport.       If this box is checked, I also certify that the patient is physically or mentally incapable of signing the ambulance service's claim form and that the institution with which I am affiliated has furnished care, services or assistance to the patient.  My signature below is made on behalf of the patient pursuant to 42 .36(b)(4). In accordance with 42 .37, the specific reason(s) that the patient is physically or mentally incapable of signing the claim for is as follows:     Signature of Physician or Healthcare Professional     TORV: Dr. Durbin/Devante STOREY Date/Time:     11/1/2024     (For Scheduled repetitive transport, this form is not valid for transports performed more than 60 days after this date).                                                                                                                                            --------------------------------------------------------------------------------------------  Printed Name and Credentials of Physician or Authorized Healthcare Professional     *Form must be signed by patient's attending physician for scheduled, repetitive transports,.  For non-repetitive ambulance transports, if unable to obtain the signature of the attending physician, any of the following may sign (please select below):     Physician  Clinical Nurse Specialist  Registered Nurse X    Physician Assistant  Discharge Planner  Licensed Practical Nurse     Nurse Practitioner   X

## 2024-11-01 NOTE — SIGNIFICANT NOTE
11/01/24 0930   OTHER   Discipline physical therapist   Rehab Time/Intention   Session Not Performed other (see comments)  (hospital d/c pending; waiting on transportation to be arranged.)   Recommendation   PT - Next Appointment 11/04/24

## 2024-11-01 NOTE — PLAN OF CARE
Goal Outcome Evaluation:      Patient alert and oriented x 4. Able to make needs known. Family at bedside. Personal items and call light in reach. Plan of care is ongoing.

## 2024-11-01 NOTE — PLAN OF CARE
Goal Outcome Evaluation:         Patient sleeping most of day, daughter at bedside, patient awaiting EMS transport per [facility], care plan ongoing

## 2024-11-04 NOTE — CASE MANAGEMENT/SOCIAL WORK
Case Management Discharge Note      Final Note: SNF ([facility])             Selected Continued Care - Discharged on 11/1/2024 Admission date: 10/26/2024 - Discharge disposition: Home or Self Care      Destination Coordination complete.      Service Provider Services Address Phone Fax Patient Preferred    [facility] [place] [id number] [phone] --                         Transportation Services  Ambulance: [facility]    Final Discharge Disposition Code: 03 - skilled nursing facility (SNF)

## 2024-11-14 ENCOUNTER — OFFICE VISIT (OUTPATIENT)
Age: 79
End: 2024-11-14
Payer: MEDICARE

## 2024-11-14 VITALS
DIASTOLIC BLOOD PRESSURE: 37 MMHG | OXYGEN SATURATION: 95 % | TEMPERATURE: 98 F | BODY MASS INDEX: 23.56 KG/M2 | RESPIRATION RATE: 16 BRPM | WEIGHT: 133 LBS | SYSTOLIC BLOOD PRESSURE: 120 MMHG | HEART RATE: 98 BPM

## 2024-11-14 DIAGNOSIS — S81.801D OPEN WOUND OF BOTH LOWER EXTREMITIES, SUBSEQUENT ENCOUNTER: ICD-10-CM

## 2024-11-14 DIAGNOSIS — I73.9 PAD (PERIPHERAL ARTERY DISEASE): ICD-10-CM

## 2024-11-14 DIAGNOSIS — S81.802D OPEN WOUND OF BOTH LOWER EXTREMITIES, SUBSEQUENT ENCOUNTER: ICD-10-CM

## 2024-11-14 DIAGNOSIS — I48.11 LONGSTANDING PERSISTENT ATRIAL FIBRILLATION: Primary | Chronic | ICD-10-CM

## 2024-11-14 NOTE — PROGRESS NOTES
Chief Complaint  2 wk po     Follow-up for lower extremity wounds    Subjective        Fallon Nur presents to [facility] VASCULAR SURGERY  History of Present Illness    Patient is a pleasant 79-year-old lady with atrial fibrillation, hypertension,, and bilateral lower extremity wounds after a traumatic event involving the generator a few months ago.    The patient underwent right leg debridement on 9/16/2024 for a nonhealing wound.  She had a palpable pulse in her right foot at that time but was readmitted in October after this wound failed to heal.  At that time she had a nonpalpable pulse and was found to occluded popliteal artery which was likely a cardioembolic event.  She underwent a percutaneous aspiration mechanical thrombectomy and angioplasty on 10/14/2024.  He did have return of a palpable pulse in her right foot.  The wound was debrided again at that time as well.  Unfortunately during that hospitalization the patient developed a pressure sore on the left heel, and she had no popliteal artery occlusion in that leg at that time.  On 10/29/2024 the patient underwent a left leg arteriogram with balloon angioplasty of the anterior tibial artery and popliteal artery.  The patient has some mild age-related peripheral arterial disease but both of these popliteal artery occlusions were likely originally embolic in nature.  The right 1 was more acute, having occurred sometime between 9/16/2024 and 10/14/2024 the left was more chronic.     She is here today for her first follow-up since her recent hospital admission.  She was discharged on 11/1/2024.     She reports she has pain in her legs whenever the nurses change her wound dressings.  Additionally she remains very weak and has not really been able to walk since hospital discharge    Denies any fevers or chills or any other acute problems    Objective   Vital Signs:  BP (!) 120/37 (BP Location: Right arm, Patient Position: Sitting, Cuff  "Size: Adult)   Pulse 98   Temp 98 °F (36.7 °C) (Oral)   Resp 16   Wt 60.3 kg (133 lb)   SpO2 95%   BMI 23.56 kg/m²   Estimated body mass index is 23.56 kg/m² as calculated from the following:    Height as of 10/26/24: 160 cm (63\").    Weight as of this encounter: 60.3 kg (133 lb).         BMI is within normal parameters. No other follow-up for BMI required.         Physical Exam   NAD  Palpable DP pulses bilaterally  Right lower leg wound healing reasonably well with some granulation tissue forming at its base.  No cellulitis or purulence.  Moderate lower extremity edema noted.  Left leg wound with exposed Achilles tendon.  No surrounding cellulitis or erythema or purulence.  Patient has a nice brisk triphasic left PT signal        Result Review :                     Assessment and Plan     79-year-old lady with lower extremity wounds associated with arterial occlusions bilaterally of likely cardioembolic etiology.     She has palpable pedal pulses bilaterally.  These wounds will be difficult to heal due to her frailty but perfusion should be adequate.  Additionally she has some exposed tendon on her left wound that has not really granulated over much at all.  I do not see any infection or need for urgent amputation but will remain high risk going forward    I recommend patient continue her wound care as well as her current medical therapy  She is on 81 mg aspirin, 40 mg Lipitor,  and 20 mg Xarelto.    I will see her back in 1 month with repeat lower extremity arterial studies with toe pressures.  Patient may not tolerate ABIs but hopefully we can get toe pressures to confirm adequate perfusion for wound healing.    Once unaccompanied at today's visit but I will call her daughter to update.    Diagnoses and all orders for this visit:    1. Longstanding persistent atrial fibrillation (Primary)    2. PAD (peripheral artery disease)  -     Doppler Arterial Multi Level Lower Extremity - Bilateral CAR; Future    3. " Open wound of both lower extremities, subsequent encounter  -     Doppler Arterial Multi Level Lower Extremity - Bilateral CAR; Future              Patient BMI noted. Educational material for patient for health risks of being overweight added to patient's after visit summary.           Follow Up     Return in about 4 weeks (around 12/12/2024).  Patient was given instructions and counseling regarding her condition or for health maintenance advice. Please see specific information pulled into the AVS if appropriate.

## 2024-12-16 NOTE — PROGRESS NOTES
Chief Complaint  Follow-up (Peripheral artery disease )    Follow-up for lower extremity wounds    Subjective        Fallon Nur presents to [facility] VASCULAR SURGERY  History of Present Illness    Patient is a pleasant 79-year-old lady with atrial fibrillation, hypertension,, and bilateral lower extremity wounds after a traumatic event involving the generator a few months ago.    The patient underwent right leg debridement on 9/16/2024 for a nonhealing wound.  She had a palpable pulse in her right foot at that time but was readmitted in October after this wound failed to heal.  At that time she had a nonpalpable pulse and was found to occluded popliteal artery which was likely a cardioembolic event.  She underwent a percutaneous aspiration mechanical thrombectomy and angioplasty on 10/14/2024.  He did have return of a palpable pulse in her right foot.  The wound was debrided again at that time as well.  Unfortunately during that hospitalization the patient developed a pressure sore on the left heel, and she had no popliteal artery occlusion in that leg at that time.  On 10/29/2024 the patient underwent a left leg arteriogram with balloon angioplasty of the anterior tibial artery and popliteal artery.  The patient has some mild age-related peripheral arterial disease but both of these popliteal artery occlusions were likely originally embolic in nature.  The right 1 was more acute, having occurred sometime between 9/16/2024 and 10/14/2024 the left was more chronic.     11/14/24: She is here today for her first follow-up since her recent hospital admission.  She was discharged on 11/1/2024.     She reports she has pain in her legs whenever the nurses change her wound dressings.  Additionally she remains very weak and has not really been able to walk since hospital discharge    Denies any fevers or chills or any other acute problems    12/17/24: Patient returns today for routine follow-up  "with non-invasive lower extremity studies.   She is still living at her nursing home, [place].  She has not been able to walk to any significant degree still.  The wounds on her right lower leg and left lower leg are improving, but her left heel wound persists and appears slightly worse.  She has significant lower extremity edema bilaterally.       Objective   Vital Signs:  /75 (BP Location: Left arm)   Ht 160 cm (62.99\")   Wt 68 kg (150 lb)   BMI 26.58 kg/m²   Estimated body mass index is 26.58 kg/m² as calculated from the following:    Height as of this encounter: 160 cm (62.99\").    Weight as of this encounter: 68 kg (150 lb).         BMI is within normal parameters. No other follow-up for BMI required.         Physical Exam   NAD  Significant lower extremity edema bilaterally, pitting.  Brisk DP and PT signals bilaterally  Right lower leg wound on her shin is significantly improved, healing very nicely.  The  Left heel over her Achilles tendon has actually developed some reasonable granulation tissue at its edges albeit with some fibrinous exudate in the mid part.  Unfortunately the decubitus ulcer of the left heel persists.  There is no purulence or cellulitis although there is faint malodor.  There is a superficial ulceration on the medial lower leg appears likely related to her edema.  It has a clean base with no cellulitis or purulence        Result Review :                     Assessment and Plan     79-year-old lady with lower extremity wounds associated with arterial occlusions bilaterally of likely cardioembolic etiology.   She is very frail and functional status severely decompensated from her multiple recent admissions and lower extremity wounds.  Exam and noninvasive studies suggest adequate perfusion to heal these wounds but the edema is a problem, and she has a persistent decubitus ulcer on her left heel.  The wounds were dressed with a Maxorb dressing and dry gauze that was wrapped " loosely when she was in clinic today.  The patient reports they are changing these dressings every shift.  It sounds like she is getting reasonable wound care and they are doing a reasonable job of keeping her legs offloaded but she needs compression to help deal with this edema.  I think the perfusion is adequate that we can do some light compression with Ace wraps.  It does not appear that her wounds are grossly infected but I suspect they are likely colonized due to the malodor at the time of her dressing change.  There was no purulence or cellulitis but will give her a 10-day course of Bactrim.   I will try to reach out to her nurses at [facility] to discuss wound care regimen and plans going forward  I do not think she needs any further vascular workup or intervention right now.  I think the best we can do is continue ongoing wound care and hopefully these wounds will heal.  I have placed an ambulatory referral to the wound clinic to get their input.  I will see her back in clinic in 1 month.  I recommend patient continue 81 mg aspirin, 40 mg Lipitor,  and 20 mg Xarelto, as well as other medications.          Diagnoses and all orders for this visit:    1. PAD (peripheral artery disease) (Primary)    2. Open wound of both lower extremities, subsequent encounter  -     Ambulatory Referral to Wound Clinic  -     sulfamethoxazole-trimethoprim (Bactrim DS) 800-160 MG per tablet; Take 1 tablet by mouth 2 (Two) Times a Day for 10 days.  Dispense: 20 tablet; Refill: 0                Patient BMI noted. Educational material for patient for health risks of being overweight added to patient's after visit summary.           Follow Up     Return in about 4 weeks (around 1/14/2025).  Patient was given instructions and counseling regarding her condition or for health maintenance advice. Please see specific information pulled into the AVS if appropriate.

## 2024-12-17 ENCOUNTER — OFFICE VISIT (OUTPATIENT)
Age: 79
End: 2024-12-17
Payer: MEDICARE

## 2024-12-17 ENCOUNTER — HOSPITAL ENCOUNTER (OUTPATIENT)
Dept: CARDIOLOGY | Facility: HOSPITAL | Age: 79
Discharge: HOME | End: 2024-12-17
Admitting: STUDENT IN AN ORGANIZED HEALTH CARE EDUCATION/TRAINING PROGRAM
Payer: MEDICARE

## 2024-12-17 VITALS
WEIGHT: 150 LBS | HEIGHT: 63 IN | DIASTOLIC BLOOD PRESSURE: 75 MMHG | SYSTOLIC BLOOD PRESSURE: 169 MMHG | BODY MASS INDEX: 26.58 KG/M2

## 2024-12-17 DIAGNOSIS — I73.9 PAD (PERIPHERAL ARTERY DISEASE): Primary | ICD-10-CM

## 2024-12-17 DIAGNOSIS — S81.801D OPEN WOUND OF BOTH LOWER EXTREMITIES, SUBSEQUENT ENCOUNTER: ICD-10-CM

## 2024-12-17 DIAGNOSIS — S81.802D OPEN WOUND OF BOTH LOWER EXTREMITIES, SUBSEQUENT ENCOUNTER: ICD-10-CM

## 2024-12-17 DIAGNOSIS — I73.9 PAD (PERIPHERAL ARTERY DISEASE): ICD-10-CM

## 2024-12-17 LAB
BH CV LOWER ARTERIAL LEFT ABI RATIO: 1.35
BH CV LOWER ARTERIAL LEFT DORSALIS PEDIS SYS MAX: 176
BH CV LOWER ARTERIAL LEFT GREAT TOE SYS MAX: 92
BH CV LOWER ARTERIAL LEFT POST TIBIAL SYS MAX: 168
BH CV LOWER ARTERIAL LEFT TBI RATIO: 0.71
BH CV LOWER ARTERIAL RIGHT ABI RATIO: 1.36
BH CV LOWER ARTERIAL RIGHT DORSALIS PEDIS SYS MAX: 177
BH CV LOWER ARTERIAL RIGHT GREAT TOE SYS MAX: 70
BH CV LOWER ARTERIAL RIGHT POST TIBIAL SYS MAX: 176
BH CV LOWER ARTERIAL RIGHT TBI RATIO: 0.54
UPPER ARTERIAL LEFT ARM BRACHIAL SYS MAX: 130
UPPER ARTERIAL RIGHT ARM BRACHIAL SYS MAX: 70

## 2024-12-17 PROCEDURE — 93922 UPR/L XTREMITY ART 2 LEVELS: CPT

## 2024-12-17 RX ORDER — SULFAMETHOXAZOLE AND TRIMETHOPRIM 800; 160 MG/1; MG/1
1 TABLET ORAL 2 TIMES DAILY
Qty: 20 TABLET | Refills: 0 | Status: SHIPPED | OUTPATIENT
Start: 2024-12-17 | End: 2024-12-27

## 2025-01-01 ENCOUNTER — APPOINTMENT (OUTPATIENT)
Dept: GENERAL RADIOLOGY | Facility: HOSPITAL | Age: 80
DRG: 951 | End: 2025-01-01
Payer: MEDICARE

## 2025-01-01 ENCOUNTER — HOSPITAL ENCOUNTER (INPATIENT)
Facility: HOSPITAL | Age: 80
LOS: 1 days | DRG: 951 | End: 2025-02-21
Attending: INTERNAL MEDICINE | Admitting: INTERNAL MEDICINE
Payer: MEDICARE

## 2025-01-01 VITALS
HEIGHT: 63 IN | WEIGHT: 120 LBS | HEART RATE: 135 BPM | OXYGEN SATURATION: 58 % | RESPIRATION RATE: 5 BRPM | DIASTOLIC BLOOD PRESSURE: 31 MMHG | BODY MASS INDEX: 21.26 KG/M2 | SYSTOLIC BLOOD PRESSURE: 80 MMHG | TEMPERATURE: 102.2 F

## 2025-01-01 DIAGNOSIS — R09.02 HYPOXIA: ICD-10-CM

## 2025-01-01 DIAGNOSIS — D72.825 BANDEMIA: ICD-10-CM

## 2025-01-01 DIAGNOSIS — E87.6 HYPOKALEMIA: ICD-10-CM

## 2025-01-01 DIAGNOSIS — J10.1 INFLUENZA A: ICD-10-CM

## 2025-01-01 DIAGNOSIS — R50.9 FEVER AND CHILLS: Primary | ICD-10-CM

## 2025-01-01 LAB
ABO GROUP BLD: NORMAL
ALBUMIN SERPL-MCNC: 2.3 G/DL (ref 3.5–5.2)
ALBUMIN/GLOB SERPL: 0.7 G/DL
ALP SERPL-CCNC: 112 U/L (ref 39–117)
ALT SERPL W P-5'-P-CCNC: 28 U/L (ref 1–33)
ANION GAP SERPL CALCULATED.3IONS-SCNC: 9.1 MMOL/L (ref 5–15)
ARTERIAL PATENCY WRIST A: POSITIVE
AST SERPL-CCNC: 70 U/L (ref 1–32)
ATMOSPHERIC PRESS: ABNORMAL MM[HG]
B PARAPERT DNA SPEC QL NAA+PROBE: NOT DETECTED
B PERT DNA SPEC QL NAA+PROBE: NOT DETECTED
BASE EXCESS BLDA CALC-SCNC: 3.1 MMOL/L (ref 0–3)
BDY SITE: ABNORMAL
BILIRUB SERPL-MCNC: 0.3 MG/DL (ref 0–1.2)
BLD GP AB SCN SERPL QL: NEGATIVE
BUN SERPL-MCNC: 17 MG/DL (ref 8–23)
BUN/CREAT SERPL: 19.8 (ref 7–25)
C PNEUM DNA NPH QL NAA+NON-PROBE: NOT DETECTED
CALCIUM SPEC-SCNC: 8.4 MG/DL (ref 8.6–10.5)
CHLORIDE SERPL-SCNC: 99 MMOL/L (ref 98–107)
CO2 BLDA-SCNC: 27.9 MMOL/L (ref 22–29)
CO2 SERPL-SCNC: 26.9 MMOL/L (ref 22–29)
CREAT SERPL-MCNC: 0.86 MG/DL (ref 0.57–1)
DEPRECATED RDW RBC AUTO: 55.4 FL (ref 37–54)
DIGOXIN SERPL-MCNC: 0.91 NG/ML (ref 0.6–1.2)
EGFRCR SERPLBLD CKD-EPI 2021: 68.8 ML/MIN/1.73
ERYTHROCYTE [DISTWIDTH] IN BLOOD BY AUTOMATED COUNT: 16.6 % (ref 12.3–15.4)
FLUAV H3 RNA NPH QL NAA+PROBE: DETECTED
FLUBV RNA ISLT QL NAA+PROBE: NOT DETECTED
GEN 5 1HR TROPONIN T REFLEX: 72 NG/L
GLOBULIN UR ELPH-MCNC: 3.5 GM/DL
GLUCOSE SERPL-MCNC: 92 MG/DL (ref 65–99)
HADV DNA SPEC NAA+PROBE: NOT DETECTED
HCO3 BLDA-SCNC: 26.8 MMOL/L (ref 21–28)
HCOV 229E RNA SPEC QL NAA+PROBE: NOT DETECTED
HCOV HKU1 RNA SPEC QL NAA+PROBE: NOT DETECTED
HCOV NL63 RNA SPEC QL NAA+PROBE: NOT DETECTED
HCOV OC43 RNA SPEC QL NAA+PROBE: NOT DETECTED
HCT VFR BLD AUTO: 32.7 % (ref 34–46.6)
HEMODILUTION: NO
HGB BLD-MCNC: 10.2 G/DL (ref 12–15.9)
HMPV RNA NPH QL NAA+NON-PROBE: NOT DETECTED
HPIV1 RNA ISLT QL NAA+PROBE: NOT DETECTED
HPIV2 RNA SPEC QL NAA+PROBE: NOT DETECTED
HPIV3 RNA NPH QL NAA+PROBE: NOT DETECTED
HPIV4 P GENE NPH QL NAA+PROBE: NOT DETECTED
INHALED O2 CONCENTRATION: 34 %
LARGE PLATELETS: ABNORMAL
LYMPHOCYTES # BLD MANUAL: 0.15 10*3/MM3 (ref 0.7–3.1)
LYMPHOCYTES NFR BLD MANUAL: 7 % (ref 5–12)
M PNEUMO IGG SER IA-ACNC: NOT DETECTED
MAGNESIUM SERPL-MCNC: 1.9 MG/DL (ref 1.6–2.4)
MCH RBC QN AUTO: 28.2 PG (ref 26.6–33)
MCHC RBC AUTO-ENTMCNC: 31.2 G/DL (ref 31.5–35.7)
MCV RBC AUTO: 90.3 FL (ref 79–97)
METAMYELOCYTES NFR BLD MANUAL: 10 % (ref 0–0)
MODALITY: ABNORMAL
MONOCYTES # BLD: 0.1 10*3/MM3 (ref 0.1–0.9)
MYELOCYTES NFR BLD MANUAL: 1 % (ref 0–0)
NEUTROPHILS # BLD AUTO: 1.05 10*3/MM3 (ref 1.7–7)
NEUTROPHILS NFR BLD MANUAL: 27 % (ref 42.7–76)
NEUTS BAND NFR BLD MANUAL: 45 % (ref 0–5)
NT-PROBNP SERPL-MCNC: ABNORMAL PG/ML (ref 0–1800)
OVALOCYTES BLD QL SMEAR: ABNORMAL
PCO2 BLDA: 36.7 MM HG (ref 35–48)
PH BLDA: 7.47 PH UNITS (ref 7.35–7.45)
PLATELET # BLD AUTO: 372 10*3/MM3 (ref 140–450)
PMV BLD AUTO: 9.8 FL (ref 6–12)
PO2 BLD: 129 MM[HG] (ref 0–500)
PO2 BLDA: 43.7 MM HG (ref 83–108)
POIKILOCYTOSIS BLD QL SMEAR: ABNORMAL
POLYCHROMASIA BLD QL SMEAR: ABNORMAL
POTASSIUM SERPL-SCNC: 3.2 MMOL/L (ref 3.5–5.2)
PROT SERPL-MCNC: 5.8 G/DL (ref 6–8.5)
QT INTERVAL: 329 MS
QTC INTERVAL: 475 MS
RBC # BLD AUTO: 3.62 10*6/MM3 (ref 3.77–5.28)
RH BLD: POSITIVE
RHINOVIRUS RNA SPEC NAA+PROBE: NOT DETECTED
RSV RNA NPH QL NAA+NON-PROBE: NOT DETECTED
SAO2 % BLDCOA: 82.6 % (ref 94–98)
SARS-COV-2 RNA RESP QL NAA+PROBE: NOT DETECTED
SCAN SLIDE: NORMAL
SODIUM SERPL-SCNC: 135 MMOL/L (ref 136–145)
STOMATOCYTES BLD QL SMEAR: ABNORMAL
T&S EXPIRATION DATE: NORMAL
TROPONIN T % DELTA: 18
TROPONIN T NUMERIC DELTA: 11 NG/L
TROPONIN T SERPL HS-MCNC: 61 NG/L
VARIANT LYMPHS NFR BLD MANUAL: 10 % (ref 19.6–45.3)
WBC MORPH BLD: NORMAL
WBC NRBC COR # BLD AUTO: 1.46 10*3/MM3 (ref 3.4–10.8)
WHOLE BLOOD HOLD COAG: NORMAL

## 2025-01-01 PROCEDURE — 93005 ELECTROCARDIOGRAM TRACING: CPT | Performed by: INTERNAL MEDICINE

## 2025-01-01 PROCEDURE — 71045 X-RAY EXAM CHEST 1 VIEW: CPT

## 2025-01-01 PROCEDURE — 80053 COMPREHEN METABOLIC PANEL: CPT | Performed by: NURSE PRACTITIONER

## 2025-01-01 PROCEDURE — 36415 COLL VENOUS BLD VENIPUNCTURE: CPT

## 2025-01-01 PROCEDURE — 96366 THER/PROPH/DIAG IV INF ADDON: CPT

## 2025-01-01 PROCEDURE — 93005 ELECTROCARDIOGRAM TRACING: CPT

## 2025-01-01 PROCEDURE — 86850 RBC ANTIBODY SCREEN: CPT | Performed by: NURSE PRACTITIONER

## 2025-01-01 PROCEDURE — 25010000002 HYDROMORPHONE 1 MG/ML SOLUTION

## 2025-01-01 PROCEDURE — 82803 BLOOD GASES ANY COMBINATION: CPT | Performed by: NURSE PRACTITIONER

## 2025-01-01 PROCEDURE — 36600 WITHDRAWAL OF ARTERIAL BLOOD: CPT | Performed by: NURSE PRACTITIONER

## 2025-01-01 PROCEDURE — 80162 ASSAY OF DIGOXIN TOTAL: CPT | Performed by: NURSE PRACTITIONER

## 2025-01-01 PROCEDURE — 25010000002 DIAZEPAM PER 5 MG

## 2025-01-01 PROCEDURE — 96376 TX/PRO/DX INJ SAME DRUG ADON: CPT

## 2025-01-01 PROCEDURE — 86901 BLOOD TYPING SEROLOGIC RH(D): CPT | Performed by: NURSE PRACTITIONER

## 2025-01-01 PROCEDURE — 25010000002 LORAZEPAM PER 2 MG: Performed by: NURSE PRACTITIONER

## 2025-01-01 PROCEDURE — 84484 ASSAY OF TROPONIN QUANT: CPT | Performed by: NURSE PRACTITIONER

## 2025-01-01 PROCEDURE — 25010000002 POTASSIUM CHLORIDE 10 MEQ/100ML SOLUTION: Performed by: NURSE PRACTITIONER

## 2025-01-01 PROCEDURE — 86900 BLOOD TYPING SEROLOGIC ABO: CPT | Performed by: NURSE PRACTITIONER

## 2025-01-01 PROCEDURE — 0202U NFCT DS 22 TRGT SARS-COV-2: CPT | Performed by: NURSE PRACTITIONER

## 2025-01-01 PROCEDURE — 25810000003 SODIUM CHLORIDE 0.9 % SOLUTION: Performed by: NURSE PRACTITIONER

## 2025-01-01 PROCEDURE — 25010000002 LORAZEPAM PER 2 MG

## 2025-01-01 PROCEDURE — 25010000002 MORPHINE PER 10 MG

## 2025-01-01 PROCEDURE — 99284 EMERGENCY DEPT VISIT MOD MDM: CPT

## 2025-01-01 PROCEDURE — 85007 BL SMEAR W/DIFF WBC COUNT: CPT | Performed by: NURSE PRACTITIONER

## 2025-01-01 PROCEDURE — 96375 TX/PRO/DX INJ NEW DRUG ADDON: CPT

## 2025-01-01 PROCEDURE — 85025 COMPLETE CBC W/AUTO DIFF WBC: CPT | Performed by: NURSE PRACTITIONER

## 2025-01-01 PROCEDURE — 83880 ASSAY OF NATRIURETIC PEPTIDE: CPT | Performed by: NURSE PRACTITIONER

## 2025-01-01 PROCEDURE — 96365 THER/PROPH/DIAG IV INF INIT: CPT

## 2025-01-01 PROCEDURE — 83735 ASSAY OF MAGNESIUM: CPT | Performed by: NURSE PRACTITIONER

## 2025-01-01 RX ORDER — LORAZEPAM 2 MG/ML
2 CONCENTRATE ORAL
Status: DISCONTINUED | OUTPATIENT
Start: 2025-01-01 | End: 2025-01-01 | Stop reason: HOSPADM

## 2025-01-01 RX ORDER — DIAZEPAM 10 MG/2ML
10 INJECTION, SOLUTION INTRAMUSCULAR; INTRAVENOUS
Status: DISCONTINUED | OUTPATIENT
Start: 2025-01-01 | End: 2025-01-01 | Stop reason: HOSPADM

## 2025-01-01 RX ORDER — METOPROLOL SUCCINATE 25 MG/1
25 TABLET, EXTENDED RELEASE ORAL DAILY
COMMUNITY

## 2025-01-01 RX ORDER — LORAZEPAM 2 MG/ML
1 CONCENTRATE ORAL
Status: DISCONTINUED | OUTPATIENT
Start: 2025-01-01 | End: 2025-01-01 | Stop reason: HOSPADM

## 2025-01-01 RX ORDER — POTASSIUM CHLORIDE 7.45 MG/ML
10 INJECTION INTRAVENOUS ONCE
Status: DISCONTINUED | OUTPATIENT
Start: 2025-01-01 | End: 2025-01-01

## 2025-01-01 RX ORDER — FUROSEMIDE 20 MG/1
40 TABLET ORAL 2 TIMES DAILY
COMMUNITY

## 2025-01-01 RX ORDER — LEVOTHYROXINE SODIUM 75 UG/1
75 TABLET ORAL
COMMUNITY

## 2025-01-01 RX ORDER — POTASSIUM CHLORIDE 7.45 MG/ML
10 INJECTION INTRAVENOUS ONCE
Status: COMPLETED | OUTPATIENT
Start: 2025-01-01 | End: 2025-01-01

## 2025-01-01 RX ORDER — DIAZEPAM 10 MG/2ML
2.5 INJECTION, SOLUTION INTRAMUSCULAR; INTRAVENOUS
Status: DISCONTINUED | OUTPATIENT
Start: 2025-01-01 | End: 2025-01-01 | Stop reason: HOSPADM

## 2025-01-01 RX ORDER — POTASSIUM CHLORIDE 1500 MG/1
20 TABLET, EXTENDED RELEASE ORAL DAILY
COMMUNITY

## 2025-01-01 RX ORDER — LORAZEPAM 2 MG/ML
INJECTION INTRAMUSCULAR
Status: COMPLETED
Start: 2025-01-01 | End: 2025-01-01

## 2025-01-01 RX ORDER — LORAZEPAM 1 MG/1
2 TABLET ORAL
Status: DISCONTINUED | OUTPATIENT
Start: 2025-01-01 | End: 2025-01-01 | Stop reason: HOSPADM

## 2025-01-01 RX ORDER — DIAZEPAM 10 MG/2ML
5 INJECTION, SOLUTION INTRAMUSCULAR; INTRAVENOUS
Status: DISCONTINUED | OUTPATIENT
Start: 2025-01-01 | End: 2025-01-01 | Stop reason: HOSPADM

## 2025-01-01 RX ORDER — LORAZEPAM 2 MG/ML
2 INJECTION INTRAMUSCULAR ONCE
Status: COMPLETED | OUTPATIENT
Start: 2025-01-01 | End: 2025-01-01

## 2025-01-01 RX ORDER — LORAZEPAM 2 MG/ML
0.5 CONCENTRATE ORAL
Status: DISCONTINUED | OUTPATIENT
Start: 2025-01-01 | End: 2025-01-01 | Stop reason: HOSPADM

## 2025-01-01 RX ORDER — GLYCOPYRROLATE 0.2 MG/ML
0.2 INJECTION INTRAMUSCULAR; INTRAVENOUS
Status: DISCONTINUED | OUTPATIENT
Start: 2025-01-01 | End: 2025-01-01 | Stop reason: HOSPADM

## 2025-01-01 RX ORDER — LORAZEPAM 1 MG/1
1 TABLET ORAL
Status: DISCONTINUED | OUTPATIENT
Start: 2025-01-01 | End: 2025-01-01 | Stop reason: HOSPADM

## 2025-01-01 RX ORDER — GLYCOPYRROLATE 0.2 MG/ML
0.4 INJECTION INTRAMUSCULAR; INTRAVENOUS
Status: DISCONTINUED | OUTPATIENT
Start: 2025-01-01 | End: 2025-01-01 | Stop reason: HOSPADM

## 2025-01-01 RX ORDER — IPRATROPIUM BROMIDE AND ALBUTEROL SULFATE 2.5; .5 MG/3ML; MG/3ML
3 SOLUTION RESPIRATORY (INHALATION)
COMMUNITY

## 2025-01-01 RX ORDER — CALCIUM CARBONATE 500 MG/1
1 TABLET, CHEWABLE ORAL DAILY
COMMUNITY

## 2025-01-01 RX ORDER — SODIUM CHLORIDE 0.9 % (FLUSH) 0.9 %
10 SYRINGE (ML) INJECTION AS NEEDED
Status: DISCONTINUED | OUTPATIENT
Start: 2025-01-01 | End: 2025-01-01 | Stop reason: HOSPADM

## 2025-01-01 RX ORDER — ACETAMINOPHEN 650 MG/1
650 SUPPOSITORY RECTAL ONCE
Status: COMPLETED | OUTPATIENT
Start: 2025-01-01 | End: 2025-01-01

## 2025-01-01 RX ORDER — MORPHINE SULFATE 20 MG/ML
10 SOLUTION ORAL
Status: DISCONTINUED | OUTPATIENT
Start: 2025-01-01 | End: 2025-01-01 | Stop reason: HOSPADM

## 2025-01-01 RX ORDER — POLYETHYLENE GLYCOL 3350 17 G/17G
17 POWDER, FOR SOLUTION ORAL DAILY
COMMUNITY

## 2025-01-01 RX ORDER — LORAZEPAM 2 MG/ML
0.5 INJECTION INTRAMUSCULAR ONCE
Status: COMPLETED | OUTPATIENT
Start: 2025-01-01 | End: 2025-01-01

## 2025-01-01 RX ORDER — ACETAMINOPHEN 650 MG/1
650 SUPPOSITORY RECTAL EVERY 4 HOURS PRN
Status: DISCONTINUED | OUTPATIENT
Start: 2025-01-01 | End: 2025-01-01 | Stop reason: HOSPADM

## 2025-01-01 RX ORDER — FUROSEMIDE 10 MG/ML
80 INJECTION INTRAMUSCULAR; INTRAVENOUS ONCE
Status: DISCONTINUED | OUTPATIENT
Start: 2025-01-01 | End: 2025-01-01 | Stop reason: HOSPADM

## 2025-01-01 RX ORDER — LORAZEPAM 0.5 MG/1
0.5 TABLET ORAL
Status: DISCONTINUED | OUTPATIENT
Start: 2025-01-01 | End: 2025-01-01 | Stop reason: HOSPADM

## 2025-01-01 RX ORDER — MORPHINE SULFATE 2 MG/ML
2 INJECTION, SOLUTION INTRAMUSCULAR; INTRAVENOUS
Status: DISCONTINUED | OUTPATIENT
Start: 2025-01-01 | End: 2025-01-01 | Stop reason: HOSPADM

## 2025-01-01 RX ORDER — ALBUTEROL SULFATE 90 UG/1
2 INHALANT RESPIRATORY (INHALATION) EVERY 6 HOURS PRN
COMMUNITY

## 2025-01-01 RX ADMIN — POTASSIUM CHLORIDE 10 MEQ: 7.46 INJECTION, SOLUTION INTRAVENOUS at 18:38

## 2025-01-01 RX ADMIN — LORAZEPAM 0.5 MG: 2 INJECTION INTRAMUSCULAR; INTRAVENOUS at 19:44

## 2025-01-01 RX ADMIN — MORPHINE SULFATE 2 MG: 2 INJECTION, SOLUTION INTRAMUSCULAR; INTRAVENOUS at 22:52

## 2025-01-01 RX ADMIN — POTASSIUM CHLORIDE 10 MEQ: 7.46 INJECTION, SOLUTION INTRAVENOUS at 19:56

## 2025-01-01 RX ADMIN — DIAZEPAM 10 MG: 10 INJECTION, SOLUTION INTRAMUSCULAR; INTRAVENOUS at 21:54

## 2025-01-01 RX ADMIN — MORPHINE SULFATE 2 MG: 2 INJECTION, SOLUTION INTRAMUSCULAR; INTRAVENOUS at 21:54

## 2025-01-01 RX ADMIN — ACETAMINOPHEN 650 MG: 650 SUPPOSITORY RECTAL at 16:58

## 2025-01-01 RX ADMIN — MORPHINE SULFATE 2 MG: 2 INJECTION, SOLUTION INTRAMUSCULAR; INTRAVENOUS at 20:50

## 2025-01-01 RX ADMIN — DIAZEPAM 10 MG: 10 INJECTION, SOLUTION INTRAMUSCULAR; INTRAVENOUS at 23:47

## 2025-01-01 RX ADMIN — MORPHINE SULFATE 2 MG: 2 INJECTION, SOLUTION INTRAMUSCULAR; INTRAVENOUS at 23:47

## 2025-01-01 RX ADMIN — LORAZEPAM 2 MG: 2 INJECTION INTRAMUSCULAR; INTRAVENOUS at 23:28

## 2025-01-01 RX ADMIN — MORPHINE SULFATE 2 MG: 2 INJECTION, SOLUTION INTRAMUSCULAR; INTRAVENOUS at 01:33

## 2025-01-01 RX ADMIN — HYDROMORPHONE HYDROCHLORIDE 1 MG: 1 INJECTION, SOLUTION INTRAMUSCULAR; INTRAVENOUS; SUBCUTANEOUS at 21:44

## 2025-01-01 RX ADMIN — LORAZEPAM 2 MG: 2 INJECTION INTRAMUSCULAR at 23:28

## 2025-01-01 RX ADMIN — DIAZEPAM 10 MG: 10 INJECTION, SOLUTION INTRAMUSCULAR; INTRAVENOUS at 02:43

## 2025-01-01 RX ADMIN — DIAZEPAM 10 MG: 10 INJECTION, SOLUTION INTRAMUSCULAR; INTRAVENOUS at 20:50

## 2025-01-01 RX ADMIN — DIAZEPAM 10 MG: 10 INJECTION, SOLUTION INTRAMUSCULAR; INTRAVENOUS at 22:52

## 2025-01-01 RX ADMIN — HYDROMORPHONE HYDROCHLORIDE 1 MG: 1 INJECTION, SOLUTION INTRAMUSCULAR; INTRAVENOUS; SUBCUTANEOUS at 02:43

## 2025-01-01 RX ADMIN — SODIUM CHLORIDE 1000 ML: 9 INJECTION, SOLUTION INTRAVENOUS at 18:36

## 2025-01-01 RX ADMIN — DIAZEPAM 10 MG: 10 INJECTION, SOLUTION INTRAMUSCULAR; INTRAVENOUS at 01:33

## 2025-01-21 ENCOUNTER — OFFICE VISIT (OUTPATIENT)
Age: 80
End: 2025-01-21
Payer: MEDICARE

## 2025-01-21 VITALS
HEIGHT: 63 IN | SYSTOLIC BLOOD PRESSURE: 115 MMHG | BODY MASS INDEX: 26.58 KG/M2 | DIASTOLIC BLOOD PRESSURE: 55 MMHG | WEIGHT: 150 LBS

## 2025-01-21 DIAGNOSIS — I48.11 LONGSTANDING PERSISTENT ATRIAL FIBRILLATION: Chronic | ICD-10-CM

## 2025-01-21 DIAGNOSIS — I70.242 ATHEROSCLEROSIS OF NATIVE ARTERIES OF LEFT LEG WITH ULCERATION OF CALF: ICD-10-CM

## 2025-01-21 DIAGNOSIS — I73.9 PAD (PERIPHERAL ARTERY DISEASE): Primary | ICD-10-CM

## 2025-01-21 DIAGNOSIS — I70.232 ATHEROSCLEROSIS OF NATIVE ARTERIES OF RIGHT LEG WITH ULCERATION OF CALF: ICD-10-CM

## 2025-01-21 RX ORDER — FUROSEMIDE 20 MG/1
40 TABLET ORAL DAILY
Qty: 30 TABLET | Refills: 0 | Status: SHIPPED | OUTPATIENT
Start: 2025-01-21

## 2025-01-21 NOTE — PROGRESS NOTES
Chief Complaint  Wound Check    Follow-up for lower extremity wounds    Subjective        Fallon Nur presents to [facility] VASCULAR SURGERY  History of Present Illness    Patient is a pleasant 79-year-old lady with atrial fibrillation, hypertension,, and bilateral lower extremity wounds after a traumatic event involving the generator a few months ago.    The patient underwent right leg debridement on 9/16/2024 for a nonhealing wound.  She had a palpable pulse in her right foot at that time but was readmitted in October after this wound failed to heal.  At that time she had a nonpalpable pulse and was found to occluded popliteal artery which was likely a cardioembolic event.  She underwent a percutaneous aspiration mechanical thrombectomy and angioplasty on 10/14/2024.  He did have return of a palpable pulse in her right foot.  The wound was debrided again at that time as well.  Unfortunately during that hospitalization the patient developed a pressure sore on the left heel, and she had no popliteal artery occlusion in that leg at that time.  On 10/29/2024 the patient underwent a left leg arteriogram with balloon angioplasty of the anterior tibial artery and popliteal artery.  The patient has some mild age-related peripheral arterial disease but both of these popliteal artery occlusions were likely originally embolic in nature.  The right 1 was more acute, having occurred sometime between 9/16/2024 and 10/14/2024 the left was more chronic.     11/14/24: She is here today for her first follow-up since her recent hospital admission.  She was discharged on 11/1/2024.     She reports she has pain in her legs whenever the nurses change her wound dressings.  Additionally she remains very weak and has not really been able to walk since hospital discharge    Denies any fevers or chills or any other acute problems    12/17/24: Patient returns today for routine follow-up with non-invasive lower  "extremity studies.   She is still living at her nursing home, [place].  She has not been able to walk to any significant degree still.  The wounds on her right lower leg and left lower leg are improving, but her left heel wound persists and appears slightly worse.  She has significant lower extremity edema bilaterally.     1/21/2025: Patient returns today for routine follow up for a wound check.  Seems to be doing a bit better overall.  She has 2 daughters with her today.  The wound on her right leg has essentially completely healed, left proximal shin wound is completely healed, the left leg wound overlying the Achilles tendon has made tremendous improvement since I last saw her and is almost completely healed, the eschar on her left heel appears to have been debrided or removed and underlying tissue appears reasonably healthy with some granulation tissue forming.  She has significant amount of edema throughout her upper and lower extremities but appears her lower extremities have been wrapped effectively.         Objective   Vital Signs:  /55 (BP Location: Left arm)   Ht 160 cm (62.99\")   Wt 68 kg (150 lb)   BMI 26.58 kg/m²   Estimated body mass index is 26.58 kg/m² as calculated from the following:    Height as of this encounter: 160 cm (62.99\").    Weight as of this encounter: 68 kg (150 lb).         BMI is within normal parameters. No other follow-up for BMI required.         Physical Exam   NAD  Significant lower extremity edema bilaterally, pitting, improved from previous likely secondary to compression wraps.  Palpable DP pulses bilaterally  Right leg wounds have healed.   Left heel over her Achilles tendon is healing very nicely and is almost completely healed.  The eschar on her left heel is no longer present but she has a full-thickness wound here with some reasonable appearing granulation tissue at its base.  No erythema or induration or purulence.  It is not malodorous.  Edema of her lower " legs appears controlled with Ace wrap's.        Result Review :                     Assessment and Plan     79-year-old lady with lower extremity wounds associated with arterial occlusions bilaterally of likely cardioembolic etiology.   She is very frail and functional status severely decompensated from her multiple recent admissions and lower extremity wounds.    At her last visit she was referred to wound care, but has only continued to receive wound care from her long-term care facility, [facility] in [place].  I am very pleased with the appearance of her legs today.  They have made significant improvement since I last saw her and patient appears better overall.  I encouraged him to continue their current wound care regimen as it seems to be working well.  I did dress her left foot with some Betadine dampened gauze, Kerlix and Ace wrap and then Curlex and Ace wrap for the right lower leg as well.  The patient's family is worried about her anasarca.  She follows with Dr. Colunga for cardiology and has been on 20 mg of Lasix daily.  I did call to her nursing facility, [facility] and talk to her floor nurse Jyothi who confirms the patient is getting weekly labs there and most recent set of labs showed normal kidney function.  I have given an order to increase the Lasix from 20 mg daily to 40 mg daily.  I have encouraged him to reach out to Dr. Colunga's office to follow-up with him.  Most recent echocardiogram in our system shows good systolic function but some diastolic dysfunction.  Patient and family verbalized understanding and agreement of this.  The nurse at [facility] stated they would continue to monitor labs on her weekly and these will be followed by their nurse practitioner.  I encouraged patient and family to call me if they have any new or worsening problems.  I will plan to see her back in 6 weeks for another wound check.  I recommend patient continue 81 mg aspirin, 40 mg Lipitor,  and 20 mg  Xarelto, as well as other medications.          Diagnoses and all orders for this visit:    1. PAD (peripheral artery disease) (Primary)    2. Longstanding persistent atrial fibrillation    3. 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 Atherosclerosis of native arteries of left leg with ulceration of calf    Other orders  -     furosemide (LASIX) 20 MG tablet; Take 2 tablets by mouth Daily.  Dispense: 30 tablet; Refill: 0                  Patient BMI noted. Educational material for patient for health risks of being overweight added to patient's after visit summary.           Follow Up     Return in about 6 weeks (around 3/4/2025).  Patient was given instructions and counseling regarding her condition or for health maintenance advice. Please see specific information pulled into the AVS if appropriate.

## 2025-02-20 NOTE — Clinical Note
Level of Care: Med/Surg [1]   Diagnosis: Altered mental status [780.97.ICD-9-CM]   Admitting Physician: LAURA GRESHAM [[id number]]   Attending Physician: LAURA GRESHAM [[id number]]   Isolate for COVID?: No [0]   Certification: I Certify That Inpatient Hospital Services Are Medically Necessary For Greater Than 2 Midnights

## 2025-02-20 NOTE — ED PROVIDER NOTES
Subjective   History of Present Illness  Patient is a 79-year-old female who comes in for change in mental status and fever and hypoxia    Patient's daughter at bedside states that the patient is a DNR      Review of Systems   Unable to perform ROS: Acuity of condition       Past Medical History:   Diagnosis Date    Atrial fibrillation     Blind 07/10/2023    Cellulitis of leg 09/12/2024    Duodenal ulcer, perforated 10/01/2013    Juan J Peters    12/2021 - EGD clear      DVT of upper extremity (deep vein thrombosis) 01/01/1995    subclavian - premarin      E. 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    Histoplasmosis     Hypertension     Infestation by bed bug 10/09/2024    Legally blind     Longstanding persistent atrial fibrillation 07/10/2023    Macular degeneration 10/15/2024    Nonrheumatic mitral valve regurgitation 10/15/2024    Open wound of both lower extremities 09/12/2024    Primary hypertension 10/15/2024    PVD (peripheral vascular disease)     SBO (small bowel obstruction) 12/31/2020       No Known Allergies    Past Surgical History:   Procedure Laterality Date    ARTERIOGRAM Left 10/29/2024    Procedure: Left leg arteriogram with angioplasty;  Surgeon: Red Hazel II, MD;  Location: [facility] OR;  Service: Vascular;  Laterality: Left;    CARDIAC CATHETERIZATION Right 10/14/2024    Procedure: RIGHT LOWER EXTREMITY ARTERIOGRAM, PERCUTANEOUS THROMBECTOMY, ANGIOPLASTY,  AND WOUND DEBRIDEMENT;  Surgeon: Red Hazel II, MD;  Location: [facility] HYBRID OR;  Service: Vascular;  Laterality: Right;    WOUND DEBRIDEMENT Right 9/16/2024    Procedure: RIGHT LEG DEBRIDEMENT;  Surgeon: Red Hazel II, MD;  Location: [facility] MAIN OR;  Service: Vascular;  Laterality: Right;       Family History   Problem Relation Age of Onset    Cancer Mother     Dementia Father     Cancer Sister        Social History     Socioeconomic History    Marital status:    Tobacco Use    Smoking status:  Former    Smokeless tobacco: Never   Vaping Use    Vaping status: Never Used   Substance and Sexual Activity    Alcohol use: Not Currently    Drug use: Not Currently    Sexual activity: Defer           Objective   Physical Exam  Vitals reviewed.   Constitutional:       Appearance: She is ill-appearing.   HENT:      Head: Normocephalic.      Mouth/Throat:      Pharynx: Oropharynx is clear.   Cardiovascular:      Rate and Rhythm: Tachycardia present. Rhythm irregular.   Pulmonary:      Effort: Tachypnea present.      Breath sounds: No rhonchi or rales.   Musculoskeletal:      Cervical back: Neck supple.   Skin:     General: Skin is warm.      Capillary Refill: Capillary refill takes more than 3 seconds.      Coloration: Skin is pale.      Findings: Ecchymosis and signs of injury present.   Neurological:      Mental Status: She is lethargic.      GCS: GCS eye subscore is 3. GCS verbal subscore is 3. GCS motor subscore is 4.         Procedures         EKG was obtained and reviewed and read by myself as well as Dr. Jose ward with rate of 125 it was noted that the patient is febrile at this time    ED Course  ED Course as of 02/20/25 1903   u Feb 20, 2025   1651 Patient's daughter at bedside states that the patient fell out of bed about a week or so ago and that is what all the bruising on her face is from.  She reports she is normally alert and oriented and today was unable to speak and had high fever [KW]   1723 Patient ABG shows she is not retaining she is placed on Airvo which was only keeping her at about 78% and then was placed with a nonrebreather over the Airvo she is not alert enough for CPAP/BiPAP [KW]   1737 Patient's blood pressure is dropping patient's daughters are at bedside I offered to call the  I explained to them that I feel this is end-of-life [KW]   1754 Family was notified that the patient is flu a positive [KW]   1901 Spoke with William Pharm D who states I can use Ativan for end of  "life care and will order 0.5mg IV once for agitation.  As patient is now becoming agitated and wanting to try to pull the oxygen off. [KW]      ED Course User Index  [KW] Marie Aguilar RACHEAL, APRN        BP (!) 95/28   Pulse 120   Temp (!) 102.2 °F (39 °C) (Rectal)   Resp 18   Ht 160 cm (62.99\")   Wt 54.4 kg (120 lb)   SpO2 100%   BMI 21.26 kg/m²   Labs Reviewed   RESPIRATORY PANEL PCR W/ COVID-19 (SARS-COV-2), NP SWAB IN UTM/VTP, 2 HR TAT - Abnormal; Notable for the following components:       Result Value    Influenza A H3 Detected (*)     All other components within normal limits    Narrative:     In the setting of a positive respiratory panel with a viral infection PLUS a negative procalcitonin without other underlying concern for bacterial infection, consider observing off antibiotics or discontinuation of antibiotics and continue supportive care. If the respiratory panel is positive for atypical bacterial infection (Bordetella pertussis, Chlamydophila pneumoniae, or Mycoplasma pneumoniae), consider antibiotic de-escalation to target atypical bacterial infection.   COMPREHENSIVE METABOLIC PANEL - Abnormal; Notable for the following components:    Sodium 135 (*)     Potassium 3.2 (*)     Calcium 8.4 (*)     Total Protein 5.8 (*)     Albumin 2.3 (*)     AST (SGOT) 70 (*)     All other components within normal limits    Narrative:     GFR Categories in Chronic Kidney Disease (CKD)      GFR Category          GFR (mL/min/1.73)    Interpretation  G1                     90 or greater         Normal or high (1)  G2                      60-89                Mild decrease (1)  G3a                   45-59                Mild to moderate decrease  G3b                   30-44                Moderate to severe decrease  G4                    15-29                Severe decrease  G5                    14 or less           Kidney failure          (1)In the absence of evidence of kidney disease, neither GFR category G1 " or G2 fulfill the criteria for CKD.    eGFR calculation 2021 CKD-EPI creatinine equation, which does not include race as a factor   BNP (IN-HOUSE) - Abnormal; Notable for the following components:    proBNP 12,448.0 (*)     All other components within normal limits    Narrative:     This assay is used as an aid in the diagnosis of individuals suspected of having heart failure. It can be used as an aid in the diagnosis of acute decompensated heart failure (ADHF) in patients presenting with signs and symptoms of ADHF to the emergency department (ED). In addition, NT-proBNP of <300 pg/mL indicates ADHF is not likely.    Age Range Result Interpretation  NT-proBNP Concentration (pg/mL:      <50             Positive            >450                   Gray                 300-450                    Negative             <300    50-75           Positive            >900                  Gray                300-900                  Negative            <300      >75             Positive            >1800                  Gray                300-1800                  Negative            <300   TROPONIN - Abnormal; Notable for the following components:    HS Troponin T 61 (*)     All other components within normal limits    Narrative:     High Sensitive Troponin T Reference Range:  <14.0 ng/L- Negative Female for AMI  <22.0 ng/L- Negative Male for AMI  >=14 - Abnormal Female indicating possible myocardial injury.  >=22 - Abnormal Male indicating possible myocardial injury.   Clinicians would have to utilize clinical acumen, EKG, Troponin, and serial changes to determine if it is an Acute Myocardial Infarction or myocardial injury due to an underlying chronic condition.        CBC WITH AUTO DIFFERENTIAL - Abnormal; Notable for the following components:    WBC 1.46 (*)     RBC 3.62 (*)     Hemoglobin 10.2 (*)     Hematocrit 32.7 (*)     MCHC 31.2 (*)     RDW 16.6 (*)     RDW-SD 55.4 (*)     All other components within normal limits     Narrative:     The previously reported component NRBC is no longer being reported. Previous result was 0.0 /100 WBC (Reference Range: 0.0-0.2 /100 WBC) on 2/20/2025 at 1756 EST.   BLOOD GAS, ARTERIAL - Abnormal; Notable for the following components:    pH, Arterial 7.471 (*)     pO2, Arterial 43.7 (*)     Base Excess, Arterial 3.1 (*)     O2 Saturation, Arterial 82.6 (*)     All other components within normal limits   MANUAL DIFFERENTIAL - Abnormal; Notable for the following components:    Neutrophil % 27.0 (*)     Lymphocyte % 10.0 (*)     Bands %  45.0 (*)     Metamyelocyte % 10.0 (*)     Myelocyte % 1.0 (*)     Neutrophils Absolute 1.05 (*)     Lymphocytes Absolute 0.15 (*)     All other components within normal limits   SCAN SLIDE   HIGH SENSITIVITIY TROPONIN T 1HR   DIGOXIN LEVEL   MAGNESIUM   TYPE AND SCREEN   CBC AND DIFFERENTIAL    Narrative:     The following orders were created for panel order CBC & Differential.  Procedure                               Abnormality         Status                     ---------                               -----------         ------                     CBC Auto Differential[[id number]]        Abnormal            Final result               Scan Slide[[id number]]                                       Final result                 Please view results for these tests on the individual orders.   EXTRA TUBES    Narrative:     The following orders were created for panel order Extra Tubes.  Procedure                               Abnormality         Status                     ---------                               -----------         ------                     Light Blue Top[[id number]]                                   Final result                 Please view results for these tests on the individual orders.   LIGHT BLUE TOP     Medications   sodium chloride 0.9 % flush 10 mL (has no administration in time range)   furosemide (LASIX) injection 80 mg (0 mg Intravenous Hold 2/20/25  1739)   potassium chloride 10 mEq in 100 mL IVPB (10 mEq Intravenous New Bag 2/20/25 1838)     And   potassium chloride 10 mEq in 100 mL IVPB (has no administration in time range)     And   potassium chloride 10 mEq in 100 mL IVPB (has no administration in time range)     And   potassium chloride 10 mEq in 100 mL IVPB (has no administration in time range)   sodium chloride 0.9 % bolus 1,000 mL (1,000 mL Intravenous New Bag 2/20/25 1836)   acetaminophen (TYLENOL) suppository 650 mg (650 mg Rectal Given 2/20/25 1658)     XR Chest 1 View    Result Date: 2/20/2025  Findings most concerning for pulmonary vascular congestion and edema. Developing bilateral pneumonia not entirely excluded. Electronically Signed: Noel Cruz MD  2/20/2025 5:16 PM EST  Workstation ID: [id number]                                                  Medical Decision Making  Spoke with patient's family at length concerning the DNR status and the decision not to give pressors patient's family was agreeable to this and states the patient would not want this I explained to them at length that I felt this was end-of-life.  They were agreeable to keeping the patient as comfortable as possible.  IV was established and blood work was obtained and reviewed the patient was found of a white count of 1.46 chart review shows 3 months ago it was 7.3 hemoglobin and hematocrit were stable patient was found have a potassium of 3.2 and potassium replacement was ordered at this time as well as a magnesium   IV fluids were initiated to help with hypotension as well.  This was done with caution as the patient is in fluid overload but    Patient had a chest x-ray which was read by myself the radiologist and Dr. Garcia as having pulmonary edema I did order 80 mg of Lasix but the patient's blood pressure was hypotensive and it was held at this time.    I did offer to call the  for the family they state they will call their own.    Patient was discussed with Bo  the nurse practitioner and admitted to the hospitalist   Patient was found to be influenza A positive     Problems Addressed:  Bandemia: complicated acute illness or injury  Fever and chills: complicated acute illness or injury  Hypokalemia: complicated acute illness or injury  Hypoxia: complicated acute illness or injury  Influenza A: complicated acute illness or injury    Amount and/or Complexity of Data Reviewed  Labs: ordered. Decision-making details documented in ED Course.  Radiology: ordered and independent interpretation performed. Decision-making details documented in ED Course.  ECG/medicine tests: ordered and independent interpretation performed. Decision-making details documented in ED Course.    Risk  OTC drugs.  Prescription drug management.  Decision regarding hospitalization.        Final diagnoses:   Fever and chills   Influenza A   Hypokalemia   Hypoxia   Bandemia       ED Disposition  ED Disposition       ED Disposition   Decision to Admit    Condition   --    Comment   Level of Care: Med/Surg [1]   Diagnosis: Altered mental status [780.97.ICD-9-CM]   Admitting Physician: LAURA GRESHAM [[id number]]   Attending Physician: LAURA GRESHAM [[id number]]   Isolate for COVID?: No [0]   Certification: I Certify That Inpatient Hospital Services Are Medically Necessary For Greater Than 2 Midnights                 No follow-up provider specified.       Medication List      No changes were made to your prescriptions during this visit.            Marie Aguilar, APRN  02/20/25 1836       Marie Aguilar, APRN  02/20/25 1909

## 2025-02-20 NOTE — ED NOTES
YOLANDA Aguilar APRN at bedside to discuss plan of care with family. Family states that they do not want pressors since pt is a DNR. Pt repositioned in bed. Emotional support offered and given to family.

## 2025-02-21 NOTE — ED NOTES
Spoke with MAYCOL figueredo and given verbal to administer ativan from orderset now along with dilaudid

## 2025-02-21 NOTE — H&P
[facility] Medicine Services  History & Physical    Patient Name: Fallon Nur  : 1945  MRN: [id number]  Primary Care Physician:  Francine Hampton MD  Date of admission: 2025  Date and Time of Service: 2025 at 2100      Assessment & Plan      Chief Complaint: altered mental status and hypoxia    Plan:    Hypoxia secondary to influenza A  End of Life Care  -patient's family state that she is a DNR and they want to continue with comfort care only  -Morphine, Ativan, Robinul ordered  -Supplemental oxygen for comfort  -Palliative care consult    Home medications for chronic conditions will be restarted as appropriate when verified by pharmacy      History of Present Illness     History of Present Illness: Fallon Nur is a 79 y.o. female with a previous medical history of CAD, CHF, atrial fibrillation who presented to [facility] on 2025 with altered mental status and hypoxia from a skilled nursing facility.      In the ED, the patient was positive for Influenza A.  She was 91% on Airvo and nonrebreather, febrile at 102.2 and hypotensive at 66/37.  Her daughter at bedside states that she is a DNR and they wish to focus only on comfort care at this time.  Hospitalist was consulted for end of life care.    Unable to obtain ROS due to altered mental status    Objective      Vitals:   Temp:  [102.2 °F (39 °C)] 102.2 °F (39 °C)  Heart Rate:  [] 99  Resp:  [18-24] 18  BP: ()/() 56/24  Body mass index is 21.26 kg/m².    Physical Exam  Vitals and nursing note reviewed.   Constitutional:       General: She is sleeping.      Appearance: She is ill-appearing.   Cardiovascular:      Rate and Rhythm: Tachycardia present. Rhythm irregular.   Skin:     General: Skin is warm and dry.   Neurological:      General: No focal deficit present.      Mental Status: She is lethargic.        Personal History     This is a 79 y.o. female with:    Past Medical History:    Diagnosis Date    Atrial fibrillation     Blind 07/10/2023    Cellulitis of leg 09/12/2024    Duodenal ulcer, perforated 10/01/2013    Juan J Peters    12/2021 - EGD clear      DVT of upper extremity (deep vein thrombosis) 01/01/1995    subclavian - premarin      E. 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    Histoplasmosis     Hypertension     Infestation by bed bug 10/09/2024    Legally blind     Longstanding persistent atrial fibrillation 07/10/2023    Macular degeneration 10/15/2024    Nonrheumatic mitral valve regurgitation 10/15/2024    Open wound of both lower extremities 09/12/2024    Primary hypertension 10/15/2024    PVD (peripheral vascular disease)     SBO (small bowel obstruction) 12/31/2020       Past Surgical History:   Procedure Laterality Date    ARTERIOGRAM Left 10/29/2024    Procedure: Left leg arteriogram with angioplasty;  Surgeon: Red Hazel II, MD;  Location: [facility] HYBRID OR;  Service: Vascular;  Laterality: Left;    CARDIAC CATHETERIZATION Right 10/14/2024    Procedure: RIGHT LOWER EXTREMITY ARTERIOGRAM, PERCUTANEOUS THROMBECTOMY, ANGIOPLASTY,  AND WOUND DEBRIDEMENT;  Surgeon: Red Hazel II, MD;  Location: [facility] HYBRID OR;  Service: Vascular;  Laterality: Right;    WOUND DEBRIDEMENT Right 9/16/2024    Procedure: RIGHT LEG DEBRIDEMENT;  Surgeon: Red Hazel II, MD;  Location: [facility] MAIN OR;  Service: Vascular;  Laterality: Right;       Active and Resolved Problems  Active Hospital Problems    Diagnosis  POA    **Altered mental status [R41.82]  Yes      Resolved Hospital Problems   No resolved problems to display.       Family History: family history includes Cancer in her mother and sister; Dementia in her father. Otherwise pertinent FHx was reviewed and not pertinent to current issue.    Social History:  reports that she has quit smoking. She has never used smokeless tobacco. She reports that she does not currently use alcohol. She reports that she does not  currently use drugs.    Home Medications:  Prior to Admission Medications       Prescriptions Last Dose Informant Patient Reported? Taking?    aspirin 81 MG chewable tablet 2/20/2025  No Yes    Chew 1 tablet Daily.    calcium carbonate (TUMS) 500 MG chewable tablet 2/20/2025  Yes Yes    Chew 1 tablet Daily.    cetirizine (zyrTEC) 10 MG tablet 2/20/2025  Yes Yes    Take 1 tablet by mouth Daily.    cilostazol (PLETAL) 100 MG tablet 2/20/2025  No Yes    Take 1 tablet by mouth 2 (Two) Times a Day.    citalopram (CeleXA) 20 MG tablet 2/20/2025  Yes Yes    Take 1 tablet by mouth Daily.    digoxin (LANOXIN) 125 MCG tablet 2/20/2025  Yes Yes    Take 1 tablet by mouth Daily.    ferrous sulfate 325 (65 FE) MG tablet 2/20/2025  Yes Yes    Take 1 tablet by mouth Daily With Breakfast.    furosemide (LASIX) 20 MG tablet 2/20/2025  Yes Yes    Take 2 tablets by mouth 2 (Two) Times a Day.    ipratropium-albuterol (DUO-NEB) 0.5-2.5 mg/3 ml nebulizer 2/20/2025  Yes Yes    Take 3 mL by nebulization 4 (Four) Times a Day.    levothyroxine (SYNTHROID, LEVOTHROID) 75 MCG tablet 2/20/2025  Yes Yes    Take 1 tablet by mouth Every Morning.    metoprolol succinate XL (TOPROL-XL) 25 MG 24 hr tablet 2/20/2025  Yes Yes    Take 1 tablet by mouth Daily.    multivitamin (Thera) tablet tablet 2/20/2025  No Yes    Take 1 tablet by mouth Daily.    Nutritional Supplements (Estiven) pack 2/20/2025  No Yes    Take 1 packet by mouth 2 (Two) Times a Day.    pantoprazole (PROTONIX) 40 MG EC tablet 2/20/2025  Yes Yes    Take 1 tablet by mouth Daily.    polyethylene glycol (MiraLax) 17 g packet 2/20/2025  Yes Yes    Take 17 g by mouth Daily.    potassium chloride ER (K-TAB) 20 MEQ tablet controlled-release ER tablet 2/20/2025  Yes Yes    Take 1 tablet by mouth Daily.    rivaroxaban (XARELTO) 15 MG tablet 2/19/2025  Yes Yes    Take 1 tablet by mouth Daily.    vitamin B-12 (CYANOCOBALAMIN) 1000 MCG tablet 2/20/2025  Yes Yes    Take 1 tablet by mouth Daily.     vitamin D (ERGOCALCIFEROL) 1.25 MG (42181 UT) capsule capsule Past Week  Yes Yes    Take 1 capsule by mouth 1 (One) Time Per Week. Saturday    albuterol sulfate  (90 Base) MCG/ACT inhaler   Yes No    Inhale 2 puffs Every 6 (Six) Hours As Needed for Wheezing.              Allergies:  No Known Allergies        VTE Prophylaxis:  Pharmacologic & mechanical VTE prophylaxis orders are present.        CODE STATUS:    Code Status (Patient has no pulse and is not breathing): No CPR (Do Not Attempt to Resuscitate)  Medical Interventions (Patient has pulse or is breathing): Comfort Measures        Admission Status:  I believe this patient meets inpatient status.    I discussed the patient's findings and my recommendations with patient, family, and nursing staff.    Signature:     This document has been electronically signed by Keisha Barnes, SHARIFA, APRN, AGACNP-BC on February 20, 2025 22:21 [facility]

## 2025-02-21 NOTE — NURSING NOTE
Patient  at 0250, multiple family members at bedside.  This RN called [facility]  home and they will be here around 0500 to pick patient up.  MD,  and Donation line all released patient to  home.  All tubes removed and LDA's removed.

## 2025-02-21 NOTE — ED NOTES
Nursing report ED to floor  Fallon Nur  79 y.o.  female    HPI:   Chief Complaint   Patient presents with    Shortness of Breath       Admitting doctor:   Fuentes Hooper MD    Admitting diagnosis:   The primary encounter diagnosis was Fever and chills. Diagnoses of Influenza A, Hypokalemia, Hypoxia, and Bandemia were also pertinent to this visit.    Code status:   Current Code Status       Date Active Code Status Order ID Comments User Context       2/20/2025 1829 No CPR (Do Not Attempt to Resuscitate) [id number]  Brii Galindo APRN ED        Question Answer    Code Status (Patient has no pulse and is not breathing) No CPR (Do Not Attempt to Resuscitate)    Medical Interventions (Patient has pulse or is breathing) Comfort Measures                    Allergies:   Patient has no known allergies.    Isolation:  Contact, Droplet     Fall Risk:  Fall Risk Assessment was completed, and patient is at high risk for falls.   Predictive Model Details         54 (High) Factor Value    Calculated 2/20/2025 20:12 Age 79    Risk of Fall Model Sarika Coma Scale 12     Active Peripheral IV Present     Imaging order in this encounter Present     Diastolic BP 42     Respiratory Rate 18     Albumin 2.3 g/dL     Magnesium 1.9 mg/dL     Calcium 8.4 mg/dL     Number of Distinct Medication Classes administered 3     Jose Scale not on file     Chloride 99 mmol/L     Cardiac Assessment X     ALT 28 U/L     Days after Admission 0.155     Creatinine 0.86 mg/dL     Tobacco Use Quit     Potassium 3.2 mmol/L     Total Bilirubin 0.3 mg/dL         Weight:       02/20/25  1808   Weight: 54.4 kg (120 lb)       Intake and Output    Intake/Output Summary (Last 24 hours) at 2/20/2025 2015  Last data filed at 2/20/2025 1943  Gross per 24 hour   Intake 100 ml   Output --   Net 100 ml       Diet:        Most recent vitals:   Vitals:    02/20/25 1817 02/20/25 1845 02/20/25 1902 02/20/25 1919   BP: (!) 84/35 (!) 71/35 (!) 95/28 (!) 88/42   BP  Location: Right arm Left arm     Patient Position: Lying Lying     Pulse: 118 118 120 108   Resp: 18 18     Temp:       TempSrc:       SpO2: 100% 98% 100%    Weight:       Height:           Active LDAs/IV Access:   Lines, Drains & Airways       Active LDAs       Name Placement date Placement time Site Days    Peripheral IV 02/20/25 1800 Left Arm 02/20/25  1800  Arm  less than 1    Peripheral IV 02/20/25 1858 Anterior;Left Wrist 02/20/25  1858  Wrist  less than 1    External Urinary Catheter 10/29/24  2230  --  113                    Skin Condition:   Skin Assessments (last day)       None             Labs (abnormal labs have a star):   Labs Reviewed   RESPIRATORY PANEL PCR W/ COVID-19 (SARS-COV-2), NP SWAB IN UTM/VTP, 2 HR TAT - Abnormal; Notable for the following components:       Result Value    Influenza A H3 Detected (*)     All other components within normal limits    Narrative:     In the setting of a positive respiratory panel with a viral infection PLUS a negative procalcitonin without other underlying concern for bacterial infection, consider observing off antibiotics or discontinuation of antibiotics and continue supportive care. If the respiratory panel is positive for atypical bacterial infection (Bordetella pertussis, Chlamydophila pneumoniae, or Mycoplasma pneumoniae), consider antibiotic de-escalation to target atypical bacterial infection.   COMPREHENSIVE METABOLIC PANEL - Abnormal; Notable for the following components:    Sodium 135 (*)     Potassium 3.2 (*)     Calcium 8.4 (*)     Total Protein 5.8 (*)     Albumin 2.3 (*)     AST (SGOT) 70 (*)     All other components within normal limits    Narrative:     GFR Categories in Chronic Kidney Disease (CKD)      GFR Category          GFR (mL/min/1.73)    Interpretation  G1                     90 or greater         Normal or high (1)  G2                      60-89                Mild decrease (1)  G3a                   45-59                Mild to  moderate decrease  G3b                   30-44                Moderate to severe decrease  G4                    15-29                Severe decrease  G5                    14 or less           Kidney failure          (1)In the absence of evidence of kidney disease, neither GFR category G1 or G2 fulfill the criteria for CKD.    eGFR calculation 2021 CKD-EPI creatinine equation, which does not include race as a factor   BNP (IN-HOUSE) - Abnormal; Notable for the following components:    proBNP 12,448.0 (*)     All other components within normal limits    Narrative:     This assay is used as an aid in the diagnosis of individuals suspected of having heart failure. It can be used as an aid in the diagnosis of acute decompensated heart failure (ADHF) in patients presenting with signs and symptoms of ADHF to the emergency department (ED). In addition, NT-proBNP of <300 pg/mL indicates ADHF is not likely.    Age Range Result Interpretation  NT-proBNP Concentration (pg/mL:      <50             Positive            >450                   Gray                 300-450                    Negative             <300    50-75           Positive            >900                  Gray                300-900                  Negative            <300      >75             Positive            >1800                  Gray                300-1800                  Negative            <300   TROPONIN - Abnormal; Notable for the following components:    HS Troponin T 61 (*)     All other components within normal limits    Narrative:     High Sensitive Troponin T Reference Range:  <14.0 ng/L- Negative Female for AMI  <22.0 ng/L- Negative Male for AMI  >=14 - Abnormal Female indicating possible myocardial injury.  >=22 - Abnormal Male indicating possible myocardial injury.   Clinicians would have to utilize clinical acumen, EKG, Troponin, and serial changes to determine if it is an Acute Myocardial Infarction or myocardial injury due to an  underlying chronic condition.        CBC WITH AUTO DIFFERENTIAL - Abnormal; Notable for the following components:    WBC 1.46 (*)     RBC 3.62 (*)     Hemoglobin 10.2 (*)     Hematocrit 32.7 (*)     MCHC 31.2 (*)     RDW 16.6 (*)     RDW-SD 55.4 (*)     All other components within normal limits    Narrative:     The previously reported component NRBC is no longer being reported. Previous result was 0.0 /100 WBC (Reference Range: 0.0-0.2 /100 WBC) on 2/20/2025 at 1756 EST.   BLOOD GAS, ARTERIAL - Abnormal; Notable for the following components:    pH, Arterial 7.471 (*)     pO2, Arterial 43.7 (*)     Base Excess, Arterial 3.1 (*)     O2 Saturation, Arterial 82.6 (*)     All other components within normal limits   HIGH SENSITIVITIY TROPONIN T 1HR - Abnormal; Notable for the following components:    HS Troponin T 72 (*)     All other components within normal limits    Narrative:     High Sensitive Troponin T Reference Range:  <14.0 ng/L- Negative Female for AMI  <22.0 ng/L- Negative Male for AMI  >=14 - Abnormal Female indicating possible myocardial injury.  >=22 - Abnormal Male indicating possible myocardial injury.   Clinicians would have to utilize clinical acumen, EKG, Troponin, and serial changes to determine if it is an Acute Myocardial Infarction or myocardial injury due to an underlying chronic condition.        MANUAL DIFFERENTIAL - Abnormal; Notable for the following components:    Neutrophil % 27.0 (*)     Lymphocyte % 10.0 (*)     Bands %  45.0 (*)     Metamyelocyte % 10.0 (*)     Myelocyte % 1.0 (*)     Neutrophils Absolute 1.05 (*)     Lymphocytes Absolute 0.15 (*)     All other components within normal limits   DIGOXIN LEVEL - Normal   MAGNESIUM - Normal   SCAN SLIDE   TYPE AND SCREEN   CBC AND DIFFERENTIAL    Narrative:     The following orders were created for panel order CBC & Differential.  Procedure                               Abnormality         Status                     ---------                                -----------         ------                     CBC Auto Differential[[id number]]        Abnormal            Final result               Scan Slide[[id number]]                                       Final result                 Please view results for these tests on the individual orders.   EXTRA TUBES    Narrative:     The following orders were created for panel order Extra Tubes.  Procedure                               Abnormality         Status                     ---------                               -----------         ------                     Light Blue Top[[id number]]                                   Final result                 Please view results for these tests on the individual orders.   LIGHT BLUE TOP       LOC:  Responsive to painful stimuli    Telemetry:  Med/Surg    Cardiac Monitoring Ordered: yes    EKG:   ECG 12 Lead Dyspnea   Preliminary Result   HEART INEH=447  bpm   RR Lotqfhyb=699  ms   NC Ckchuzbj=279  ms   P Horizontal Axis=189  deg   P Front Axis=0  deg   QRSD Interval=63  ms   QT Joqmypus=297  ms   HSbS=731  ms   QRS Axis=26  deg   T Wave Axis=39  deg   - BORDERLINE ECG -   Sinus tachycardia with irregular rate   Low voltage, extremity leads   Date and Time of Study:2025-02-20 16:32:27      ECG 12 Lead Dyspnea    (Results Pending)       Medications Given in the ED:   Medications   sodium chloride 0.9 % flush 10 mL (has no administration in time range)   furosemide (LASIX) injection 80 mg (0 mg Intravenous Hold 2/20/25 1739)   potassium chloride 10 mEq in 100 mL IVPB (0 mEq Intravenous Stopped 2/20/25 1943)     And   potassium chloride 10 mEq in 100 mL IVPB (10 mEq Intravenous New Bag 2/20/25 1956)     And   potassium chloride 10 mEq in 100 mL IVPB (has no administration in time range)     And   potassium chloride 10 mEq in 100 mL IVPB (has no administration in time range)   acetaminophen (TYLENOL) suppository 650 mg (has no administration in time range)   acetaminophen  (TYLENOL) suppository 650 mg (650 mg Rectal Given 2/20/25 1658)   sodium chloride 0.9 % bolus 1,000 mL (1,000 mL Intravenous New Bag 2/20/25 1836)   LORazepam (ATIVAN) injection 0.5 mg (0.5 mg Intravenous Given 2/20/25 1944)       Imaging results:  XR Chest 1 View    Result Date: 2/20/2025  Findings most concerning for pulmonary vascular congestion and edema. Developing bilateral pneumonia not entirely excluded. Electronically Signed: Noel Cruz MD  2/20/2025 5:16 PM EST  Workstation ID: [id number]     Social issues:   Social History     Socioeconomic History    Marital status:    Tobacco Use    Smoking status: Former    Smokeless tobacco: Never   Vaping Use    Vaping status: Never Used   Substance and Sexual Activity    Alcohol use: Not Currently    Drug use: Not Currently    Sexual activity: Defer       NIH Stroke Scale:  Interval: (not recorded)  1a. Level of Consciousness: (not recorded)  1b. LOC Questions: (not recorded)  1c. LOC Commands: (not recorded)  2. Best Gaze: (not recorded)  3. Visual: (not recorded)  4. Facial Palsy: (not recorded)  5a. Motor Arm, Left: (not recorded)  5b. Motor Arm, Right: (not recorded)  6a. Motor Leg, Left: (not recorded)  6b. Motor Leg, Right: (not recorded)  7. Limb Ataxia: (not recorded)  8. Sensory: (not recorded)  9. Best Language: (not recorded)  10. Dysarthria: (not recorded)  11. Extinction and Inattention (formerly Neglect): (not recorded)    Total (NIH Stroke Scale): (not recorded)     Additional notable assessment information:DNR/DNI End of Life Care     Nursing report ED to floor:  RALEIGH Rick RN   02/20/25 20:15 EST

## 2025-02-26 NOTE — PROGRESS NOTES
Enter Query Response Below      Query Response: acute respiratory failure with hypoxia             If applicable, please update the problem list.

## 2025-02-26 NOTE — PROGRESS NOTES
Enter Query Response Below      Query Response: Chronic diastolic heart failure             If applicable, please update the problem list.

## (undated) DEVICE — NAVICROSS SUPPORT CATHETER: Brand: NAVICROSS

## (undated) DEVICE — ST ACC MICROPUNCTURE STFF/CANN PLAT/TP 4F 21G 40CM

## (undated) DEVICE — UNDERGLV SURG BIOGEL INDICAT PI SZ8.5 BLU

## (undated) DEVICE — BNDG,ELSTC,MATRIX,STRL,6"X5YD,LF,HOOK&LP: Brand: MEDLINE

## (undated) DEVICE — CVR PROB 96IN LF STRL

## (undated) DEVICE — DRSNG SURESITE WNDW 4X4.5

## (undated) DEVICE — PENCL HND ROCKRSWTCH HOLSTR EZ CLEAN TP CRD 10FT

## (undated) DEVICE — PERCLOSE™ PROSTYLE™ SUTURE-MEDIATED CLOSURE AND REPAIR SYSTEM: Brand: PERCLOSE™ PROSTYLE™

## (undated) DEVICE — PTA BALLOON DILATATION CATHETER: Brand: COYOTE™

## (undated) DEVICE — Device

## (undated) DEVICE — PAD,ABDOMINAL,5"X9",STERILE,LF,1/PK: Brand: MEDLINE INDUSTRIES, INC.

## (undated) DEVICE — RADIFOCUS GLIDEWIRE: Brand: GLIDEWIRE

## (undated) DEVICE — SPNG LAP PREWSH SFTPK 18X18IN STRL PK/5

## (undated) DEVICE — PACK,UNIVERSAL,NO GOWNS: Brand: MEDLINE

## (undated) DEVICE — UNDRGLV SURG BIOGEL PIMICROINDICATOR SYNTH SZ7.5PF STRL PR/2

## (undated) DEVICE — IR MAJOR VASCULAR PACK: Brand: MEDLINE INDUSTRIES, INC.

## (undated) DEVICE — HI-TORQUE SPARTACORE 14 PERIPHERAL GUIDE WIRE .014 5.0 CM X 300 CM: Brand: SPARTACORE

## (undated) DEVICE — PINNACLE INTRODUCER SHEATH: Brand: PINNACLE

## (undated) DEVICE — SOLUTION,WATER,IRRIGATION,1000ML,STERILE: Brand: MEDLINE

## (undated) DEVICE — 3M™ STERI-DRAPE™ INSTRUMENT POUCH 9097: Brand: 3M™ STERI-DRAPE™

## (undated) DEVICE — ST IV INFUS ALARIS PUMP MODULE 2PC M/LL 23ML 109IN

## (undated) DEVICE — SUT PROLN 6/0 BV1 D/A 30IN 8709H

## (undated) DEVICE — CATH SUP RUBICON STR .014 4F 150CM

## (undated) DEVICE — LN INJ CONTRST FLXCIL HP F/M LL 1200PSI72

## (undated) DEVICE — SLV SCD CALF HEMOFORCE DVT THERP REPROC MD

## (undated) DEVICE — SOL IRRIG NACL 1000ML

## (undated) DEVICE — C-ARM: Brand: UNBRANDED

## (undated) DEVICE — DESTINATION RENAL GUIDING SHEATH: Brand: DESTINATION

## (undated) DEVICE — GOWN,REINFORCE,POLY,SIRUS,BREATH SLV,XLG: Brand: MEDLINE

## (undated) DEVICE — MICRO TIP WIPE: Brand: DEVON

## (undated) DEVICE — BANDAGE,GAUZE,BULKEE II,4.5"X4.1YD,STRL: Brand: MEDLINE

## (undated) DEVICE — GEL ULTRASND AQUASONIC 100 20GM STRL

## (undated) DEVICE — KT SURG TURNOVER 050

## (undated) DEVICE — CANSTR COL ENGINE FOR INDIGO SYS

## (undated) DEVICE — KT CATH INDIGO W/TBG/ASP STR/TP 6F 135CM 2MM

## (undated) DEVICE — APPL CHLORAPREP HI/LITE TINTED 10.5ML ORNG

## (undated) DEVICE — GLV SURG BIOGEL LTX PF 7 1/2

## (undated) DEVICE — SYR INJ ILLUMENA W/HANDIFILL 150ML LTX STRL

## (undated) DEVICE — DEV EPS SPIDERFX 5MM OTW320/RX190CM

## (undated) DEVICE — EXTENSION SET, MALE LUER LOCK ADAPTER WITH RETRACTABLE COLLAR

## (undated) DEVICE — CATH OMNI FLUSH 5FR

## (undated) DEVICE — GOWN,REINFRCE,POLY,SIRUS,BREATH SLV,XXLG: Brand: MEDLINE

## (undated) DEVICE — COVADERM: Brand: DEROYAL

## (undated) DEVICE — KT CATH INDIGO RX ASP 140CM

## (undated) DEVICE — ARMADA 14 PTA CATHETER 4.0 MM X 80 MM X 150 CM / OVER-THE-WIRE: Brand: ARMADA

## (undated) DEVICE — RADIFOCUS TORQUE DEVICE MULTI-TORQUE VISE: Brand: RADIFOCUS TORQUE DEVICE

## (undated) DEVICE — IRRIGATOR BULB ASEPTO 60CC STRL

## (undated) DEVICE — GAUZE,SPONGE,4"X4",32PLY,XRAY,STRL,LF: Brand: MEDLINE

## (undated) DEVICE — PK MINOR VASC 50

## (undated) DEVICE — PINNACLE R/O II INTRODUCER SHEATH WITH RADIOPAQUE MARKER: Brand: PINNACLE

## (undated) DEVICE — COVER,LIGHT HANDLE,FLX,1/PK: Brand: MEDLINE INDUSTRIES, INC.

## (undated) DEVICE — APPL CHLORAPREP HI/LITE 26ML ORNG

## (undated) DEVICE — SYRINGE KIT,PACKAGED,,150FT,MK 7(ANGIO-ARTERION, 150ML SYR KIT W/QFT,MC)(60729385): Brand: MEDRAD® MARK 7 ARTERION DISPOSABLE SYRINGE 150 ML WITH QUICK FILL TUBE

## (undated) DEVICE — PK EXTREM 50

## (undated) DEVICE — SOL NACL INJ USP 0.9PCT 500ML STRL

## (undated) DEVICE — WET SKIN PREP TRAY: Brand: MEDLINE INDUSTRIES, INC.

## (undated) DEVICE — DEV INFL COMPAK W/ACCESSPLUS IN4530

## (undated) DEVICE — WIPE INST 3X3IN 2MM BX/20

## (undated) DEVICE — GOWN,REINF,POLY,SIRUS,BRTH SLV,XLNG/XXL: Brand: MEDLINE

## (undated) DEVICE — SUT PROLN 5/0 C1 D/A 36IN 8720H

## (undated) DEVICE — TOWEL,OR,DSP,ST,WHITE,DLX,4/PK,20PK/CS: Brand: MEDLINE

## (undated) DEVICE — 3M™ IOBAN™ 2 ANTIMICROBIAL INCISE DRAPE 6650EZ: Brand: IOBAN™ 2

## (undated) DEVICE — RADIFOCUS GLIDEWIRE ADVANTAGE GUIDEWIRE: Brand: GLIDEWIRE ADVANTAGE

## (undated) DEVICE — CVR PROB ULTRASND CIVFLEX GEN/PURP TELESCP/FOLD 5.5X96IN LF

## (undated) DEVICE — GAUZE,SPONGE,FLUFF,6"X6.75",STRL,5/TRAY: Brand: MEDLINE

## (undated) DEVICE — DRAPE,HAND,STERILE: Brand: MEDLINE

## (undated) DEVICE — CVR HNDL LT SURG ACCSSRY BLU STRL

## (undated) DEVICE — PENCL SMOKE/EVAC MEGADYNE TELESCP 15FT

## (undated) DEVICE — SOL NACL 0.9PCT 1000ML

## (undated) DEVICE — ADHS SKIN PREMIERPRO EXOFIN TOPICAL HI/VISC .5ML

## (undated) DEVICE — ANTIBACTERIAL UNDYED BRAIDED (POLYGLACTIN 910), SYNTHETIC ABSORBABLE SUTURE: Brand: COATED VICRYL